# Patient Record
Sex: FEMALE | Race: WHITE | Employment: OTHER | URBAN - METROPOLITAN AREA
[De-identification: names, ages, dates, MRNs, and addresses within clinical notes are randomized per-mention and may not be internally consistent; named-entity substitution may affect disease eponyms.]

---

## 2017-02-27 DIAGNOSIS — F51.01 PRIMARY INSOMNIA: ICD-10-CM

## 2017-02-28 RX ORDER — MIRTAZAPINE 30 MG/1
30 TABLET, FILM COATED ORAL NIGHTLY
Qty: 30 TABLET | Refills: 1 | OUTPATIENT
Start: 2017-02-28

## 2017-02-28 RX ORDER — HYDROXYZINE PAMOATE 25 MG/1
CAPSULE ORAL
Qty: 60 CAPSULE | Refills: 0 | OUTPATIENT
Start: 2017-02-28

## 2017-02-28 RX ORDER — TRAZODONE HYDROCHLORIDE 100 MG/1
TABLET ORAL
Qty: 45 TABLET | Refills: 0 | OUTPATIENT
Start: 2017-02-28

## 2017-02-28 RX ORDER — LORAZEPAM 1 MG/1
TABLET ORAL
Qty: 30 TABLET | Refills: 0 | OUTPATIENT
Start: 2017-02-28

## 2017-02-28 RX ORDER — METHOCARBAMOL 750 MG/1
TABLET, FILM COATED ORAL
Qty: 90 TABLET | Refills: 0 | OUTPATIENT
Start: 2017-02-28

## 2017-02-28 RX ORDER — IBUPROFEN 800 MG/1
TABLET ORAL
Qty: 120 TABLET | Refills: 0 | OUTPATIENT
Start: 2017-02-28

## 2017-03-02 DIAGNOSIS — J45.20 MILD INTERMITTENT ASTHMA WITHOUT COMPLICATION: ICD-10-CM

## 2017-03-02 RX ORDER — ALBUTEROL SULFATE 90 UG/1
2 AEROSOL, METERED RESPIRATORY (INHALATION) EVERY 6 HOURS PRN
Qty: 1 INHALER | Refills: 0 | Status: ON HOLD | OUTPATIENT
Start: 2017-03-02 | End: 2018-06-08

## 2017-03-03 RX ORDER — LORAZEPAM 1 MG/1
TABLET ORAL
Qty: 30 TABLET | Refills: 0 | OUTPATIENT
Start: 2017-03-03

## 2017-03-03 RX ORDER — IPRATROPIUM BROMIDE AND ALBUTEROL SULFATE 2.5; .5 MG/3ML; MG/3ML
1 SOLUTION RESPIRATORY (INHALATION) EVERY 4 HOURS
Qty: 120 ML | Refills: 0 | Status: SHIPPED | OUTPATIENT
Start: 2017-03-03 | End: 2017-11-04 | Stop reason: ALTCHOICE

## 2017-03-03 RX ORDER — METHOCARBAMOL 750 MG/1
TABLET, FILM COATED ORAL
Qty: 90 TABLET | Refills: 0 | Status: ON HOLD | OUTPATIENT
Start: 2017-03-03 | End: 2018-06-08 | Stop reason: ALTCHOICE

## 2017-03-03 RX ORDER — TRAZODONE HYDROCHLORIDE 100 MG/1
TABLET ORAL
Qty: 45 TABLET | Refills: 0 | Status: SHIPPED | OUTPATIENT
Start: 2017-03-03 | End: 2018-03-16

## 2017-05-08 PROBLEM — J96.01 ACUTE RESPIRATORY FAILURE WITH HYPOXIA (HCC): Status: ACTIVE | Noted: 2017-05-08

## 2017-05-09 PROBLEM — J45.901 ACUTE ASTHMA EXACERBATION: Status: ACTIVE | Noted: 2017-05-09

## 2017-05-09 PROBLEM — J45.902 ASTHMA WITH STATUS ASTHMATICUS: Status: ACTIVE | Noted: 2017-05-09

## 2017-05-10 ENCOUNTER — CARE COORDINATION (OUTPATIENT)
Dept: CARE COORDINATION | Age: 57
End: 2017-05-10

## 2017-05-11 ENCOUNTER — TELEPHONE (OUTPATIENT)
Dept: PULMONOLOGY | Age: 57
End: 2017-05-11

## 2018-06-08 PROBLEM — J45.901 ACUTE ASTHMA EXACERBATION: Status: ACTIVE | Noted: 2018-06-08
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
## 2018-06-08 PROBLEM — J96.01 ACUTE RESPIRATORY FAILURE WITH HYPOXIA (HCC): Status: RESOLVED | Noted: 2017-05-08 | Resolved: 2018-06-08
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
## 2018-06-10 PROBLEM — J45.902 ASTHMA WITH STATUS ASTHMATICUS: Status: RESOLVED | Noted: 2017-05-09 | Resolved: 2018-06-10
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
## 2018-06-10 PROBLEM — J45.901 ACUTE ASTHMA EXACERBATION: Status: RESOLVED | Noted: 2018-06-08 | Resolved: 2018-06-10
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
## 2018-06-30 ENCOUNTER — CLINICAL DOCUMENTATION (OUTPATIENT)
Dept: INTERNAL MEDICINE | Age: 58
End: 2018-06-30

## 2018-08-28 ENCOUNTER — HOSPITAL ENCOUNTER (OUTPATIENT)
Age: 58
Setting detail: OBSERVATION
Discharge: HOME | End: 2018-08-29
Attending: EMERGENCY MEDICINE | Admitting: EMERGENCY MEDICINE

## 2018-08-28 ENCOUNTER — APPOINTMENT (OUTPATIENT)
Dept: GENERAL RADIOLOGY | Age: 58
End: 2018-08-28

## 2018-08-28 DIAGNOSIS — J45.901 ACUTE SEVERE EXACERBATION OF ASTHMA: Primary | ICD-10-CM

## 2018-08-28 LAB
ANION GAP SERPL CALCULATED.3IONS-SCNC: 13 MMOL/L (ref 3–16)
BUN BLDV-MCNC: 15 MG/DL (ref 7–20)
CALCIUM SERPL-MCNC: 9.5 MG/DL (ref 8.3–10.6)
CHLORIDE BLD-SCNC: 102 MMOL/L (ref 99–110)
CO2: 23 MMOL/L (ref 21–32)
CREAT SERPL-MCNC: 0.5 MG/DL (ref 0.6–1.1)
EKG ATRIAL RATE: 94 BPM
EKG ATRIAL RATE: 94 BPM
EKG DIAGNOSIS: NORMAL
EKG DIAGNOSIS: NORMAL
EKG P AXIS: 6 DEGREES
EKG P AXIS: 6 DEGREES
EKG P-R INTERVAL: 176 MS
EKG P-R INTERVAL: 176 MS
EKG Q-T INTERVAL: 360 MS
EKG Q-T INTERVAL: 360 MS
EKG QRS DURATION: 88 MS
EKG QRS DURATION: 88 MS
EKG QTC CALCULATION (BAZETT): 450 MS
EKG QTC CALCULATION (BAZETT): 450 MS
EKG R AXIS: 55 DEGREES
EKG R AXIS: 55 DEGREES
EKG T AXIS: 46 DEGREES
EKG T AXIS: 46 DEGREES
EKG VENTRICULAR RATE: 94 BPM
EKG VENTRICULAR RATE: 94 BPM
GFR AFRICAN AMERICAN: >60
GFR NON-AFRICAN AMERICAN: >60
GLUCOSE BLD-MCNC: 198 MG/DL (ref 70–99)
HCT VFR BLD CALC: 32.7 % (ref 36–48)
HEMOGLOBIN: 10.4 G/DL (ref 12–16)
MCH RBC QN AUTO: 25 PG (ref 26–34)
MCHC RBC AUTO-ENTMCNC: 31.8 G/DL (ref 31–36)
MCV RBC AUTO: 78.7 FL (ref 80–100)
PDW BLD-RTO: 18.6 % (ref 12.4–15.4)
PLATELET # BLD: 470 K/UL (ref 135–450)
PMV BLD AUTO: 7.4 FL (ref 5–10.5)
POTASSIUM REFLEX MAGNESIUM: 4.3 MMOL/L (ref 3.5–5.1)
RBC # BLD: 4.16 M/UL (ref 4–5.2)
SODIUM BLD-SCNC: 138 MMOL/L (ref 136–145)
WBC # BLD: 9.8 K/UL (ref 4–11)

## 2018-08-28 PROCEDURE — 94640 AIRWAY INHALATION TREATMENT: CPT

## 2018-08-28 PROCEDURE — 2580000003 HC RX 258: Performed by: INTERNAL MEDICINE

## 2018-08-28 PROCEDURE — 93005 ELECTROCARDIOGRAM TRACING: CPT | Performed by: INTERNAL MEDICINE

## 2018-08-28 PROCEDURE — 71046 X-RAY EXAM CHEST 2 VIEWS: CPT

## 2018-08-28 PROCEDURE — 6360000002 HC RX W HCPCS: Performed by: INTERNAL MEDICINE

## 2018-08-28 PROCEDURE — 85027 COMPLETE CBC AUTOMATED: CPT

## 2018-08-28 PROCEDURE — 6370000000 HC RX 637 (ALT 250 FOR IP): Performed by: EMERGENCY MEDICINE

## 2018-08-28 PROCEDURE — 93005 ELECTROCARDIOGRAM TRACING: CPT | Performed by: EMERGENCY MEDICINE

## 2018-08-28 PROCEDURE — G0378 HOSPITAL OBSERVATION PER HR: HCPCS

## 2018-08-28 PROCEDURE — 99222 1ST HOSP IP/OBS MODERATE 55: CPT | Performed by: INTERNAL MEDICINE

## 2018-08-28 PROCEDURE — 6360000002 HC RX W HCPCS: Performed by: EMERGENCY MEDICINE

## 2018-08-28 PROCEDURE — 96374 THER/PROPH/DIAG INJ IV PUSH: CPT

## 2018-08-28 PROCEDURE — 94150 VITAL CAPACITY TEST: CPT

## 2018-08-28 PROCEDURE — 96372 THER/PROPH/DIAG INJ SC/IM: CPT

## 2018-08-28 PROCEDURE — 6370000000 HC RX 637 (ALT 250 FOR IP): Performed by: INTERNAL MEDICINE

## 2018-08-28 PROCEDURE — 99285 EMERGENCY DEPT VISIT HI MDM: CPT

## 2018-08-28 PROCEDURE — 80048 BASIC METABOLIC PNL TOTAL CA: CPT

## 2018-08-28 PROCEDURE — 36415 COLL VENOUS BLD VENIPUNCTURE: CPT

## 2018-08-28 PROCEDURE — 94799 UNLISTED PULMONARY SVC/PX: CPT

## 2018-08-28 PROCEDURE — 94664 DEMO&/EVAL PT USE INHALER: CPT

## 2018-08-28 PROCEDURE — 94761 N-INVAS EAR/PLS OXIMETRY MLT: CPT

## 2018-08-28 RX ORDER — ALBUTEROL SULFATE 2.5 MG/3ML
2.5 SOLUTION RESPIRATORY (INHALATION) 4 TIMES DAILY
Status: DISCONTINUED | OUTPATIENT
Start: 2018-08-28 | End: 2018-08-28

## 2018-08-28 RX ORDER — BUDESONIDE 0.5 MG/2ML
0.5 INHALANT ORAL 2 TIMES DAILY
Status: DISCONTINUED | OUTPATIENT
Start: 2018-08-28 | End: 2018-08-28

## 2018-08-28 RX ORDER — METHYLPREDNISOLONE SODIUM SUCCINATE 125 MG/2ML
125 INJECTION, POWDER, LYOPHILIZED, FOR SOLUTION INTRAMUSCULAR; INTRAVENOUS ONCE
Status: DISCONTINUED | OUTPATIENT
Start: 2018-08-28 | End: 2018-08-28

## 2018-08-28 RX ORDER — HYDROXYZINE PAMOATE 25 MG/1
25 CAPSULE ORAL 3 TIMES DAILY PRN
Status: DISCONTINUED | OUTPATIENT
Start: 2018-08-28 | End: 2018-08-29 | Stop reason: HOSPADM

## 2018-08-28 RX ORDER — IPRATROPIUM BROMIDE AND ALBUTEROL SULFATE 2.5; .5 MG/3ML; MG/3ML
1 SOLUTION RESPIRATORY (INHALATION) ONCE
Status: COMPLETED | OUTPATIENT
Start: 2018-08-28 | End: 2018-08-28

## 2018-08-28 RX ORDER — BUDESONIDE 0.5 MG/2ML
0.5 INHALANT ORAL 2 TIMES DAILY
Status: DISCONTINUED | OUTPATIENT
Start: 2018-08-28 | End: 2018-08-29 | Stop reason: HOSPADM

## 2018-08-28 RX ORDER — CYCLOBENZAPRINE HCL 10 MG
10 TABLET ORAL 3 TIMES DAILY
Status: DISCONTINUED | OUTPATIENT
Start: 2018-08-28 | End: 2018-08-29 | Stop reason: HOSPADM

## 2018-08-28 RX ORDER — IPRATROPIUM BROMIDE AND ALBUTEROL SULFATE 2.5; .5 MG/3ML; MG/3ML
1 SOLUTION RESPIRATORY (INHALATION) EVERY 4 HOURS
Status: DISCONTINUED | OUTPATIENT
Start: 2018-08-28 | End: 2018-08-29 | Stop reason: HOSPADM

## 2018-08-28 RX ORDER — SODIUM CHLORIDE 0.9 % (FLUSH) 0.9 %
10 SYRINGE (ML) INJECTION PRN
Status: DISCONTINUED | OUTPATIENT
Start: 2018-08-28 | End: 2018-08-29 | Stop reason: HOSPADM

## 2018-08-28 RX ORDER — SODIUM CHLORIDE 0.9 % (FLUSH) 0.9 %
10 SYRINGE (ML) INJECTION EVERY 12 HOURS SCHEDULED
Status: DISCONTINUED | OUTPATIENT
Start: 2018-08-28 | End: 2018-08-29 | Stop reason: HOSPADM

## 2018-08-28 RX ORDER — ALBUTEROL SULFATE 2.5 MG/3ML
2.5 SOLUTION RESPIRATORY (INHALATION) EVERY 6 HOURS PRN
Status: DISCONTINUED | OUTPATIENT
Start: 2018-08-28 | End: 2018-08-28

## 2018-08-28 RX ORDER — ALBUTEROL SULFATE 2.5 MG/3ML
2.5 SOLUTION RESPIRATORY (INHALATION) EVERY 4 HOURS PRN
Status: DISCONTINUED | OUTPATIENT
Start: 2018-08-28 | End: 2018-08-29 | Stop reason: HOSPADM

## 2018-08-28 RX ORDER — DOCUSATE SODIUM 100 MG/1
100 CAPSULE, LIQUID FILLED ORAL 2 TIMES DAILY
Status: DISCONTINUED | OUTPATIENT
Start: 2018-08-28 | End: 2018-08-29 | Stop reason: HOSPADM

## 2018-08-28 RX ORDER — ONDANSETRON 2 MG/ML
4 INJECTION INTRAMUSCULAR; INTRAVENOUS EVERY 6 HOURS PRN
Status: DISCONTINUED | OUTPATIENT
Start: 2018-08-28 | End: 2018-08-29 | Stop reason: HOSPADM

## 2018-08-28 RX ORDER — FLUOXETINE HYDROCHLORIDE 20 MG/1
40 CAPSULE ORAL DAILY
Status: DISCONTINUED | OUTPATIENT
Start: 2018-08-28 | End: 2018-08-29 | Stop reason: HOSPADM

## 2018-08-28 RX ORDER — SODIUM CHLORIDE 9 MG/ML
INJECTION, SOLUTION INTRAVENOUS CONTINUOUS
Status: DISCONTINUED | OUTPATIENT
Start: 2018-08-28 | End: 2018-08-29 | Stop reason: HOSPADM

## 2018-08-28 RX ORDER — METHYLPREDNISOLONE SODIUM SUCCINATE 40 MG/ML
40 INJECTION, POWDER, LYOPHILIZED, FOR SOLUTION INTRAMUSCULAR; INTRAVENOUS EVERY 12 HOURS
Status: DISCONTINUED | OUTPATIENT
Start: 2018-08-28 | End: 2018-08-29 | Stop reason: HOSPADM

## 2018-08-28 RX ORDER — GABAPENTIN 300 MG/1
600 CAPSULE ORAL 3 TIMES DAILY
Status: DISCONTINUED | OUTPATIENT
Start: 2018-08-28 | End: 2018-08-29 | Stop reason: HOSPADM

## 2018-08-28 RX ORDER — IPRATROPIUM BROMIDE AND ALBUTEROL SULFATE 2.5; .5 MG/3ML; MG/3ML
1 SOLUTION RESPIRATORY (INHALATION)
Status: DISCONTINUED | OUTPATIENT
Start: 2018-08-28 | End: 2018-08-28

## 2018-08-28 RX ORDER — ALBUTEROL SULFATE 2.5 MG/3ML
2.5 SOLUTION RESPIRATORY (INHALATION) EVERY 30 MIN PRN
Status: DISCONTINUED | OUTPATIENT
Start: 2018-08-28 | End: 2018-08-28

## 2018-08-28 RX ORDER — ACETAMINOPHEN 500 MG
1000 TABLET ORAL ONCE
Status: COMPLETED | OUTPATIENT
Start: 2018-08-28 | End: 2018-08-28

## 2018-08-28 RX ORDER — IBUPROFEN 400 MG/1
800 TABLET ORAL EVERY 6 HOURS PRN
Status: DISCONTINUED | OUTPATIENT
Start: 2018-08-28 | End: 2018-08-29 | Stop reason: HOSPADM

## 2018-08-28 RX ORDER — KETOROLAC TROMETHAMINE 30 MG/ML
30 INJECTION, SOLUTION INTRAMUSCULAR; INTRAVENOUS ONCE
Status: COMPLETED | OUTPATIENT
Start: 2018-08-28 | End: 2018-08-28

## 2018-08-28 RX ADMIN — HYDROXYZINE PAMOATE 25 MG: 25 CAPSULE ORAL at 20:42

## 2018-08-28 RX ADMIN — IPRATROPIUM BROMIDE AND ALBUTEROL SULFATE 1 AMPULE: .5; 3 SOLUTION RESPIRATORY (INHALATION) at 11:54

## 2018-08-28 RX ADMIN — BUDESONIDE 500 MCG: 0.5 SUSPENSION RESPIRATORY (INHALATION) at 11:54

## 2018-08-28 RX ADMIN — BUDESONIDE 500 MCG: 0.5 SUSPENSION RESPIRATORY (INHALATION) at 20:00

## 2018-08-28 RX ADMIN — ALBUTEROL SULFATE 2.5 MG: 2.5 SOLUTION RESPIRATORY (INHALATION) at 06:14

## 2018-08-28 RX ADMIN — DOCUSATE SODIUM 100 MG: 100 CAPSULE, LIQUID FILLED ORAL at 20:37

## 2018-08-28 RX ADMIN — IBUPROFEN 800 MG: 400 TABLET ORAL at 17:59

## 2018-08-28 RX ADMIN — ENOXAPARIN SODIUM 40 MG: 40 INJECTION SUBCUTANEOUS at 12:07

## 2018-08-28 RX ADMIN — METHYLPREDNISOLONE SODIUM SUCCINATE 40 MG: 40 INJECTION, POWDER, FOR SOLUTION INTRAMUSCULAR; INTRAVENOUS at 20:39

## 2018-08-28 RX ADMIN — METHYLPREDNISOLONE SODIUM SUCCINATE 40 MG: 40 INJECTION, POWDER, FOR SOLUTION INTRAMUSCULAR; INTRAVENOUS at 09:37

## 2018-08-28 RX ADMIN — IBUPROFEN 800 MG: 400 TABLET ORAL at 12:04

## 2018-08-28 RX ADMIN — GABAPENTIN 600 MG: 300 CAPSULE ORAL at 16:23

## 2018-08-28 RX ADMIN — IPRATROPIUM BROMIDE AND ALBUTEROL SULFATE 1 AMPULE: .5; 3 SOLUTION RESPIRATORY (INHALATION) at 15:34

## 2018-08-28 RX ADMIN — GABAPENTIN 600 MG: 300 CAPSULE ORAL at 20:37

## 2018-08-28 RX ADMIN — IPRATROPIUM BROMIDE AND ALBUTEROL SULFATE 1 AMPULE: .5; 3 SOLUTION RESPIRATORY (INHALATION) at 03:59

## 2018-08-28 RX ADMIN — SODIUM CHLORIDE: 900 INJECTION, SOLUTION INTRAVENOUS at 09:25

## 2018-08-28 RX ADMIN — KETOROLAC TROMETHAMINE 30 MG: 30 INJECTION, SOLUTION INTRAMUSCULAR at 06:00

## 2018-08-28 RX ADMIN — FLUOXETINE 40 MG: 20 CAPSULE ORAL at 09:36

## 2018-08-28 RX ADMIN — IPRATROPIUM BROMIDE AND ALBUTEROL SULFATE 1 AMPULE: .5; 3 SOLUTION RESPIRATORY (INHALATION) at 20:00

## 2018-08-28 RX ADMIN — ACETAMINOPHEN 1000 MG: 500 TABLET ORAL at 16:23

## 2018-08-28 RX ADMIN — Medication 10 ML: at 09:38

## 2018-08-28 RX ADMIN — GABAPENTIN 600 MG: 300 CAPSULE ORAL at 09:36

## 2018-08-28 RX ADMIN — CYCLOBENZAPRINE HYDROCHLORIDE 10 MG: 10 TABLET, FILM COATED ORAL at 12:05

## 2018-08-28 RX ADMIN — CYCLOBENZAPRINE HYDROCHLORIDE 10 MG: 10 TABLET, FILM COATED ORAL at 20:37

## 2018-08-28 RX ADMIN — HYDROXYZINE PAMOATE 25 MG: 25 CAPSULE ORAL at 12:05

## 2018-08-28 RX ADMIN — Medication 10 ML: at 20:37

## 2018-08-28 RX ADMIN — ALBUTEROL SULFATE 2.5 MG: 2.5 SOLUTION RESPIRATORY (INHALATION) at 04:12

## 2018-08-28 RX ADMIN — SODIUM CHLORIDE: 900 INJECTION, SOLUTION INTRAVENOUS at 22:16

## 2018-08-28 RX ADMIN — AMITRIPTYLINE HYDROCHLORIDE 75 MG: 25 TABLET, FILM COATED ORAL at 20:37

## 2018-08-28 ASSESSMENT — PAIN DESCRIPTION - LOCATION
LOCATION: RIB CAGE
LOCATION: HEAD
LOCATION: HEAD
LOCATION: CHEST
LOCATION: HEAD

## 2018-08-28 ASSESSMENT — PAIN DESCRIPTION - PAIN TYPE
TYPE: ACUTE PAIN

## 2018-08-28 ASSESSMENT — ENCOUNTER SYMPTOMS
WHEEZING: 1
COUGH: 1
SPUTUM PRODUCTION: 1
EYES NEGATIVE: 1
SHORTNESS OF BREATH: 1
GASTROINTESTINAL NEGATIVE: 1

## 2018-08-28 ASSESSMENT — PAIN SCALES - GENERAL
PAINLEVEL_OUTOF10: 9
PAINLEVEL_OUTOF10: 9
PAINLEVEL_OUTOF10: 0
PAINLEVEL_OUTOF10: 8
PAINLEVEL_OUTOF10: 9

## 2018-08-28 ASSESSMENT — PAIN DESCRIPTION - FREQUENCY
FREQUENCY: CONTINUOUS
FREQUENCY: CONTINUOUS

## 2018-08-28 ASSESSMENT — PAIN DESCRIPTION - ORIENTATION: ORIENTATION: MID

## 2018-08-28 ASSESSMENT — PAIN DESCRIPTION - DESCRIPTORS
DESCRIPTORS: ACHING
DESCRIPTORS: ACHING;TIGHTNESS;CONSTANT
DESCRIPTORS: ACHING

## 2018-08-28 ASSESSMENT — PAIN DESCRIPTION - ONSET
ONSET: ON-GOING

## 2018-08-28 ASSESSMENT — PULMONARY FUNCTION TESTS: PEFR_L/MIN: 180

## 2018-08-28 NOTE — PROGRESS NOTES
Patient complaining of a 9 out of 10 headache. She had ibuprofen at noon.   Sent a perfect serve to Dr. Meena Castaneda

## 2018-08-28 NOTE — FLOWSHEET NOTE
08/28/18 1556   Encounter Summary   Services provided to: Patient   Referral/Consult From: [address] [facility] Family members   Continue Visiting (seen 8/28/18, DIAZ. )   Complexity of Encounter Moderate   Length of Encounter 15 minutes   Routine   Type Initial   Assessment Approachable   Intervention Nurtured hope   Outcome Expressed gratitude

## 2018-08-28 NOTE — CONSULTS
 TOTAL HIP ARTHROPLASTY Bilateral 2014    WISDOM TOOTH EXTRACTION  1995       Family History  Family History   Problem Relation Age of Onset    Adopted: Yes    Diabetes Daughter     Cancer Mother         Breast and leukemia    Heart Disease Sister     Drug Abuse Brother        Social History  Social History     Social History    Marital status: Legally      Spouse name: N/A    Number of children: N/A    Years of education: N/A     Social History Main Topics    Smoking status: Former Smoker     Packs/day: 0.25     Years: 0.20     Types: Cigarettes     Quit date: 6/1/2014    Smokeless tobacco: Never Used      Comment: working to decrease    Alcohol use No      Comment: recovering alcoholic since [age], has had couple of relaspes, last 7/15/2015.     Drug use: No    Sexual activity: Yes     Partners: Male     Other Topics Concern    Not on file     Social History Narrative    No narrative on file       Allergies  Allergies   Allergen Reactions    Compazine [Prochlorperazine] Other (See Comments)     EPS symptoms    Droperidol Other (See Comments)     shaking  ETS       Current Medications  Current Facility-Administered Medications   Medication Dose Route Frequency Provider Last Rate Last Dose    amitriptyline (ELAVIL) tablet 75 mg  75 mg Oral Nightly Ernie Melton MD        FLUoxetine (PROZAC) capsule 40 mg  40 mg Oral Daily Ernie Melton MD   40 mg at 08/28/18 0936    gabapentin (NEURONTIN) capsule 600 mg  600 mg Oral TID Ernie Melton MD   600 mg at 08/28/18 0936    0.9 % sodium chloride infusion   Intravenous Continuous Ernie Melton MD 75 mL/hr at 08/28/18 5419      sodium chloride flush 0.9 % injection 10 mL  10 mL Intravenous 2 times per day Ernie Melton MD   10 mL at 08/28/18 5279    sodium chloride flush 0.9 % injection 10 mL  10 mL Intravenous PRN Ernie Melton MD        magnesium hydroxide (MILK OF MAGNESIA) 400 MG/5ML suspension 30 mL  30 mL Oral Daily PRN Piero VALDEZ

## 2018-08-28 NOTE — ED NOTES
Attempted to call report to [address] [place] [address] [address], RN unavailable at this time. Phone number left with [phone].      Carlyle Merlin, RN  08/28/18 9517

## 2018-08-28 NOTE — PROGRESS NOTES
Clinical Pharmacy Progress Note  Medication History     Admit Date: 8/28/2018    List of of current medications patient is taking is complete. Home Medication list in EPIC updated to reflect changes noted below. Source of information: Patient interview    **Of note - patient is supposed to be taking Symbicort as a maintenance inhaler. Pt states she no longer has insurance and cannot afford the Symbicort. Uses only Albuterol nebs now. · Told patient that I would print out patient assistance forms for her, but patient states that she has tried that before and she makes too much money so doesn't qualify for assistance. Verified that another pharmacist provided the patient assistance information to her during her admission in early June. Coupons can only be used with [employer], so this also is not an option. Patient has already used a 30-day free trial card, so also does qualify for a free trial coupon. · An alternate ICS / LABA could be tried Elda Barboza / Radha Bowles / [facility] however the cash price would be the same as Symbicort. Patient assistance programs via drug companies use similar requirements for eligibility, so if she does not qualify for Symbicort patient assistance, she likely will not qualify for the others either. · Could consider using Budesonide nebs BID + Albuterol nebs (pt already on) QID as an alternate - although Budesonide nebs would likely be cash price ~$180 / month (vs $320 / month for Symbicort). · There is a generic Fluticasone / Salmeterol product that is less expensive - generic Rosebud Misha would be ~$ / month cash price. Would need to be ordered as Rosebud Misha \"ok to substitute with generic\" 232mcg/14mcg 1 puff BID. Unsure if this would be considered affordable to patient.   · Systemic chronic steroid (low dose prednisone) may be the only cost-effective option for this patient, although would need to consider risk / benefit of long-term chronic systemic steroid.     Please call with questions--  Thanks--  Roney Todd, PharmD, [address], [address] or [phone] (wireless)  8/28/2018 9:07 AM

## 2018-08-28 NOTE — H&P
Breast and leukemia    Heart Disease Sister     Drug Abuse Brother        REVIEW OF SYSTEMS:   Pertinent positives as noted in the HPI. All other systems reviewed and negative. PHYSICAL EXAM PERFORMED:    BP (!) 145/80   Pulse 97   Temp 98.5 °F (36.9 °C) (Oral)   Resp 20   LMP 12/22/2014   SpO2 91%     Physical Exam   Constitutional: She is oriented to person, place, and time and well-developed, well-nourished, and in no distress. No distress. HENT:   Head: Normocephalic and atraumatic. Cardiovascular: Normal rate and regular rhythm. Pulmonary/Chest: Effort normal. She has wheezes. Abdominal: Soft. Bowel sounds are normal. She exhibits no distension. There is no tenderness. Musculoskeletal: Normal range of motion. She exhibits no edema. Lymphadenopathy:   No LAD noted. Neurological: She is alert and oriented to person, place, and time. Skin: Skin is warm and dry. She is not diaphoretic. Psychiatric: Affect and judgment normal.       Labs:   No labs done in ER. Radiology:           XR CHEST STANDARD (2 VW)   Final Result      Mild streaky right mid and lower lung opacity may relate to atelectasis or pneumonitis present previously      No lobar consolidation          ASSESSMENT & PLAN  Acute asthma exacerbation  Admit to observation. Start Nebs scheduled Q4 ,IV  steroids, and Dulera. Home o2 eval prior to dc. No need for abx now. Check BMP, CBC, EKG. Ordered. Chronic pain :  She is on Gabapentin and Amitriptyline. Continue with that. Code Status: Full         Eugenio Prieto MD    Thank you Bladimir Syed MD for the opportunity to be involved in this patient's care. If you have any questions or concerns please feel free to contact me at [phone].

## 2018-08-28 NOTE — ED PROVIDER NOTES
INHALER    Inhale 2 puffs into the lungs every 4 hours as needed for Wheezing or Shortness of Breath (Space out to every 6 hours as symptoms improve) Space out to every 6 hours as symptoms improve. AMITRIPTYLINE (ELAVIL) 75 MG TABLET    Take 1 tablet by mouth nightly    BUDESONIDE-FORMOTEROL (SYMBICORT) 160-4.5 MCG/ACT AERO    Inhale 2 puffs into the lungs 2 times daily    CHOLECALCIFEROL (VITAMIN D3) 5000 UNITS TABS    Take by mouth    CYCLOBENZAPRINE (FLEXERIL) 5 MG TABLET    Take 10 mg by mouth 3 times daily as needed for Muscle spasms    FLUOXETINE (PROZAC) 40 MG CAPSULE    Take 1 capsule by mouth daily    FOLIC ACID (FOLVITE) 1 MG TABLET        GABAPENTIN (NEURONTIN) 300 MG CAPSULE    Take 600 mg by mouth 3 times daily. Stacy Quiet HYDROXYZINE (VISTARIL) 25 MG CAPSULE    Take 25 mg by mouth 3 times daily as needed for Anxiety    IBUPROFEN (ADVIL;MOTRIN) 800 MG TABLET    Take 800 mg by mouth every 6 hours as needed for Pain    MULTIPLE VITAMINS-MINERALS (THERAPEUTIC MULTIVITAMIN-MINERALS) TABLET    Take 1 tablet by mouth daily    PREDNISONE (DELTASONE) 10 MG TABLET    Take 5 tablets by mouth once daily for 4 more days, starting tomorrow    VITAMIN B-1 (THIAMINE) 100 MG TABLET        VITAMIN B-12 (CYANOCOBALAMIN) 100 MCG TABLET           Allergies     She is allergic to compazine [prochlorperazine] and droperidol. Physical Exam     INITIAL VITALS: BP: 134/85, Temp: 98.5 °F (36.9 °C), Pulse: 97, Resp: 24, SpO2: 96 %   Physical Exam  Gen. --Actively wheezing as I walk in the room looking tachypneic  HEENT--atraumatic, normocephalic, EOMI, pupils are equal round reactive to light  Neck--trachea midline, no stridor  Chest--diffuse significant wheezing with prolonged expiratory phase, no focal rales or rhonchi  Heart--difficult to hear over the wheezing, regular rhythm, no appreciable murmurs  Abdomen--soft, nondistended, no focal rebound or guarding, bowel sounds are present  Extremities--trace edema both lower

## 2018-08-28 NOTE — PROGRESS NOTES
RESPIRATORY THERAPY ASSESSMENT    Name:  Mayte Doherty Record Number:  [id number]  Age: 61 y.o. Gender: female  : 1960  Today's Date:  2018  Room:  [facility]-    Assessment     Is the patient being admitted for a COPD or Asthma exacerbation? Yes   (If yes the patient will be seen every 4 hours for the first 24 hours and then reassessed)    Patient Admission Diagnosis      Allergies  Allergies   Allergen Reactions    Compazine [Prochlorperazine] Other (See Comments)     EPS symptoms    Droperidol Other (See Comments)     shaking  ETS       Minimum Predicted Vital Capacity:     646          Actual Vital Capacity:      500          Pulmonary History:Asthma  Home Oxygen Therapy:  room air  Home Respiratory Therapy:Albuterol and Budesonide/Formoterol    Current Respiratory Therapy:  Albuterol QID, Pulmicort BID, Duoneb Q4WA  Treatment Type: HHN, IS (HHN x 2, Peak flow done)  Medications: Albuterol/Ipratropium, Budesonide    Respiratory Severity Index(RSI)   Patients with orders for inhalation medications, oxygen, or any therapeutic treatment modality will be placed on Respiratory Protocol. They will be assessed with the first treatment and at least every 72 hours thereafter. The following severity scale will be used to determine frequency of treatment intervention. Smoking History: Mild Exacerbation = 3    Social History  Social History   Substance Use Topics    Smoking status: Former Smoker     Packs/day: 0.25     Years: 0.20     Types: Cigarettes     Quit date: 2014    Smokeless tobacco: Never Used      Comment: working to decrease    Alcohol use No      Comment: recovering alcoholic since , has had couple of relaspes, last 7/15/2015.        Recent Surgical History: None = 0  Past Surgical History  Past Surgical History:   Procedure Laterality Date    BREAST ENHANCEMENT SURGERY Bilateral    [place]    FRACTURE SURGERY experiences worsened bronchospasm, or secretions have lessened to the point that the patient is able to clear them with a cough. Anti-inflammatory and Combination Medications:    1. If the patient lacks prior history of lung disease, is not using inhaled anti-inflammatory medication at home, and lacks wheezing by examination or by history for at least 24 hours, contact physician for possible discontinuation.

## 2018-08-28 NOTE — ED NOTES
RT at bedside for breathing treatment. Pt will be admitted for her 02 sats dropping even while awake.      Romel Shelton RN  08/28/18 1675

## 2018-08-28 NOTE — PROGRESS NOTES
Patient called out and requested a breathing treatment. Respiratory at bedside. Patient complaining of pain. Ibuprofen given and scheduled flexeril.

## 2018-08-28 NOTE — PROGRESS NOTES
Peak flows done per orders. Pre = 120, post = 190 (45% predicted), predicted = 425. MD fernandez served to notify him of peak flows <200 per orders.

## 2018-08-29 VITALS
OXYGEN SATURATION: 92 % | WEIGHT: 175 LBS | HEART RATE: 97 BPM | RESPIRATION RATE: 20 BRPM | HEIGHT: 57 IN | BODY MASS INDEX: 37.76 KG/M2 | TEMPERATURE: 98 F | DIASTOLIC BLOOD PRESSURE: 90 MMHG | SYSTOLIC BLOOD PRESSURE: 154 MMHG

## 2018-08-29 PROCEDURE — 94664 DEMO&/EVAL PT USE INHALER: CPT

## 2018-08-29 PROCEDURE — 6370000000 HC RX 637 (ALT 250 FOR IP): Performed by: INTERNAL MEDICINE

## 2018-08-29 PROCEDURE — 96372 THER/PROPH/DIAG INJ SC/IM: CPT

## 2018-08-29 PROCEDURE — 94761 N-INVAS EAR/PLS OXIMETRY MLT: CPT

## 2018-08-29 PROCEDURE — 6360000002 HC RX W HCPCS: Performed by: INTERNAL MEDICINE

## 2018-08-29 PROCEDURE — 94640 AIRWAY INHALATION TREATMENT: CPT

## 2018-08-29 PROCEDURE — G0378 HOSPITAL OBSERVATION PER HR: HCPCS

## 2018-08-29 PROCEDURE — 99232 SBSQ HOSP IP/OBS MODERATE 35: CPT | Performed by: INTERNAL MEDICINE

## 2018-08-29 PROCEDURE — 94680 O2 UPTK RST&XERS DIR SIMPLE: CPT

## 2018-08-29 PROCEDURE — 2580000003 HC RX 258: Performed by: INTERNAL MEDICINE

## 2018-08-29 RX ORDER — IPRATROPIUM BROMIDE AND ALBUTEROL SULFATE 2.5; .5 MG/3ML; MG/3ML
3 SOLUTION RESPIRATORY (INHALATION) EVERY 4 HOURS PRN
Qty: 360 ML | Refills: 0 | Status: SHIPPED | OUTPATIENT
Start: 2018-08-29 | End: 2019-02-13 | Stop reason: ALTCHOICE

## 2018-08-29 RX ORDER — PREDNISONE 20 MG/1
40 TABLET ORAL DAILY
Qty: 10 TABLET | Refills: 0 | Status: SHIPPED | OUTPATIENT
Start: 2018-08-29 | End: 2018-09-03

## 2018-08-29 RX ORDER — BUDESONIDE AND FORMOTEROL FUMARATE DIHYDRATE 160; 4.5 UG/1; UG/1
2 AEROSOL RESPIRATORY (INHALATION) 2 TIMES DAILY
Qty: 1 INHALER | Refills: 3 | Status: ON HOLD | OUTPATIENT
Start: 2018-08-29 | End: 2018-11-03

## 2018-08-29 RX ORDER — ALBUTEROL SULFATE 2.5 MG/3ML
2.5 SOLUTION RESPIRATORY (INHALATION) EVERY 6 HOURS PRN
Qty: 120 EACH | Refills: 3 | Status: ON HOLD | OUTPATIENT
Start: 2018-08-29 | End: 2021-03-20 | Stop reason: HOSPADM

## 2018-08-29 RX ADMIN — GABAPENTIN 600 MG: 300 CAPSULE ORAL at 08:49

## 2018-08-29 RX ADMIN — IBUPROFEN 800 MG: 400 TABLET ORAL at 08:49

## 2018-08-29 RX ADMIN — BUDESONIDE 500 MCG: 0.5 SUSPENSION RESPIRATORY (INHALATION) at 08:33

## 2018-08-29 RX ADMIN — IPRATROPIUM BROMIDE AND ALBUTEROL SULFATE 1 AMPULE: .5; 3 SOLUTION RESPIRATORY (INHALATION) at 08:34

## 2018-08-29 RX ADMIN — ALBUTEROL SULFATE 2.5 MG: 2.5 SOLUTION RESPIRATORY (INHALATION) at 02:38

## 2018-08-29 RX ADMIN — HYDROXYZINE PAMOATE 25 MG: 25 CAPSULE ORAL at 08:49

## 2018-08-29 RX ADMIN — ENOXAPARIN SODIUM 40 MG: 40 INJECTION SUBCUTANEOUS at 08:50

## 2018-08-29 RX ADMIN — CYCLOBENZAPRINE HYDROCHLORIDE 10 MG: 10 TABLET, FILM COATED ORAL at 08:49

## 2018-08-29 RX ADMIN — IPRATROPIUM BROMIDE AND ALBUTEROL SULFATE 1 AMPULE: .5; 3 SOLUTION RESPIRATORY (INHALATION) at 05:15

## 2018-08-29 RX ADMIN — IPRATROPIUM BROMIDE AND ALBUTEROL SULFATE 1 AMPULE: .5; 3 SOLUTION RESPIRATORY (INHALATION) at 12:09

## 2018-08-29 RX ADMIN — Medication 10 ML: at 08:50

## 2018-08-29 RX ADMIN — METHYLPREDNISOLONE SODIUM SUCCINATE 40 MG: 40 INJECTION, POWDER, FOR SOLUTION INTRAMUSCULAR; INTRAVENOUS at 08:50

## 2018-08-29 RX ADMIN — FLUOXETINE 40 MG: 20 CAPSULE ORAL at 08:49

## 2018-08-29 ASSESSMENT — PAIN SCALES - GENERAL
PAINLEVEL_OUTOF10: 0
PAINLEVEL_OUTOF10: 0
PAINLEVEL_OUTOF10: 6

## 2018-08-29 NOTE — PROGRESS NOTES
D/c order received, IV and tele removed. D/c instructions discussed with patient and daughter, denies further questions. D/c home with daughter.

## 2018-08-29 NOTE — PROGRESS NOTES
Pulmonary Followup Note    CC: Acute asthma exacerbation  Subjective:  Patient seen and examined at bedside this morning. No overnight events. Breathing well on RA. She is responding well to scheduled nebs and steroids. She reports feeling much better since admission and denies any SOB, cough or wheezing. She was able to ambulate around her room without becoming short of breath. ROS:  Denies headache, nausea or chest pain. 24HR INTAKE/OUTPUT:    Intake/Output Summary (Last 24 hours) at 18 1535  Last data filed at 18 1026   Gross per 24 hour   Intake             2200 ml   Output                0 ml   Net             2200 ml        amitriptyline  75 mg Oral Nightly    FLUoxetine  40 mg Oral Daily    gabapentin  600 mg Oral TID    sodium chloride flush  10 mL Intravenous 2 times per day    docusate sodium  100 mg Oral BID    enoxaparin  40 mg Subcutaneous Daily    methylPREDNISolone  40 mg Intravenous Q12H    cyclobenzaprine  10 mg Oral TID    budesonide  0.5 mg Nebulization BID    ipratropium-albuterol  1 ampule Inhalation Q4H           PHYSICAL EXAMINATION:  BP (!) 154/90   Pulse 97   Temp 98 °F (36.7 °C) (Oral)   Resp 20   Ht 4' 9\" (1.448 m)   Wt 175 lb (79.4 kg)   LMP 2014   SpO2 92%   BMI 37.87 kg/m²   CURRENT PULSE OXIMETRY:  SpO2: 92 %  24HR PULSE OXIMETRY RANGE:  SpO2  Av.6 %  Min: 91 %  Max: 97 % on RA      Gen: In no apparent distress. Speaking in full sentences without accessory muscle use  HEENT: PERRL, EOMI, OP nl  Lung: CTAB, no wheezing, rales, or rhonchi. CV: RRR without M/R/R  Abd: +BS, soft, NT/ND  Ext: No edema. DATA  CBC: Recent Labs      18   0903   WBC  9.8   HGB  10.4*   HCT  32.7*   MCV  78.7*   PLT  470*     BMP: Recent Labs      18   0903   NA  138   K  4.3   CL  102   CO2  23   BUN  15   CREATININE  0.5*     No results for input(s): PHART, YSE8SQT, PO2ART in the last 72 hours.   LIVER PROFILE: No results for input(s): AST, ALT, LIPASE, BILIDIR, BILITOT, ALKPHOS in the last 72 hours. Invalid input(s): AMYLASE,  ALB    CXR REVIEWED BY ME AND SHOWED:  XR CHEST STANDARD (2 VW)   Final Result      Mild streaky right mid and lower lung opacity may relate to atelectasis or pneumonitis present previously      No lobar consolidation           ASSESSMENT/PLAN:  Ms. Jenn Pierce is 60 yo F with PMHx significant for asthma, EtOH abuse, and depression who presents with progressive SOB and productive cough x 1 week. Acute Asthma Exacerbation- Resolved. Breathing well on RA s/p scheduled nebs, solumedrol, and inhaled corticosteroids with LABA. - Follow-up in outpatient pulmonology clinic tomorrow   - Discharged on the following meds:   - Albuterol nebulizer   - Symbicort   - Duoneb nebulizer    -PDN 20MG BID x 5 days    This patient was staffed and discussed with Dr. Adeline Haynes MD  Internal Medicine, PGY-1    Patient seen, examined and discussed with the resident and I agree with the assessment and plan. Doing much better this morning. No longer wheezing. I called my office and Dr. Boby Dao has an opening tomorrow morning. She has an appointment at 10 am.  She should continue steroids and hopefully with the appointment he can find a maintenance inhaler which she can afford or at least give her samples to tide her over. Agree with discharge.       Heather Pavon MD

## 2018-08-29 NOTE — PLAN OF CARE
Problem: Falls - Risk of:  Goal: Will remain free from falls  Will remain free from falls   Outcome: Ongoing  Pt has unsteady gait, has a IV attached, history of falls and uses a walker, pt educated on the need to use call light before getting out of bed and how to use the call light, pt verbalizes understanding of the information but needs constant reminders. Will continue to monitor, bed in lowest position, call light in reach and bed alarm activated. Problem: Pain:  Goal: Pain level will decrease  Pain level will decrease   Outcome: Ongoing  Pt complained of pain earlier this shift but has managed with repositioning and comfort measures, pt educated on rating the pain and to let staff know when pain begins so interventions can be put in place, pt verbalizes understanding of the information. Will continue to monitor and call light in reach.

## 2018-08-29 NOTE — PROGRESS NOTES
[facility]    Respiratory Therapy     Home Oxygen Evaluation        Name: Mayte Doherty Record Number: [id number]  Age: 61 y.o. Gender:  female   : 1960  Today's date: 2018  Room: [address] [place] [address]      Assessment        BP (!) 154/90   Pulse 97   Temp 98 °F (36.7 °C) (Oral)   Resp 20   Ht 4' 9\" (1.448 m)   Wt 175 lb (79.4 kg)   LMP 2014   SpO2 92%   BMI 37.87 kg/m²     Patient Active Problem List   Diagnosis    Knee osteoarthritis    Chronic pain    Bladder prolapse    Moderate persistent asthma    Alcohol abuse    Localized edema    Anxiety    Primary insomnia    Arthritis    Alcohol use disorder, severe, dependence ([facility]    Depression    [facility]    Acute asthma exacerbation       Social History:  Social History   Substance Use Topics    Smoking status: Former Smoker     Packs/day: 0.25     Years: 0.20     Types: Cigarettes     Quit date: 2014    Smokeless tobacco: Never Used      Comment: working to decrease    Alcohol use No      Comment: recovering alcoholic since , has had couple of relaspes, last 7/15/2015. Patient Room Air saturation at rest 94  %  Patient Room Air saturation upon ambulation 95 %    Qualifying patient for home oxygen with ambulation and continuous flow  @ 0 lpm.      In your clinical assessment does the Patient Require Portable Oxygen Tanks?     No:            Patient/caregiver was educated on Home Oxygen process:  No:       Level of patient/caregiver understanding able to:   [] Verbalize understanding   [] Demonstrate understanding       [] Teach back        [] Needs reinforcement        []  No available caregiver               []  Other:     Response to education:  Good     Time Spent with Home O2 Set Up:  10  minutes     Karla Dunlap RCP on 2018 at 10:57 AM                 .

## 2018-08-30 ENCOUNTER — OFFICE VISIT (OUTPATIENT)
Dept: PULMONOLOGY | Age: 58
End: 2018-08-30

## 2018-08-30 VITALS
WEIGHT: 175 LBS | HEIGHT: 57 IN | BODY MASS INDEX: 37.76 KG/M2 | DIASTOLIC BLOOD PRESSURE: 76 MMHG | RESPIRATION RATE: 18 BRPM | SYSTOLIC BLOOD PRESSURE: 128 MMHG | OXYGEN SATURATION: 92 % | HEART RATE: 107 BPM

## 2018-08-30 DIAGNOSIS — E66.01 MORBID OBESITY (HCC): ICD-10-CM

## 2018-08-30 DIAGNOSIS — Z87.891 EX-SMOKER: ICD-10-CM

## 2018-08-30 DIAGNOSIS — J45.40 MODERATE PERSISTENT ASTHMA WITHOUT COMPLICATION: Primary | ICD-10-CM

## 2018-08-30 PROCEDURE — 99214 OFFICE O/P EST MOD 30 MIN: CPT | Performed by: INTERNAL MEDICINE

## 2018-08-30 ASSESSMENT — ENCOUNTER SYMPTOMS
SHORTNESS OF BREATH: 1
COUGH: 1

## 2018-10-13 ENCOUNTER — HOSPITAL ENCOUNTER (OUTPATIENT)
Age: 58
Setting detail: OBSERVATION
Discharge: HOME | End: 2018-10-16
Attending: EMERGENCY MEDICINE

## 2018-10-13 ENCOUNTER — APPOINTMENT (OUTPATIENT)
Dept: GENERAL RADIOLOGY | Age: 58
End: 2018-10-13

## 2018-10-13 DIAGNOSIS — R26.2 UNABLE TO AMBULATE: ICD-10-CM

## 2018-10-13 DIAGNOSIS — S80.01XD CONTUSION OF RIGHT KNEE, SUBSEQUENT ENCOUNTER: ICD-10-CM

## 2018-10-13 DIAGNOSIS — M25.561 ACUTE PAIN OF RIGHT KNEE: Primary | ICD-10-CM

## 2018-10-13 LAB
ALBUMIN SERPL-MCNC: 3.4 G/DL (ref 3.4–5)
ANION GAP SERPL CALCULATED.3IONS-SCNC: 14 MMOL/L (ref 3–16)
BUN BLDV-MCNC: 15 MG/DL (ref 7–20)
CALCIUM SERPL-MCNC: 8.8 MG/DL (ref 8.3–10.6)
CHLORIDE BLD-SCNC: 100 MMOL/L (ref 99–110)
CO2: 20 MMOL/L (ref 21–32)
CREAT SERPL-MCNC: <0.5 MG/DL (ref 0.6–1.1)
GFR AFRICAN AMERICAN: >60
GFR NON-AFRICAN AMERICAN: >60
GLUCOSE BLD-MCNC: 126 MG/DL (ref 70–99)
PHOSPHORUS: 3.9 MG/DL (ref 2.5–4.9)
POTASSIUM SERPL-SCNC: 3.6 MMOL/L (ref 3.5–5.1)
SODIUM BLD-SCNC: 134 MMOL/L (ref 136–145)

## 2018-10-13 PROCEDURE — 6360000002 HC RX W HCPCS: Performed by: INTERNAL MEDICINE

## 2018-10-13 PROCEDURE — 36415 COLL VENOUS BLD VENIPUNCTURE: CPT

## 2018-10-13 PROCEDURE — 96375 TX/PRO/DX INJ NEW DRUG ADDON: CPT

## 2018-10-13 PROCEDURE — 96372 THER/PROPH/DIAG INJ SC/IM: CPT

## 2018-10-13 PROCEDURE — G0378 HOSPITAL OBSERVATION PER HR: HCPCS

## 2018-10-13 PROCEDURE — 96374 THER/PROPH/DIAG INJ IV PUSH: CPT

## 2018-10-13 PROCEDURE — 73502 X-RAY EXAM HIP UNI 2-3 VIEWS: CPT

## 2018-10-13 PROCEDURE — 94761 N-INVAS EAR/PLS OXIMETRY MLT: CPT

## 2018-10-13 PROCEDURE — 73552 X-RAY EXAM OF FEMUR 2/>: CPT

## 2018-10-13 PROCEDURE — 94640 AIRWAY INHALATION TREATMENT: CPT

## 2018-10-13 PROCEDURE — 6370000000 HC RX 637 (ALT 250 FOR IP): Performed by: INTERNAL MEDICINE

## 2018-10-13 PROCEDURE — 94664 DEMO&/EVAL PT USE INHALER: CPT

## 2018-10-13 PROCEDURE — 2580000003 HC RX 258: Performed by: INTERNAL MEDICINE

## 2018-10-13 PROCEDURE — 6370000000 HC RX 637 (ALT 250 FOR IP): Performed by: EMERGENCY MEDICINE

## 2018-10-13 PROCEDURE — 6360000002 HC RX W HCPCS

## 2018-10-13 PROCEDURE — 99285 EMERGENCY DEPT VISIT HI MDM: CPT

## 2018-10-13 PROCEDURE — 6360000002 HC RX W HCPCS: Performed by: EMERGENCY MEDICINE

## 2018-10-13 PROCEDURE — 73560 X-RAY EXAM OF KNEE 1 OR 2: CPT

## 2018-10-13 PROCEDURE — 80069 RENAL FUNCTION PANEL: CPT

## 2018-10-13 RX ORDER — BUDESONIDE 0.5 MG/2ML
0.5 INHALANT ORAL 2 TIMES DAILY
Status: DISCONTINUED | OUTPATIENT
Start: 2018-10-13 | End: 2018-10-16 | Stop reason: HOSPADM

## 2018-10-13 RX ORDER — OXYCODONE HYDROCHLORIDE AND ACETAMINOPHEN 5; 325 MG/1; MG/1
2 TABLET ORAL EVERY 4 HOURS PRN
Status: DISCONTINUED | OUTPATIENT
Start: 2018-10-13 | End: 2018-10-16 | Stop reason: HOSPADM

## 2018-10-13 RX ORDER — SODIUM CHLORIDE 0.9 % (FLUSH) 0.9 %
10 SYRINGE (ML) INJECTION PRN
Status: DISCONTINUED | OUTPATIENT
Start: 2018-10-13 | End: 2018-10-16 | Stop reason: HOSPADM

## 2018-10-13 RX ORDER — BUDESONIDE AND FORMOTEROL FUMARATE DIHYDRATE 160; 4.5 UG/1; UG/1
2 AEROSOL RESPIRATORY (INHALATION) 2 TIMES DAILY
Status: DISCONTINUED | OUTPATIENT
Start: 2018-10-13 | End: 2018-10-13

## 2018-10-13 RX ORDER — FLUOXETINE HYDROCHLORIDE 20 MG/1
40 CAPSULE ORAL DAILY
Status: DISCONTINUED | OUTPATIENT
Start: 2018-10-14 | End: 2018-10-16 | Stop reason: HOSPADM

## 2018-10-13 RX ORDER — KETOROLAC TROMETHAMINE 30 MG/ML
15 INJECTION, SOLUTION INTRAMUSCULAR; INTRAVENOUS ONCE
Status: COMPLETED | OUTPATIENT
Start: 2018-10-13 | End: 2018-10-13

## 2018-10-13 RX ORDER — TRAMADOL HYDROCHLORIDE 50 MG/1
50 TABLET ORAL EVERY 6 HOURS PRN
Status: DISCONTINUED | OUTPATIENT
Start: 2018-10-13 | End: 2018-10-13

## 2018-10-13 RX ORDER — FOLIC ACID 1 MG/1
1 TABLET ORAL DAILY
Status: DISCONTINUED | OUTPATIENT
Start: 2018-10-13 | End: 2018-10-16 | Stop reason: HOSPADM

## 2018-10-13 RX ORDER — POTASSIUM CHLORIDE 7.45 MG/ML
10 INJECTION INTRAVENOUS PRN
Status: DISCONTINUED | OUTPATIENT
Start: 2018-10-13 | End: 2018-10-16 | Stop reason: HOSPADM

## 2018-10-13 RX ORDER — POTASSIUM CHLORIDE 20MEQ/15ML
40 LIQUID (ML) ORAL PRN
Status: DISCONTINUED | OUTPATIENT
Start: 2018-10-13 | End: 2018-10-16 | Stop reason: HOSPADM

## 2018-10-13 RX ORDER — ALBUTEROL SULFATE 2.5 MG/3ML
2.5 SOLUTION RESPIRATORY (INHALATION) 4 TIMES DAILY
Status: DISCONTINUED | OUTPATIENT
Start: 2018-10-13 | End: 2018-10-16 | Stop reason: HOSPADM

## 2018-10-13 RX ORDER — DIPHENHYDRAMINE HCL 25 MG
25 TABLET ORAL ONCE
Status: COMPLETED | OUTPATIENT
Start: 2018-10-13 | End: 2018-10-13

## 2018-10-13 RX ORDER — GABAPENTIN 300 MG/1
600 CAPSULE ORAL 3 TIMES DAILY
Status: DISCONTINUED | OUTPATIENT
Start: 2018-10-13 | End: 2018-10-16 | Stop reason: HOSPADM

## 2018-10-13 RX ORDER — OXYCODONE HYDROCHLORIDE AND ACETAMINOPHEN 5; 325 MG/1; MG/1
1 TABLET ORAL EVERY 4 HOURS PRN
Status: DISCONTINUED | OUTPATIENT
Start: 2018-10-13 | End: 2018-10-16 | Stop reason: HOSPADM

## 2018-10-13 RX ORDER — HYDROXYZINE PAMOATE 25 MG/1
25 CAPSULE ORAL 3 TIMES DAILY PRN
Status: DISCONTINUED | OUTPATIENT
Start: 2018-10-13 | End: 2018-10-16 | Stop reason: HOSPADM

## 2018-10-13 RX ORDER — CYCLOBENZAPRINE HCL 10 MG
10 TABLET ORAL 3 TIMES DAILY
Status: DISCONTINUED | OUTPATIENT
Start: 2018-10-13 | End: 2018-10-16 | Stop reason: HOSPADM

## 2018-10-13 RX ORDER — POTASSIUM CHLORIDE 20 MEQ/1
40 TABLET, EXTENDED RELEASE ORAL PRN
Status: DISCONTINUED | OUTPATIENT
Start: 2018-10-13 | End: 2018-10-16 | Stop reason: HOSPADM

## 2018-10-13 RX ORDER — TRAMADOL HYDROCHLORIDE 50 MG/1
100 TABLET ORAL EVERY 6 HOURS PRN
Status: DISCONTINUED | OUTPATIENT
Start: 2018-10-13 | End: 2018-10-13

## 2018-10-13 RX ORDER — MORPHINE SULFATE 10 MG/ML
4 INJECTION, SOLUTION INTRAMUSCULAR; INTRAVENOUS
Status: DISCONTINUED | OUTPATIENT
Start: 2018-10-13 | End: 2018-10-16 | Stop reason: HOSPADM

## 2018-10-13 RX ORDER — ONDANSETRON 2 MG/ML
4 INJECTION INTRAMUSCULAR; INTRAVENOUS EVERY 6 HOURS PRN
Status: DISCONTINUED | OUTPATIENT
Start: 2018-10-13 | End: 2018-10-16 | Stop reason: HOSPADM

## 2018-10-13 RX ORDER — SODIUM CHLORIDE 0.9 % (FLUSH) 0.9 %
10 SYRINGE (ML) INJECTION EVERY 12 HOURS SCHEDULED
Status: DISCONTINUED | OUTPATIENT
Start: 2018-10-13 | End: 2018-10-16 | Stop reason: HOSPADM

## 2018-10-13 RX ORDER — ALBUTEROL SULFATE 2.5 MG/3ML
2.5 SOLUTION RESPIRATORY (INHALATION) EVERY 6 HOURS PRN
Status: DISCONTINUED | OUTPATIENT
Start: 2018-10-13 | End: 2018-10-16 | Stop reason: HOSPADM

## 2018-10-13 RX ORDER — MORPHINE SULFATE 4 MG/ML
INJECTION, SOLUTION INTRAMUSCULAR; INTRAVENOUS
Status: COMPLETED
Start: 2018-10-13 | End: 2018-10-13

## 2018-10-13 RX ADMIN — MORPHINE SULFATE 4 MG: 4 INJECTION INTRAVENOUS at 10:27

## 2018-10-13 RX ADMIN — BUDESONIDE 500 MCG: 0.5 SUSPENSION RESPIRATORY (INHALATION) at 22:04

## 2018-10-13 RX ADMIN — ALBUTEROL SULFATE 2.5 MG: 2.5 SOLUTION RESPIRATORY (INHALATION) at 22:04

## 2018-10-13 RX ADMIN — GABAPENTIN 600 MG: 300 CAPSULE ORAL at 20:35

## 2018-10-13 RX ADMIN — CYCLOBENZAPRINE HYDROCHLORIDE 10 MG: 10 TABLET, FILM COATED ORAL at 20:35

## 2018-10-13 RX ADMIN — KETOROLAC TROMETHAMINE 15 MG: 30 INJECTION, SOLUTION INTRAMUSCULAR at 14:20

## 2018-10-13 RX ADMIN — HYDROXYZINE PAMOATE 25 MG: 25 CAPSULE ORAL at 15:08

## 2018-10-13 RX ADMIN — ONDANSETRON HYDROCHLORIDE 4 MG: 2 SOLUTION INTRAMUSCULAR; INTRAVENOUS at 21:56

## 2018-10-13 RX ADMIN — DIPHENHYDRAMINE HCL 25 MG: 25 TABLET ORAL at 12:14

## 2018-10-13 RX ADMIN — Medication 10 ML: at 20:36

## 2018-10-13 RX ADMIN — FOLIC ACID 1 MG: 1 TABLET ORAL at 20:35

## 2018-10-13 RX ADMIN — DICLOFENAC 4 G: 10 GEL TOPICAL at 20:35

## 2018-10-13 RX ADMIN — MORPHINE SULFATE 4 MG: 10 INJECTION, SOLUTION INTRAMUSCULAR; INTRAVENOUS at 11:46

## 2018-10-13 RX ADMIN — ALBUTEROL SULFATE 2.5 MG: 2.5 SOLUTION RESPIRATORY (INHALATION) at 17:28

## 2018-10-13 RX ADMIN — OXYCODONE AND ACETAMINOPHEN 2 TABLET: 5; 325 TABLET ORAL at 23:47

## 2018-10-13 RX ADMIN — OXYCODONE AND ACETAMINOPHEN 2 TABLET: 5; 325 TABLET ORAL at 19:12

## 2018-10-13 RX ADMIN — ENOXAPARIN SODIUM 40 MG: 40 INJECTION SUBCUTANEOUS at 20:36

## 2018-10-13 RX ADMIN — AMITRIPTYLINE HYDROCHLORIDE 75 MG: 50 TABLET, FILM COATED ORAL at 20:34

## 2018-10-13 ASSESSMENT — PAIN DESCRIPTION - ONSET: ONSET: GRADUAL

## 2018-10-13 ASSESSMENT — PAIN DESCRIPTION - PROGRESSION: CLINICAL_PROGRESSION: GRADUALLY WORSENING

## 2018-10-13 ASSESSMENT — PAIN SCALES - GENERAL
PAINLEVEL_OUTOF10: 6
PAINLEVEL_OUTOF10: 10
PAINLEVEL_OUTOF10: 6
PAINLEVEL_OUTOF10: 7
PAINLEVEL_OUTOF10: 9
PAINLEVEL_OUTOF10: 9
PAINLEVEL_OUTOF10: 7
PAINLEVEL_OUTOF10: 10

## 2018-10-13 ASSESSMENT — PAIN DESCRIPTION - LOCATION
LOCATION: LEG;HIP;KNEE
LOCATION: BACK;HIP;KNEE

## 2018-10-13 ASSESSMENT — PAIN DESCRIPTION - PAIN TYPE
TYPE: ACUTE PAIN
TYPE: ACUTE PAIN

## 2018-10-13 ASSESSMENT — ENCOUNTER SYMPTOMS
COLOR CHANGE: 0
CHEST TIGHTNESS: 0
NAUSEA: 0
ABDOMINAL PAIN: 0
SHORTNESS OF BREATH: 0
DIARRHEA: 0

## 2018-10-13 ASSESSMENT — PAIN DESCRIPTION - DESCRIPTORS: DESCRIPTORS: PATIENT UNABLE TO DESCRIBE;CONSTANT

## 2018-10-13 ASSESSMENT — PAIN DESCRIPTION - ORIENTATION
ORIENTATION: RIGHT
ORIENTATION: RIGHT

## 2018-10-13 ASSESSMENT — PAIN DESCRIPTION - FREQUENCY: FREQUENCY: CONTINUOUS

## 2018-10-13 NOTE — ED PROVIDER NOTES
Date ofevaluation: 10/13/2018    Chief Complaint   Fall (Pt reports falling getting out of tub and states her right knee hurts, right leg, and right hip. ); Knee Pain; and Leg Pain    Nursing Notes, Past Medical Hx, Past Surgical Hx, Social Hx, Allergies, and Family Hx were reviewed. History of Present Illness     Terri Moreira is a 60 y.o. female with PMH notable For numerous prior procedures on her right hip, femur and knee presenting with right knee pain, right thigh pain and right hip pain following a fall as she slipped exiting her bathtub this morning. The pain was so severe that she was unable to ambulate. She stated that she had to crawl to call 911. Pain is 9 out of 10, throbbing, exacerbated by movement and palpation. She has a small amount of swelling around her right knee. She denies hitting her head or any trunk trauma. She denies having any midline spine pain or neck pain. Although range of motion is somewhat limited due to pain, she denies any foot discoloration, focal weakness, numbness or paresthesias. Review of Systems     Review of Systems   Constitutional: Negative for appetite change and fever. HENT: Negative for congestion. Respiratory: Negative for chest tightness and shortness of breath. Gastrointestinal: Negative for abdominal pain, diarrhea and nausea. Musculoskeletal: Positive for gait problem and joint swelling. Negative for neck pain. Skin: Negative for color change, pallor and wound. Neurological: Negative for weakness and numbness. All other systems reviewed and are negative.       Past Medical, Surgical, Family, and Social History         Diagnosis Date    Asthma     Depression     Osteoarthritis     PONV (postoperative nausea and vomiting)     Scoliosis          Procedure Laterality Date    BREAST ENHANCEMENT SURGERY Bilateral 2007   [place]    FRACTURE SURGERY      femur RT    GASTRIC BY[place] SURGERY  [place]    [place]    JOINT REPLACEMENT Bilateral 12/29/2014    knee replacement    JOINT REPLACEMENT      KNEE CARTILAGE SURGERY Left 2006    ROTATOR CUFF REPAIR Left 2008    TOTAL HIP ARTHROPLASTY Bilateral 2014    WISDOM TOOTH EXTRACTION  1995     Her family history includes Cancer in her mother; Diabetes in her daughter; Drug Abuse in her brother; Heart Disease in her sister. She was adopted. She reports that she quit smoking about 4 years ago. Her smoking use included Cigarettes. She has a 0.05 pack-year smoking history. She has never used smokeless tobacco. She reports that she does not drink alcohol or use drugs. Medications     Previous Medications    ALBUTEROL (PROVENTIL) (2.5 MG/3ML) 0.083% NEBULIZER SOLUTION    Take 3 mLs by nebulization every 6 hours as needed for Wheezing or Shortness of Breath    AMITRIPTYLINE (ELAVIL) 75 MG TABLET    Take 1 tablet by mouth nightly    BUDESONIDE-FORMOTEROL (SYMBICORT) 160-4.5 MCG/ACT AERO    Inhale 2 puffs into the lungs 2 times daily    CHOLECALCIFEROL (VITAMIN D3) 5000 UNITS TABS    Take 5,000 Units by mouth daily     CYCLOBENZAPRINE (FLEXERIL) 5 MG TABLET    Take 10 mg by mouth 3 times daily     FLUOXETINE (PROZAC) 40 MG CAPSULE    Take 1 capsule by mouth daily    FOLIC ACID (FOLVITE) 1 MG TABLET    Take 1 mg by mouth daily     GABAPENTIN (NEURONTIN) 300 MG CAPSULE    Take 600 mg by mouth 3 times daily. Patricia Tran     HYDROXYZINE (VISTARIL) 25 MG CAPSULE    Take 25 mg by mouth 3 times daily as needed for Anxiety    IBUPROFEN (ADVIL;MOTRIN) 800 MG TABLET    Take 800 mg by mouth every 6 hours as needed for Pain    IPRATROPIUM-ALBUTEROL (DUONEB) 0.5-2.5 (3) MG/3ML SOLN NEBULIZER SOLUTION    Inhale 3 mLs into the lungs every 4 hours as needed for Shortness of Breath    MULTIPLE VITAMINS-MINERALS (THERAPEUTIC MULTIVITAMIN-MINERALS) TABLET    Take 1 tablet by mouth daily    VITAMIN B-1 (THIAMINE) 100 MG TABLET    Take 100 mg by mouth daily     VITAMIN B-12 (CYANOCOBALAMIN) 100 MCG TABLET    Take 100 mcg by mouth daily        Allergies     She is allergic to cortisone; droperidol; and compazine [prochlorperazine]. Physical Exam     INITIAL VITALS: BP (!) 156/78   Pulse 89   Temp 98.2 °F (36.8 °C)   Resp 15   Ht 4' 9\" (1.448 m)   Wt 175 lb (79.4 kg)   LMP 12/22/2014   SpO2 94%   BMI 37.87 kg/m²    Physical Exam   Constitutional: She is oriented to person, place, and time. She appears well-developed and well-nourished. No distress. HENT:   Head: Normocephalic and atraumatic. Eyes: Pupils are equal, round, and reactive to light. EOM are normal.   Neck: Normal range of motion. No tracheal deviation present. Nontender C-spine   Cardiovascular: Normal rate, regular rhythm and normal heart sounds. Pulmonary/Chest: Effort normal and breath sounds normal. No respiratory distress. Abdominal: Soft. She exhibits no distension. There is no tenderness. Musculoskeletal: She exhibits tenderness. She exhibits no deformity. Surgical scars intact over R thigh, knee, hip  Minimal swelling of right knee, no skin lesion, significant tenderness of right knee with range of motion limited by pain, no hip pain on log roll, tenderness to palpation of anterior thigh and over her greater trochanter on the right, tenderness palpation of her right sacrum; no hematoma or ecchymosis   Neurological: She is alert and oriented to person, place, and time. No cranial nerve deficit. Coordination normal.   Skin: Capillary refill takes less than 2 seconds. Psychiatric: Her behavior is normal.   Nursing note and vitals reviewed. Diagnostic Results     RADIOLOGY:  XR KNEE RIGHT (1-2 VIEWS)   Final Result      No sign of any acute fracture with extensive hardware in place, unchanged compared to prior study.  Total knee replacement intact as well as lateral fixation plate and screws in the distal femur and an intramedullary aracelis and cerclage wires      RIGHT FEMUR AND HIP 4 VIEWS:

## 2018-10-13 NOTE — ED NOTES
Pt is alert and oriented times four. Pt here today for a fall. Pt states she was getting out of the tub and slipped and fell. Pt c/o right knee pain. Pt states she has had a knee replacement in the past. Pt states her pain is to her right knee and hip. Call ligh tin reach will continue to monitor.       Janet Díaz RN  10/13/18 6830

## 2018-10-13 NOTE — H&P
12/29/2014); fracture surgery; joint replacement; and Total hip arthroplasty (Bilateral, 2014). Medications:  No current facility-administered medications on file prior to encounter. Current Outpatient Prescriptions on File Prior to Encounter   Medication Sig Dispense Refill    albuterol (PROVENTIL) (2.5 MG/3ML) 0.083% nebulizer solution Take 3 mLs by nebulization every 6 hours as needed for Wheezing or Shortness of Breath 120 each 3    gabapentin (NEURONTIN) 300 MG capsule Take 600 mg by mouth 3 times daily. Lonn Megan cyclobenzaprine (FLEXERIL) 5 MG tablet Take 10 mg by mouth 3 times daily       hydrOXYzine (VISTARIL) 25 MG capsule Take 25 mg by mouth 3 times daily as needed for Anxiety      vitamin B-12 (CYANOCOBALAMIN) 100 MCG tablet Take 100 mcg by mouth daily       ibuprofen (ADVIL;MOTRIN) 800 MG tablet Take 800 mg by mouth every 6 hours as needed for Pain      FLUoxetine (PROZAC) 40 MG capsule Take 1 capsule by mouth daily 30 capsule 0    amitriptyline (ELAVIL) 75 MG tablet Take 1 tablet by mouth nightly 30 tablet 0    Multiple Vitamins-Minerals (THERAPEUTIC MULTIVITAMIN-MINERALS) tablet Take 1 tablet by mouth daily      budesonide-formoterol (SYMBICORT) 160-4.5 MCG/ACT AERO Inhale 2 puffs into the lungs 2 times daily 1 Inhaler 3    ipratropium-albuterol (DUONEB) 0.5-2.5 (3) MG/3ML SOLN nebulizer solution Inhale 3 mLs into the lungs every 4 hours as needed for Shortness of Breath 360 mL 0    Cholecalciferol (VITAMIN D3) 5000 units TABS Take 5,000 Units by mouth daily       folic acid (FOLVITE) 1 MG tablet Take 1 mg by mouth daily       vitamin B-1 (THIAMINE) 100 MG tablet Take 100 mg by mouth daily          Allergies: Allergies   Allergen Reactions    Cortisone Anaphylaxis     Only if Injected; many years ago. Anaphylactic reaction??numbing agent??cortisone. Injected causes reaction. .. Judythe Arnaldo Blackmanythe Arnaldo the patient states she CAN TAKE IV solumedrol fine (6/8/18)    Droperidol Other (See Comments)     EPS List  Active Problems:    Right knee pain  Resolved Problems:    * No resolved hospital problems.  *        Assessment/Plan:     R knee pain after mechanical fall / trauma    Admit as observation    Ice-pack / voltaren gel / percocet for pain    PT/OT     Chronic pain syndrome - continue home regimen        Diet: DIET GENERAL;  Prophylaxis:   DVT: lovenox   Code:Full Code  Disposition: med-surg     Admit as observation     Bindu Alfonso MD   10/13/2018 5:49 PM

## 2018-10-13 NOTE — ED PROVIDER NOTES
ED Attending Attestation Note     Date of evaluation: 10/13/2018    This patient was seen by the resident. I have seen and examined the patient, agree with the workup, evaluation, management and diagnosis. The care plan has been discussed. My assessment reveals A female who suffered a mechanical fall and reports pain to her right leg. Neurovascularly intact.        Daina Cantor MD  10/13/18 0460

## 2018-10-13 NOTE — ED NOTES
Report called to [facility]. RN on 5S. Pt will be transferred to 5S.       Nancy Mckeon RN  10/13/18 3241

## 2018-10-13 NOTE — PROGRESS NOTES
4 Eyes Admission Assessment     I agree as the admission nurse that 2 RN's have performed a thorough Head to Toe Skin Assessment on the patient. ALL assessment sites listed below have been assessed on admission. Areas assessed by both nurses:   [x]   Head, Face, and Ears   [x]   Shoulders, Back, and Chest  [x]   Arms, Elbows, and Hands   [x]   Coccyx, Sacrum, and Ischum  [x]   Legs, Feet, and Heels        Does the Patient have Skin Breakdown?   No         Evan Prevention initiated:  No   Wound Care Orders initiated:  No      [facility] nurse consulted for Pressure Injury (Stage 3,4, Unstageable, DTI, NWPT, and Complex wounds):  No      Nurse 1 eSignature: Electronically signed by Flip Kwon RN on 10/13/18 at 3:55 PM    **SHARE this note so that the co-signing nurse is able to place an eSignature**    Nurse 2 eSignature: Electronically signed by Phil Hopper RN on 10/13/18 at 6:18 PM

## 2018-10-14 PROBLEM — S80.01XA CONTUSION OF RIGHT KNEE: Status: ACTIVE | Noted: 2018-10-14

## 2018-10-14 PROBLEM — J45.909 ASTHMA: Status: ACTIVE | Noted: 2018-10-14

## 2018-10-14 LAB
A/G RATIO: 1 (ref 1.1–2.2)
ALBUMIN SERPL-MCNC: 3.7 G/DL (ref 3.4–5)
ALP BLD-CCNC: 113 U/L (ref 40–129)
ALT SERPL-CCNC: 15 U/L (ref 10–40)
ANION GAP SERPL CALCULATED.3IONS-SCNC: 12 MMOL/L (ref 3–16)
AST SERPL-CCNC: 17 U/L (ref 15–37)
BASOPHILS ABSOLUTE: 0 K/UL (ref 0–0.2)
BASOPHILS RELATIVE PERCENT: 0.3 %
BILIRUB SERPL-MCNC: <0.2 MG/DL (ref 0–1)
BUN BLDV-MCNC: 15 MG/DL (ref 7–20)
CALCIUM SERPL-MCNC: 9.3 MG/DL (ref 8.3–10.6)
CHLORIDE BLD-SCNC: 102 MMOL/L (ref 99–110)
CO2: 23 MMOL/L (ref 21–32)
CREAT SERPL-MCNC: <0.5 MG/DL (ref 0.6–1.1)
EOSINOPHILS ABSOLUTE: 0.1 K/UL (ref 0–0.6)
EOSINOPHILS RELATIVE PERCENT: 2.3 %
GFR AFRICAN AMERICAN: >60
GFR NON-AFRICAN AMERICAN: >60
GLOBULIN: 3.8 G/DL
GLUCOSE BLD-MCNC: 107 MG/DL (ref 70–99)
HCT VFR BLD CALC: 35.2 % (ref 36–48)
HEMOGLOBIN: 10.9 G/DL (ref 12–16)
LYMPHOCYTES ABSOLUTE: 2 K/UL (ref 1–5.1)
LYMPHOCYTES RELATIVE PERCENT: 38 %
MCH RBC QN AUTO: 25.2 PG (ref 26–34)
MCHC RBC AUTO-ENTMCNC: 31 G/DL (ref 31–36)
MCV RBC AUTO: 81.1 FL (ref 80–100)
MONOCYTES ABSOLUTE: 0.8 K/UL (ref 0–1.3)
MONOCYTES RELATIVE PERCENT: 14.9 %
NEUTROPHILS ABSOLUTE: 2.3 K/UL (ref 1.7–7.7)
NEUTROPHILS RELATIVE PERCENT: 44.5 %
PDW BLD-RTO: 20.8 % (ref 12.4–15.4)
PLATELET # BLD: 476 K/UL (ref 135–450)
PMV BLD AUTO: 7.3 FL (ref 5–10.5)
POTASSIUM REFLEX MAGNESIUM: 4 MMOL/L (ref 3.5–5.1)
RBC # BLD: 4.34 M/UL (ref 4–5.2)
SODIUM BLD-SCNC: 137 MMOL/L (ref 136–145)
TOTAL PROTEIN: 7.5 G/DL (ref 6.4–8.2)
WBC # BLD: 5.2 K/UL (ref 4–11)

## 2018-10-14 PROCEDURE — G0378 HOSPITAL OBSERVATION PER HR: HCPCS

## 2018-10-14 PROCEDURE — 85025 COMPLETE CBC W/AUTO DIFF WBC: CPT

## 2018-10-14 PROCEDURE — 36415 COLL VENOUS BLD VENIPUNCTURE: CPT

## 2018-10-14 PROCEDURE — G0008 ADMIN INFLUENZA VIRUS VAC: HCPCS | Performed by: INTERNAL MEDICINE

## 2018-10-14 PROCEDURE — 90686 IIV4 VACC NO PRSV 0.5 ML IM: CPT | Performed by: INTERNAL MEDICINE

## 2018-10-14 PROCEDURE — 96372 THER/PROPH/DIAG INJ SC/IM: CPT

## 2018-10-14 PROCEDURE — 80053 COMPREHEN METABOLIC PANEL: CPT

## 2018-10-14 PROCEDURE — 2580000003 HC RX 258: Performed by: INTERNAL MEDICINE

## 2018-10-14 PROCEDURE — 94150 VITAL CAPACITY TEST: CPT

## 2018-10-14 PROCEDURE — 6360000002 HC RX W HCPCS: Performed by: INTERNAL MEDICINE

## 2018-10-14 PROCEDURE — 94761 N-INVAS EAR/PLS OXIMETRY MLT: CPT

## 2018-10-14 PROCEDURE — 94640 AIRWAY INHALATION TREATMENT: CPT

## 2018-10-14 PROCEDURE — 94664 DEMO&/EVAL PT USE INHALER: CPT

## 2018-10-14 PROCEDURE — 96376 TX/PRO/DX INJ SAME DRUG ADON: CPT

## 2018-10-14 PROCEDURE — 6370000000 HC RX 637 (ALT 250 FOR IP): Performed by: INTERNAL MEDICINE

## 2018-10-14 RX ORDER — CALCIUM CARBONATE 200(500)MG
500 TABLET,CHEWABLE ORAL 3 TIMES DAILY PRN
Status: DISCONTINUED | OUTPATIENT
Start: 2018-10-14 | End: 2018-10-16 | Stop reason: HOSPADM

## 2018-10-14 RX ADMIN — INFLUENZA A VIRUS A/MICHIGAN/45/2015 X-275 (H1N1) ANTIGEN (FORMALDEHYDE INACTIVATED), INFLUENZA A VIRUS A/SINGAPORE/INFIMH-16-0019/2016 IVR-186 (H3N2) ANTIGEN (FORMALDEHYDE INACTIVATED), INFLUENZA B VIRUS B/PHUKET/3073/2013 ANTIGEN (FORMALDEHYDE INACTIVATED), AND INFLUENZA B VIRUS B/MARYLAND/15/2016 BX-69A ANTIGEN (FORMALDEHYDE INACTIVATED) 0.5 ML: 15; 15; 15; 15 INJECTION, SUSPENSION INTRAMUSCULAR at 09:07

## 2018-10-14 RX ADMIN — Medication 10 ML: at 19:28

## 2018-10-14 RX ADMIN — GABAPENTIN 600 MG: 300 CAPSULE ORAL at 13:22

## 2018-10-14 RX ADMIN — ALBUTEROL SULFATE 2.5 MG: 2.5 SOLUTION RESPIRATORY (INHALATION) at 16:54

## 2018-10-14 RX ADMIN — AMITRIPTYLINE HYDROCHLORIDE 75 MG: 50 TABLET, FILM COATED ORAL at 21:28

## 2018-10-14 RX ADMIN — Medication 10 ML: at 09:09

## 2018-10-14 RX ADMIN — ALBUTEROL SULFATE 2.5 MG: 2.5 SOLUTION RESPIRATORY (INHALATION) at 13:16

## 2018-10-14 RX ADMIN — ANTACID TABLETS 500 MG: 500 TABLET, CHEWABLE ORAL at 14:25

## 2018-10-14 RX ADMIN — FOLIC ACID 1 MG: 1 TABLET ORAL at 09:07

## 2018-10-14 RX ADMIN — BUDESONIDE 500 MCG: 0.5 SUSPENSION RESPIRATORY (INHALATION) at 20:24

## 2018-10-14 RX ADMIN — BUDESONIDE 500 MCG: 0.5 SUSPENSION RESPIRATORY (INHALATION) at 09:49

## 2018-10-14 RX ADMIN — ONDANSETRON HYDROCHLORIDE 4 MG: 2 SOLUTION INTRAMUSCULAR; INTRAVENOUS at 15:58

## 2018-10-14 RX ADMIN — OXYCODONE AND ACETAMINOPHEN 2 TABLET: 5; 325 TABLET ORAL at 09:07

## 2018-10-14 RX ADMIN — ALBUTEROL SULFATE 2.5 MG: 2.5 SOLUTION RESPIRATORY (INHALATION) at 05:09

## 2018-10-14 RX ADMIN — OXYCODONE AND ACETAMINOPHEN 2 TABLET: 5; 325 TABLET ORAL at 17:22

## 2018-10-14 RX ADMIN — OXYCODONE AND ACETAMINOPHEN 2 TABLET: 5; 325 TABLET ORAL at 21:28

## 2018-10-14 RX ADMIN — OXYCODONE AND ACETAMINOPHEN 2 TABLET: 5; 325 TABLET ORAL at 13:19

## 2018-10-14 RX ADMIN — OXYCODONE AND ACETAMINOPHEN 2 TABLET: 5; 325 TABLET ORAL at 05:05

## 2018-10-14 RX ADMIN — GABAPENTIN 600 MG: 300 CAPSULE ORAL at 09:07

## 2018-10-14 RX ADMIN — ALBUTEROL SULFATE 2.5 MG: 2.5 SOLUTION RESPIRATORY (INHALATION) at 09:48

## 2018-10-14 RX ADMIN — GABAPENTIN 600 MG: 300 CAPSULE ORAL at 19:27

## 2018-10-14 RX ADMIN — CYCLOBENZAPRINE HYDROCHLORIDE 10 MG: 10 TABLET, FILM COATED ORAL at 19:27

## 2018-10-14 RX ADMIN — ALBUTEROL SULFATE 2.5 MG: 2.5 SOLUTION RESPIRATORY (INHALATION) at 20:23

## 2018-10-14 RX ADMIN — CYCLOBENZAPRINE HYDROCHLORIDE 10 MG: 10 TABLET, FILM COATED ORAL at 13:22

## 2018-10-14 RX ADMIN — CYCLOBENZAPRINE HYDROCHLORIDE 10 MG: 10 TABLET, FILM COATED ORAL at 09:07

## 2018-10-14 RX ADMIN — ENOXAPARIN SODIUM 40 MG: 40 INJECTION SUBCUTANEOUS at 19:28

## 2018-10-14 RX ADMIN — HYDROXYZINE PAMOATE 25 MG: 25 CAPSULE ORAL at 19:27

## 2018-10-14 RX ADMIN — FLUOXETINE 40 MG: 20 CAPSULE ORAL at 09:10

## 2018-10-14 ASSESSMENT — PAIN DESCRIPTION - LOCATION
LOCATION: BACK;KNEE;HIP
LOCATION: KNEE

## 2018-10-14 ASSESSMENT — PAIN DESCRIPTION - PROGRESSION
CLINICAL_PROGRESSION: GRADUALLY WORSENING
CLINICAL_PROGRESSION: NOT CHANGED
CLINICAL_PROGRESSION: GRADUALLY WORSENING

## 2018-10-14 ASSESSMENT — PAIN DESCRIPTION - DESCRIPTORS
DESCRIPTORS: PATIENT UNABLE TO DESCRIBE

## 2018-10-14 ASSESSMENT — PAIN DESCRIPTION - PAIN TYPE
TYPE: ACUTE PAIN

## 2018-10-14 ASSESSMENT — PAIN DESCRIPTION - ONSET
ONSET: ON-GOING

## 2018-10-14 ASSESSMENT — PAIN DESCRIPTION - ORIENTATION: ORIENTATION: RIGHT

## 2018-10-14 ASSESSMENT — PAIN SCALES - GENERAL
PAINLEVEL_OUTOF10: 7
PAINLEVEL_OUTOF10: 8
PAINLEVEL_OUTOF10: 2
PAINLEVEL_OUTOF10: 3
PAINLEVEL_OUTOF10: 7
PAINLEVEL_OUTOF10: 4
PAINLEVEL_OUTOF10: 8
PAINLEVEL_OUTOF10: 8

## 2018-10-14 ASSESSMENT — PAIN DESCRIPTION - FREQUENCY
FREQUENCY: CONTINUOUS

## 2018-10-14 NOTE — PROGRESS NOTES
Hospitalist Progress Note      PCP: Ciera Fernandez MD    Date of Admission: 10/13/2018    Chief Complaint: Right knee pain    Hospital Course: admitted with knee pain. X rays do not show acute fracture. Patient states her right leg has been weaker than left for many years, and had multiple falls before. No problems with left, arm strength is also OK    Subjective: right knee pain and swelling. No chest pain, no shortness of breath. Others reviewed in complete detail and found negative       Medications:  Reviewed    Infusion Medications   Scheduled Medications    amitriptyline  75 mg Oral Nightly    cyclobenzaprine  10 mg Oral TID    FLUoxetine  40 mg Oral Daily    folic acid  1 mg Oral Daily    gabapentin  600 mg Oral TID    sodium chloride flush  10 mL Intravenous 2 times per day    enoxaparin  40 mg Subcutaneous Daily    budesonide  0.5 mg Nebulization BID    And    albuterol  2.5 mg Nebulization 4x daily     PRN Meds: morphine, albuterol, hydrOXYzine, sodium chloride flush, magnesium hydroxide, ondansetron, potassium chloride **OR** potassium chloride **OR** potassium chloride, diclofenac sodium, oxyCODONE-acetaminophen **OR** oxyCODONE-acetaminophen      Intake/Output Summary (Last 24 hours) at 10/14/18 1053  Last data filed at 10/14/18 0453   Gross per 24 hour   Intake             1200 ml   Output                0 ml   Net             1200 ml       Physical Exam Performed:    /76   Pulse 87   Temp 98.1 °F (36.7 °C) (Oral)   Resp 16   Ht 4' 10\" (1.473 m)   Wt 179 lb 3.7 oz (81.3 kg)   LMP 12/22/2014   SpO2 92%   BMI 37.46 kg/m²     General appearance: No apparent distress, appears stated age and cooperative. HEENT: Pupils equal, round, and reactive to light. Conjunctivae/corneas clear. Neck: Supple, with full range of motion. No jugular venous distention. Trachea midline. Respiratory:  Normal respiratory effort.  Clear to auscultation, bilaterally without No acute fracture or dislocation         VL Extremity Venous Bilateral    (Results Pending)           Assessment/Plan:    Active Hospital Problems    Diagnosis Date Noted    Asthma [J45.909] 10/14/2018    Contusion of right knee [S80.01XA] 10/14/2018    Right knee pain [M25.561] 10/13/2018     PLAN:    Right knee pain:  Continue symptomatic treatment. PT/OT consulted. No fractures on the affected knee. OK to bear weight. Right knee contusion:  Both knees are prosthetic. No issues with left knee. Patient states she has the tendency to have falls on the right side. This is not the first time. I am also consulting PM & R to evaluate patient's eligibility for ARU. May benefit from intensive rehab and especially balance and strength training    Asthma:  No signs of acute exacerbation.  Continue inhalers as before    D/w patient, questions answered    DVT Prophylaxis: Lovenox  Diet: DIET GENERAL;  Code Status: Full Code    PT/OT Eval Status: pending    Dispo - possible ARU vs home w Martín Blake MD

## 2018-10-14 NOTE — PLAN OF CARE
Problem: Falls - Risk of:  Goal: Absence of physical injury  Absence of physical injury   Outcome: Ongoing  Patient has remained free from injury at this time.  Bedside commode given, Standby assist. Will continue to monitor

## 2018-10-14 NOTE — PROGRESS NOTES
Pt AO4, VSS, c/o R knee pain which is controlled w prn percocet. Pt has had an emesis, was given zofran, no extra emesis noted. Pt has bedside commode, got up x1 assistance, calls out appropriately.  Will continue to monitor

## 2018-10-15 PROCEDURE — 97161 PT EVAL LOW COMPLEX 20 MIN: CPT

## 2018-10-15 PROCEDURE — 6360000002 HC RX W HCPCS: Performed by: INTERNAL MEDICINE

## 2018-10-15 PROCEDURE — 97530 THERAPEUTIC ACTIVITIES: CPT

## 2018-10-15 PROCEDURE — 96376 TX/PRO/DX INJ SAME DRUG ADON: CPT

## 2018-10-15 PROCEDURE — G8988 SELF CARE GOAL STATUS: HCPCS

## 2018-10-15 PROCEDURE — G8987 SELF CARE CURRENT STATUS: HCPCS

## 2018-10-15 PROCEDURE — 97165 OT EVAL LOW COMPLEX 30 MIN: CPT

## 2018-10-15 PROCEDURE — G8978 MOBILITY CURRENT STATUS: HCPCS

## 2018-10-15 PROCEDURE — G0378 HOSPITAL OBSERVATION PER HR: HCPCS

## 2018-10-15 PROCEDURE — 2580000003 HC RX 258: Performed by: INTERNAL MEDICINE

## 2018-10-15 PROCEDURE — 94640 AIRWAY INHALATION TREATMENT: CPT

## 2018-10-15 PROCEDURE — 94664 DEMO&/EVAL PT USE INHALER: CPT

## 2018-10-15 PROCEDURE — 96372 THER/PROPH/DIAG INJ SC/IM: CPT

## 2018-10-15 PROCEDURE — 6370000000 HC RX 637 (ALT 250 FOR IP): Performed by: INTERNAL MEDICINE

## 2018-10-15 PROCEDURE — 93970 EXTREMITY STUDY: CPT

## 2018-10-15 PROCEDURE — 97116 GAIT TRAINING THERAPY: CPT

## 2018-10-15 PROCEDURE — G8979 MOBILITY GOAL STATUS: HCPCS

## 2018-10-15 RX ADMIN — Medication 10 ML: at 12:41

## 2018-10-15 RX ADMIN — CYCLOBENZAPRINE HYDROCHLORIDE 10 MG: 10 TABLET, FILM COATED ORAL at 20:43

## 2018-10-15 RX ADMIN — FOLIC ACID 1 MG: 1 TABLET ORAL at 09:32

## 2018-10-15 RX ADMIN — OXYCODONE AND ACETAMINOPHEN 2 TABLET: 5; 325 TABLET ORAL at 07:44

## 2018-10-15 RX ADMIN — ALBUTEROL SULFATE 2.5 MG: 2.5 SOLUTION RESPIRATORY (INHALATION) at 16:34

## 2018-10-15 RX ADMIN — OXYCODONE AND ACETAMINOPHEN 2 TABLET: 5; 325 TABLET ORAL at 20:42

## 2018-10-15 RX ADMIN — HYDROXYZINE PAMOATE 25 MG: 25 CAPSULE ORAL at 15:57

## 2018-10-15 RX ADMIN — GABAPENTIN 600 MG: 300 CAPSULE ORAL at 14:52

## 2018-10-15 RX ADMIN — CYCLOBENZAPRINE HYDROCHLORIDE 10 MG: 10 TABLET, FILM COATED ORAL at 09:33

## 2018-10-15 RX ADMIN — OXYCODONE AND ACETAMINOPHEN 2 TABLET: 5; 325 TABLET ORAL at 15:57

## 2018-10-15 RX ADMIN — ONDANSETRON HYDROCHLORIDE 4 MG: 2 SOLUTION INTRAMUSCULAR; INTRAVENOUS at 12:41

## 2018-10-15 RX ADMIN — OXYCODONE AND ACETAMINOPHEN 2 TABLET: 5; 325 TABLET ORAL at 03:32

## 2018-10-15 RX ADMIN — GABAPENTIN 600 MG: 300 CAPSULE ORAL at 09:32

## 2018-10-15 RX ADMIN — HYDROXYZINE PAMOATE 25 MG: 25 CAPSULE ORAL at 09:57

## 2018-10-15 RX ADMIN — GABAPENTIN 600 MG: 300 CAPSULE ORAL at 20:41

## 2018-10-15 RX ADMIN — FLUOXETINE 40 MG: 20 CAPSULE ORAL at 09:33

## 2018-10-15 RX ADMIN — Medication 10 ML: at 21:00

## 2018-10-15 RX ADMIN — OXYCODONE AND ACETAMINOPHEN 2 TABLET: 5; 325 TABLET ORAL at 11:52

## 2018-10-15 RX ADMIN — BUDESONIDE 500 MCG: 0.5 SUSPENSION RESPIRATORY (INHALATION) at 08:18

## 2018-10-15 RX ADMIN — BUDESONIDE 500 MCG: 0.5 SUSPENSION RESPIRATORY (INHALATION) at 21:59

## 2018-10-15 RX ADMIN — ALBUTEROL SULFATE 2.5 MG: 2.5 SOLUTION RESPIRATORY (INHALATION) at 08:20

## 2018-10-15 RX ADMIN — ALBUTEROL SULFATE 2.5 MG: 2.5 SOLUTION RESPIRATORY (INHALATION) at 21:59

## 2018-10-15 RX ADMIN — CYCLOBENZAPRINE HYDROCHLORIDE 10 MG: 10 TABLET, FILM COATED ORAL at 14:53

## 2018-10-15 RX ADMIN — AMITRIPTYLINE HYDROCHLORIDE 75 MG: 50 TABLET, FILM COATED ORAL at 20:41

## 2018-10-15 RX ADMIN — ENOXAPARIN SODIUM 40 MG: 40 INJECTION SUBCUTANEOUS at 20:42

## 2018-10-15 ASSESSMENT — PAIN SCALES - GENERAL
PAINLEVEL_OUTOF10: 7
PAINLEVEL_OUTOF10: 8
PAINLEVEL_OUTOF10: 7
PAINLEVEL_OUTOF10: 8
PAINLEVEL_OUTOF10: 7
PAINLEVEL_OUTOF10: 7

## 2018-10-15 ASSESSMENT — PAIN DESCRIPTION - PAIN TYPE
TYPE: ACUTE PAIN

## 2018-10-15 ASSESSMENT — PAIN DESCRIPTION - LOCATION
LOCATION: KNEE

## 2018-10-15 ASSESSMENT — PAIN DESCRIPTION - ONSET: ONSET: ON-GOING

## 2018-10-15 ASSESSMENT — PAIN DESCRIPTION - DESCRIPTORS: DESCRIPTORS: STABBING

## 2018-10-15 ASSESSMENT — PAIN DESCRIPTION - FREQUENCY: FREQUENCY: CONTINUOUS

## 2018-10-15 NOTE — PLAN OF CARE
Problem: Falls - Risk of:  Goal: Will remain free from falls  Will remain free from falls   Outcome: Ongoing  Pt is A&Ox4, Pt educated and encouraged to use call light to notify and wait for assistance from staff before getting OOB, pt uses call light appropriately, has made no unsafe movements, no attempts to get OOB without assistance. Pt's bed alarm remained on throughout shift, bed in lowest position, 2/4 side rails up, call light and bedside table within reach. No falls so far this shift, will continue to monitor.

## 2018-10-15 NOTE — PROGRESS NOTES
Occupational Therapy   Occupational Therapy Initial Assessment and Treatment  Date: 10/15/2018   Patient Name: Renetta Bautista  MRN: [id number]     : 1960    Date of Service: 10/15/2018    Discharge Recommendations: Renetta Bautista scored a 21/24 on the AM-PAC ADL Inpatient form. Current research shows that an AM-PAC score of 18 or greater is typically associated with a discharge to the patient's home setting. Based on the patients AM-PAC score and their current ADL deficits, it is recommended that the patient have 2-3 sessions per week of Occupational Therapy at d/c to increase the patients independence. OT Equipment Recommendations  Equipment Needed: No      Patient Diagnosis(es): The primary encounter diagnosis was Acute pain of right knee. A diagnosis of Unable to ambulate was also pertinent to this visit. has a past medical history of Asthma; Depression; Osteoarthritis; PONV (postoperative nausea and vomiting); and Scoliosis. has a past surgical history that includes Gastric bypass surgery (); Rotator cuff repair (Left, ); Knee cartilage surgery (Left, ); Breast enhancement surgery (Bilateral, );  section ([facility] [address]); Cholecystectomy (); [place] tooth extraction (); joint replacement (Bilateral, 2014); fracture surgery; joint replacement; and Total hip arthroplasty (Bilateral, ). Treatment Diagnosis: Decreased ADLs and functional mobility      Restrictions  Position Activity Restriction  Other position/activity restrictions: up with assist    Subjective   General  Chart Reviewed:  Yes  Additional Pertinent Hx: 60 y.o. female to hospital 10/13 s/p fall; B femur XR, pelvic XR, R hip and knee XRs all negative for fx; LE dopplers: (-); PMHx: astham, depression, OA, scoliosis, R femur fx surgery, gastric bypass, B TKR, B JACEK, L rotator cuff repair  Family / Caregiver Present: No  Diagnosis: Right knee pain  Subjective  Subjective: Pt in bed upon entry; Complexity  Patient Education: Role of OT, d/c planning- pt verb understanding  REQUIRES OT FOLLOW UP: Yes  Activity Tolerance  Activity Tolerance: Patient Tolerated treatment well  Safety Devices  Safety Devices in place: Yes  Type of devices: Left in chair;Nurse notified;Call light within reach; Chair alarm in place         Plan   Plan  Times per week: 2-5x  Times per day: Daily  If patient discharges prior to next treatment, this note will serve as discharge summary. Will continue per POC if patient does not discharge. AM-PAC Score        AM-PAC Inpatient Daily Activity Raw Score: 21  AM-PAC Inpatient ADL T-Scale Score : 44.27  ADL Inpatient CMS 0-100% Score: 32.79  ADL Inpatient CMS G-Code Modifier : CJ    Goals  Short term goals  Time Frame for Short term goals: By d/c   Short term goal 1: Toilet transfers w/ spvn   Short term goal 2: Standing balance x4 mins w/ spvn while completing ADL tasks   Short term goal 3: Pt to participate in full LB dressing assessment  Patient Goals   Patient goals :  To return home        Therapy Time   Individual Concurrent Group Co-treatment   Time In 1615         Time Out 1640         Minutes 25         Timed Code Treatment Minutes:   10    Total Treatment Minutes:  [address] [facility]

## 2018-10-15 NOTE — PROGRESS NOTES
Hospitalist Progress Note      PCP: Emilia Webster MD    Date of Admission: 10/13/2018    Chief Complaint: Right knee pain    Hospital Course: admitted with knee pain. X rays do not show acute fracture. Patient states her right leg has been weaker than left for many years, and had multiple falls before. No problems with left, arm strength is also OK. Venous dopplers OK. PT/OT not seen yet    Subjective: right knee pain and swelling. No chest pain, no shortness of breath. Others reviewed in complete detail and found negative. No changes from yesterday      Medications:  Reviewed    Infusion Medications   Scheduled Medications    amitriptyline  75 mg Oral Nightly    cyclobenzaprine  10 mg Oral TID    FLUoxetine  40 mg Oral Daily    folic acid  1 mg Oral Daily    gabapentin  600 mg Oral TID    sodium chloride flush  10 mL Intravenous 2 times per day    enoxaparin  40 mg Subcutaneous Daily    budesonide  0.5 mg Nebulization BID    And    albuterol  2.5 mg Nebulization 4x daily     PRN Meds: calcium carbonate, morphine, albuterol, hydrOXYzine, sodium chloride flush, magnesium hydroxide, ondansetron, potassium chloride **OR** potassium chloride **OR** potassium chloride, diclofenac sodium, oxyCODONE-acetaminophen **OR** oxyCODONE-acetaminophen      Intake/Output Summary (Last 24 hours) at 10/15/18 1623  Last data filed at 10/15/18 1000   Gross per 24 hour   Intake              660 ml   Output             1400 ml   Net             -740 ml       Physical Exam Performed:    /77   Pulse 95   Temp 98.6 °F (37 °C) (Oral)   Resp 16   Ht 4' 10\" (1.473 m)   Wt 179 lb 3.7 oz (81.3 kg)   LMP 12/22/2014   SpO2 92%   BMI 37.46 kg/m²     General appearance: No apparent distress, appears stated age and cooperative. HEENT: Pupils equal, round, and reactive to light. Conjunctivae/corneas clear. Neck: Supple, with full range of motion. No jugular venous distention. Trachea midline.   Respiratory:

## 2018-10-15 NOTE — PLAN OF CARE
Problem: Falls - Risk of:  Goal: Will remain free from falls  Will remain free from falls   Outcome: Ongoing  Pt has been free from fall during shift. Pt has called out appropriately for needs. Call light in reach. Bed in lowest position. Bed alarm on. Will continue to monitor.

## 2018-10-16 VITALS
HEART RATE: 97 BPM | HEIGHT: 58 IN | RESPIRATION RATE: 22 BRPM | DIASTOLIC BLOOD PRESSURE: 68 MMHG | TEMPERATURE: 98.5 F | SYSTOLIC BLOOD PRESSURE: 110 MMHG | WEIGHT: 179.23 LBS | OXYGEN SATURATION: 92 % | BODY MASS INDEX: 37.62 KG/M2

## 2018-10-16 PROCEDURE — G0378 HOSPITAL OBSERVATION PER HR: HCPCS

## 2018-10-16 PROCEDURE — 6360000002 HC RX W HCPCS: Performed by: INTERNAL MEDICINE

## 2018-10-16 PROCEDURE — 6370000000 HC RX 637 (ALT 250 FOR IP): Performed by: INTERNAL MEDICINE

## 2018-10-16 PROCEDURE — 94760 N-INVAS EAR/PLS OXIMETRY 1: CPT

## 2018-10-16 PROCEDURE — 94640 AIRWAY INHALATION TREATMENT: CPT

## 2018-10-16 RX ORDER — OXYCODONE HYDROCHLORIDE AND ACETAMINOPHEN 5; 325 MG/1; MG/1
1 TABLET ORAL EVERY 6 HOURS PRN
Qty: 12 TABLET | Refills: 0 | Status: SHIPPED | OUTPATIENT
Start: 2018-10-16 | End: 2018-10-19

## 2018-10-16 RX ADMIN — BUDESONIDE 500 MCG: 0.5 SUSPENSION RESPIRATORY (INHALATION) at 09:11

## 2018-10-16 RX ADMIN — FOLIC ACID 1 MG: 1 TABLET ORAL at 09:42

## 2018-10-16 RX ADMIN — HYDROXYZINE PAMOATE 25 MG: 25 CAPSULE ORAL at 09:42

## 2018-10-16 RX ADMIN — ALBUTEROL SULFATE 2.5 MG: 2.5 SOLUTION RESPIRATORY (INHALATION) at 12:58

## 2018-10-16 RX ADMIN — GABAPENTIN 600 MG: 300 CAPSULE ORAL at 09:42

## 2018-10-16 RX ADMIN — OXYCODONE AND ACETAMINOPHEN 2 TABLET: 5; 325 TABLET ORAL at 02:20

## 2018-10-16 RX ADMIN — ALBUTEROL SULFATE 2.5 MG: 2.5 SOLUTION RESPIRATORY (INHALATION) at 09:10

## 2018-10-16 RX ADMIN — CYCLOBENZAPRINE HYDROCHLORIDE 10 MG: 10 TABLET, FILM COATED ORAL at 13:56

## 2018-10-16 RX ADMIN — FLUOXETINE 40 MG: 20 CAPSULE ORAL at 09:42

## 2018-10-16 RX ADMIN — GABAPENTIN 600 MG: 300 CAPSULE ORAL at 13:56

## 2018-10-16 RX ADMIN — OXYCODONE AND ACETAMINOPHEN 2 TABLET: 5; 325 TABLET ORAL at 11:03

## 2018-10-16 RX ADMIN — OXYCODONE AND ACETAMINOPHEN 2 TABLET: 5; 325 TABLET ORAL at 06:41

## 2018-10-16 RX ADMIN — CYCLOBENZAPRINE HYDROCHLORIDE 10 MG: 10 TABLET, FILM COATED ORAL at 09:42

## 2018-10-16 ASSESSMENT — PAIN SCALES - GENERAL
PAINLEVEL_OUTOF10: 8
PAINLEVEL_OUTOF10: 8
PAINLEVEL_OUTOF10: 7
PAINLEVEL_OUTOF10: 0
PAINLEVEL_OUTOF10: 7

## 2018-10-16 ASSESSMENT — PAIN DESCRIPTION - PAIN TYPE
TYPE: ACUTE PAIN
TYPE: ACUTE PAIN

## 2018-10-16 ASSESSMENT — PAIN DESCRIPTION - DESCRIPTORS
DESCRIPTORS: ACHING
DESCRIPTORS: SHARP

## 2018-10-16 ASSESSMENT — PAIN DESCRIPTION - LOCATION
LOCATION: KNEE
LOCATION: LEG

## 2018-10-16 NOTE — DISCHARGE SUMMARY
involved in this patient's care. If you have any questions or concerns please feel free to contact me at [phone].

## 2018-11-02 ENCOUNTER — APPOINTMENT (OUTPATIENT)
Dept: GENERAL RADIOLOGY | Age: 58
End: 2018-11-02

## 2018-11-02 ENCOUNTER — HOSPITAL ENCOUNTER (OUTPATIENT)
Age: 58
Setting detail: OBSERVATION
Discharge: HOME | End: 2018-11-03
Attending: EMERGENCY MEDICINE | Admitting: INTERNAL MEDICINE

## 2018-11-02 DIAGNOSIS — J45.41 MODERATE PERSISTENT ASTHMA WITH ACUTE EXACERBATION: Primary | ICD-10-CM

## 2018-11-02 DIAGNOSIS — G89.4 CHRONIC PAIN SYNDROME: Chronic | ICD-10-CM

## 2018-11-02 LAB
ANION GAP SERPL CALCULATED.3IONS-SCNC: 14 MMOL/L (ref 3–16)
BASE EXCESS VENOUS: 4 (ref -3–3)
BASOPHILS ABSOLUTE: 0.1 K/UL (ref 0–0.2)
BASOPHILS RELATIVE PERCENT: 0.6 %
BUN BLDV-MCNC: 15 MG/DL (ref 7–20)
CALCIUM SERPL-MCNC: 9.3 MG/DL (ref 8.3–10.6)
CHLORIDE BLD-SCNC: 103 MMOL/L (ref 99–110)
CO2: 23 MMOL/L (ref 21–32)
CREAT SERPL-MCNC: 0.5 MG/DL (ref 0.6–1.1)
EOSINOPHILS ABSOLUTE: 0.3 K/UL (ref 0–0.6)
EOSINOPHILS RELATIVE PERCENT: 2.8 %
GFR AFRICAN AMERICAN: >60
GFR NON-AFRICAN AMERICAN: >60
GLUCOSE BLD-MCNC: 121 MG/DL (ref 70–99)
HCO3 VENOUS: 28 MMOL/L (ref 23–29)
HCT VFR BLD CALC: 33.2 % (ref 36–48)
HEMOGLOBIN: 10.7 G/DL (ref 12–16)
LACTATE: 0.66 MMOL/L (ref 0.4–2)
LYMPHOCYTES ABSOLUTE: 2.1 K/UL (ref 1–5.1)
LYMPHOCYTES RELATIVE PERCENT: 21.9 %
MCH RBC QN AUTO: 25.3 PG (ref 26–34)
MCHC RBC AUTO-ENTMCNC: 32.2 G/DL (ref 31–36)
MCV RBC AUTO: 78.6 FL (ref 80–100)
MONOCYTES ABSOLUTE: 0.7 K/UL (ref 0–1.3)
MONOCYTES RELATIVE PERCENT: 7.5 %
NEUTROPHILS ABSOLUTE: 6.6 K/UL (ref 1.7–7.7)
NEUTROPHILS RELATIVE PERCENT: 67.2 %
O2 SAT, VEN: 82 %
PCO2, VEN: 43 MM HG (ref 40–50)
PDW BLD-RTO: 20.6 % (ref 12.4–15.4)
PERFORMED ON: ABNORMAL
PH VENOUS: 7.42 (ref 7.35–7.45)
PLATELET # BLD: 396 K/UL (ref 135–450)
PMV BLD AUTO: 7.7 FL (ref 5–10.5)
PO2, VEN: 46 MM HG
POC SAMPLE TYPE: ABNORMAL
POTASSIUM SERPL-SCNC: 4.6 MMOL/L (ref 3.5–5.1)
PRO-BNP: 86 PG/ML (ref 0–124)
RBC # BLD: 4.22 M/UL (ref 4–5.2)
SODIUM BLD-SCNC: 140 MMOL/L (ref 136–145)
TCO2 CALC VENOUS: 29 MMOL/L
TROPONIN: <0.01 NG/ML
WBC # BLD: 9.8 K/UL (ref 4–11)

## 2018-11-02 PROCEDURE — 2700000000 HC OXYGEN THERAPY PER DAY

## 2018-11-02 PROCEDURE — 80048 BASIC METABOLIC PNL TOTAL CA: CPT

## 2018-11-02 PROCEDURE — G0378 HOSPITAL OBSERVATION PER HR: HCPCS

## 2018-11-02 PROCEDURE — 6370000000 HC RX 637 (ALT 250 FOR IP): Performed by: FAMILY MEDICINE

## 2018-11-02 PROCEDURE — 96372 THER/PROPH/DIAG INJ SC/IM: CPT

## 2018-11-02 PROCEDURE — 6370000000 HC RX 637 (ALT 250 FOR IP): Performed by: INTERNAL MEDICINE

## 2018-11-02 PROCEDURE — 99285 EMERGENCY DEPT VISIT HI MDM: CPT

## 2018-11-02 PROCEDURE — 6360000002 HC RX W HCPCS: Performed by: FAMILY MEDICINE

## 2018-11-02 PROCEDURE — 94761 N-INVAS EAR/PLS OXIMETRY MLT: CPT

## 2018-11-02 PROCEDURE — 94150 VITAL CAPACITY TEST: CPT

## 2018-11-02 PROCEDURE — 85025 COMPLETE CBC W/AUTO DIFF WBC: CPT

## 2018-11-02 PROCEDURE — 83605 ASSAY OF LACTIC ACID: CPT

## 2018-11-02 PROCEDURE — 96365 THER/PROPH/DIAG IV INF INIT: CPT

## 2018-11-02 PROCEDURE — 6360000002 HC RX W HCPCS: Performed by: INTERNAL MEDICINE

## 2018-11-02 PROCEDURE — 2580000003 HC RX 258: Performed by: INTERNAL MEDICINE

## 2018-11-02 PROCEDURE — 71046 X-RAY EXAM CHEST 2 VIEWS: CPT

## 2018-11-02 PROCEDURE — 83880 ASSAY OF NATRIURETIC PEPTIDE: CPT

## 2018-11-02 PROCEDURE — 6360000002 HC RX W HCPCS: Performed by: EMERGENCY MEDICINE

## 2018-11-02 PROCEDURE — 94664 DEMO&/EVAL PT USE INHALER: CPT

## 2018-11-02 PROCEDURE — 93005 ELECTROCARDIOGRAM TRACING: CPT | Performed by: EMERGENCY MEDICINE

## 2018-11-02 PROCEDURE — 94640 AIRWAY INHALATION TREATMENT: CPT

## 2018-11-02 PROCEDURE — 84484 ASSAY OF TROPONIN QUANT: CPT

## 2018-11-02 PROCEDURE — 82803 BLOOD GASES ANY COMBINATION: CPT

## 2018-11-02 PROCEDURE — 6370000000 HC RX 637 (ALT 250 FOR IP): Performed by: EMERGENCY MEDICINE

## 2018-11-02 RX ORDER — BUDESONIDE 0.5 MG/2ML
0.5 INHALANT ORAL 2 TIMES DAILY
Status: DISCONTINUED | OUTPATIENT
Start: 2018-11-02 | End: 2018-11-03 | Stop reason: HOSPADM

## 2018-11-02 RX ORDER — GABAPENTIN 300 MG/1
600 CAPSULE ORAL 3 TIMES DAILY
Status: DISCONTINUED | OUTPATIENT
Start: 2018-11-02 | End: 2018-11-03 | Stop reason: HOSPADM

## 2018-11-02 RX ORDER — SODIUM CHLORIDE 0.9 % (FLUSH) 0.9 %
10 SYRINGE (ML) INJECTION PRN
Status: DISCONTINUED | OUTPATIENT
Start: 2018-11-02 | End: 2018-11-03 | Stop reason: HOSPADM

## 2018-11-02 RX ORDER — OXYCODONE HYDROCHLORIDE AND ACETAMINOPHEN 5; 325 MG/1; MG/1
1 TABLET ORAL ONCE
Status: COMPLETED | OUTPATIENT
Start: 2018-11-02 | End: 2018-11-02

## 2018-11-02 RX ORDER — THIAMINE MONONITRATE (VIT B1) 100 MG
100 TABLET ORAL DAILY
Status: DISCONTINUED | OUTPATIENT
Start: 2018-11-02 | End: 2018-11-03 | Stop reason: HOSPADM

## 2018-11-02 RX ORDER — ALBUTEROL SULFATE 2.5 MG/3ML
2.5 SOLUTION RESPIRATORY (INHALATION) ONCE
Status: COMPLETED | OUTPATIENT
Start: 2018-11-02 | End: 2018-11-02

## 2018-11-02 RX ORDER — HYDROXYZINE PAMOATE 25 MG/1
25 CAPSULE ORAL 3 TIMES DAILY PRN
Status: DISCONTINUED | OUTPATIENT
Start: 2018-11-02 | End: 2018-11-03 | Stop reason: HOSPADM

## 2018-11-02 RX ORDER — CYCLOBENZAPRINE HCL 10 MG
10 TABLET ORAL 3 TIMES DAILY PRN
Status: DISCONTINUED | OUTPATIENT
Start: 2018-11-02 | End: 2018-11-03 | Stop reason: HOSPADM

## 2018-11-02 RX ORDER — AMITRIPTYLINE HYDROCHLORIDE 50 MG/1
75 TABLET, FILM COATED ORAL NIGHTLY
Status: DISCONTINUED | OUTPATIENT
Start: 2018-11-02 | End: 2018-11-03 | Stop reason: HOSPADM

## 2018-11-02 RX ORDER — PREDNISONE 20 MG/1
20 TABLET ORAL 2 TIMES DAILY
Status: DISCONTINUED | OUTPATIENT
Start: 2018-11-03 | End: 2018-11-03 | Stop reason: HOSPADM

## 2018-11-02 RX ORDER — IPRATROPIUM BROMIDE AND ALBUTEROL SULFATE 2.5; .5 MG/3ML; MG/3ML
1 SOLUTION RESPIRATORY (INHALATION) EVERY 4 HOURS
Status: DISCONTINUED | OUTPATIENT
Start: 2018-11-02 | End: 2018-11-03 | Stop reason: HOSPADM

## 2018-11-02 RX ORDER — UBIDECARENONE 75 MG
100 CAPSULE ORAL DAILY
Status: DISCONTINUED | OUTPATIENT
Start: 2018-11-02 | End: 2018-11-03 | Stop reason: HOSPADM

## 2018-11-02 RX ORDER — FOLIC ACID 1 MG/1
1 TABLET ORAL DAILY
Status: DISCONTINUED | OUTPATIENT
Start: 2018-11-02 | End: 2018-11-03 | Stop reason: HOSPADM

## 2018-11-02 RX ORDER — FLUOXETINE HYDROCHLORIDE 20 MG/1
40 CAPSULE ORAL DAILY
Status: DISCONTINUED | OUTPATIENT
Start: 2018-11-02 | End: 2018-11-03 | Stop reason: HOSPADM

## 2018-11-02 RX ORDER — ALBUTEROL SULFATE 2.5 MG/3ML
2.5 SOLUTION RESPIRATORY (INHALATION)
Status: DISCONTINUED | OUTPATIENT
Start: 2018-11-02 | End: 2018-11-02

## 2018-11-02 RX ORDER — IPRATROPIUM BROMIDE AND ALBUTEROL SULFATE 2.5; .5 MG/3ML; MG/3ML
1 SOLUTION RESPIRATORY (INHALATION)
Status: DISCONTINUED | OUTPATIENT
Start: 2018-11-02 | End: 2018-11-02

## 2018-11-02 RX ORDER — SODIUM CHLORIDE 0.9 % (FLUSH) 0.9 %
10 SYRINGE (ML) INJECTION EVERY 12 HOURS SCHEDULED
Status: DISCONTINUED | OUTPATIENT
Start: 2018-11-02 | End: 2018-11-03 | Stop reason: HOSPADM

## 2018-11-02 RX ORDER — MAGNESIUM SULFATE IN WATER 40 MG/ML
2 INJECTION, SOLUTION INTRAVENOUS ONCE
Status: COMPLETED | OUTPATIENT
Start: 2018-11-02 | End: 2018-11-02

## 2018-11-02 RX ORDER — LEVALBUTEROL 1.25 MG/.5ML
1.25 SOLUTION, CONCENTRATE RESPIRATORY (INHALATION) EVERY 8 HOURS PRN
Status: DISCONTINUED | OUTPATIENT
Start: 2018-11-02 | End: 2018-11-03 | Stop reason: HOSPADM

## 2018-11-02 RX ORDER — ALBUTEROL SULFATE 2.5 MG/3ML
2.5 SOLUTION RESPIRATORY (INHALATION) 4 TIMES DAILY
Status: DISCONTINUED | OUTPATIENT
Start: 2018-11-02 | End: 2018-11-02

## 2018-11-02 RX ORDER — ONDANSETRON 2 MG/ML
4 INJECTION INTRAMUSCULAR; INTRAVENOUS EVERY 6 HOURS PRN
Status: DISCONTINUED | OUTPATIENT
Start: 2018-11-02 | End: 2018-11-03 | Stop reason: HOSPADM

## 2018-11-02 RX ORDER — OXYCODONE HYDROCHLORIDE AND ACETAMINOPHEN 5; 325 MG/1; MG/1
1 TABLET ORAL EVERY 4 HOURS PRN
Status: DISCONTINUED | OUTPATIENT
Start: 2018-11-02 | End: 2018-11-03 | Stop reason: HOSPADM

## 2018-11-02 RX ORDER — BUDESONIDE 0.5 MG/2ML
0.5 INHALANT ORAL 2 TIMES DAILY
Status: DISCONTINUED | OUTPATIENT
Start: 2018-11-02 | End: 2018-11-02

## 2018-11-02 RX ADMIN — ALBUTEROL SULFATE 2.5 MG: 2.5 SOLUTION RESPIRATORY (INHALATION) at 06:32

## 2018-11-02 RX ADMIN — Medication 10 ML: at 11:07

## 2018-11-02 RX ADMIN — HYDROXYZINE PAMOATE 25 MG: 25 CAPSULE ORAL at 17:02

## 2018-11-02 RX ADMIN — FOLIC ACID 1 MG: 1 TABLET ORAL at 11:05

## 2018-11-02 RX ADMIN — IPRATROPIUM BROMIDE AND ALBUTEROL SULFATE 1 AMPULE: .5; 3 SOLUTION RESPIRATORY (INHALATION) at 22:44

## 2018-11-02 RX ADMIN — Medication 100 MG: at 11:05

## 2018-11-02 RX ADMIN — HYDROXYZINE PAMOATE 25 MG: 25 CAPSULE ORAL at 11:43

## 2018-11-02 RX ADMIN — Medication 10 ML: at 20:54

## 2018-11-02 RX ADMIN — ENOXAPARIN SODIUM 40 MG: 40 INJECTION SUBCUTANEOUS at 11:05

## 2018-11-02 RX ADMIN — OXYCODONE AND ACETAMINOPHEN 1 TABLET: 5; 325 TABLET ORAL at 20:48

## 2018-11-02 RX ADMIN — BUDESONIDE 500 MCG: 0.5 SUSPENSION RESPIRATORY (INHALATION) at 19:54

## 2018-11-02 RX ADMIN — ALBUTEROL SULFATE 2.5 MG: 2.5 SOLUTION RESPIRATORY (INHALATION) at 04:22

## 2018-11-02 RX ADMIN — GABAPENTIN 600 MG: 300 CAPSULE ORAL at 11:05

## 2018-11-02 RX ADMIN — GABAPENTIN 600 MG: 300 CAPSULE ORAL at 20:48

## 2018-11-02 RX ADMIN — OXYCODONE AND ACETAMINOPHEN 1 TABLET: 5; 325 TABLET ORAL at 11:43

## 2018-11-02 RX ADMIN — OXYCODONE HYDROCHLORIDE AND ACETAMINOPHEN 1 TABLET: 5; 325 TABLET ORAL at 04:26

## 2018-11-02 RX ADMIN — GABAPENTIN 600 MG: 300 CAPSULE ORAL at 15:36

## 2018-11-02 RX ADMIN — IPRATROPIUM BROMIDE AND ALBUTEROL SULFATE 1 AMPULE: .5; 3 SOLUTION RESPIRATORY (INHALATION) at 19:53

## 2018-11-02 RX ADMIN — ALBUTEROL SULFATE 2.5 MG: 2.5 SOLUTION RESPIRATORY (INHALATION) at 12:28

## 2018-11-02 RX ADMIN — ALBUTEROL SULFATE 2.5 MG: 2.5 SOLUTION RESPIRATORY (INHALATION) at 04:12

## 2018-11-02 RX ADMIN — MAGNESIUM SULFATE HEPTAHYDRATE 2 G: 40 INJECTION, SOLUTION INTRAVENOUS at 05:40

## 2018-11-02 RX ADMIN — CYCLOBENZAPRINE HYDROCHLORIDE 10 MG: 10 TABLET, FILM COATED ORAL at 23:19

## 2018-11-02 RX ADMIN — AMITRIPTYLINE HYDROCHLORIDE 75 MG: 50 TABLET, FILM COATED ORAL at 20:48

## 2018-11-02 RX ADMIN — ALBUTEROL SULFATE 2.5 MG: 2.5 SOLUTION RESPIRATORY (INHALATION) at 04:03

## 2018-11-02 RX ADMIN — VITAM B12 100 MCG: 100 TAB at 11:05

## 2018-11-02 RX ADMIN — FLUOXETINE 40 MG: 20 CAPSULE ORAL at 11:05

## 2018-11-02 RX ADMIN — OXYCODONE AND ACETAMINOPHEN 1 TABLET: 5; 325 TABLET ORAL at 15:57

## 2018-11-02 RX ADMIN — IPRATROPIUM BROMIDE 0.5 MG: 0.5 SOLUTION RESPIRATORY (INHALATION) at 12:29

## 2018-11-02 RX ADMIN — IPRATROPIUM BROMIDE AND ALBUTEROL SULFATE 1 AMPULE: .5; 3 SOLUTION RESPIRATORY (INHALATION) at 17:21

## 2018-11-02 ASSESSMENT — PAIN DESCRIPTION - LOCATION
LOCATION: LEG
LOCATION: LEG
LOCATION: KNEE
LOCATION: LEG

## 2018-11-02 ASSESSMENT — PAIN DESCRIPTION - PROGRESSION
CLINICAL_PROGRESSION: NOT CHANGED

## 2018-11-02 ASSESSMENT — ENCOUNTER SYMPTOMS
SHORTNESS OF BREATH: 1
COUGH: 0
EYES NEGATIVE: 1
GASTROINTESTINAL NEGATIVE: 1

## 2018-11-02 ASSESSMENT — PAIN DESCRIPTION - DIRECTION
RADIATING_TOWARDS: KNEE
RADIATING_TOWARDS: KNEE TO HIP

## 2018-11-02 ASSESSMENT — PAIN SCALES - GENERAL
PAINLEVEL_OUTOF10: 5
PAINLEVEL_OUTOF10: 8
PAINLEVEL_OUTOF10: 8
PAINLEVEL_OUTOF10: 5
PAINLEVEL_OUTOF10: 8
PAINLEVEL_OUTOF10: 8
PAINLEVEL_OUTOF10: 7

## 2018-11-02 ASSESSMENT — PAIN DESCRIPTION - PAIN TYPE
TYPE: CHRONIC PAIN

## 2018-11-02 ASSESSMENT — PULMONARY FUNCTION TESTS: PEFR_L/MIN: 160

## 2018-11-02 ASSESSMENT — PAIN DESCRIPTION - FREQUENCY
FREQUENCY: CONTINUOUS

## 2018-11-02 ASSESSMENT — PAIN DESCRIPTION - ORIENTATION
ORIENTATION: RIGHT
ORIENTATION: LEFT;RIGHT
ORIENTATION: RIGHT
ORIENTATION: RIGHT

## 2018-11-02 ASSESSMENT — PAIN DESCRIPTION - DESCRIPTORS
DESCRIPTORS: ACHING
DESCRIPTORS: THROBBING;SHOOTING
DESCRIPTORS: THROBBING
DESCRIPTORS: SHOOTING

## 2018-11-02 ASSESSMENT — PAIN DESCRIPTION - ONSET
ONSET: ON-GOING

## 2018-11-02 ASSESSMENT — PAIN SCALES - WONG BAKER: WONGBAKER_NUMERICALRESPONSE: 0

## 2018-11-02 NOTE — PROGRESS NOTES
RESPIRATORY THERAPY ASSESSMENT    Name:  Mayte Doherty Record Number:  [id number]  Age: 60 y.o. Gender: female  : 1960  Today's Date:  2018  Room:  [facility]630-    Assessment     Is the patient being admitted for a COPD or Asthma exacerbation? Yes   (If yes the patient will be seen every 4 hours for the first 24 hours and then reassessed)    Patient Admission Diagnosis   Acute asthma exacerbation      Allergies  Allergies   Allergen Reactions    Cortisone Anaphylaxis     Only if Injected; many years ago. Anaphylactic reaction??numbing agent??cortisone. Injected causes reaction. .. Garlon Odor Garlon Odor the patient states she CAN TAKE IV solumedrol fine (18)    Droperidol Other (See Comments)     EPS   shaking  shaking  ETS    Compazine [Prochlorperazine] Other (See Comments)     EPS symptoms       Minimum Predicted Vital Capacity:     544          Actual Vital Capacity:      1250          Pulmonary History:Asthma , former smoker  Home Oxygen Therapy:  room air  Home Respiratory Therapy:Albuterol, Albuterol/Ipratropium Bromide MDI and Mometasone/Formoterol   Current Respiratory Therapy:  Dulera, Xopenex prn, Atrovent   Treatment Type: Aerosol Generator  Medications: (S) Albuterol    Respiratory Severity Index(RSI)   Patients with orders for inhalation medications, oxygen, or any therapeutic treatment modality will be placed on Respiratory Protocol. They will be assessed with the first treatment and at least every 72 hours thereafter. The following severity scale will be used to determine frequency of treatment intervention.     Smoking History: Mild Exacerbation = 3    Social History  Social History   Substance Use Topics    Smoking status: Former Smoker     Packs/day: 0.25     Years: 0.20     Types: Cigarettes     Quit date: 2014    Smokeless tobacco: Never Used      Comment: working to decrease    Alcohol use No      Comment: recovering alcoholic since , has had couple of relaspes, last

## 2018-11-03 VITALS
HEART RATE: 85 BPM | TEMPERATURE: 97.8 F | OXYGEN SATURATION: 94 % | SYSTOLIC BLOOD PRESSURE: 130 MMHG | BODY MASS INDEX: 38.04 KG/M2 | DIASTOLIC BLOOD PRESSURE: 68 MMHG | HEIGHT: 58 IN | WEIGHT: 181.2 LBS | RESPIRATION RATE: 18 BRPM

## 2018-11-03 LAB
ANION GAP SERPL CALCULATED.3IONS-SCNC: 12 MMOL/L (ref 3–16)
BUN BLDV-MCNC: 12 MG/DL (ref 7–20)
CALCIUM SERPL-MCNC: 9.1 MG/DL (ref 8.3–10.6)
CHLORIDE BLD-SCNC: 100 MMOL/L (ref 99–110)
CO2: 24 MMOL/L (ref 21–32)
CREAT SERPL-MCNC: <0.5 MG/DL (ref 0.6–1.1)
GFR AFRICAN AMERICAN: >60
GFR NON-AFRICAN AMERICAN: >60
GLUCOSE BLD-MCNC: 103 MG/DL (ref 70–99)
POTASSIUM REFLEX MAGNESIUM: 4.1 MMOL/L (ref 3.5–5.1)
SODIUM BLD-SCNC: 136 MMOL/L (ref 136–145)

## 2018-11-03 PROCEDURE — 6370000000 HC RX 637 (ALT 250 FOR IP): Performed by: FAMILY MEDICINE

## 2018-11-03 PROCEDURE — 94664 DEMO&/EVAL PT USE INHALER: CPT

## 2018-11-03 PROCEDURE — 2580000003 HC RX 258: Performed by: INTERNAL MEDICINE

## 2018-11-03 PROCEDURE — 94761 N-INVAS EAR/PLS OXIMETRY MLT: CPT

## 2018-11-03 PROCEDURE — 94640 AIRWAY INHALATION TREATMENT: CPT

## 2018-11-03 PROCEDURE — 94150 VITAL CAPACITY TEST: CPT

## 2018-11-03 PROCEDURE — 6360000002 HC RX W HCPCS: Performed by: FAMILY MEDICINE

## 2018-11-03 PROCEDURE — 96372 THER/PROPH/DIAG INJ SC/IM: CPT

## 2018-11-03 PROCEDURE — G0378 HOSPITAL OBSERVATION PER HR: HCPCS

## 2018-11-03 PROCEDURE — 94799 UNLISTED PULMONARY SVC/PX: CPT

## 2018-11-03 PROCEDURE — 6360000002 HC RX W HCPCS: Performed by: INTERNAL MEDICINE

## 2018-11-03 PROCEDURE — 6370000000 HC RX 637 (ALT 250 FOR IP): Performed by: INTERNAL MEDICINE

## 2018-11-03 PROCEDURE — 80048 BASIC METABOLIC PNL TOTAL CA: CPT

## 2018-11-03 PROCEDURE — 36415 COLL VENOUS BLD VENIPUNCTURE: CPT

## 2018-11-03 RX ORDER — OXYCODONE HYDROCHLORIDE AND ACETAMINOPHEN 5; 325 MG/1; MG/1
1 TABLET ORAL EVERY 4 HOURS PRN
Qty: 3 TABLET | Refills: 0 | Status: SHIPPED | OUTPATIENT
Start: 2018-11-03 | End: 2018-11-05

## 2018-11-03 RX ORDER — BUDESONIDE AND FORMOTEROL FUMARATE DIHYDRATE 160; 4.5 UG/1; UG/1
2 AEROSOL RESPIRATORY (INHALATION) 2 TIMES DAILY
Qty: 1 INHALER | Refills: 3 | Status: SHIPPED | OUTPATIENT
Start: 2018-11-03 | End: 2019-02-13 | Stop reason: ALTCHOICE

## 2018-11-03 RX ORDER — PREDNISONE 20 MG/1
20 TABLET ORAL 2 TIMES DAILY
Qty: 10 TABLET | Refills: 0 | Status: SHIPPED | OUTPATIENT
Start: 2018-11-03 | End: 2018-11-08

## 2018-11-03 RX ADMIN — FOLIC ACID 1 MG: 1 TABLET ORAL at 07:56

## 2018-11-03 RX ADMIN — FLUOXETINE 40 MG: 20 CAPSULE ORAL at 07:55

## 2018-11-03 RX ADMIN — Medication 10 ML: at 07:58

## 2018-11-03 RX ADMIN — IPRATROPIUM BROMIDE AND ALBUTEROL SULFATE 1 AMPULE: .5; 3 SOLUTION RESPIRATORY (INHALATION) at 13:27

## 2018-11-03 RX ADMIN — OXYCODONE AND ACETAMINOPHEN 1 TABLET: 5; 325 TABLET ORAL at 00:26

## 2018-11-03 RX ADMIN — IPRATROPIUM BROMIDE AND ALBUTEROL SULFATE 1 AMPULE: .5; 3 SOLUTION RESPIRATORY (INHALATION) at 03:36

## 2018-11-03 RX ADMIN — OXYCODONE AND ACETAMINOPHEN 1 TABLET: 5; 325 TABLET ORAL at 09:40

## 2018-11-03 RX ADMIN — GABAPENTIN 600 MG: 300 CAPSULE ORAL at 13:44

## 2018-11-03 RX ADMIN — OXYCODONE AND ACETAMINOPHEN 1 TABLET: 5; 325 TABLET ORAL at 05:36

## 2018-11-03 RX ADMIN — OXYCODONE AND ACETAMINOPHEN 1 TABLET: 5; 325 TABLET ORAL at 13:44

## 2018-11-03 RX ADMIN — IPRATROPIUM BROMIDE AND ALBUTEROL SULFATE 1 AMPULE: .5; 3 SOLUTION RESPIRATORY (INHALATION) at 08:52

## 2018-11-03 RX ADMIN — HYDROXYZINE PAMOATE 25 MG: 25 CAPSULE ORAL at 07:56

## 2018-11-03 RX ADMIN — VITAM B12 100 MCG: 100 TAB at 07:56

## 2018-11-03 RX ADMIN — CYCLOBENZAPRINE HYDROCHLORIDE 10 MG: 10 TABLET, FILM COATED ORAL at 07:55

## 2018-11-03 RX ADMIN — ENOXAPARIN SODIUM 40 MG: 40 INJECTION SUBCUTANEOUS at 07:56

## 2018-11-03 RX ADMIN — BUDESONIDE 500 MCG: 0.5 SUSPENSION RESPIRATORY (INHALATION) at 08:53

## 2018-11-03 RX ADMIN — Medication 100 MG: at 07:55

## 2018-11-03 RX ADMIN — PREDNISONE 20 MG: 20 TABLET ORAL at 07:56

## 2018-11-03 RX ADMIN — GABAPENTIN 600 MG: 300 CAPSULE ORAL at 07:56

## 2018-11-03 ASSESSMENT — PAIN DESCRIPTION - FREQUENCY
FREQUENCY: CONTINUOUS

## 2018-11-03 ASSESSMENT — PAIN DESCRIPTION - DESCRIPTORS
DESCRIPTORS: ACHING

## 2018-11-03 ASSESSMENT — PAIN DESCRIPTION - ONSET
ONSET: ON-GOING

## 2018-11-03 ASSESSMENT — PAIN DESCRIPTION - ORIENTATION
ORIENTATION: LEFT;RIGHT
ORIENTATION: RIGHT
ORIENTATION: LEFT;RIGHT
ORIENTATION: RIGHT
ORIENTATION: RIGHT

## 2018-11-03 ASSESSMENT — PAIN SCALES - GENERAL
PAINLEVEL_OUTOF10: 8
PAINLEVEL_OUTOF10: 0
PAINLEVEL_OUTOF10: 7
PAINLEVEL_OUTOF10: 0
PAINLEVEL_OUTOF10: 8
PAINLEVEL_OUTOF10: 4
PAINLEVEL_OUTOF10: 3
PAINLEVEL_OUTOF10: 8
PAINLEVEL_OUTOF10: 7

## 2018-11-03 ASSESSMENT — PAIN DESCRIPTION - PAIN TYPE
TYPE: CHRONIC PAIN

## 2018-11-03 ASSESSMENT — PAIN DESCRIPTION - LOCATION
LOCATION: KNEE
LOCATION: HIP;KNEE
LOCATION: KNEE
LOCATION: HIP;KNEE
LOCATION: HIP;KNEE

## 2018-11-03 ASSESSMENT — PAIN DESCRIPTION - PROGRESSION
CLINICAL_PROGRESSION: NOT CHANGED
CLINICAL_PROGRESSION: GRADUALLY WORSENING
CLINICAL_PROGRESSION: NOT CHANGED

## 2018-11-03 ASSESSMENT — PULMONARY FUNCTION TESTS: PEFR_L/MIN: 100

## 2018-11-03 NOTE — CARE COORDINATION
SW received referral today, Saturday, for Pt to be DC home with visiting RN - Home Care Order placed. Pt is returning home with her mother and does not need Home 02. Pt does NOT currently have Insurance. SW will have to find a Home Care agency that will agree to do free visits. SW will try to locate an agency this weekend but if not able too, CM/SW will have to follow up on Monday morning 11/5/18. Pt was advised of above.      Katheryn Lechuga, [place]  Case Management  [phone]

## 2018-11-15 LAB
EKG ATRIAL RATE: 105 BPM
EKG DIAGNOSIS: NORMAL
EKG P AXIS: 82 DEGREES
EKG P-R INTERVAL: 166 MS
EKG Q-T INTERVAL: 344 MS
EKG QRS DURATION: 86 MS
EKG QTC CALCULATION (BAZETT): 454 MS
EKG R AXIS: 43 DEGREES
EKG T AXIS: 39 DEGREES
EKG VENTRICULAR RATE: 105 BPM

## 2019-02-13 ENCOUNTER — APPOINTMENT (OUTPATIENT)
Dept: GENERAL RADIOLOGY | Age: 59
DRG: 153 | End: 2019-02-13
Payer: MEDICARE

## 2019-02-13 ENCOUNTER — HOSPITAL ENCOUNTER (INPATIENT)
Age: 59
LOS: 1 days | Discharge: HOME | DRG: 153 | End: 2019-02-14
Attending: EMERGENCY MEDICINE | Admitting: INTERNAL MEDICINE
Payer: MEDICARE

## 2019-02-13 DIAGNOSIS — J18.9 PNEUMONIA DUE TO ORGANISM: ICD-10-CM

## 2019-02-13 DIAGNOSIS — J45.21 INTERMITTENT ASTHMA WITH ACUTE EXACERBATION, UNSPECIFIED ASTHMA SEVERITY: Primary | ICD-10-CM

## 2019-02-13 DIAGNOSIS — J45.41 MODERATE PERSISTENT ASTHMA WITH EXACERBATION: ICD-10-CM

## 2019-02-13 LAB
ANION GAP SERPL CALCULATED.3IONS-SCNC: 12 MMOL/L (ref 3–16)
BASE EXCESS VENOUS: 6 (ref -3–3)
BASOPHILS ABSOLUTE: 0.1 K/UL (ref 0–0.2)
BASOPHILS RELATIVE PERCENT: 1.4 %
BUN BLDV-MCNC: 14 MG/DL (ref 7–20)
CALCIUM SERPL-MCNC: 9 MG/DL (ref 8.3–10.6)
CHLORIDE BLD-SCNC: 102 MMOL/L (ref 99–110)
CO2: 27 MMOL/L (ref 21–32)
CREAT SERPL-MCNC: <0.5 MG/DL (ref 0.6–1.1)
EOSINOPHILS ABSOLUTE: 0.2 K/UL (ref 0–0.6)
EOSINOPHILS RELATIVE PERCENT: 3 %
GFR AFRICAN AMERICAN: >60
GFR NON-AFRICAN AMERICAN: >60
GLUCOSE BLD-MCNC: 101 MG/DL (ref 70–99)
HCO3 VENOUS: 29.6 MMOL/L (ref 23–29)
HCT VFR BLD CALC: 34.7 % (ref 36–48)
HEMOGLOBIN: 10.9 G/DL (ref 12–16)
LYMPHOCYTES ABSOLUTE: 1.9 K/UL (ref 1–5.1)
LYMPHOCYTES RELATIVE PERCENT: 24.2 %
MCH RBC QN AUTO: 25.4 PG (ref 26–34)
MCHC RBC AUTO-ENTMCNC: 31.6 G/DL (ref 31–36)
MCV RBC AUTO: 80.5 FL (ref 80–100)
MONOCYTES ABSOLUTE: 0.6 K/UL (ref 0–1.3)
MONOCYTES RELATIVE PERCENT: 6.9 %
NEUTROPHILS ABSOLUTE: 5.2 K/UL (ref 1.7–7.7)
NEUTROPHILS RELATIVE PERCENT: 64.5 %
O2 SAT, VEN: 81 %
PCO2, VEN: 39.7 MM HG (ref 40–50)
PDW BLD-RTO: 19.1 % (ref 12.4–15.4)
PERFORMED ON: ABNORMAL
PH VENOUS: 7.48 (ref 7.35–7.45)
PLATELET # BLD: 499 K/UL (ref 135–450)
PMV BLD AUTO: 7.4 FL (ref 5–10.5)
PO2, VEN: 42 MM HG
POC SAMPLE TYPE: ABNORMAL
POTASSIUM REFLEX MAGNESIUM: 4.1 MMOL/L (ref 3.5–5.1)
PROCALCITONIN: 0.02 NG/ML (ref 0–0.15)
RBC # BLD: 4.31 M/UL (ref 4–5.2)
SODIUM BLD-SCNC: 141 MMOL/L (ref 136–145)
TCO2 CALC VENOUS: 31 MMOL/L
WBC # BLD: 8 K/UL (ref 4–11)

## 2019-02-13 PROCEDURE — 99285 EMERGENCY DEPT VISIT HI MDM: CPT

## 2019-02-13 PROCEDURE — 85025 COMPLETE CBC W/AUTO DIFF WBC: CPT

## 2019-02-13 PROCEDURE — 94761 N-INVAS EAR/PLS OXIMETRY MLT: CPT

## 2019-02-13 PROCEDURE — 1200000000 HC SEMI PRIVATE

## 2019-02-13 PROCEDURE — 2580000003 HC RX 258: Performed by: EMERGENCY MEDICINE

## 2019-02-13 PROCEDURE — 6370000000 HC RX 637 (ALT 250 FOR IP): Performed by: EMERGENCY MEDICINE

## 2019-02-13 PROCEDURE — 2700000000 HC OXYGEN THERAPY PER DAY

## 2019-02-13 PROCEDURE — 82803 BLOOD GASES ANY COMBINATION: CPT

## 2019-02-13 PROCEDURE — 6360000002 HC RX W HCPCS: Performed by: INTERNAL MEDICINE

## 2019-02-13 PROCEDURE — 94664 DEMO&/EVAL PT USE INHALER: CPT

## 2019-02-13 PROCEDURE — 2580000003 HC RX 258: Performed by: INTERNAL MEDICINE

## 2019-02-13 PROCEDURE — 6360000002 HC RX W HCPCS: Performed by: EMERGENCY MEDICINE

## 2019-02-13 PROCEDURE — 80048 BASIC METABOLIC PNL TOTAL CA: CPT

## 2019-02-13 PROCEDURE — 71046 X-RAY EXAM CHEST 2 VIEWS: CPT

## 2019-02-13 PROCEDURE — 94640 AIRWAY INHALATION TREATMENT: CPT

## 2019-02-13 PROCEDURE — 36415 COLL VENOUS BLD VENIPUNCTURE: CPT

## 2019-02-13 PROCEDURE — 6370000000 HC RX 637 (ALT 250 FOR IP): Performed by: INTERNAL MEDICINE

## 2019-02-13 PROCEDURE — 84145 PROCALCITONIN (PCT): CPT

## 2019-02-13 RX ORDER — SODIUM CHLORIDE 0.9 % (FLUSH) 0.9 %
10 SYRINGE (ML) INJECTION EVERY 12 HOURS SCHEDULED
Status: DISCONTINUED | OUTPATIENT
Start: 2019-02-13 | End: 2019-02-14 | Stop reason: HOSPADM

## 2019-02-13 RX ORDER — ALBUTEROL SULFATE 2.5 MG/3ML
2.5 SOLUTION RESPIRATORY (INHALATION) EVERY 4 HOURS PRN
Status: DISCONTINUED | OUTPATIENT
Start: 2019-02-14 | End: 2019-02-14

## 2019-02-13 RX ORDER — UBIDECARENONE 75 MG
100 CAPSULE ORAL DAILY
Status: DISCONTINUED | OUTPATIENT
Start: 2019-02-13 | End: 2019-02-14 | Stop reason: HOSPADM

## 2019-02-13 RX ORDER — ALBUTEROL SULFATE 2.5 MG/3ML
2.5 SOLUTION RESPIRATORY (INHALATION) EVERY 6 HOURS PRN
Status: DISCONTINUED | OUTPATIENT
Start: 2019-02-13 | End: 2019-02-14

## 2019-02-13 RX ORDER — METHYLPREDNISOLONE SODIUM SUCCINATE 40 MG/ML
40 INJECTION, POWDER, LYOPHILIZED, FOR SOLUTION INTRAMUSCULAR; INTRAVENOUS EVERY 12 HOURS
Status: DISCONTINUED | OUTPATIENT
Start: 2019-02-13 | End: 2019-02-14 | Stop reason: HOSPADM

## 2019-02-13 RX ORDER — ALBUTEROL SULFATE 2.5 MG/3ML
2.5 SOLUTION RESPIRATORY (INHALATION) ONCE
Status: COMPLETED | OUTPATIENT
Start: 2019-02-13 | End: 2019-02-13

## 2019-02-13 RX ORDER — CYCLOBENZAPRINE HCL 10 MG
10 TABLET ORAL 3 TIMES DAILY
Status: DISCONTINUED | OUTPATIENT
Start: 2019-02-13 | End: 2019-02-14 | Stop reason: HOSPADM

## 2019-02-13 RX ORDER — ALBUTEROL SULFATE 2.5 MG/3ML
2.5 SOLUTION RESPIRATORY (INHALATION) EVERY 4 HOURS
Status: DISCONTINUED | OUTPATIENT
Start: 2019-02-13 | End: 2019-02-13

## 2019-02-13 RX ORDER — ONDANSETRON 2 MG/ML
4 INJECTION INTRAMUSCULAR; INTRAVENOUS EVERY 6 HOURS PRN
Status: DISCONTINUED | OUTPATIENT
Start: 2019-02-13 | End: 2019-02-14 | Stop reason: HOSPADM

## 2019-02-13 RX ORDER — ALBUTEROL SULFATE 2.5 MG/3ML
2.5 SOLUTION RESPIRATORY (INHALATION)
Status: DISCONTINUED | OUTPATIENT
Start: 2019-02-13 | End: 2019-02-13

## 2019-02-13 RX ORDER — IPRATROPIUM BROMIDE AND ALBUTEROL SULFATE 2.5; .5 MG/3ML; MG/3ML
1 SOLUTION RESPIRATORY (INHALATION) EVERY 4 HOURS
Status: DISCONTINUED | OUTPATIENT
Start: 2019-02-14 | End: 2019-02-14 | Stop reason: HOSPADM

## 2019-02-13 RX ORDER — PREDNISONE 20 MG/1
40 TABLET ORAL DAILY
Status: DISCONTINUED | OUTPATIENT
Start: 2019-02-14 | End: 2019-02-13

## 2019-02-13 RX ORDER — IBUPROFEN 400 MG/1
800 TABLET ORAL ONCE
Status: COMPLETED | OUTPATIENT
Start: 2019-02-13 | End: 2019-02-13

## 2019-02-13 RX ORDER — FLUOXETINE HYDROCHLORIDE 20 MG/1
40 CAPSULE ORAL DAILY
Status: DISCONTINUED | OUTPATIENT
Start: 2019-02-13 | End: 2019-02-14 | Stop reason: HOSPADM

## 2019-02-13 RX ORDER — GABAPENTIN 300 MG/1
600 CAPSULE ORAL 3 TIMES DAILY
Status: DISCONTINUED | OUTPATIENT
Start: 2019-02-13 | End: 2019-02-14 | Stop reason: HOSPADM

## 2019-02-13 RX ORDER — PREDNISONE 20 MG/1
60 TABLET ORAL ONCE
Status: COMPLETED | OUTPATIENT
Start: 2019-02-13 | End: 2019-02-13

## 2019-02-13 RX ORDER — IPRATROPIUM BROMIDE AND ALBUTEROL SULFATE 2.5; .5 MG/3ML; MG/3ML
1 SOLUTION RESPIRATORY (INHALATION) ONCE
Status: COMPLETED | OUTPATIENT
Start: 2019-02-13 | End: 2019-02-13

## 2019-02-13 RX ORDER — SODIUM CHLORIDE 0.9 % (FLUSH) 0.9 %
10 SYRINGE (ML) INJECTION PRN
Status: DISCONTINUED | OUTPATIENT
Start: 2019-02-13 | End: 2019-02-14 | Stop reason: HOSPADM

## 2019-02-13 RX ORDER — THIAMINE MONONITRATE (VIT B1) 100 MG
100 TABLET ORAL DAILY
Status: DISCONTINUED | OUTPATIENT
Start: 2019-02-13 | End: 2019-02-14 | Stop reason: HOSPADM

## 2019-02-13 RX ORDER — GABAPENTIN 300 MG/1
600 CAPSULE ORAL ONCE
Status: COMPLETED | OUTPATIENT
Start: 2019-02-13 | End: 2019-02-13

## 2019-02-13 RX ORDER — IBUPROFEN 400 MG/1
800 TABLET ORAL EVERY 6 HOURS PRN
Status: DISCONTINUED | OUTPATIENT
Start: 2019-02-13 | End: 2019-02-14 | Stop reason: HOSPADM

## 2019-02-13 RX ORDER — FOLIC ACID 1 MG/1
1 TABLET ORAL DAILY
Status: DISCONTINUED | OUTPATIENT
Start: 2019-02-13 | End: 2019-02-14 | Stop reason: HOSPADM

## 2019-02-13 RX ORDER — NICOTINE 21 MG/24HR
1 PATCH, TRANSDERMAL 24 HOURS TRANSDERMAL DAILY
Status: DISCONTINUED | OUTPATIENT
Start: 2019-02-13 | End: 2019-02-13

## 2019-02-13 RX ORDER — HYDROXYZINE PAMOATE 25 MG/1
25 CAPSULE ORAL 3 TIMES DAILY PRN
Status: DISCONTINUED | OUTPATIENT
Start: 2019-02-13 | End: 2019-02-14 | Stop reason: HOSPADM

## 2019-02-13 RX ADMIN — AZITHROMYCIN MONOHYDRATE 500 MG: 500 INJECTION, POWDER, LYOPHILIZED, FOR SOLUTION INTRAVENOUS at 10:16

## 2019-02-13 RX ADMIN — IPRATROPIUM BROMIDE AND ALBUTEROL SULFATE 1 AMPULE: .5; 3 SOLUTION RESPIRATORY (INHALATION) at 06:05

## 2019-02-13 RX ADMIN — ALBUTEROL SULFATE 2.5 MG: 2.5 SOLUTION RESPIRATORY (INHALATION) at 08:06

## 2019-02-13 RX ADMIN — Medication 10 ML: at 20:49

## 2019-02-13 RX ADMIN — PREDNISONE 60 MG: 20 TABLET ORAL at 06:21

## 2019-02-13 RX ADMIN — GABAPENTIN 600 MG: 300 CAPSULE ORAL at 20:48

## 2019-02-13 RX ADMIN — GABAPENTIN 600 MG: 300 CAPSULE ORAL at 14:33

## 2019-02-13 RX ADMIN — GABAPENTIN 600 MG: 300 CAPSULE ORAL at 08:24

## 2019-02-13 RX ADMIN — IBUPROFEN 800 MG: 400 TABLET ORAL at 08:24

## 2019-02-13 RX ADMIN — ALBUTEROL SULFATE 2.5 MG: 2.5 SOLUTION RESPIRATORY (INHALATION) at 20:01

## 2019-02-13 RX ADMIN — ALBUTEROL SULFATE 2.5 MG: 2.5 SOLUTION RESPIRATORY (INHALATION) at 15:04

## 2019-02-13 RX ADMIN — CYCLOBENZAPRINE HYDROCHLORIDE 10 MG: 10 TABLET, FILM COATED ORAL at 20:48

## 2019-02-13 RX ADMIN — METHYLPREDNISOLONE SODIUM SUCCINATE 40 MG: 40 INJECTION, POWDER, FOR SOLUTION INTRAMUSCULAR; INTRAVENOUS at 18:20

## 2019-02-13 RX ADMIN — AMITRIPTYLINE HYDROCHLORIDE 75 MG: 50 TABLET, FILM COATED ORAL at 20:48

## 2019-02-13 RX ADMIN — ALBUTEROL SULFATE 2.5 MG: 2.5 SOLUTION RESPIRATORY (INHALATION) at 11:22

## 2019-02-13 RX ADMIN — IBUPROFEN 800 MG: 400 TABLET, FILM COATED ORAL at 14:33

## 2019-02-13 RX ADMIN — FOLIC ACID 1 MG: 1 TABLET ORAL at 14:34

## 2019-02-13 RX ADMIN — ENOXAPARIN SODIUM 40 MG: 40 INJECTION SUBCUTANEOUS at 14:34

## 2019-02-13 RX ADMIN — CEFTRIAXONE 1 G: 1 INJECTION, POWDER, FOR SOLUTION INTRAMUSCULAR; INTRAVENOUS at 09:50

## 2019-02-13 RX ADMIN — VITAM B12 100 MCG: 100 TAB at 14:34

## 2019-02-13 RX ADMIN — IBUPROFEN 800 MG: 400 TABLET, FILM COATED ORAL at 20:54

## 2019-02-13 RX ADMIN — FLUOXETINE 40 MG: 20 CAPSULE ORAL at 14:33

## 2019-02-13 RX ADMIN — ALBUTEROL SULFATE 2.5 MG: 2.5 SOLUTION RESPIRATORY (INHALATION) at 06:11

## 2019-02-13 RX ADMIN — Medication 10 ML: at 14:35

## 2019-02-13 RX ADMIN — HYDROXYZINE PAMOATE 25 MG: 25 CAPSULE ORAL at 19:14

## 2019-02-13 RX ADMIN — Medication 100 MG: at 14:34

## 2019-02-13 RX ADMIN — CYCLOBENZAPRINE HYDROCHLORIDE 10 MG: 10 TABLET, FILM COATED ORAL at 14:33

## 2019-02-13 ASSESSMENT — PAIN DESCRIPTION - ORIENTATION
ORIENTATION: RIGHT
ORIENTATION: MID;UPPER;LOWER

## 2019-02-13 ASSESSMENT — PAIN SCALES - GENERAL
PAINLEVEL_OUTOF10: 5
PAINLEVEL_OUTOF10: 8

## 2019-02-13 ASSESSMENT — PAIN DESCRIPTION - FREQUENCY
FREQUENCY: CONTINUOUS
FREQUENCY: CONTINUOUS

## 2019-02-13 ASSESSMENT — PAIN DESCRIPTION - LOCATION
LOCATION: GENERALIZED
LOCATION: HIP

## 2019-02-13 ASSESSMENT — ENCOUNTER SYMPTOMS
WHEEZING: 0
NAUSEA: 0
VOMITING: 0
EYE PAIN: 0
DIARRHEA: 0
COUGH: 1
ABDOMINAL PAIN: 0
SHORTNESS OF BREATH: 1

## 2019-02-13 ASSESSMENT — PAIN DESCRIPTION - DESCRIPTORS
DESCRIPTORS: ACHING
DESCRIPTORS: ACHING

## 2019-02-13 ASSESSMENT — PAIN DESCRIPTION - ONSET: ONSET: ON-GOING

## 2019-02-13 ASSESSMENT — PAIN DESCRIPTION - PAIN TYPE
TYPE: CHRONIC PAIN
TYPE: CHRONIC PAIN

## 2019-02-13 ASSESSMENT — PAIN - FUNCTIONAL ASSESSMENT: PAIN_FUNCTIONAL_ASSESSMENT: ACTIVITIES ARE NOT PREVENTED

## 2019-02-14 VITALS
OXYGEN SATURATION: 92 % | HEIGHT: 58 IN | SYSTOLIC BLOOD PRESSURE: 145 MMHG | BODY MASS INDEX: 39.02 KG/M2 | RESPIRATION RATE: 18 BRPM | WEIGHT: 185.9 LBS | DIASTOLIC BLOOD PRESSURE: 90 MMHG | TEMPERATURE: 98.7 F | HEART RATE: 85 BPM

## 2019-02-14 LAB
BASOPHILS ABSOLUTE: 0.1 K/UL (ref 0–0.2)
BASOPHILS RELATIVE PERCENT: 0.5 %
EOSINOPHILS ABSOLUTE: 0 K/UL (ref 0–0.6)
EOSINOPHILS RELATIVE PERCENT: 0.2 %
HCT VFR BLD CALC: 33 % (ref 36–48)
HEMOGLOBIN: 10.7 G/DL (ref 12–16)
LYMPHOCYTES ABSOLUTE: 1.2 K/UL (ref 1–5.1)
LYMPHOCYTES RELATIVE PERCENT: 12.9 %
MCH RBC QN AUTO: 25.7 PG (ref 26–34)
MCHC RBC AUTO-ENTMCNC: 32.5 G/DL (ref 31–36)
MCV RBC AUTO: 78.9 FL (ref 80–100)
MONOCYTES ABSOLUTE: 0.5 K/UL (ref 0–1.3)
MONOCYTES RELATIVE PERCENT: 5.7 %
NEUTROPHILS ABSOLUTE: 7.8 K/UL (ref 1.7–7.7)
NEUTROPHILS RELATIVE PERCENT: 80.7 %
PDW BLD-RTO: 19 % (ref 12.4–15.4)
PLATELET # BLD: 370 K/UL (ref 135–450)
PMV BLD AUTO: 8.3 FL (ref 5–10.5)
RAPID INFLUENZA  B AGN: NEGATIVE
RAPID INFLUENZA A AGN: NEGATIVE
RBC # BLD: 4.18 M/UL (ref 4–5.2)
REPORT: NORMAL
RESPIRATORY PANEL PCR: NORMAL
WBC # BLD: 9.7 K/UL (ref 4–11)

## 2019-02-14 PROCEDURE — 87581 M.PNEUMON DNA AMP PROBE: CPT

## 2019-02-14 PROCEDURE — 2580000003 HC RX 258: Performed by: INTERNAL MEDICINE

## 2019-02-14 PROCEDURE — 94761 N-INVAS EAR/PLS OXIMETRY MLT: CPT

## 2019-02-14 PROCEDURE — 36415 COLL VENOUS BLD VENIPUNCTURE: CPT

## 2019-02-14 PROCEDURE — 87486 CHLMYD PNEUM DNA AMP PROBE: CPT

## 2019-02-14 PROCEDURE — 6370000000 HC RX 637 (ALT 250 FOR IP): Performed by: FAMILY MEDICINE

## 2019-02-14 PROCEDURE — 6370000000 HC RX 637 (ALT 250 FOR IP): Performed by: INTERNAL MEDICINE

## 2019-02-14 PROCEDURE — 2700000000 HC OXYGEN THERAPY PER DAY

## 2019-02-14 PROCEDURE — 6360000002 HC RX W HCPCS: Performed by: INTERNAL MEDICINE

## 2019-02-14 PROCEDURE — 87798 DETECT AGENT NOS DNA AMP: CPT

## 2019-02-14 PROCEDURE — 87804 INFLUENZA ASSAY W/OPTIC: CPT

## 2019-02-14 PROCEDURE — 85025 COMPLETE CBC W/AUTO DIFF WBC: CPT

## 2019-02-14 PROCEDURE — 89220 SPUTUM SPECIMEN COLLECTION: CPT

## 2019-02-14 PROCEDURE — 87633 RESP VIRUS 12-25 TARGETS: CPT

## 2019-02-14 PROCEDURE — 94640 AIRWAY INHALATION TREATMENT: CPT

## 2019-02-14 RX ORDER — ALBUTEROL SULFATE 90 UG/1
2 AEROSOL, METERED RESPIRATORY (INHALATION) 4 TIMES DAILY PRN
Qty: 1 INHALER | Refills: 0 | Status: SHIPPED | OUTPATIENT
Start: 2019-02-14 | End: 2022-09-17

## 2019-02-14 RX ORDER — PREDNISONE 20 MG/1
40 TABLET ORAL DAILY
Qty: 10 TABLET | Refills: 0 | Status: SHIPPED | OUTPATIENT
Start: 2019-02-14 | End: 2019-02-14

## 2019-02-14 RX ORDER — PREDNISONE 20 MG/1
40 TABLET ORAL DAILY
Qty: 10 TABLET | Refills: 0 | Status: SHIPPED | OUTPATIENT
Start: 2019-02-14 | End: 2019-02-19

## 2019-02-14 RX ORDER — BENZONATATE 100 MG/1
100-200 CAPSULE ORAL 3 TIMES DAILY PRN
Qty: 20 CAPSULE | Refills: 0 | Status: SHIPPED | OUTPATIENT
Start: 2019-02-14 | End: 2019-02-19

## 2019-02-14 RX ORDER — BUDESONIDE AND FORMOTEROL FUMARATE DIHYDRATE 160; 4.5 UG/1; UG/1
2 AEROSOL RESPIRATORY (INHALATION) 2 TIMES DAILY
Qty: 1 INHALER | Refills: 0 | Status: SHIPPED | OUTPATIENT
Start: 2019-02-14 | End: 2019-02-14

## 2019-02-14 RX ORDER — TRAMADOL HYDROCHLORIDE 50 MG/1
50 TABLET ORAL EVERY 6 HOURS PRN
Status: DISCONTINUED | OUTPATIENT
Start: 2019-02-14 | End: 2019-02-14 | Stop reason: HOSPADM

## 2019-02-14 RX ORDER — TRAMADOL HYDROCHLORIDE 50 MG/1
100 TABLET ORAL EVERY 6 HOURS PRN
Status: DISCONTINUED | OUTPATIENT
Start: 2019-02-14 | End: 2019-02-14 | Stop reason: HOSPADM

## 2019-02-14 RX ORDER — ALBUTEROL SULFATE 90 UG/1
2 AEROSOL, METERED RESPIRATORY (INHALATION) 4 TIMES DAILY PRN
Qty: 1 INHALER | Refills: 0 | Status: SHIPPED | OUTPATIENT
Start: 2019-02-14 | End: 2019-02-14

## 2019-02-14 RX ORDER — BUDESONIDE AND FORMOTEROL FUMARATE DIHYDRATE 160; 4.5 UG/1; UG/1
2 AEROSOL RESPIRATORY (INHALATION) 2 TIMES DAILY
Qty: 1 INHALER | Refills: 0 | Status: SHIPPED | OUTPATIENT
Start: 2019-02-14 | End: 2020-10-23 | Stop reason: DRUGHIGH

## 2019-02-14 RX ORDER — ALBUTEROL SULFATE 2.5 MG/3ML
2.5 SOLUTION RESPIRATORY (INHALATION) EVERY 4 HOURS PRN
Status: DISCONTINUED | OUTPATIENT
Start: 2019-02-14 | End: 2019-02-14 | Stop reason: HOSPADM

## 2019-02-14 RX ADMIN — CYCLOBENZAPRINE HYDROCHLORIDE 10 MG: 10 TABLET, FILM COATED ORAL at 15:50

## 2019-02-14 RX ADMIN — GABAPENTIN 600 MG: 300 CAPSULE ORAL at 08:45

## 2019-02-14 RX ADMIN — IPRATROPIUM BROMIDE AND ALBUTEROL SULFATE 1 AMPULE: .5; 3 SOLUTION RESPIRATORY (INHALATION) at 09:32

## 2019-02-14 RX ADMIN — Medication 100 MG: at 08:45

## 2019-02-14 RX ADMIN — TRAMADOL HYDROCHLORIDE 100 MG: 50 TABLET, FILM COATED ORAL at 03:50

## 2019-02-14 RX ADMIN — TRAMADOL HYDROCHLORIDE 100 MG: 50 TABLET, FILM COATED ORAL at 16:23

## 2019-02-14 RX ADMIN — HYDROXYZINE PAMOATE 25 MG: 25 CAPSULE ORAL at 05:59

## 2019-02-14 RX ADMIN — FOLIC ACID 1 MG: 1 TABLET ORAL at 08:45

## 2019-02-14 RX ADMIN — METHYLPREDNISOLONE SODIUM SUCCINATE 40 MG: 40 INJECTION, POWDER, FOR SOLUTION INTRAMUSCULAR; INTRAVENOUS at 05:59

## 2019-02-14 RX ADMIN — TRAMADOL HYDROCHLORIDE 100 MG: 50 TABLET, FILM COATED ORAL at 10:05

## 2019-02-14 RX ADMIN — GABAPENTIN 600 MG: 300 CAPSULE ORAL at 15:50

## 2019-02-14 RX ADMIN — IPRATROPIUM BROMIDE AND ALBUTEROL SULFATE 1 AMPULE: .5; 3 SOLUTION RESPIRATORY (INHALATION) at 12:20

## 2019-02-14 RX ADMIN — ENOXAPARIN SODIUM 40 MG: 40 INJECTION SUBCUTANEOUS at 08:45

## 2019-02-14 RX ADMIN — IPRATROPIUM BROMIDE AND ALBUTEROL SULFATE 1 AMPULE: .5; 3 SOLUTION RESPIRATORY (INHALATION) at 04:36

## 2019-02-14 RX ADMIN — FLUOXETINE 40 MG: 20 CAPSULE ORAL at 08:45

## 2019-02-14 RX ADMIN — CYCLOBENZAPRINE HYDROCHLORIDE 10 MG: 10 TABLET, FILM COATED ORAL at 08:45

## 2019-02-14 RX ADMIN — IBUPROFEN 800 MG: 400 TABLET, FILM COATED ORAL at 02:39

## 2019-02-14 RX ADMIN — Medication 10 ML: at 08:45

## 2019-02-14 RX ADMIN — IPRATROPIUM BROMIDE AND ALBUTEROL SULFATE 1 AMPULE: .5; 3 SOLUTION RESPIRATORY (INHALATION) at 00:57

## 2019-02-14 RX ADMIN — VITAM B12 100 MCG: 100 TAB at 08:45

## 2019-02-14 ASSESSMENT — PAIN DESCRIPTION - LOCATION
LOCATION: HIP

## 2019-02-14 ASSESSMENT — PAIN SCALES - GENERAL
PAINLEVEL_OUTOF10: 8
PAINLEVEL_OUTOF10: 8
PAINLEVEL_OUTOF10: 6
PAINLEVEL_OUTOF10: 7
PAINLEVEL_OUTOF10: 8

## 2019-02-14 ASSESSMENT — PAIN DESCRIPTION - ORIENTATION
ORIENTATION: RIGHT

## 2019-02-14 ASSESSMENT — PAIN DESCRIPTION - PAIN TYPE
TYPE: CHRONIC PAIN

## 2019-02-14 ASSESSMENT — PAIN DESCRIPTION - PROGRESSION
CLINICAL_PROGRESSION: NOT CHANGED
CLINICAL_PROGRESSION: NOT CHANGED

## 2019-02-14 ASSESSMENT — PAIN DESCRIPTION - FREQUENCY
FREQUENCY: CONTINUOUS

## 2019-02-14 ASSESSMENT — PAIN DESCRIPTION - DESCRIPTORS
DESCRIPTORS: ACHING

## 2019-02-14 ASSESSMENT — PAIN - FUNCTIONAL ASSESSMENT
PAIN_FUNCTIONAL_ASSESSMENT: ACTIVITIES ARE NOT PREVENTED

## 2019-02-14 ASSESSMENT — PAIN DESCRIPTION - ONSET
ONSET: ON-GOING

## 2019-05-27 ENCOUNTER — HOSPITAL ENCOUNTER (EMERGENCY)
Age: 59
Discharge: HOME | End: 2019-05-27
Attending: EMERGENCY MEDICINE

## 2019-05-27 ENCOUNTER — APPOINTMENT (OUTPATIENT)
Dept: GENERAL RADIOLOGY | Age: 59
End: 2019-05-27

## 2019-05-27 VITALS
SYSTOLIC BLOOD PRESSURE: 155 MMHG | TEMPERATURE: 98.9 F | BODY MASS INDEX: 37.62 KG/M2 | WEIGHT: 180 LBS | OXYGEN SATURATION: 93 % | RESPIRATION RATE: 18 BRPM | HEART RATE: 93 BPM | DIASTOLIC BLOOD PRESSURE: 102 MMHG

## 2019-05-27 DIAGNOSIS — L02.415 CUTANEOUS ABSCESS OF RIGHT LOWER EXTREMITY: Primary | ICD-10-CM

## 2019-05-27 LAB
ANION GAP SERPL CALCULATED.3IONS-SCNC: 11 MMOL/L (ref 3–16)
BUN BLDV-MCNC: 10 MG/DL (ref 7–20)
C-REACTIVE PROTEIN: 21.1 MG/L (ref 0–5.1)
CALCIUM SERPL-MCNC: 8.9 MG/DL (ref 8.3–10.6)
CHLORIDE BLD-SCNC: 102 MMOL/L (ref 99–110)
CO2: 26 MMOL/L (ref 21–32)
CREAT SERPL-MCNC: 0.6 MG/DL (ref 0.6–1.1)
GFR AFRICAN AMERICAN: >60
GFR NON-AFRICAN AMERICAN: >60
GLUCOSE BLD-MCNC: 133 MG/DL (ref 70–99)
HCT VFR BLD CALC: 32.7 % (ref 36–48)
HEMOGLOBIN: 10.4 G/DL (ref 12–16)
LACTIC ACID: 0.8 MMOL/L (ref 0.4–2)
MCH RBC QN AUTO: 24.3 PG (ref 26–34)
MCHC RBC AUTO-ENTMCNC: 31.9 G/DL (ref 31–36)
MCV RBC AUTO: 76.4 FL (ref 80–100)
PDW BLD-RTO: 16.2 % (ref 12.4–15.4)
PLATELET # BLD: 462 K/UL (ref 135–450)
PMV BLD AUTO: 7.3 FL (ref 5–10.5)
POTASSIUM REFLEX MAGNESIUM: 4.7 MMOL/L (ref 3.5–5.1)
RBC # BLD: 4.28 M/UL (ref 4–5.2)
SEDIMENTATION RATE, ERYTHROCYTE: 57 MM/HR (ref 0–30)
SODIUM BLD-SCNC: 139 MMOL/L (ref 136–145)
WBC # BLD: 9.5 K/UL (ref 4–11)

## 2019-05-27 PROCEDURE — 99284 EMERGENCY DEPT VISIT MOD MDM: CPT

## 2019-05-27 PROCEDURE — 86140 C-REACTIVE PROTEIN: CPT

## 2019-05-27 PROCEDURE — 73502 X-RAY EXAM HIP UNI 2-3 VIEWS: CPT

## 2019-05-27 PROCEDURE — 6360000002 HC RX W HCPCS: Performed by: EMERGENCY MEDICINE

## 2019-05-27 PROCEDURE — 80048 BASIC METABOLIC PNL TOTAL CA: CPT

## 2019-05-27 PROCEDURE — 94761 N-INVAS EAR/PLS OXIMETRY MLT: CPT

## 2019-05-27 PROCEDURE — 94640 AIRWAY INHALATION TREATMENT: CPT

## 2019-05-27 PROCEDURE — 96366 THER/PROPH/DIAG IV INF ADDON: CPT

## 2019-05-27 PROCEDURE — 83605 ASSAY OF LACTIC ACID: CPT

## 2019-05-27 PROCEDURE — 85027 COMPLETE CBC AUTOMATED: CPT

## 2019-05-27 PROCEDURE — 2580000003 HC RX 258: Performed by: EMERGENCY MEDICINE

## 2019-05-27 PROCEDURE — 96365 THER/PROPH/DIAG IV INF INIT: CPT

## 2019-05-27 PROCEDURE — 96375 TX/PRO/DX INJ NEW DRUG ADDON: CPT

## 2019-05-27 PROCEDURE — 85652 RBC SED RATE AUTOMATED: CPT

## 2019-05-27 RX ORDER — LIDOCAINE HYDROCHLORIDE 10 MG/ML
5 INJECTION, SOLUTION EPIDURAL; INFILTRATION; INTRACAUDAL; PERINEURAL ONCE
Status: DISCONTINUED | OUTPATIENT
Start: 2019-05-27 | End: 2019-05-27 | Stop reason: HOSPADM

## 2019-05-27 RX ORDER — ALBUTEROL SULFATE 2.5 MG/3ML
2.5 SOLUTION RESPIRATORY (INHALATION) EVERY 6 HOURS PRN
Status: DISCONTINUED | OUTPATIENT
Start: 2019-05-27 | End: 2019-05-27 | Stop reason: HOSPADM

## 2019-05-27 RX ORDER — SULFAMETHOXAZOLE AND TRIMETHOPRIM 800; 160 MG/1; MG/1
1 TABLET ORAL 2 TIMES DAILY
Qty: 14 TABLET | Refills: 0 | Status: SHIPPED | OUTPATIENT
Start: 2019-05-27 | End: 2019-06-03

## 2019-05-27 RX ORDER — SODIUM CHLORIDE, SODIUM LACTATE, POTASSIUM CHLORIDE, CALCIUM CHLORIDE 600; 310; 30; 20 MG/100ML; MG/100ML; MG/100ML; MG/100ML
1000 INJECTION, SOLUTION INTRAVENOUS ONCE
Status: COMPLETED | OUTPATIENT
Start: 2019-05-27 | End: 2019-05-27

## 2019-05-27 RX ORDER — MORPHINE SULFATE 4 MG/ML
4 INJECTION, SOLUTION INTRAMUSCULAR; INTRAVENOUS ONCE
Status: COMPLETED | OUTPATIENT
Start: 2019-05-27 | End: 2019-05-27

## 2019-05-27 RX ADMIN — SODIUM CHLORIDE, POTASSIUM CHLORIDE, SODIUM LACTATE AND CALCIUM CHLORIDE 1000 ML: 600; 310; 30; 20 INJECTION, SOLUTION INTRAVENOUS at 05:50

## 2019-05-27 RX ADMIN — MORPHINE SULFATE 4 MG: 4 INJECTION INTRAVENOUS at 05:50

## 2019-05-27 RX ADMIN — ALBUTEROL SULFATE 2.5 MG: 2.5 SOLUTION RESPIRATORY (INHALATION) at 07:34

## 2019-05-27 ASSESSMENT — PAIN SCALES - GENERAL
PAINLEVEL_OUTOF10: 9
PAINLEVEL_OUTOF10: 8
PAINLEVEL_OUTOF10: 4

## 2019-05-27 ASSESSMENT — ENCOUNTER SYMPTOMS
ALLERGIC/IMMUNOLOGIC NEGATIVE: 1
RESPIRATORY NEGATIVE: 1
EYES NEGATIVE: 1
GASTROINTESTINAL NEGATIVE: 1

## 2019-05-27 ASSESSMENT — PAIN SCALES - WONG BAKER: WONGBAKER_NUMERICALRESPONSE: 8

## 2019-05-27 ASSESSMENT — PAIN DESCRIPTION - ORIENTATION: ORIENTATION: RIGHT

## 2019-05-27 ASSESSMENT — PAIN DESCRIPTION - LOCATION: LOCATION: HIP

## 2019-05-27 ASSESSMENT — PAIN DESCRIPTION - FREQUENCY: FREQUENCY: CONTINUOUS

## 2019-05-27 ASSESSMENT — PAIN DESCRIPTION - ONSET: ONSET: SUDDEN

## 2019-05-27 ASSESSMENT — PAIN DESCRIPTION - PAIN TYPE: TYPE: CHRONIC PAIN;ACUTE PAIN

## 2019-05-27 NOTE — ED NOTES
Patient states her pain is mostly at baseline for her. Is resting as comfortably as she can.       Paula Owen RN  05/27/19 1546

## 2019-05-27 NOTE — ED PROVIDER NOTES
[address] ENCOUNTER          ATTENDING PHYSICIAN NOTE       Date of evaluation: 2019    Chief Complaint     Hip Pain (right hip) and Fall      History of Present Illness     Terri Barajas is a 60 y.o. female who presents with complaining of pain in the area of a knot/lesion in her right hip which she just noticed this evening. Patient reports that about 2 days ago she had a fall onto the right hip, was able to stand afterwards and has been able to ambulate since then. She reports that she has also been, and this wound did not appear to be particularly different. She noticed this evening when she turned over this lesion on her right lateral thigh. It's painful to the touch, warm and that area. She denies drainage from the area. No limitation range of motion of her hip or knee. No fevers or chills or systemic symptoms otherwise. Review of Systems     Review of Systems   Constitutional: Negative. HENT: Negative. Eyes: Negative. Respiratory: Negative. Cardiovascular: Negative. Gastrointestinal: Negative. Endocrine: Negative. Genitourinary: Negative. Musculoskeletal:        Pain in area of lesion right thigh   Skin: Negative. Allergic/Immunologic: Negative. Neurological: Negative. Hematological: Negative. Psychiatric/Behavioral: Negative. Past Medical, Surgical, Family, and Social History     She has a past medical history of Asthma, Depression, Osteoarthritis, PONV (postoperative nausea and vomiting), and Scoliosis. She has a past surgical history that includes Gastric bypass surgery (); Rotator cuff repair (Left, ); Knee cartilage surgery (Left, ); Breast enhancement surgery (Bilateral, );  section ([facility] [address]); Cholecystectomy (); [place] tooth extraction (); joint replacement (Bilateral, 2014); fracture surgery; joint replacement; and Total hip arthroplasty (Bilateral, ).   Her family history includes Cancer in her mother; Diabetes in her daughter; Drug Abuse in her brother; Heart Disease in her sister. She was adopted. She reports that she quit smoking about 4 years ago. Her smoking use included cigarettes. She has a 0.05 pack-year smoking history. She has never used smokeless tobacco. She reports that she does not drink alcohol or use drugs. Medications     Previous Medications    ALBUTEROL (PROVENTIL) (2.5 MG/3ML) 0.083% NEBULIZER SOLUTION    Take 3 mLs by nebulization every 6 hours as needed for Wheezing or Shortness of Breath    ALBUTEROL SULFATE  (90 BASE) MCG/ACT INHALER    Inhale 2 puffs into the lungs 4 times daily as needed for Wheezing    AMITRIPTYLINE (ELAVIL) 75 MG TABLET    Take 1 tablet by mouth nightly    BUDESONIDE-FORMOTEROL (SYMBICORT) 160-4.5 MCG/ACT AERO    Inhale 2 puffs into the lungs 2 times daily    CHOLECALCIFEROL (VITAMIN D3) 5000 UNITS TABS    Take 5,000 Units by mouth daily     CYCLOBENZAPRINE (FLEXERIL) 5 MG TABLET    Take 10 mg by mouth 3 times daily     FLUOXETINE (PROZAC) 40 MG CAPSULE    Take 1 capsule by mouth daily    FOLIC ACID (FOLVITE) 1 MG TABLET    Take 1 mg by mouth daily     GABAPENTIN (NEURONTIN) 300 MG CAPSULE    Take 600 mg by mouth 3 times daily. Shey Reyes HYDROXYZINE (VISTARIL) 25 MG CAPSULE    Take 25 mg by mouth 3 times daily as needed for Anxiety    IBUPROFEN (ADVIL;MOTRIN) 800 MG TABLET    Take 800 mg by mouth every 6 hours as needed for Pain    MULTIPLE VITAMINS-MINERALS (THERAPEUTIC MULTIVITAMIN-MINERALS) TABLET    Take 1 tablet by mouth daily    VITAMIN B-1 (THIAMINE) 100 MG TABLET    Take 100 mg by mouth daily     VITAMIN B-12 (CYANOCOBALAMIN) 100 MCG TABLET    Take 100 mcg by mouth daily        Allergies     She is allergic to cortisone; droperidol; and compazine [prochlorperazine].     Physical Exam     INITIAL VITALS: BP: 125/86, Temp: 98.9 °F (37.2 °C), Pulse: 103, Resp: 16, SpO2: 93 %    Physical Exam   Constitutional: She Gustavo Sweeney MD     Consent:     Consent obtained:  Verbal    Consent given by:  Patient    Risks discussed:  Bleeding and incomplete drainage    Alternatives discussed:  No treatment  Location:     Type:  Abscess    Location:  Lower extremity    Lower extremity location:  Hip    Hip location:  R hip  Pre-procedure details:     Skin preparation:  Betadine  Anesthesia (see MAR for exact dosages): Anesthesia method:  Local infiltration    Local anesthetic:  Lidocaine 1% w/o epi  Procedure type:     Complexity:  Simple  Procedure details:     Needle aspiration: no      Incision types:  Stab incision    Incision depth:  Subcutaneous    Wound management:  Probed and deloculated    Drainage:  Purulent and bloody    Drainage amount: Moderate    Wound treatment:  Wound left open    Packing materials:  1/2 in gauze    Amount 1/2\":  4 inches  Post-procedure details:     Patient tolerance of procedure: Tolerated well, no immediate complications  Comments: Instructed to remove about 1 inch of packing tomorrow and the remainder the next day        ED Course     Nursing Notes, Past Medical Hx, Past Surgical Hx, Social Hx,Allergies, and Family Hx were reviewed. The patient was given the following medications:  Orders Placed This Encounter   Medications    morphine injection 4 mg    lactated ringers infusion 1,000 mL    albuterol (PROVENTIL) nebulizer solution 2.5 mg    lidocaine PF 1 % injection 5 mL    sulfamethoxazole-trimethoprim (BACTRIM DS) 800-160 MG per tablet     Sig: Take 1 tablet by mouth 2 times daily for 7 days     Dispense:  14 tablet     Refill:  0       CONSULTS:  None    MEDICAL DECISIONMAKING / ASSESSMENT / Sima Myron is a 60 y.o. female who presents with what appears to be abscess on the right lateral thigh. 90 was performed in the ED with expression of some purulent drainage. The area around it appears relatively minimally inflamed, we will prescribe Bactrim as well.   Patient

## 2019-05-27 NOTE — ED TRIAGE NOTES
Patient states she fell due to tripping over a cord around midnight, pain increased so she came in. Patient also reports a growth on her right hip that is giving her pain. A raised, reddened growth noted. States she did NOT hit her head. Denies blood thinners. No LOC, Denies chest pain, SOB, N,V,D, burning with urination.

## 2019-07-11 ENCOUNTER — HOSPITAL ENCOUNTER (EMERGENCY)
Age: 59
Discharge: HOME | End: 2019-07-11
Attending: EMERGENCY MEDICINE
Payer: MEDICARE

## 2019-07-11 VITALS
OXYGEN SATURATION: 100 % | TEMPERATURE: 97.5 F | RESPIRATION RATE: 17 BRPM | DIASTOLIC BLOOD PRESSURE: 85 MMHG | BODY MASS INDEX: 40.93 KG/M2 | HEIGHT: 58 IN | HEART RATE: 104 BPM | WEIGHT: 195 LBS | SYSTOLIC BLOOD PRESSURE: 160 MMHG

## 2019-07-11 DIAGNOSIS — L02.415 ABSCESS OF RIGHT LEG: Primary | ICD-10-CM

## 2019-07-11 PROCEDURE — 2500000003 HC RX 250 WO HCPCS: Performed by: EMERGENCY MEDICINE

## 2019-07-11 PROCEDURE — 4500000022 HC ED LEVEL 2 PROCEDURE

## 2019-07-11 PROCEDURE — 99282 EMERGENCY DEPT VISIT SF MDM: CPT

## 2019-07-11 RX ORDER — SULFAMETHOXAZOLE AND TRIMETHOPRIM 800; 160 MG/1; MG/1
1 TABLET ORAL 2 TIMES DAILY
Qty: 14 TABLET | Refills: 0 | Status: SHIPPED | OUTPATIENT
Start: 2019-07-11 | End: 2019-07-18

## 2019-07-11 RX ORDER — LIDOCAINE HYDROCHLORIDE AND EPINEPHRINE 10; 10 MG/ML; UG/ML
20 INJECTION, SOLUTION INFILTRATION; PERINEURAL ONCE
Status: COMPLETED | OUTPATIENT
Start: 2019-07-11 | End: 2019-07-11

## 2019-07-11 RX ORDER — CEPHALEXIN 500 MG/1
500 CAPSULE ORAL 4 TIMES DAILY
Qty: 28 CAPSULE | Refills: 0 | Status: SHIPPED | OUTPATIENT
Start: 2019-07-11 | End: 2019-07-18

## 2019-07-11 RX ADMIN — LIDOCAINE HYDROCHLORIDE,EPINEPHRINE BITARTRATE 20 ML: 10; .01 INJECTION, SOLUTION INFILTRATION; PERINEURAL at 13:08

## 2019-07-11 ASSESSMENT — PAIN DESCRIPTION - PROGRESSION: CLINICAL_PROGRESSION: GRADUALLY WORSENING

## 2019-07-11 ASSESSMENT — PAIN DESCRIPTION - DESCRIPTORS: DESCRIPTORS: PRESSURE;BURNING

## 2019-07-11 ASSESSMENT — PAIN DESCRIPTION - LOCATION: LOCATION: HIP

## 2019-07-11 ASSESSMENT — PAIN SCALES - GENERAL: PAINLEVEL_OUTOF10: 8

## 2019-07-11 ASSESSMENT — PAIN DESCRIPTION - PAIN TYPE: TYPE: ACUTE PAIN

## 2019-07-11 ASSESSMENT — PAIN DESCRIPTION - FREQUENCY: FREQUENCY: INTERMITTENT

## 2019-07-11 ASSESSMENT — PAIN DESCRIPTION - ORIENTATION: ORIENTATION: RIGHT

## 2019-08-01 ENCOUNTER — HOSPITAL ENCOUNTER (OUTPATIENT)
Dept: PHYSICAL THERAPY | Age: 59
Setting detail: THERAPIES SERIES
Discharge: HOME | End: 2019-08-01
Payer: MEDICARE

## 2019-08-01 PROCEDURE — 97110 THERAPEUTIC EXERCISES: CPT

## 2019-08-01 PROCEDURE — 97161 PT EVAL LOW COMPLEX 20 MIN: CPT

## 2019-08-01 NOTE — FLOWSHEET NOTE
Physical Therapy Daily Treatment Note  Date:  2019    Patient Name:  Sophia Mraio    :  1960  MRN: [id number]  Restrictions/Precautions:  Hx B TKA, B JACEK (R 2x + revision), HWR R femur, fall risk, asthma, depression  Medical/Treatment Diagnosis Information:  · Diagnosis: Primary OA of B knees M17.0, history of R hip replacement V43.64  · Treatment Diagnosis: R knee pain, weakness, impaired gait  Insurance/Certification information:  PT Insurance Information: Medicare  Physician Information:  Referring Practitioner: Dr. Ronie Apley MD Follow-up: ?  Plan of care signed (Y/N): Faxed to  19  Visit# / total visits:  1/10  Pain level: 7/10     Progress Note: []  Yes  [x]  No  Next due by: Visit #10      Subjective:  19: Pt reports history of B TKA 14, R JACEK Aug 2016 and was noted to have spiral fx in R femur so revision 3 weeks later, Oct 2016 fell and shattered R femur and HWR placed and R JACEK again , L JACEK 2017. Pt reports that since her knee surgeries her R knee has never fully recovered and has gotten worse over time. Pt reports was able to use cane after L JACEK but she kept falling so then switched to 4WW and has used since. Over this time pt reports pain has gotten worse and increased frequency of falls. Pt reports dr found a \"spot\" on her brain that is likely early dementia that is located in \"balance\" section of brain and is likely contributing to falls. Pt reports this \"spot\" is likely due to the fact that she is a recovering alcoholic. Pt reports pain primarily to R knee and thigh 7/10 currently, ranges from 3-9/10 that is achy, sharp, burning. Also with R groin pain today due to fall getting out of tub - she says it's just a muscle pull. Pt with history of falls 3x in just this last week, due to either losing balance or tripping over something and is usually able to get back up herself.   Pt is disabled and PLOF: walking with cane , less pain (more typical

## 2019-08-01 NOTE — PROGRESS NOTES
Physical Therapy  Initial Assessment  Date: 2019  Patient Name: Himanshu Harris  MRN: [id number]  : 1960     Treatment Diagnosis: R knee pain, weakness, impaired gait    Restrictions  Position Activity Restriction  Other position/activity restrictions: Hx B TKA, B JACEK (R 2x + revision), HWR R femur, fall risk, asthma, depression    Subjective   General  Chart Reviewed: Yes  Patient assessed for rehabilitation services?: Yes  Additional Pertinent Hx: Hx B TKA, B JACEK (R 2x + revision), HWR R femur, fall risk, asthma, depression  Referring Practitioner: Dr. Mcneal Session  Referral Date : 19  Diagnosis: Primary OA of B knees M17.0, history of R hip replacement V43.64  PT Visit Information  Onset Date: ( but worse )  PT Insurance Information: Medicare  Total # of Visits Approved: 10  Total # of Visits to Date: 1  Subjective  Subjective: Pt reports history of B TKA 14, R JACEK Aug 2016 and was noted to have spiral fx in R femur so revision 3 weeks later, Oct 2016 fell and shattered R femur and HWR placed and R JACEK again , L JACEK 2017. Pt reports that since her knee surgeries her R knee has never fully recovered and has gotten worse over time. Pt reports was able to use cane after L JACEK but she kept falling so then switched to 4WW and has used since. Over this time pt reports pain has gotten worse and increased frequency of falls. Pt reports dr found a \"spot\" on her brain that is likely early dementia that is located in \"balance\" section of brain and is likely contributing to falls. Pt reports this \"spot\" is likely due to the fact that she is a recovering alcoholic. Pt reports pain primarily to R knee and thigh 7/10 currently, ranges from 3-9/10 that is achy, sharp, burning. Also with R groin pain today due to fall getting out of tub - she says it's just a muscle pull.   Pt with history of falls 3x in just this last week, due to either losing balance or tripping over something and is usually able to get back up herself. Pt is disabled and PLOF: walking with cane 2017, less pain (more typical 5/10) and less frequent falls. Social/Functional History  Social/Functional History  Lives With: Alone  Type of Home: Apartment  Home Layout: One level  Home Access: Level entry  Active : Yes  Additional Comments: pt typically orders groceries and has them delievered, does own cooking/cleaning    Objective     Observation/Palpation  Observation: pt ambulates with 4WW with moderate antalgia with gait speed: 2.17 with decreased R knee flexion during swing and heavy reliance on B UE on walker  Edema: mild swelling R knee vs L    PROM RLE (degrees)  RLE General PROM: hip flexion 90 deg limited by body stature  AROM RLE (degrees)  RLE General AROM: knee 10-92 deg  PROM LLE (degrees)  LLE General PROM: hip flexion ~ 80 deg with pain  AROM LLE (degrees)  LLE General AROM: knee 5-112 deg    Strength RLE  Comment: HS 3/5 limited by pain, quad 3/5 limited by pain, hip flexion 3/5 limited by pain, poor quad set  Strength LLE  Comment: HS 4/5, quad 4/5, hip flexion 4/5. Fair+ quad set  Strength Other  Other: B DF 5/5. Additional Measures  Special Tests: TUG = 14.13 seconds and 13.25 seconds with 4WW  Other: LEFS = 86% impaired    Assessment   Conditions Requiring Skilled Therapeutic Intervention  Body structures, Functions, Activity limitations: Decreased functional mobility ; Decreased ROM; Decreased strength; Increased Pain;Decreased balance  Assessment: Pt presents with pain, decreased ROM/strength/balance limiting N gait, standing  Treatment Diagnosis: R knee pain, weakness, impaired gait  Prognosis: Fair  Decision Making: Medium Complexity  REQUIRES PT FOLLOW UP: Yes  Activity Tolerance  Activity Tolerance: Patient limited by pain         Plan   Plan  Times per week: 2  Plan weeks: 5  Plan Comment: Plan of care initiated    Goals  Short term goals  Time Frame for Short term goals: 2 weeks  Short

## 2019-08-05 ENCOUNTER — HOSPITAL ENCOUNTER (EMERGENCY)
Age: 59
Discharge: HOME | End: 2019-08-05
Attending: EMERGENCY MEDICINE
Payer: MEDICARE

## 2019-08-05 VITALS
SYSTOLIC BLOOD PRESSURE: 121 MMHG | TEMPERATURE: 98.7 F | WEIGHT: 190 LBS | BODY MASS INDEX: 40.99 KG/M2 | HEART RATE: 100 BPM | RESPIRATION RATE: 24 BRPM | DIASTOLIC BLOOD PRESSURE: 93 MMHG | HEIGHT: 57 IN | OXYGEN SATURATION: 93 %

## 2019-08-05 DIAGNOSIS — J45.21 MILD INTERMITTENT ASTHMA WITH EXACERBATION: Primary | ICD-10-CM

## 2019-08-05 DIAGNOSIS — M25.511 ACUTE PAIN OF RIGHT SHOULDER: ICD-10-CM

## 2019-08-05 PROCEDURE — 94640 AIRWAY INHALATION TREATMENT: CPT

## 2019-08-05 PROCEDURE — 99284 EMERGENCY DEPT VISIT MOD MDM: CPT

## 2019-08-05 PROCEDURE — 6370000000 HC RX 637 (ALT 250 FOR IP): Performed by: EMERGENCY MEDICINE

## 2019-08-05 PROCEDURE — 94761 N-INVAS EAR/PLS OXIMETRY MLT: CPT

## 2019-08-05 RX ORDER — OXYCODONE HYDROCHLORIDE AND ACETAMINOPHEN 5; 325 MG/1; MG/1
1 TABLET ORAL ONCE
Status: COMPLETED | OUTPATIENT
Start: 2019-08-05 | End: 2019-08-05

## 2019-08-05 RX ORDER — HYDROCODONE BITARTRATE AND ACETAMINOPHEN 5; 325 MG/1; MG/1
1 TABLET ORAL EVERY 6 HOURS PRN
Qty: 10 TABLET | Refills: 0 | Status: SHIPPED | OUTPATIENT
Start: 2019-08-05 | End: 2019-08-06

## 2019-08-05 RX ORDER — PREDNISONE 10 MG/1
TABLET ORAL
Qty: 30 TABLET | Refills: 0 | Status: ON HOLD | OUTPATIENT
Start: 2019-08-05 | End: 2020-10-28 | Stop reason: SDUPTHER

## 2019-08-05 RX ORDER — CYCLOBENZAPRINE HCL 10 MG
10 TABLET ORAL 3 TIMES DAILY PRN
Qty: 30 TABLET | Refills: 0 | Status: ON HOLD | OUTPATIENT
Start: 2019-08-05 | End: 2021-03-20 | Stop reason: HOSPADM

## 2019-08-05 RX ORDER — PREDNISONE 20 MG/1
60 TABLET ORAL ONCE
Status: COMPLETED | OUTPATIENT
Start: 2019-08-05 | End: 2019-08-05

## 2019-08-05 RX ADMIN — OXYCODONE HYDROCHLORIDE AND ACETAMINOPHEN 1 TABLET: 5; 325 TABLET ORAL at 15:39

## 2019-08-05 RX ADMIN — PREDNISONE 60 MG: 20 TABLET ORAL at 15:39

## 2019-08-05 ASSESSMENT — ENCOUNTER SYMPTOMS
DIARRHEA: 0
NAUSEA: 0
VOMITING: 0
COUGH: 0
SHORTNESS OF BREATH: 1

## 2019-08-05 ASSESSMENT — PAIN SCALES - GENERAL
PAINLEVEL_OUTOF10: 5
PAINLEVEL_OUTOF10: 9

## 2019-08-06 ENCOUNTER — HOSPITAL ENCOUNTER (OUTPATIENT)
Dept: PHYSICAL THERAPY | Age: 59
Setting detail: THERAPIES SERIES
Discharge: HOME | End: 2019-08-06
Payer: MEDICARE

## 2019-08-06 NOTE — CARE COORDINATION
Physical Therapy  Cancellation/No-show Note  Patient Name:  Griffin Figueroa  :  1960   Date:  2019  MRN: [id number]  Cancelled visits to date: 1-19  No-shows to date: 0    For today's appointment patient:  [x]  Cancelled  []  Rescheduled appointment  []  No-show     Reason given by patient:  [x]  Patient ill  []  Conflicting appointment  []  No transportation    []  Conflict with work  []  No reason given  []  Other:     Comments:   Pt called morning of appt stating very sick. Confirmed next appt.      Electronically signed by:  Kalli Lara, PT, DPT

## 2019-08-08 ENCOUNTER — HOSPITAL ENCOUNTER (OUTPATIENT)
Dept: PHYSICAL THERAPY | Age: 59
Setting detail: THERAPIES SERIES
Discharge: HOME | End: 2019-08-08
Payer: MEDICARE

## 2019-08-08 PROCEDURE — 97110 THERAPEUTIC EXERCISES: CPT

## 2019-08-08 NOTE — FLOWSHEET NOTE
5/10) and less frequent falls. 8/8/19: Pt reports she went to ED on 8/5/19 for R UE sharp hot pain after lifting arm up,  Reportedly was diagnosed w/ radiculopathy and asthma flare up due to anxiety. Steriods, pain medicine, muscle relaxer prescribed. Reviewed chart, no new imaging. Feeling better now. Pt reports she attempted to go down 3 flights of stairs when power went out at apt and wanted to charge her cell phone in her car. She reportedly fell 2 days ago while dragging rollator down stairs with it landing on her head. Has been using rowing machine, leg press, hip abd/add, knee ext machine sometimes at gym when goes, hasn't been there in over a week. Had difficulty getting on/off machines. Objective: 08/08/19  Observation:    Observation: pt ambulates with 6KN with moderate antalgia excessive UE lean on walker w/ shoulders elevated. Exercises:  Exercise/Equipment Resistance/Repetitions Other comments   R sitting knee flexion 5\" x 5 Required review due to not performing correctly on HEP   Supine B quad sets over towel roll 5\" x 10 Reviewed w/ correct technique   Nustep 5 min., L 2   Seat 5, L arm 6 B LE, L UE only due to R UE pain. Good tolerance   R HS stretch 30 sec x 3 hooklying w/ strap   R Calf stretch 60 sec x 3  Supported long sit w/ belt   R/L SAQ Lower for 3 sec, 10 x 2 6\" bolster                                             Other Therapeutic Activities:  NA    Home Exercise Program:   Pt. demonstrated good understanding and knowledge of HEP. Written instructions provided. 08/01/19: quad sets, sitting knee flexion  8/8/19: reprinted HEP at pt's request due to she threw it away and daughter asked to see it. When reviewed exercises, pt not aware of knee flex exercise and not performing correctly. Re-educated and pt then able to return demo correctly. Encouraged her to not throw HEP away and to actually keep it with her while doing exercises at home.    Added supported long sit gastroc

## 2019-08-13 ENCOUNTER — HOSPITAL ENCOUNTER (OUTPATIENT)
Dept: PHYSICAL THERAPY | Age: 59
Setting detail: THERAPIES SERIES
Discharge: HOME | End: 2019-08-13
Payer: MEDICARE

## 2019-08-13 PROCEDURE — 97110 THERAPEUTIC EXERCISES: CPT

## 2019-08-13 NOTE — FLOWSHEET NOTE
cane 2017, less pain (more typical 5/10) and less frequent falls. 8/8/19: Pt reports she went to ED on 8/5/19 for R UE sharp hot pain after lifting arm up,  Reportedly was diagnosed w/ radiculopathy and asthma flare up due to anxiety. Steriods, pain medicine, muscle relaxer prescribed. Reviewed chart, no new imaging. Feeling better now. Pt reports she attempted to go down 3 flights of stairs when power went out at apt and wanted to charge her cell phone in her car. She reportedly fell 2 days ago while dragging rollator down stairs with it landing on her head. Has been using rowing machine, leg press, hip abd/add, knee ext machine sometimes at gym when goes, hasn't been there in over a week. Had difficulty getting on/off machines. 8/13/19: Pt reports her R hip is more painful and she is afraid something is wrong because this is how it felt a week or 2 before she had to have it revised last time. Hurts worse when walking, wishes she could be non- WB because it hurts with every step. She doesn't want to see doctor because her daughter is getting  in 2 months and doesn't want to have surgery. Exercises are not effecting it, except doesn't feel like she can do all HEP well. Also cannot recall exercises. Hasn't been to gym since last Wed. Pt c/o noticing having more trouble w/ memory in past 2 months, \"like I am a sundowner\", and especially at night. Objective: 08/08/19  Observation:    Observation: pt ambulates with 2IP with moderate antalgia excessive UE lean on walker w/ shoulders elevated. Exercises:  Exercise/Equipment Resistance/Repetitions Other comments   Supine B quad sets over towel roll 5\" x 10 Reviewed w/ correct technique   Nustep 7 min., L 2   Seat 5, L arm 6 B LE/UE. Good tolerance, but not willing to increase resistance. R HS stretch 30 sec x 3 hooklying w/ strap. VC for hold time. R Calf stretch 60 sec x 3  Supported long sit w/ belt   R/L SAQ Lower for 3 sec, 10 x 2 6\" bolster. Communicated concerns w/ pt's c/o pain today and inconsistent recall/compliance w/ HEP w/ primary PT to address next session. Pt w/ partial carry over of HEP, reviewed again and only added 2 exercises for ROM to improve compliance.      Prognosis: [] Good [x] Fair  [] Poor    Patient Requires Follow-up: [x] Yes  [] No    Goals:  Short term goals  Time Frame for Short term goals: 2 weeks  Short term goal 1: Pt will demo good understanding and knowledge of initial HEP  Short term goal 2: Good B quad set for improved WB tolerance  Short term goal 3: Pt with decreased frequency of falls 50%  Long term goals  Time Frame for Long term goals : 5 weeks  Long term goal 1: Pt will demo good understanding and knowledge of HEP progressions  Long term goal 2: AROM R knee 5-110 deg, PROM R hip 90 deg flexion to allow for improved gait pattern with 4WW with gait speed: 2.83 ft/sec   Long term goal 3: Decreased pain to 5/10 at worst to allow for improved tolerance to standing  Long term goal 4: Strength L knee/hip 5/5, R knee and hip 4+/5 to allow for good stability with gait  Long term goal 5: TUG less than or equal to 10 seconds with 4WW for decreased fall risk     Plan:   [x] Continue per plan of care [] Alter current plan (see comments)  [] Plan of care initiated [] Hold pending MD visit [] Discharge    Plan for Next Session:  Review HEP, add exercises as able    Electronically signed by:  Maribel Quiles DPT

## 2019-08-27 ENCOUNTER — APPOINTMENT (OUTPATIENT)
Dept: PHYSICAL THERAPY | Age: 59
End: 2019-08-27
Payer: MEDICARE

## 2019-08-29 ENCOUNTER — APPOINTMENT (OUTPATIENT)
Dept: PHYSICAL THERAPY | Age: 59
End: 2019-08-29
Payer: MEDICARE

## 2019-08-30 ENCOUNTER — HOSPITAL ENCOUNTER (OUTPATIENT)
Dept: PHYSICAL THERAPY | Age: 59
Setting detail: THERAPIES SERIES
Discharge: HOME | End: 2019-08-30
Payer: MEDICARE

## 2019-08-30 PROCEDURE — 97110 THERAPEUTIC EXERCISES: CPT

## 2019-08-30 NOTE — FLOWSHEET NOTE
Physical Therapy Daily Treatment Note  Date:  2019    Patient Name:  Hammad Zarate    :  1960  MRN: [id number]  Restrictions/Precautions:  Hx B TKA, B JACEK (R 2x + revision), HWR R femur, fall risk, asthma, depression  Medical/Treatment Diagnosis Information:  · Diagnosis: Primary OA of B knees M17.0, history of R hip replacement V43.64  · Treatment Diagnosis: R knee pain, weakness, impaired gait  Insurance/Certification information:  PT Insurance Information: Medicare  Physician Information:  Referring Practitioner: Dr. Milena Gramajo MD Follow-up: ?  Plan of care signed (Y/N): Faxed to  19  Visit# / total visits:  4/10  Pain level: 5/10 R lateral hip, 0/10 R knee    Progress Note: []  Yes  [x]  No  Next due by: Visit #10      Subjective:  19: Pt reports history of B TKA 14, R JACEK Aug 2016 and was noted to have spiral fx in R femur so revision 3 weeks later, Oct 2016 fell and shattered R femur and HWR placed and R JACEK again , L JACEK 2017. Pt reports that since her knee surgeries her R knee has never fully recovered and has gotten worse over time. Pt reports was able to use cane after L JACEK but she kept falling so then switched to 4WW and has used since. Over this time pt reports pain has gotten worse and increased frequency of falls. Pt reports dr found a \"spot\" on her brain that is likely early dementia that is located in \"balance\" section of brain and is likely contributing to falls. Pt reports this \"spot\" is likely due to the fact that she is a recovering alcoholic. Pt reports pain primarily to R knee and thigh 7/10 currently, ranges from 3-9/10 that is achy, sharp, burning. Also with R groin pain today due to fall getting out of tub - she says it's just a muscle pull. Pt with history of falls 3x in just this last week, due to either losing balance or tripping over something and is usually able to get back up herself.   Pt is disabled and PLOF: walking with cane ,

## 2019-09-03 ENCOUNTER — HOSPITAL ENCOUNTER (OUTPATIENT)
Dept: PHYSICAL THERAPY | Age: 59
Setting detail: THERAPIES SERIES
Discharge: HOME | End: 2019-09-03
Payer: MEDICARE

## 2019-09-03 NOTE — CARE COORDINATION
Physical Therapy  Cancellation/No-show Note  Patient Name:  Luis Lewis  :  1960   Date:  9/3/2019  MRN: [id number]  Cancelled visits to date: 2  19  No-shows to date: 3  08/22/19  08/20/19  08/15/19    For today's appointment patient:  [x]  Cancelled  []  Rescheduled appointment  []  No-show     Reason given by patient:  []  Patient ill  []  Conflicting appointment  []  No transportation    []  Conflict with work  []  No reason given  [x]  Other:     Comments: Mother-in-law is dying and has to travel to [facility]     Electronically signed by:   Kristan Harrison, [address] [facility], DPT [id number]

## 2019-10-17 ENCOUNTER — HOSPITAL ENCOUNTER (OUTPATIENT)
Dept: PHYSICAL THERAPY | Age: 59
Setting detail: THERAPIES SERIES
Discharge: HOME | End: 2019-10-17
Payer: MEDICARE

## 2020-09-10 ENCOUNTER — TELEPHONE (OUTPATIENT)
Dept: PULMONOLOGY | Age: 60
End: 2020-09-10

## 2020-09-14 ENCOUNTER — OFFICE VISIT (OUTPATIENT)
Dept: PRIMARY CARE CLINIC | Age: 60
End: 2020-09-14
Payer: MEDICARE

## 2020-09-14 PROCEDURE — 99211 OFF/OP EST MAY X REQ PHY/QHP: CPT | Performed by: NURSE PRACTITIONER

## 2020-09-14 PROCEDURE — G8428 CUR MEDS NOT DOCUMENT: HCPCS | Performed by: NURSE PRACTITIONER

## 2020-09-14 PROCEDURE — G8421 BMI NOT CALCULATED: HCPCS | Performed by: NURSE PRACTITIONER

## 2020-09-16 LAB — SARS-COV-2, NAA: NOT DETECTED

## 2020-10-23 ENCOUNTER — APPOINTMENT (OUTPATIENT)
Dept: CT IMAGING | Age: 60
DRG: 202 | End: 2020-10-23
Payer: MEDICARE

## 2020-10-23 ENCOUNTER — APPOINTMENT (OUTPATIENT)
Dept: GENERAL RADIOLOGY | Age: 60
DRG: 202 | End: 2020-10-23
Payer: MEDICARE

## 2020-10-23 ENCOUNTER — HOSPITAL ENCOUNTER (INPATIENT)
Age: 60
LOS: 2 days | Discharge: HOME | DRG: 202 | End: 2020-10-28
Attending: EMERGENCY MEDICINE | Admitting: HOSPITALIST
Payer: MEDICARE

## 2020-10-23 PROBLEM — R06.09 DOE (DYSPNEA ON EXERTION): Status: ACTIVE | Noted: 2020-10-23

## 2020-10-23 LAB
ANION GAP SERPL CALCULATED.3IONS-SCNC: 10 MMOL/L (ref 3–16)
BASE EXCESS VENOUS: 1 MMOL/L (ref -2–3)
BASOPHILS ABSOLUTE: 0.1 K/UL (ref 0–0.2)
BASOPHILS RELATIVE PERCENT: 0.5 %
BUN BLDV-MCNC: 12 MG/DL (ref 7–20)
CALCIUM SERPL-MCNC: 8.2 MG/DL (ref 8.3–10.6)
CARBOXYHEMOGLOBIN: 0.4 % (ref 0–1.5)
CHLORIDE BLD-SCNC: 105 MMOL/L (ref 99–110)
CO2: 20 MMOL/L (ref 21–32)
CREAT SERPL-MCNC: 0.6 MG/DL (ref 0.6–1.2)
EKG ATRIAL RATE: 102 BPM
EKG DIAGNOSIS: NORMAL
EKG P AXIS: 63 DEGREES
EKG P-R INTERVAL: 174 MS
EKG Q-T INTERVAL: 338 MS
EKG QRS DURATION: 94 MS
EKG QTC CALCULATION (BAZETT): 440 MS
EKG R AXIS: 46 DEGREES
EKG T AXIS: 58 DEGREES
EKG VENTRICULAR RATE: 102 BPM
EOSINOPHILS ABSOLUTE: 0 K/UL (ref 0–0.6)
EOSINOPHILS RELATIVE PERCENT: 0.1 %
GFR AFRICAN AMERICAN: >60
GFR NON-AFRICAN AMERICAN: >60
GLUCOSE BLD-MCNC: 122 MG/DL (ref 70–99)
HCO3 VENOUS: 25.2 MMOL/L (ref 24–28)
HCT VFR BLD CALC: 34.9 % (ref 36–48)
HEMOGLOBIN, VEN, REDUCED: 6.9 %
HEMOGLOBIN: 10.9 G/DL (ref 12–16)
LYMPHOCYTES ABSOLUTE: 1.4 K/UL (ref 1–5.1)
LYMPHOCYTES RELATIVE PERCENT: 13.4 %
MAGNESIUM: 2.1 MG/DL (ref 1.8–2.4)
MCH RBC QN AUTO: 25.9 PG (ref 26–34)
MCHC RBC AUTO-ENTMCNC: 31.3 G/DL (ref 31–36)
MCV RBC AUTO: 82.5 FL (ref 80–100)
METHEMOGLOBIN VENOUS: 0.5 % (ref 0–1.5)
MONOCYTES ABSOLUTE: 0.4 K/UL (ref 0–1.3)
MONOCYTES RELATIVE PERCENT: 3.8 %
NEUTROPHILS ABSOLUTE: 8.3 K/UL (ref 1.7–7.7)
NEUTROPHILS RELATIVE PERCENT: 82.2 %
O2 SAT, VEN: 93 %
PCO2, VEN: 40.9 MMHG (ref 41–51)
PDW BLD-RTO: 16.8 % (ref 12.4–15.4)
PH VENOUS: 7.41 (ref 7.35–7.45)
PHOSPHORUS: 2.7 MG/DL (ref 2.5–4.9)
PLATELET # BLD: 376 K/UL (ref 135–450)
PMV BLD AUTO: 7.6 FL (ref 5–10.5)
PO2, VEN: 73.4 MMHG (ref 25–40)
POTASSIUM SERPL-SCNC: 4.9 MMOL/L (ref 3.5–5.1)
PRO-BNP: 190 PG/ML (ref 0–124)
RBC # BLD: 4.23 M/UL (ref 4–5.2)
SODIUM BLD-SCNC: 135 MMOL/L (ref 136–145)
TCO2 CALC VENOUS: 27 MMOL/L
TROPONIN: <0.01 NG/ML
WBC # BLD: 10.1 K/UL (ref 4–11)

## 2020-10-23 PROCEDURE — 36415 COLL VENOUS BLD VENIPUNCTURE: CPT

## 2020-10-23 PROCEDURE — 85025 COMPLETE CBC W/AUTO DIFF WBC: CPT

## 2020-10-23 PROCEDURE — G0378 HOSPITAL OBSERVATION PER HR: HCPCS

## 2020-10-23 PROCEDURE — 83880 ASSAY OF NATRIURETIC PEPTIDE: CPT

## 2020-10-23 PROCEDURE — 6360000004 HC RX CONTRAST MEDICATION: Performed by: INTERNAL MEDICINE

## 2020-10-23 PROCEDURE — 99285 EMERGENCY DEPT VISIT HI MDM: CPT

## 2020-10-23 PROCEDURE — 82803 BLOOD GASES ANY COMBINATION: CPT

## 2020-10-23 PROCEDURE — 83735 ASSAY OF MAGNESIUM: CPT

## 2020-10-23 PROCEDURE — 6370000000 HC RX 637 (ALT 250 FOR IP): Performed by: EMERGENCY MEDICINE

## 2020-10-23 PROCEDURE — 93005 ELECTROCARDIOGRAM TRACING: CPT | Performed by: EMERGENCY MEDICINE

## 2020-10-23 PROCEDURE — 84484 ASSAY OF TROPONIN QUANT: CPT

## 2020-10-23 PROCEDURE — 84100 ASSAY OF PHOSPHORUS: CPT

## 2020-10-23 PROCEDURE — 71045 X-RAY EXAM CHEST 1 VIEW: CPT

## 2020-10-23 PROCEDURE — 80048 BASIC METABOLIC PNL TOTAL CA: CPT

## 2020-10-23 PROCEDURE — 71275 CT ANGIOGRAPHY CHEST: CPT

## 2020-10-23 PROCEDURE — 94640 AIRWAY INHALATION TREATMENT: CPT

## 2020-10-23 PROCEDURE — U0003 INFECTIOUS AGENT DETECTION BY NUCLEIC ACID (DNA OR RNA); SEVERE ACUTE RESPIRATORY SYNDROME CORONAVIRUS 2 (SARS-COV-2) (CORONAVIRUS DISEASE [COVID-19]), AMPLIFIED PROBE TECHNIQUE, MAKING USE OF HIGH THROUGHPUT TECHNOLOGIES AS DESCRIBED BY CMS-2020-01-R: HCPCS

## 2020-10-23 PROCEDURE — U0002 COVID-19 LAB TEST NON-CDC: HCPCS

## 2020-10-23 RX ORDER — IPRATROPIUM BROMIDE AND ALBUTEROL SULFATE 2.5; .5 MG/3ML; MG/3ML
1 SOLUTION RESPIRATORY (INHALATION) ONCE
Status: COMPLETED | OUTPATIENT
Start: 2020-10-23 | End: 2020-10-23

## 2020-10-23 RX ORDER — MELOXICAM 15 MG/1
15 TABLET ORAL DAILY
Status: ON HOLD | COMMUNITY
End: 2021-03-20 | Stop reason: HOSPADM

## 2020-10-23 RX ORDER — BUDESONIDE AND FORMOTEROL FUMARATE DIHYDRATE 80; 4.5 UG/1; UG/1
2 AEROSOL RESPIRATORY (INHALATION) 2 TIMES DAILY
COMMUNITY

## 2020-10-23 RX ADMIN — IOPAMIDOL 80 ML: 755 INJECTION, SOLUTION INTRAVENOUS at 19:19

## 2020-10-23 RX ADMIN — IPRATROPIUM BROMIDE AND ALBUTEROL SULFATE 1 AMPULE: .5; 3 SOLUTION RESPIRATORY (INHALATION) at 17:32

## 2020-10-23 NOTE — ED NOTES
Larger IV access requested for CTA. Line placed with one attempt. Patient tolerated well.       Georgette Serrano, RN  10/23/20 [id number]

## 2020-10-23 NOTE — PROGRESS NOTES
List of Home Medications the patient is currently taking is complete. Home Medication list in EPIC updated to reflect changes noted below. Source of medications in list is [address] and patient interview. Medications added:   Meloxicam    Medications removed:   Vitamin D   Folic acid   MVI   Thiamine   B12    Medications adjusted:   Symbicort 160-4.5 adjusted to 80-4.5    Please note:  Patient reports taking both ibuprofen and meloxicam.    Please call with questions.   Manuel Cartwright, PharmD, BCPS  [address] [place] [address] Pharmacist  Wireless: [phone]  Main pharmacy: [id number]  10/23/2020 4:44 PM

## 2020-10-23 NOTE — ED PROVIDER NOTES
[address] [facility] ENCOUNTER          ATTENDING PHYSICIAN NOTE       Date of evaluation: 10/23/2020    Chief Complaint     Shortness of Breath and Asthma      History of Present Illness     Terri Bashir is a 63 y.o. female who presents to the emergency department with a complaint of dyspnea on exertion. The patient has a prior history of COPD and intermittently is on home bronchodilator therapy. Does not require any home oxygen. She reports that over the course the past week she has had increasing difficulty breathing describes it is associated with wheezing as well as with a dry cough. Reports primarily dyspnea on exertion has her main complaint. Denies any chest pain or chest discomfort. Denies any abdominal pain nausea vomiting or diarrhea. Does not know of any fevers. Does not know of any sick contacts. Review of Systems     As documented in the HPI, otherwise all other systems were reviewed and were negative. Past Medical, Surgical, Family, and Social History     She has a past medical history of Asthma, Depression, Osteoarthritis, PONV (postoperative nausea and vomiting), and Scoliosis. She has a past surgical history that includes Gastric bypass surgery (); Rotator cuff repair (Left, ); Knee cartilage surgery (Left, ); Breast enhancement surgery (Bilateral, );  section ([facility]); Cholecystectomy (); [place] tooth extraction (); joint replacement (Bilateral, 2014); fracture surgery; joint replacement; and Total hip arthroplasty (Bilateral, ). Her family history includes Cancer in her mother; Diabetes in her daughter; Drug Abuse in her brother; Heart Disease in her sister. She was adopted. She reports that she quit smoking about 6 years ago. Her smoking use included cigarettes. She has a 0.05 pack-year smoking history.  She has never used smokeless tobacco. She reports that she does not drink alcohol or use radial pulses bilaterally, capillary refill 2 seconds  Abdominal: Soft, nontender, nondistended, positive bowel sounds throughout, no rebound or guarding  Skin: Warm, dry well perfused, no rashes  Musculoskeletal: no obvious deformities, no tenderness to palpation diffusely  Neurologic:  alert and oriented x4, speech is clear and intact without dysarthria    Diagnostic Results     EKG   Sinus tachycardia with a ventricular rate of 102 patient has no evidence of any ST or T wave changes that would be indicative of active ischemia comparison to a previous EKG that was done and November 2018 I see no evidence of any significant changes    RADIOLOGY:  CTA CHEST W CONTRAST   Final Result      No evidence of acute pulmonary emboli. Atelectatic collapse of the right middle lobe. Coronary artery calcification. Status post gastric bypass. XR CHEST PORTABLE   Final Result      Stable right basilar atelectasis, scar or less likely pneumonia.                 LABS:   Results for orders placed or performed during the hospital encounter of 10/23/20   CBC Auto Differential   Result Value Ref Range    WBC 10.1 4.0 - 11.0 K/uL    RBC 4.23 4.00 - 5.20 M/uL    Hemoglobin 10.9 (L) 12.0 - 16.0 g/dL    Hematocrit 34.9 (L) 36.0 - 48.0 %    MCV 82.5 80.0 - 100.0 fL    MCH 25.9 (L) 26.0 - 34.0 pg    MCHC 31.3 31.0 - 36.0 g/dL    RDW 16.8 (H) 12.4 - 15.4 %    Platelets 723 318 - 421 K/uL    MPV 7.6 5.0 - 10.5 fL    Neutrophils % 82.2 %    Lymphocytes % 13.4 %    Monocytes % 3.8 %    Eosinophils % 0.1 %    Basophils % 0.5 %    Neutrophils Absolute 8.3 (H) 1.7 - 7.7 K/uL    Lymphocytes Absolute 1.4 1.0 - 5.1 K/uL    Monocytes Absolute 0.4 0.0 - 1.3 K/uL    Eosinophils Absolute 0.0 0.0 - 0.6 K/uL    Basophils Absolute 0.1 0.0 - 0.2 K/uL   Basic Metabolic Panel   Result Value Ref Range    Sodium 135 (L) 136 - 145 mmol/L    Potassium 4.9 3.5 - 5.1 mmol/L    Chloride 105 99 - 110 mmol/L    CO2 20 (L) 21 - 32 mmol/L    Anion Gap 10 3 - 16    Glucose 122 (H) 70 - 99 mg/dL    BUN 12 7 - 20 mg/dL    CREATININE 0.6 0.6 - 1.2 mg/dL    GFR Non-African American >60 >60    GFR African American >60 >60    Calcium 8.2 (L) 8.3 - 10.6 mg/dL   Phosphorus   Result Value Ref Range    Phosphorus 2.7 2.5 - 4.9 mg/dL   Magnesium   Result Value Ref Range    Magnesium 2.10 1.80 - 2.40 mg/dL   Troponin   Result Value Ref Range    Troponin <0.01 <0.01 ng/mL   Brain Natriuretic Peptide   Result Value Ref Range    Pro- (H) 0 - 124 pg/mL   Blood Gas, Venous   Result Value Ref Range    pH, Osbaldo 7.408 7.350 - 7.450    pCO2, Osbaldo 40.9 (L) 41.0 - 51.0 mmHg    pO2, Osbaldo 73.4 (H) 25.0 - 40.0 mmHg    HCO3, Venous 25.2 24.0 - 28.0 mmol/L    Base Excess, Osbaldo 1.0 -2.0 - 3.0 mmol/L    O2 Sat, Osbaldo 93 Not established %    Carboxyhemoglobin 0.4 0.0 - 1.5 %    MetHgb, Osbaldo 0.5 0.0 - 1.5 %    TC02 (Calc), Osbaldo 27 mmol/L    Hemoglobin, Osbaldo, Reduced 6.90 %   EKG 12 Lead   Result Value Ref Range    Ventricular Rate 102 BPM    Atrial Rate 102 BPM    P-R Interval 174 ms    QRS Duration 94 ms    Q-T Interval 338 ms    QTc Calculation (Bazett) 440 ms    P Axis 63 degrees    R Axis 46 degrees    T Axis 58 degrees    Diagnosis       EKG performed in ER and to be interpreted by ER physician. Confirmed by MD, ER ([id number]),  Vincent Owens ([phone]) on 10/23/2020 5:08:07 PM         RECENT VITALS:  BP: (!) 165/90, Temp: 98.1 °F (36.7 °C), Pulse: 97, Resp: 16, SpO2: 97 %     ED Course     Nursing Notes, Past Medical Hx, Past Surgical Hx, Social Hx, Allergies, and Family Hx were reviewed.     The patient was given the following medications:  Orders Placed This Encounter   Medications    ipratropium-albuterol (DUONEB) nebulizer solution 1 ampule    iopamidol (ISOVUE-370) 76 % injection 80 mL       CONSULTS:  Faizan Downs / ASSESSMENT / Nica Thu is a 63 y.o. female who presents to the emergency department with a complaint of shortness of breath primarily dyspnea on exertion over the course of the past week. He has been treated as an outpatient for a presumed COPD exacerbation with a course of steroids but has not had improvement of her symptoms. On initial physical exam was noted to have an oxygen saturation of 90% on room air was placed on 2 L of nasal cannula with a significant improvement of her work of breathing and oxygen saturations that improved into the upper 90s. The patient had an EKG which showed no evidence of any ischemic changes. She had laboratory investigations sent which showed CBC with a hemoglobin of 10.9 which was stable for the patient to basic metabolic profile with a sodium of 135 BUN of 12 creatinine of 0.6. Troponin less than 0.01 BNP of 190. Venous blood gas which did not reveal any evidence of significant respiratory acidosis. Chest x-ray showed stable right basilar atelectasis or scar. Because the patient's continued hypoxia the decision was made to perform a CT pulmonary angiogram which is resulted back showing no evidence of any pulmonary embolism, she does have some atelectasis that was seen which appears to be somewhat chronic in nature there is no evidence of any pneumonia. Following this the patient's oxygen saturations had spontaneously improved into the mid 90s. With ambulation her oxygen's desaturated down from 90 to 92% and the patient became quite dyspneic. Overall the exact cause and nature of the patient's dyspnea on exertion is unclear its possible that it is cardiac in nature the patient does have a number of cardiovascular risk factors and has never had cardiovascular risk ratification with a stress test.  It is possible is related to asthma however she does not have significant wheezing on exam nor does she have significant evidence of CO2 retention on her venous blood gas. Does not appear to be pneumonia. Covid test has been sent.   At this point do not feel she does require admission to the hospital for further care and evaluation of her dyspnea on exertion    Clinical Impression     1. Dyspnea on exertion        Disposition     PATIENT REFERRED TO:  No follow-up provider specified.     DISCHARGE MEDICATIONS:  New Prescriptions    No medications on file       DISPOSITION Decision To Admit 10/23/2020 08:37:30 PM         Nel Romero MD  10/23/20 2039       Nle Romero MD  10/23/20 2103

## 2020-10-24 LAB
ANION GAP SERPL CALCULATED.3IONS-SCNC: 8 MMOL/L (ref 3–16)
ANION GAP SERPL CALCULATED.3IONS-SCNC: 9 MMOL/L (ref 3–16)
BUN BLDV-MCNC: 16 MG/DL (ref 7–20)
BUN BLDV-MCNC: 20 MG/DL (ref 7–20)
CALCIUM SERPL-MCNC: 8.5 MG/DL (ref 8.3–10.6)
CALCIUM SERPL-MCNC: 8.9 MG/DL (ref 8.3–10.6)
CHLORIDE BLD-SCNC: 104 MMOL/L (ref 99–110)
CHLORIDE BLD-SCNC: 104 MMOL/L (ref 99–110)
CO2: 26 MMOL/L (ref 21–32)
CO2: 27 MMOL/L (ref 21–32)
CREAT SERPL-MCNC: 0.7 MG/DL (ref 0.6–1.2)
CREAT SERPL-MCNC: 0.7 MG/DL (ref 0.6–1.2)
GFR AFRICAN AMERICAN: >60
GFR AFRICAN AMERICAN: >60
GFR NON-AFRICAN AMERICAN: >60
GFR NON-AFRICAN AMERICAN: >60
GLUCOSE BLD-MCNC: 111 MG/DL (ref 70–99)
GLUCOSE BLD-MCNC: 170 MG/DL (ref 70–99)
MAGNESIUM: 2.3 MG/DL (ref 1.8–2.4)
POTASSIUM REFLEX MAGNESIUM: 3.5 MMOL/L (ref 3.5–5.1)
POTASSIUM REFLEX MAGNESIUM: 3.9 MMOL/L (ref 3.5–5.1)
SARS-COV-2, PCR: NOT DETECTED
SODIUM BLD-SCNC: 138 MMOL/L (ref 136–145)
SODIUM BLD-SCNC: 140 MMOL/L (ref 136–145)
TROPONIN: <0.01 NG/ML
TROPONIN: <0.01 NG/ML

## 2020-10-24 PROCEDURE — G0378 HOSPITAL OBSERVATION PER HR: HCPCS

## 2020-10-24 PROCEDURE — 94761 N-INVAS EAR/PLS OXIMETRY MLT: CPT

## 2020-10-24 PROCEDURE — 6370000000 HC RX 637 (ALT 250 FOR IP): Performed by: INTERNAL MEDICINE

## 2020-10-24 PROCEDURE — 80048 BASIC METABOLIC PNL TOTAL CA: CPT

## 2020-10-24 PROCEDURE — 6370000000 HC RX 637 (ALT 250 FOR IP): Performed by: HOSPITALIST

## 2020-10-24 PROCEDURE — 6360000002 HC RX W HCPCS: Performed by: INTERNAL MEDICINE

## 2020-10-24 PROCEDURE — 84484 ASSAY OF TROPONIN QUANT: CPT

## 2020-10-24 PROCEDURE — 94640 AIRWAY INHALATION TREATMENT: CPT

## 2020-10-24 PROCEDURE — 2700000000 HC OXYGEN THERAPY PER DAY

## 2020-10-24 PROCEDURE — 6370000000 HC RX 637 (ALT 250 FOR IP): Performed by: EMERGENCY MEDICINE

## 2020-10-24 PROCEDURE — 83735 ASSAY OF MAGNESIUM: CPT

## 2020-10-24 PROCEDURE — 99220 PR INITIAL OBSERVATION CARE/DAY 70 MINUTES: CPT | Performed by: INTERNAL MEDICINE

## 2020-10-24 PROCEDURE — 2580000003 HC RX 258: Performed by: INTERNAL MEDICINE

## 2020-10-24 PROCEDURE — 96372 THER/PROPH/DIAG INJ SC/IM: CPT

## 2020-10-24 RX ORDER — SODIUM CHLORIDE 0.9 % (FLUSH) 0.9 %
10 SYRINGE (ML) INJECTION EVERY 12 HOURS SCHEDULED
Status: DISCONTINUED | OUTPATIENT
Start: 2020-10-24 | End: 2020-10-28 | Stop reason: HOSPADM

## 2020-10-24 RX ORDER — ALBUTEROL SULFATE 2.5 MG/3ML
2.5 SOLUTION RESPIRATORY (INHALATION) EVERY 6 HOURS PRN
Status: DISCONTINUED | OUTPATIENT
Start: 2020-10-24 | End: 2020-10-24

## 2020-10-24 RX ORDER — FLUOXETINE HYDROCHLORIDE 20 MG/1
40 CAPSULE ORAL DAILY
Status: DISCONTINUED | OUTPATIENT
Start: 2020-10-24 | End: 2020-10-28 | Stop reason: HOSPADM

## 2020-10-24 RX ORDER — CYCLOBENZAPRINE HCL 10 MG
10 TABLET ORAL 3 TIMES DAILY PRN
Status: DISCONTINUED | OUTPATIENT
Start: 2020-10-24 | End: 2020-10-28 | Stop reason: HOSPADM

## 2020-10-24 RX ORDER — OXYCODONE HYDROCHLORIDE AND ACETAMINOPHEN 5; 325 MG/1; MG/1
1 TABLET ORAL ONCE
Status: COMPLETED | OUTPATIENT
Start: 2020-10-24 | End: 2020-10-24

## 2020-10-24 RX ORDER — PROMETHAZINE HYDROCHLORIDE 25 MG/1
12.5 TABLET ORAL EVERY 6 HOURS PRN
Status: DISCONTINUED | OUTPATIENT
Start: 2020-10-24 | End: 2020-10-28 | Stop reason: HOSPADM

## 2020-10-24 RX ORDER — ALBUTEROL SULFATE 2.5 MG/3ML
2.5 SOLUTION RESPIRATORY (INHALATION) EVERY 4 HOURS PRN
Status: DISCONTINUED | OUTPATIENT
Start: 2020-10-24 | End: 2020-10-24

## 2020-10-24 RX ORDER — GABAPENTIN 300 MG/1
600 CAPSULE ORAL 3 TIMES DAILY
Status: DISCONTINUED | OUTPATIENT
Start: 2020-10-24 | End: 2020-10-28 | Stop reason: HOSPADM

## 2020-10-24 RX ORDER — PANTOPRAZOLE SODIUM 40 MG/1
40 TABLET, DELAYED RELEASE ORAL
Status: DISCONTINUED | OUTPATIENT
Start: 2020-10-25 | End: 2020-10-28 | Stop reason: HOSPADM

## 2020-10-24 RX ORDER — ALBUTEROL SULFATE 2.5 MG/3ML
2.5 SOLUTION RESPIRATORY (INHALATION) EVERY 4 HOURS
Status: DISCONTINUED | OUTPATIENT
Start: 2020-10-24 | End: 2020-10-28

## 2020-10-24 RX ORDER — ACETAMINOPHEN 650 MG/1
650 SUPPOSITORY RECTAL EVERY 6 HOURS PRN
Status: DISCONTINUED | OUTPATIENT
Start: 2020-10-24 | End: 2020-10-28 | Stop reason: HOSPADM

## 2020-10-24 RX ORDER — AZITHROMYCIN 250 MG/1
500 TABLET, FILM COATED ORAL DAILY
Status: DISCONTINUED | OUTPATIENT
Start: 2020-10-24 | End: 2020-10-24

## 2020-10-24 RX ORDER — OXYCODONE HYDROCHLORIDE AND ACETAMINOPHEN 5; 325 MG/1; MG/1
1 TABLET ORAL EVERY 6 HOURS PRN
Status: DISCONTINUED | OUTPATIENT
Start: 2020-10-24 | End: 2020-10-28 | Stop reason: HOSPADM

## 2020-10-24 RX ORDER — ALBUTEROL SULFATE 90 UG/1
2 AEROSOL, METERED RESPIRATORY (INHALATION) EVERY 6 HOURS PRN
Status: DISCONTINUED | OUTPATIENT
Start: 2020-10-24 | End: 2020-10-28 | Stop reason: HOSPADM

## 2020-10-24 RX ORDER — HYDROXYZINE PAMOATE 25 MG/1
25 CAPSULE ORAL 3 TIMES DAILY PRN
Status: DISCONTINUED | OUTPATIENT
Start: 2020-10-24 | End: 2020-10-28 | Stop reason: HOSPADM

## 2020-10-24 RX ORDER — BUDESONIDE AND FORMOTEROL FUMARATE DIHYDRATE 80; 4.5 UG/1; UG/1
2 AEROSOL RESPIRATORY (INHALATION) 2 TIMES DAILY
Status: DISCONTINUED | OUTPATIENT
Start: 2020-10-24 | End: 2020-10-28 | Stop reason: HOSPADM

## 2020-10-24 RX ORDER — ONDANSETRON 2 MG/ML
4 INJECTION INTRAMUSCULAR; INTRAVENOUS EVERY 6 HOURS PRN
Status: DISCONTINUED | OUTPATIENT
Start: 2020-10-24 | End: 2020-10-28 | Stop reason: HOSPADM

## 2020-10-24 RX ORDER — POLYETHYLENE GLYCOL 3350 17 G/17G
17 POWDER, FOR SOLUTION ORAL DAILY PRN
Status: DISCONTINUED | OUTPATIENT
Start: 2020-10-24 | End: 2020-10-28 | Stop reason: HOSPADM

## 2020-10-24 RX ORDER — ACETAMINOPHEN 325 MG/1
650 TABLET ORAL EVERY 6 HOURS PRN
Status: DISCONTINUED | OUTPATIENT
Start: 2020-10-24 | End: 2020-10-28 | Stop reason: HOSPADM

## 2020-10-24 RX ORDER — SODIUM CHLORIDE 0.9 % (FLUSH) 0.9 %
10 SYRINGE (ML) INJECTION PRN
Status: DISCONTINUED | OUTPATIENT
Start: 2020-10-24 | End: 2020-10-28 | Stop reason: HOSPADM

## 2020-10-24 RX ORDER — ALBUTEROL SULFATE 2.5 MG/3ML
SOLUTION RESPIRATORY (INHALATION)
Status: DISPENSED
Start: 2020-10-24 | End: 2020-10-25

## 2020-10-24 RX ADMIN — FLUOXETINE 40 MG: 20 CAPSULE ORAL at 08:58

## 2020-10-24 RX ADMIN — AZITHROMYCIN MONOHYDRATE 500 MG: 250 TABLET ORAL at 13:55

## 2020-10-24 RX ADMIN — ALBUTEROL SULFATE 2 PUFF: 90 AEROSOL, METERED RESPIRATORY (INHALATION) at 06:44

## 2020-10-24 RX ADMIN — ALBUTEROL SULFATE 2 PUFF: 90 AEROSOL, METERED RESPIRATORY (INHALATION) at 10:09

## 2020-10-24 RX ADMIN — CYCLOBENZAPRINE 10 MG: 10 TABLET, FILM COATED ORAL at 05:40

## 2020-10-24 RX ADMIN — ALBUTEROL SULFATE 2.5 MG: 2.5 SOLUTION RESPIRATORY (INHALATION) at 17:29

## 2020-10-24 RX ADMIN — OXYCODONE HYDROCHLORIDE AND ACETAMINOPHEN 1 TABLET: 5; 325 TABLET ORAL at 19:51

## 2020-10-24 RX ADMIN — BUDESONIDE AND FORMOTEROL FUMARATE DIHYDRATE 2 PUFF: 80; 4.5 AEROSOL RESPIRATORY (INHALATION) at 20:00

## 2020-10-24 RX ADMIN — GABAPENTIN 600 MG: 300 CAPSULE ORAL at 13:01

## 2020-10-24 RX ADMIN — Medication 10 ML: at 08:58

## 2020-10-24 RX ADMIN — OXYCODONE HYDROCHLORIDE AND ACETAMINOPHEN 1 TABLET: 5; 325 TABLET ORAL at 13:01

## 2020-10-24 RX ADMIN — ALBUTEROL SULFATE 2 PUFF: 90 AEROSOL, METERED RESPIRATORY (INHALATION) at 02:42

## 2020-10-24 RX ADMIN — ENOXAPARIN SODIUM 40 MG: 40 INJECTION SUBCUTANEOUS at 08:58

## 2020-10-24 RX ADMIN — Medication 10 ML: at 19:51

## 2020-10-24 RX ADMIN — CYCLOBENZAPRINE 10 MG: 10 TABLET, FILM COATED ORAL at 23:52

## 2020-10-24 RX ADMIN — GABAPENTIN 600 MG: 300 CAPSULE ORAL at 19:51

## 2020-10-24 RX ADMIN — BUDESONIDE AND FORMOTEROL FUMARATE DIHYDRATE 2 PUFF: 80; 4.5 AEROSOL RESPIRATORY (INHALATION) at 10:06

## 2020-10-24 RX ADMIN — ALBUTEROL SULFATE 2.5 MG: 2.5 SOLUTION RESPIRATORY (INHALATION) at 20:00

## 2020-10-24 RX ADMIN — OXYCODONE HYDROCHLORIDE AND ACETAMINOPHEN 1 TABLET: 5; 325 TABLET ORAL at 00:00

## 2020-10-24 RX ADMIN — GABAPENTIN 600 MG: 300 CAPSULE ORAL at 08:58

## 2020-10-24 RX ADMIN — ALBUTEROL SULFATE 2.5 MG: 2.5 SOLUTION RESPIRATORY (INHALATION) at 14:21

## 2020-10-24 RX ADMIN — AMITRIPTYLINE HYDROCHLORIDE 75 MG: 50 TABLET, FILM COATED ORAL at 19:50

## 2020-10-24 ASSESSMENT — ENCOUNTER SYMPTOMS
COUGH: 0
SHORTNESS OF BREATH: 1
WHEEZING: 0
CHEST TIGHTNESS: 0

## 2020-10-24 ASSESSMENT — PAIN DESCRIPTION - ORIENTATION
ORIENTATION: RIGHT

## 2020-10-24 ASSESSMENT — PAIN DESCRIPTION - FREQUENCY
FREQUENCY: CONTINUOUS

## 2020-10-24 ASSESSMENT — PAIN DESCRIPTION - ONSET
ONSET: ON-GOING

## 2020-10-24 ASSESSMENT — PAIN DESCRIPTION - PAIN TYPE
TYPE: CHRONIC PAIN

## 2020-10-24 ASSESSMENT — PAIN DESCRIPTION - LOCATION
LOCATION: HIP;LEG

## 2020-10-24 ASSESSMENT — PAIN DESCRIPTION - DESCRIPTORS
DESCRIPTORS: ACHING;STABBING
DESCRIPTORS: ACHING
DESCRIPTORS: ACHING

## 2020-10-24 ASSESSMENT — PAIN SCALES - GENERAL
PAINLEVEL_OUTOF10: 8
PAINLEVEL_OUTOF10: 5
PAINLEVEL_OUTOF10: 8
PAINLEVEL_OUTOF10: 7
PAINLEVEL_OUTOF10: 8
PAINLEVEL_OUTOF10: 8
PAINLEVEL_OUTOF10: 7

## 2020-10-24 ASSESSMENT — PAIN DESCRIPTION - PROGRESSION
CLINICAL_PROGRESSION: GRADUALLY WORSENING
CLINICAL_PROGRESSION: NOT CHANGED

## 2020-10-24 NOTE — ED NOTES
Patient states she feels like she is getting tight in the chest from SOB. Called RT for PRN breathing treatment of Albuterol.       Ledy Malagon RN  10/24/20 [id number]

## 2020-10-24 NOTE — CONSULTS
Pulmonary Consult    Patient's PCP: Leonarda Cervantes MD  Referred by: Yessica Vences MD for acute hypoxic respiratory failure, hx of asthma     HISTORY OF PRESENT ILLNESS:    This is a  63 y.o. female with PMH of asthma and others as listed below presented to the hospital complaining of SOB. She was placed on 2 liters on admission as she was found hypoxic, however O2 supplement was weaned off with sats in lower 90s  On detailed history, his main complain in fact is dyspnea on exertion. She has asthma for over 30 years however this time feels different. There is no much wheezing and there is no SOB at rest.  She gets SOB going to the bathroom associated with chest tightness. There is no chest pain or palpitations, however she has left side neck pain. There is no prior hx of heart disease. She is an ex smoker. Past Medical / Surgical History:    Past Medical History:   Diagnosis Date    Asthma     Depression     Osteoarthritis     PONV (postoperative nausea and vomiting)     Scoliosis      Past Surgical History:   Procedure Laterality Date    BREAST ENHANCEMENT SURGERY Bilateral 2007   [facility]    FRACTURE SURGERY      femur RT    [place]   Connye Sole JOINT REPLACEMENT Bilateral 12/29/2014    knee replacement    JOINT REPLACEMENT      KNEE CARTILAGE SURGERY Left 2006    ROTATOR CUFF REPAIR Left 2008    TOTAL HIP ARTHROPLASTY Bilateral 2014    WISDOM TOOTH EXTRACTION  1995       Medications Prior to Admission:    No current facility-administered medications on file prior to encounter.       Current Outpatient Medications on File Prior to Encounter   Medication Sig Dispense Refill    budesonide-formoterol (SYMBICORT) 80-4.5 MCG/ACT AERO Inhale 2 puffs into the lungs 2 times daily      meloxicam (MOBIC) 15 MG tablet Take 15 mg by mouth daily      predniSONE (DELTASONE) 10 MG tablet 4 tablets once daily for 3 days then 3 tablets once daily for 3 days then 2 tablets once daily for 3 days then 1 tablet once daily for 3 days. 30 tablet 0    cyclobenzaprine (FLEXERIL) 10 MG tablet Take 1 tablet by mouth 3 times daily as needed for Muscle spasms 30 tablet 0    albuterol sulfate  (90 Base) MCG/ACT inhaler Inhale 2 puffs into the lungs 4 times daily as needed for Wheezing 1 Inhaler 0    albuterol (PROVENTIL) (2.5 MG/3ML) 0.083% nebulizer solution Take 3 mLs by nebulization every 6 hours as needed for Wheezing or Shortness of Breath 120 each 3    gabapentin (NEURONTIN) 300 MG capsule Take 600 mg by mouth 3 times daily. Raynold Glad hydrOXYzine (VISTARIL) 25 MG capsule Take 25 mg by mouth 3 times daily as needed for Anxiety      ibuprofen (ADVIL;MOTRIN) 800 MG tablet Take 800 mg by mouth every 6 hours as needed for Pain      FLUoxetine (PROZAC) 40 MG capsule Take 1 capsule by mouth daily 30 capsule 0    amitriptyline (ELAVIL) 75 MG tablet Take 1 tablet by mouth nightly 30 tablet 0       Allergies:  Cortisone; Droperidol; and Compazine [prochlorperazine]    Social History:   TOBACCO:   reports that she quit smoking about 6 years ago. Her smoking use included cigarettes. She has a 0.05 pack-year smoking history. She has never used smokeless tobacco.     ETOH:   reports no history of alcohol use. Family History:       Adopted: Yes   Problem Relation Age of Onset    Diabetes Daughter     Cancer Mother         Breast and leukemia    Heart Disease Sister     Drug Abuse Brother          Review of Systems   Constitutional: Negative for chills, fatigue, fever and unexpected weight change. HENT: Negative for congestion. Respiratory: Positive for shortness of breath (on exertion). Negative for cough, chest tightness and wheezing. Cardiovascular: Negative for chest pain, palpitations and leg swelling. Musculoskeletal: Positive for neck pain. Neurological: Negative for weakness.    Psychiatric/Behavioral: The patient is not nervous/anxious. All other systems reviewed and are negative. PHYSICAL EXAM:  BP (!) 143/82   Pulse 88   Temp 98.2 °F (36.8 °C) (Oral)   Resp 20   Ht 4' 10\" (1.473 m)   Wt 198 lb 9.6 oz (90.1 kg)   LMP 12/22/2014   SpO2 90%   BMI 41.51 kg/m²     Physical Exam  Vitals signs reviewed. Constitutional:       Appearance: Normal appearance. She is well-developed. HENT:      Head: Normocephalic and atraumatic. Eyes:      Extraocular Movements: Extraocular movements intact. Pupils: Pupils are equal, round, and reactive to light. Neck:      Musculoskeletal: Neck supple. Vascular: No JVD. Cardiovascular:      Rate and Rhythm: Normal rate and regular rhythm. Heart sounds: No murmur. Pulmonary:      Effort: Pulmonary effort is normal. No respiratory distress. Breath sounds: Normal breath sounds. No stridor. No wheezing or rales. Abdominal:      General: Bowel sounds are normal.      Palpations: Abdomen is soft. Musculoskeletal:         General: No deformity. Right lower leg: No edema. Left lower leg: No edema. Skin:     General: Skin is warm and dry. Neurological:      Mental Status: She is alert and oriented to person, place, and time. Psychiatric:         Behavior: Behavior normal.         LABS:  Recent Labs     10/23/20  1638   WBC 10.1   HGB 10.9*   HCT 34.9*                                                                     Recent Labs     10/23/20  1638 10/24/20  0312 10/24/20  0600   * 138 140   K 4.9 3.9 3.5    104 104   CO2 20* 26 27   BUN 12 16 20   CREATININE 0.6 0.7 0.7   GLUCOSE 122* 111* 170*     No results for input(s): AST, ALT, ALB, BILITOT, ALKPHOS in the last 72 hours. Recent Labs     10/23/20  1638 10/24/20  0312 10/24/20  0600   TROPONINI <0.01 <0.01 <0.01     No results for input(s): BNP in the last 72 hours.   No results found for: PHART, CEF5CLZ, PO2ART  No results for input(s): INR in the last 72 me  Full Code

## 2020-10-24 NOTE — ED NOTES
Report called to [facility] for ED12 going to 4314. All questions answered. Patient will be transported on ED stretcher with ED staff escort.         Jose R Jones RN  10/24/20 8034

## 2020-10-24 NOTE — PLAN OF CARE
Problem: Falls - Risk of:  Goal: Will remain free from falls  Description: Will remain free from falls  Outcome: Ongoing  Note: Pt scores medium fall risk on Lanier fall risk scale and is able to independently in room with steady gait. Pt alert and oriented x 4 and encouraged to call RN if she needs assistance. Non-skid footwear is in place. Call light and personal belongings are within reach. Will continue to monitor. Electronically signed by Saira Villatoro RN on 10/24/2020 at 3:02 PM       Problem: Pain:  Goal: Control of chronic pain  Description: Control of chronic pain  Outcome: Ongoing  Note: Percocet ordered by MD as needed every 6 hours for chronic pain in right hip and knee. Gabapentin is being administered as ordered (see MAR). Pt states that she is satisfied with pain relief regimen. Will monitor and administer pain medication as needed. Electronically signed by Saira Villatoro RN on 10/24/2020 at 3:03 PM       Problem: Cardiac:  Goal: Hemodynamic stability will improve  Description: Hemodynamic stability will improve  Outcome: Ongoing  Note: Pt remains hemodynamically stable with stable vital signs. Pt is in normal sinus rhythm on tele. No complaints of shortness of breath/chest pain/palpitations. Will monitor. Electronically signed by Saira Villatoro RN on 10/24/2020 at 3:06 PM       Problem: Respiratory:  Goal: Ability to maintain adequate ventilation will improve  Description: Ability to maintain adequate ventilation will improve  Outcome: Ongoing  Note: Pt is currently on room air maintaining oxygen saturation greater than 90%. No complaints of shortness of breath. No respiratory noted. Pulmonary has been consulted. Will monitor and update as needed with change in patient condition.  Electronically signed by Saira Villatoro RN on 10/24/2020 at 3:05 PM       Problem: Skin Integrity:  Goal: Risk for impaired skin integrity will decrease  Description: Risk for impaired skin integrity will decrease  Outcome: Ongoing

## 2020-10-24 NOTE — PROGRESS NOTES
RESPIRATORY THERAPY ASSESSMENT    Name:  Mayte Doherty Record Number:  [id number]  Age: 63 y.o. Gender: female  : 1960  Today's Date:  10/24/2020  Room:  9772/9924-10    Assessment     Is the patient being admitted for a COPD or Asthma exacerbation? No   (If yes the patient will be seen every 4 hours for the first 24 hours and then reassessed)    Patient Admission Diagnosis Dyspnea on exertion      Allergies  Allergies   Allergen Reactions    Cortisone Anaphylaxis     Only if Injected; many years ago. Anaphylactic reaction??numbing agent??cortisone. Injected causes reaction. .. Karlee Graven Karlee Graven the patient states she CAN TAKE IV solumedrol fine (18)    Droperidol Other (See Comments)     EPS   shaking  shaking  ETS    Compazine [Prochlorperazine] Other (See Comments)     EPS symptoms             Pulmonary History:Asthma  Home Oxygen Therapy:  room air  Home Respiratory Therapy:Albuterol and Symbicort   Current Respiratory Therapy:  Albuterol and Symbicort   Treatment Type: HHN  Medications: Albuterol    Respiratory Severity Index(RSI)   Patients with orders for inhalation medications, oxygen, or any therapeutic treatment modality will be placed on Respiratory Protocol. They will be assessed with the first treatment and at least every 72 hours thereafter. The following severity scale will be used to determine frequency of treatment intervention. Smoking History: Smoking History Less than 1ppd or less than 15 pack year = 1    Social History  Social History     Tobacco Use    Smoking status: Former Smoker     Packs/day: 0.25     Years: 0.20     Pack years: 0.05     Types: Cigarettes     Last attempt to quit: 2014     Years since quittin.4    Smokeless tobacco: Never Used    Tobacco comment: working to decrease   Substance Use Topics    Alcohol use: No     Comment: recovering alcoholic since 1499, has had couple of relaspes, last 7/15/2015.     Drug use: No       Recent Surgical History: None = 1. Assessment and treatment by Respiratory Therapy will be initiated for medication and therapeutic interventions upon initiation of aerosolized medication. 2. Physician will be contacted for respiratory rate (RR) greater than 35 breaths per minute. Therapy will be held for heart rate (HR) greater than 140 beats per minute, pending direction from physician. 3. Bronchodilators will be administered via Metered Dose Inhaler (MDI) with spacer when the following criteria are met:  a. Alert and cooperative     b. HR < 140 bpm  c. RR < 30 bpm                d. Can demonstrate a 2-3 second inspiratory hold  4. Bronchodilators will be administered via Hand Held Nebulizer CB [facility]) to patients when ANY of the following criteria are met  a. Incognizant or uncooperative          b. Patients treated with HHN at Home        c. Unable to demonstrate proper use of MDI with spacer     d. RR > 30 bpm   5. Bronchodilators will be delivered via Metered Dose Inhaler (MDI), HHN, Aerogen to intubated patients on mechanical ventilation. 6. Inhalation medication orders will be delivered and/or substituted as outlined below. Aerosolized Medications Ordering and Administration Guidelines:    1. All Medications will be ordered by a physician, and their frequency and/or modality will be adjusted as defined by the patients Respiratory Severity Index (RSI) score. 2. If the patient does not have documented COPD, consider discontinuing anticholinergics when RSI is less than 9.  3. If the bronchospasm worsens (increased RSI), then the bronchodilator frequency can be increased to a maximum of every 4 hours. If greater than every 4 hours is required, the physician will be contacted. 4. If the bronchospasm improves, the frequency of the bronchodilator can be decreased, based on the patient's RSI, but not less than home treatment regimen frequency.   5. Bronchodilator(s) will be discontinued if patient has a RSI less than 9 and has received no

## 2020-10-24 NOTE — H&P
Hospital Medicine History & Physical      PCP: Asif George MD    Date of Admission: 10/23/2020    Date of Service: Pt seen/examined on 10/24/2020 and Admitted to Inpatient with expected LOS greater than two midnights due to medical therapy. Chief Complaint: Shortness of breath      History Of Present Illness:      63 y.o. female who presented to [facility] emergency room with a chief complaint of shortness of breath in emergency patient was found to be hypoxic patient with a history of asthma denies any fever chills no chest pain complains of shortness of breath no cough no sputum no nausea vomiting no diarrhea no melena hematochezia no recent weight loss weight gain no change in appetite. Past Medical History:          Diagnosis Date    Asthma     Depression     Osteoarthritis     PONV (postoperative nausea and vomiting)     Scoliosis        Past Surgical History:          Procedure Laterality Date    BREAST ENH[place]EMENT [place]GERY Bilateral [place]   [place]    FRACTURE SURGERY      femur RT    GA[place]IC [place]  [place]    [place]   [facility] JOINT REPLACEMENT Bilateral 12/29/2014    knee replacement    JOINT REPLACEMENT      KNEE CARTILAGE SURGERY Left 2006    ROTATOR CUFF REPAIR Left 2008    TOTAL HIP ARTHROPLASTY Bilateral 2014    WISDOM TOOTH EXTRACTION  1995       Medications Prior to Admission:      Prior to Admission medications    Medication Sig Start Date End Date Taking? Authorizing Provider   budesonide-formoterol (SYMBICORT) 80-4.5 MCG/ACT AERO Inhale 2 puffs into the lungs 2 times daily   Yes Historical Provider, MD   meloxicam (MOBIC) 15 MG tablet Take 15 mg by mouth daily   Yes Historical Provider, MD   predniSONE (DELTASONE) 10 MG tablet 4 tablets once daily for 3 days then 3 tablets once daily for 3 days then 2 tablets once daily for 3 days then 1 tablet once daily for 3 days.  8/5/19  Yes Ofe Pulido MD reviewed and negative. PHYSICAL EXAM PERFORMED:    BP (!) 140/91   Pulse 89   Temp 98.1 °F (36.7 °C) (Oral)   Resp 19   Ht 4' 10\" (1.473 m)   Wt 198 lb 9.6 oz (90.1 kg)   LMP 12/22/2014   SpO2 94%   BMI 41.51 kg/m²     General appearance:  No apparent distress, appears stated age and cooperative. HEENT:  Normal cephalic, atraumatic without obvious deformity. Pupils equal, round, and reactive to light. Extra ocular muscles intact. Conjunctivae/corneas clear. Neck: Supple, with full range of motion. No jugular venous distention. Trachea midline. Respiratory:  Normal respiratory effort. positive wheezing bilateral  Cardiovascular:  Regular rate and rhythm with normal S1/S2 without murmurs, rubs or gallops. Abdomen: Soft, non-tender, non-distended with normal bowel sounds. Musculoskeletal:  No clubbing, cyanosis or edema bilaterally. Full range of motion without deformity. Skin: Skin color, texture, turgor normal.  No rashes or lesions. Neurologic:  Neurovascularly intact without any focal sensory/motor deficits. Cranial nerves: II-XII intact, grossly non-focal.  Psychiatric:  Alert and oriented, thought content appropriate, normal insight  Capillary Refill: Brisk,< 3 seconds   Peripheral Pulses: +2 palpable, equal bilaterally       Labs:     Recent Labs     10/23/20  1638   WBC 10.1   HGB 10.9*   HCT 34.9*        Recent Labs     10/23/20  1638 10/24/20  0312 10/24/20  0600   * 138 140   K 4.9 3.9 3.5    104 104   CO2 20* 26 27   BUN 12 16 20   CREATININE 0.6 0.7 0.7   CALCIUM 8.2* 8.5 8.9   PHOS 2.7  --   --      No results for input(s): AST, ALT, BILIDIR, BILITOT, ALKPHOS in the last 72 hours. No results for input(s): INR in the last 72 hours.   Recent Labs     10/23/20  1638 10/24/20  0312 10/24/20  0600   TROPONINI <0.01 <0.01 <0.01       Urinalysis:      Lab Results   Component Value Date    NITRU Negative 03/16/2018    WBCUA 3-5 03/16/2018    BACTERIA 1+ 03/16/2018    RBCUA

## 2020-10-24 NOTE — PROGRESS NOTES
RESPIRATORY THERAPY ASSESSMENT    Name:  Mayte Doherty Record Number:  [id number]  Age: 63 y.o. Gender: female  : 1960  Today's Date:  10/24/2020  Room:  [place] [address]    Assessment     Is the patient being admitted for a COPD or Asthma exacerbation? No   (If yes the patient will be seen every 4 hours for the first 24 hours and then reassessed)    Patient Admission Diagnosis      Allergies  Allergies   Allergen Reactions    Cortisone Anaphylaxis     Only if Injected; many years ago. Anaphylactic reaction??numbing agent??cortisone. Injected causes reaction. .. Lai Rosas the patient states she CAN TAKE IV solumedrol fine (18)    Droperidol Other (See Comments)     EPS   shaking  shaking  ETS    Compazine [Prochlorperazine] Other (See Comments)     EPS symptoms         Pulmonary History:Asthma  Home Oxygen Therapy:  room air  Home Respiratory Therapy:Albuterol and Symbicort   Current Respiratory Therapy:  Albuterol and Symbicort   Treatment Type: MDI  Medications: Albuterol    Respiratory Severity Index(RSI)   Patients with orders for inhalation medications, oxygen, or any therapeutic treatment modality will be placed on Respiratory Protocol. They will be assessed with the first treatment and at least every 72 hours thereafter. The following severity scale will be used to determine frequency of treatment intervention. Smoking History: Smoking History Less than 1ppd or less than 15 pack year = 1    Social History  Social History     Tobacco Use    Smoking status: Former Smoker     Packs/day: 0.25     Years: 0.20     Pack years: 0.05     Types: Cigarettes     Last attempt to quit: 2014     Years since quittin.4    Smokeless tobacco: Never Used    Tobacco comment: working to decrease   Substance Use Topics    Alcohol use: No     Comment: recovering alcoholic since , has had couple of relaspes, last 7/15/2015.     Drug use: No       Recent Surgical History: None = 0  Past Surgical History  Past Surgical History:   Procedure Laterality Date    BREAST ENHANCEMENT SURGERY Bilateral 2007   [place]    FRACTURE SURGERY      femur RT    [place]    JOINT REPLACEMENT Bilateral 12/29/2014    knee replacement    JOINT REPLACEMENT      KNEE CARTILAGE SURGERY Left 2006    ROTATOR CUFF REPAIR Left 2008    TOTAL HIP ARTHROPLASTY Bilateral 2014    WISDOM TOOTH EXTRACTION  1995       Level of Consciousness: Alert, Oriented, and Cooperative = 0    Level of Activity: Walking unassisted = 0    Respiratory Pattern: Increased; RR 21-30 = 1    Breath Sounds: Scattered wheezes to clear = 2    Sputum   ,  , Sputum How Obtained: Spontaneous cough  Cough: Strong, spontaneous, non-productive = 0    Vital Signs   BP (!) 150/76   Pulse 97   Temp 98.1 °F (36.7 °C) (Oral)   Resp 16   Ht 4' 9\" (1.448 m)   Wt 195 lb (88.5 kg)   LMP 12/22/2014   SpO2 94%   BMI 42.20 kg/m²   SPO2 (COPD values may differ): Greater than or equal to 92% on room air = 0    Peak Flow (asthma only): not applicable = 0    RSI: 0-4 = See once and convert to home regimen or discontinue        Plan       Goals: Medication delivery     Patient/caregiver was educated on the proper method of use for Respiratory Care Devices:  Yes      Level of patient/caregiver understanding able to:   ? Verbalize understanding   ? Demonstrate understanding       ? Teach back        ? Needs reinforcement       ? No available caregiver               ? Other:     Response to education:  Good     Is patient being placed on Home Treatment Regimen? Yes     Does the patient have everything they need prior to discharge? NA     Plan of Care: Continue home regimen as ordered     Electronically signed by Chucho Hammond RCP on 10/24/2020 at 5:02 AM    Respiratory Protocol Guidelines     1.  Assessment and treatment by Respiratory Therapy will be initiated for medication and therapeutic interventions upon initiation of aerosolized medication. 2. Physician will be contacted for respiratory rate (RR) greater than 35 breaths per minute. Therapy will be held for heart rate (HR) greater than 140 beats per minute, pending direction from physician. 3. Bronchodilators will be administered via Metered Dose Inhaler (MDI) with spacer when the following criteria are met:  a. Alert and cooperative     b. HR < 140 bpm  c. RR < 30 bpm                d. Can demonstrate a 2-3 second inspiratory hold  4. Bronchodilators will be administered via Hand Held Nebulizer CB [facility]) to patients when ANY of the following criteria are met  a. Incognizant or uncooperative          b. Patients treated with HHN at Home        c. Unable to demonstrate proper use of MDI with spacer     d. RR > 30 bpm   5. Bronchodilators will be delivered via Metered Dose Inhaler (MDI), HHN, Aerogen to intubated patients on mechanical ventilation. 6. Inhalation medication orders will be delivered and/or substituted as outlined below. Aerosolized Medications Ordering and Administration Guidelines:    1. All Medications will be ordered by a physician, and their frequency and/or modality will be adjusted as defined by the patients Respiratory Severity Index (RSI) score. 2. If the patient does not have documented COPD, consider discontinuing anticholinergics when RSI is less than 9.  3. If the bronchospasm worsens (increased RSI), then the bronchodilator frequency can be increased to a maximum of every 4 hours. If greater than every 4 hours is required, the physician will be contacted. 4. If the bronchospasm improves, the frequency of the bronchodilator can be decreased, based on the patient's RSI, but not less than home treatment regimen frequency. 5. Bronchodilator(s) will be discontinued if patient has a RSI less than 9 and has received no scheduled or as needed treatment for 72  Hrs.     Patients Ordered on a Mucolytic Agent: 1. Must always be administered with a bronchodilator. 2. Discontinue if patient experiences worsened bronchospasm, or secretions have lessened to the point that the patient is able to clear them with a cough. Anti-inflammatory and Combination Medications:    1. If the patient lacks prior history of lung disease, is not using inhaled anti-inflammatory medication at home, and lacks wheezing by examination or by history for at least 24 hours, contact physician for possible discontinuation.

## 2020-10-25 PROCEDURE — 94640 AIRWAY INHALATION TREATMENT: CPT

## 2020-10-25 PROCEDURE — 96374 THER/PROPH/DIAG INJ IV PUSH: CPT

## 2020-10-25 PROCEDURE — 6360000002 HC RX W HCPCS: Performed by: INTERNAL MEDICINE

## 2020-10-25 PROCEDURE — 96376 TX/PRO/DX INJ SAME DRUG ADON: CPT

## 2020-10-25 PROCEDURE — 2580000003 HC RX 258: Performed by: INTERNAL MEDICINE

## 2020-10-25 PROCEDURE — G0378 HOSPITAL OBSERVATION PER HR: HCPCS

## 2020-10-25 PROCEDURE — 6360000002 HC RX W HCPCS: Performed by: HOSPITALIST

## 2020-10-25 PROCEDURE — 6370000000 HC RX 637 (ALT 250 FOR IP): Performed by: INTERNAL MEDICINE

## 2020-10-25 PROCEDURE — 6370000000 HC RX 637 (ALT 250 FOR IP): Performed by: HOSPITALIST

## 2020-10-25 RX ORDER — METHYLPREDNISOLONE SODIUM SUCCINATE 40 MG/ML
40 INJECTION, POWDER, LYOPHILIZED, FOR SOLUTION INTRAMUSCULAR; INTRAVENOUS EVERY 8 HOURS
Status: DISCONTINUED | OUTPATIENT
Start: 2020-10-25 | End: 2020-10-26

## 2020-10-25 RX ADMIN — PANTOPRAZOLE SODIUM 40 MG: 40 TABLET, DELAYED RELEASE ORAL at 06:24

## 2020-10-25 RX ADMIN — ALBUTEROL SULFATE 2.5 MG: 2.5 SOLUTION RESPIRATORY (INHALATION) at 15:17

## 2020-10-25 RX ADMIN — METHYLPREDNISOLONE SODIUM SUCCINATE 40 MG: 40 INJECTION, POWDER, FOR SOLUTION INTRAMUSCULAR; INTRAVENOUS at 13:52

## 2020-10-25 RX ADMIN — AMITRIPTYLINE HYDROCHLORIDE 75 MG: 50 TABLET, FILM COATED ORAL at 20:54

## 2020-10-25 RX ADMIN — FLUOXETINE 40 MG: 20 CAPSULE ORAL at 08:42

## 2020-10-25 RX ADMIN — BUDESONIDE AND FORMOTEROL FUMARATE DIHYDRATE 2 PUFF: 80; 4.5 AEROSOL RESPIRATORY (INHALATION) at 09:29

## 2020-10-25 RX ADMIN — PROMETHAZINE HYDROCHLORIDE 12.5 MG: 25 TABLET ORAL at 18:00

## 2020-10-25 RX ADMIN — OXYCODONE HYDROCHLORIDE AND ACETAMINOPHEN 1 TABLET: 5; 325 TABLET ORAL at 03:58

## 2020-10-25 RX ADMIN — METHYLPREDNISOLONE SODIUM SUCCINATE 40 MG: 40 INJECTION, POWDER, FOR SOLUTION INTRAMUSCULAR; INTRAVENOUS at 20:54

## 2020-10-25 RX ADMIN — ALBUTEROL SULFATE 2.5 MG: 2.5 SOLUTION RESPIRATORY (INHALATION) at 09:28

## 2020-10-25 RX ADMIN — ALBUTEROL SULFATE 2.5 MG: 2.5 SOLUTION RESPIRATORY (INHALATION) at 04:05

## 2020-10-25 RX ADMIN — OXYCODONE HYDROCHLORIDE AND ACETAMINOPHEN 1 TABLET: 5; 325 TABLET ORAL at 16:12

## 2020-10-25 RX ADMIN — Medication 10 ML: at 20:55

## 2020-10-25 RX ADMIN — Medication 10 ML: at 08:43

## 2020-10-25 RX ADMIN — GABAPENTIN 600 MG: 300 CAPSULE ORAL at 13:52

## 2020-10-25 RX ADMIN — ALBUTEROL SULFATE 2.5 MG: 2.5 SOLUTION RESPIRATORY (INHALATION) at 21:09

## 2020-10-25 RX ADMIN — OXYCODONE HYDROCHLORIDE AND ACETAMINOPHEN 1 TABLET: 5; 325 TABLET ORAL at 09:59

## 2020-10-25 RX ADMIN — ALBUTEROL SULFATE 2.5 MG: 2.5 SOLUTION RESPIRATORY (INHALATION) at 00:10

## 2020-10-25 RX ADMIN — BUDESONIDE AND FORMOTEROL FUMARATE DIHYDRATE 2 PUFF: 80; 4.5 AEROSOL RESPIRATORY (INHALATION) at 21:09

## 2020-10-25 RX ADMIN — ALBUTEROL SULFATE 2.5 MG: 2.5 SOLUTION RESPIRATORY (INHALATION) at 11:45

## 2020-10-25 RX ADMIN — OXYCODONE HYDROCHLORIDE AND ACETAMINOPHEN 1 TABLET: 5; 325 TABLET ORAL at 22:22

## 2020-10-25 RX ADMIN — GABAPENTIN 600 MG: 300 CAPSULE ORAL at 08:42

## 2020-10-25 RX ADMIN — ENOXAPARIN SODIUM 40 MG: 40 INJECTION SUBCUTANEOUS at 08:42

## 2020-10-25 RX ADMIN — GABAPENTIN 600 MG: 300 CAPSULE ORAL at 20:55

## 2020-10-25 ASSESSMENT — PAIN DESCRIPTION - LOCATION
LOCATION: HIP;LEG

## 2020-10-25 ASSESSMENT — PAIN DESCRIPTION - ORIENTATION
ORIENTATION: RIGHT
ORIENTATION: RIGHT

## 2020-10-25 ASSESSMENT — PAIN DESCRIPTION - PAIN TYPE
TYPE: CHRONIC PAIN

## 2020-10-25 ASSESSMENT — PAIN SCALES - GENERAL
PAINLEVEL_OUTOF10: 6
PAINLEVEL_OUTOF10: 5
PAINLEVEL_OUTOF10: 0
PAINLEVEL_OUTOF10: 6
PAINLEVEL_OUTOF10: 3

## 2020-10-25 ASSESSMENT — PAIN DESCRIPTION - DESCRIPTORS: DESCRIPTORS: ACHING

## 2020-10-25 ASSESSMENT — PAIN DESCRIPTION - PROGRESSION: CLINICAL_PROGRESSION: GRADUALLY IMPROVING

## 2020-10-25 ASSESSMENT — PAIN DESCRIPTION - ONSET: ONSET: ON-GOING

## 2020-10-25 ASSESSMENT — PAIN DESCRIPTION - FREQUENCY: FREQUENCY: CONTINUOUS

## 2020-10-25 NOTE — PLAN OF CARE
Problem: Falls - Risk of:  Goal: Will remain free from falls  Description: Will remain free from falls  10/25/2020 0454 by Vishal Jc RN  Outcome: Ongoing     Problem: Pain:  Goal: Pain level will decrease  Description: Pain level will decrease  Outcome: Ongoing     Problem: Cardiac:  Goal: Hemodynamic stability will improve  Description: Hemodynamic stability will improve  10/25/2020 0454 by Vishal Jc RN  Outcome: Ongoing     Problem: Respiratory:  Goal: Ability to maintain adequate ventilation will improve  Description: Ability to maintain adequate ventilation will improve  10/25/2020 0454 by Vishal Jc RN  Outcome: Ongoing

## 2020-10-25 NOTE — PROGRESS NOTES
Pt noted to be dyspneic with exertion while ambulating to the bathroom. This RN checked oxygen saturation which was 86% on room air with exertion. Pt placed on 2 liters oxygen nasal cannula and saturation recovered to >90%. Pt previously up ad phil, but this RN explained to patient that she should have assistance getting up to the bathroom due to her dyspnea and need for supplemental oxygen. Pt expressed understanding and stated that she would call for assistance prior to getting up to the bathroom. Dr. Nakia Bernard made aware face-to-face of pt's new oxygen requirement with exertion. Dr. Nakia Bernard stated that we will continue with plan for stress test tomorrow, and no new orders were placed at this time. Will continue to monitor closely and update MD as needed with change in patient condition.  Electronically signed by Vianney Perez RN on 10/25/2020 at 6:25 PM

## 2020-10-25 NOTE — PROGRESS NOTES
appreciated patient stated she is not allergic to Solu-Medrol we will start her on Solu-Medrol continue with inhaler Zithromax has been discontinued per pulmonary. Stress test ordered for tomorrow for dyspnea on exertion. Acute respiratory failure with hypoxia on rest today not requiring any oxygen, needs to be evaluated prior to discharge for O2 requirement. 2.  Depression continue with a home medication. 3.  Morbid obesity BMI 41.51 weight loss and exercise. 4.  Chronic pain syndrome currently NSAIDs on hold Percocet as needed and continue with the gabapentin.     DVT Prophylaxis: Lovenox subcu  Diet: DIET GENERAL;  Code Status: Full Code    PT/OT Eval Status:     Jhon Patel MD

## 2020-10-26 PROBLEM — R06.09 DYSPNEA ON EXERTION: Status: ACTIVE | Noted: 2020-10-26

## 2020-10-26 LAB
LV EF: 73 %
LVEF MODALITY: NORMAL

## 2020-10-26 PROCEDURE — 6360000002 HC RX W HCPCS: Performed by: INTERNAL MEDICINE

## 2020-10-26 PROCEDURE — 93017 CV STRESS TEST TRACING ONLY: CPT

## 2020-10-26 PROCEDURE — 2580000003 HC RX 258: Performed by: INTERNAL MEDICINE

## 2020-10-26 PROCEDURE — 1200000000 HC SEMI PRIVATE

## 2020-10-26 PROCEDURE — A9502 TC99M TETROFOSMIN: HCPCS | Performed by: INTERNAL MEDICINE

## 2020-10-26 PROCEDURE — 94640 AIRWAY INHALATION TREATMENT: CPT

## 2020-10-26 PROCEDURE — G0378 HOSPITAL OBSERVATION PER HR: HCPCS

## 2020-10-26 PROCEDURE — 96376 TX/PRO/DX INJ SAME DRUG ADON: CPT

## 2020-10-26 PROCEDURE — 6370000000 HC RX 637 (ALT 250 FOR IP): Performed by: HOSPITALIST

## 2020-10-26 PROCEDURE — 78452 HT MUSCLE IMAGE SPECT MULT: CPT

## 2020-10-26 PROCEDURE — 99222 1ST HOSP IP/OBS MODERATE 55: CPT | Performed by: INTERNAL MEDICINE

## 2020-10-26 PROCEDURE — 99231 SBSQ HOSP IP/OBS SF/LOW 25: CPT | Performed by: INTERNAL MEDICINE

## 2020-10-26 PROCEDURE — 6360000002 HC RX W HCPCS: Performed by: HOSPITALIST

## 2020-10-26 PROCEDURE — 6370000000 HC RX 637 (ALT 250 FOR IP): Performed by: INTERNAL MEDICINE

## 2020-10-26 PROCEDURE — 94761 N-INVAS EAR/PLS OXIMETRY MLT: CPT

## 2020-10-26 PROCEDURE — 3430000000 HC RX DIAGNOSTIC RADIOPHARMACEUTICAL: Performed by: INTERNAL MEDICINE

## 2020-10-26 PROCEDURE — 96372 THER/PROPH/DIAG INJ SC/IM: CPT

## 2020-10-26 RX ORDER — AMLODIPINE BESYLATE 2.5 MG/1
2.5 TABLET ORAL DAILY
Status: DISCONTINUED | OUTPATIENT
Start: 2020-10-26 | End: 2020-10-28 | Stop reason: HOSPADM

## 2020-10-26 RX ORDER — PREDNISONE 20 MG/1
40 TABLET ORAL DAILY
Status: DISCONTINUED | OUTPATIENT
Start: 2020-10-26 | End: 2020-10-28 | Stop reason: HOSPADM

## 2020-10-26 RX ORDER — FUROSEMIDE 10 MG/ML
40 INJECTION INTRAMUSCULAR; INTRAVENOUS ONCE
Status: COMPLETED | OUTPATIENT
Start: 2020-10-26 | End: 2020-10-26

## 2020-10-26 RX ORDER — METHYLPREDNISOLONE SODIUM SUCCINATE 40 MG/ML
40 INJECTION, POWDER, LYOPHILIZED, FOR SOLUTION INTRAMUSCULAR; INTRAVENOUS DAILY
Status: DISCONTINUED | OUTPATIENT
Start: 2020-10-27 | End: 2020-10-26

## 2020-10-26 RX ORDER — SODIUM CHLORIDE 0.9 % (FLUSH) 0.9 %
10 SYRINGE (ML) INJECTION 2 TIMES DAILY
Status: DISCONTINUED | OUTPATIENT
Start: 2020-10-26 | End: 2020-10-28 | Stop reason: HOSPADM

## 2020-10-26 RX ADMIN — ALBUTEROL SULFATE 2.5 MG: 2.5 SOLUTION RESPIRATORY (INHALATION) at 00:24

## 2020-10-26 RX ADMIN — TETROFOSMIN 9.7 MILLICURIE: 1.38 INJECTION, POWDER, LYOPHILIZED, FOR SOLUTION INTRAVENOUS at 10:08

## 2020-10-26 RX ADMIN — GABAPENTIN 600 MG: 300 CAPSULE ORAL at 09:58

## 2020-10-26 RX ADMIN — ALBUTEROL SULFATE 2.5 MG: 2.5 SOLUTION RESPIRATORY (INHALATION) at 04:39

## 2020-10-26 RX ADMIN — Medication 10 ML: at 10:45

## 2020-10-26 RX ADMIN — ENOXAPARIN SODIUM 40 MG: 40 INJECTION SUBCUTANEOUS at 09:59

## 2020-10-26 RX ADMIN — METHYLPREDNISOLONE SODIUM SUCCINATE 40 MG: 40 INJECTION, POWDER, FOR SOLUTION INTRAMUSCULAR; INTRAVENOUS at 05:30

## 2020-10-26 RX ADMIN — AMLODIPINE BESYLATE 2.5 MG: 2.5 TABLET ORAL at 20:11

## 2020-10-26 RX ADMIN — TETROFOSMIN 32.8 MILLICURIE: 1.38 INJECTION, POWDER, LYOPHILIZED, FOR SOLUTION INTRAVENOUS at 11:00

## 2020-10-26 RX ADMIN — OXYCODONE HYDROCHLORIDE AND ACETAMINOPHEN 1 TABLET: 5; 325 TABLET ORAL at 10:06

## 2020-10-26 RX ADMIN — OXYCODONE HYDROCHLORIDE AND ACETAMINOPHEN 1 TABLET: 5; 325 TABLET ORAL at 16:53

## 2020-10-26 RX ADMIN — AMITRIPTYLINE HYDROCHLORIDE 75 MG: 50 TABLET, FILM COATED ORAL at 20:11

## 2020-10-26 RX ADMIN — ALBUTEROL SULFATE 2.5 MG: 2.5 SOLUTION RESPIRATORY (INHALATION) at 12:11

## 2020-10-26 RX ADMIN — OXYCODONE HYDROCHLORIDE AND ACETAMINOPHEN 1 TABLET: 5; 325 TABLET ORAL at 23:09

## 2020-10-26 RX ADMIN — ALBUTEROL SULFATE 2.5 MG: 2.5 SOLUTION RESPIRATORY (INHALATION) at 20:17

## 2020-10-26 RX ADMIN — Medication 10 ML: at 20:12

## 2020-10-26 RX ADMIN — PANTOPRAZOLE SODIUM 40 MG: 40 TABLET, DELAYED RELEASE ORAL at 05:30

## 2020-10-26 RX ADMIN — GABAPENTIN 600 MG: 300 CAPSULE ORAL at 20:11

## 2020-10-26 RX ADMIN — CYCLOBENZAPRINE 10 MG: 10 TABLET, FILM COATED ORAL at 16:53

## 2020-10-26 RX ADMIN — BUDESONIDE AND FORMOTEROL FUMARATE DIHYDRATE 2 PUFF: 80; 4.5 AEROSOL RESPIRATORY (INHALATION) at 20:17

## 2020-10-26 RX ADMIN — Medication 10 ML: at 10:08

## 2020-10-26 RX ADMIN — ALBUTEROL SULFATE 2.5 MG: 2.5 SOLUTION RESPIRATORY (INHALATION) at 16:25

## 2020-10-26 RX ADMIN — Medication 10 ML: at 09:59

## 2020-10-26 RX ADMIN — OXYCODONE HYDROCHLORIDE AND ACETAMINOPHEN 1 TABLET: 5; 325 TABLET ORAL at 04:37

## 2020-10-26 RX ADMIN — GABAPENTIN 600 MG: 300 CAPSULE ORAL at 16:53

## 2020-10-26 RX ADMIN — BUDESONIDE AND FORMOTEROL FUMARATE DIHYDRATE 2 PUFF: 80; 4.5 AEROSOL RESPIRATORY (INHALATION) at 08:58

## 2020-10-26 RX ADMIN — Medication 10 ML: at 11:00

## 2020-10-26 RX ADMIN — ALBUTEROL SULFATE 2.5 MG: 2.5 SOLUTION RESPIRATORY (INHALATION) at 08:56

## 2020-10-26 RX ADMIN — FUROSEMIDE 40 MG: 10 INJECTION, SOLUTION INTRAMUSCULAR; INTRAVENOUS at 16:54

## 2020-10-26 RX ADMIN — REGADENOSON 0.4 MG: 0.08 INJECTION, SOLUTION INTRAVENOUS at 11:00

## 2020-10-26 RX ADMIN — FLUOXETINE 40 MG: 20 CAPSULE ORAL at 09:58

## 2020-10-26 ASSESSMENT — PAIN DESCRIPTION - LOCATION
LOCATION: HIP;LEG
LOCATION: LEG;HIP
LOCATION: HIP;LEG
LOCATION: HIP;LEG

## 2020-10-26 ASSESSMENT — PAIN DESCRIPTION - PROGRESSION
CLINICAL_PROGRESSION: NOT CHANGED
CLINICAL_PROGRESSION: GRADUALLY IMPROVING
CLINICAL_PROGRESSION: GRADUALLY WORSENING
CLINICAL_PROGRESSION: GRADUALLY IMPROVING
CLINICAL_PROGRESSION: GRADUALLY WORSENING
CLINICAL_PROGRESSION: GRADUALLY WORSENING
CLINICAL_PROGRESSION: GRADUALLY IMPROVING

## 2020-10-26 ASSESSMENT — PAIN DESCRIPTION - ONSET
ONSET: ON-GOING

## 2020-10-26 ASSESSMENT — PAIN DESCRIPTION - PAIN TYPE
TYPE: CHRONIC PAIN

## 2020-10-26 ASSESSMENT — PAIN SCALES - GENERAL
PAINLEVEL_OUTOF10: 8
PAINLEVEL_OUTOF10: 6
PAINLEVEL_OUTOF10: 4
PAINLEVEL_OUTOF10: 8
PAINLEVEL_OUTOF10: 6
PAINLEVEL_OUTOF10: 0
PAINLEVEL_OUTOF10: 0
PAINLEVEL_OUTOF10: 6
PAINLEVEL_OUTOF10: 0
PAINLEVEL_OUTOF10: 4
PAINLEVEL_OUTOF10: 8
PAINLEVEL_OUTOF10: 6
PAINLEVEL_OUTOF10: 4
PAINLEVEL_OUTOF10: 7

## 2020-10-26 ASSESSMENT — PAIN DESCRIPTION - ORIENTATION
ORIENTATION: RIGHT

## 2020-10-26 ASSESSMENT — PAIN DESCRIPTION - FREQUENCY
FREQUENCY: CONTINUOUS

## 2020-10-26 ASSESSMENT — PAIN DESCRIPTION - DESCRIPTORS
DESCRIPTORS: ACHING

## 2020-10-26 NOTE — CARE COORDINATION
Case Management Note    Patient unavailable at this time for assessment      CM attempted to meet with patient for completion of initial face to face assessment at this time. Consult noted for assistance with discharge planning. Patient currently working with staff members at the bedside x2 different rounding times today. CM has reviewed chart and noted patient from home and is pending stress test, is negative for COVID, cardiology was consulted on patient. CM will follow up when patient available and will complete initial assessment and assist with needs for discharge.     Evan Gomez RN  [facility]  Case Management Department  Ph: [phone]  Fax: [phone]

## 2020-10-26 NOTE — CONSULTS
Cardiology Consultation                                                                    Pt Name: Jennifer Smith  Age: 63 y.o. Sex: female  : 1960  Location: 8564/[id number]    Referring Physician: Queta Paige MD  Primary cardiologist: triny So      Reason for Consult:     Reason for Consultation/Chief Complaint: exertional dyspnea    HPI:      Terri Phillips is a 63 y.o. female with a past medical history of morbid obesity, asthma, past smoking. She has no prior cardiac history. Patient presented on 10/23 with progressively worse exertional dyspnea since 2020, occasional L neck pains. Her symptoms feel different c/w her prior episodes of asthma exacerbation. She did not have any navin chest pains, palpitations, dizziness. She denies any orthopnea or lower extremity edema, denies any fevers or productive cough. At the ED she was afebrile, mildly hypertensive, oxygen saturation 90% on room air, was placed on 2 L of nasal cannula for adequate sats. Christal 10/26/20: normal.     ECG 10/23/20: normal    CTA chest neg for PE or consolidations, + moderate CAD. Trop x 3 negative, COVID negative. Histories     Past Medical History:   has a past medical history of Asthma, Depression, Osteoarthritis, PONV (postoperative nausea and vomiting), and Scoliosis. Surgical History:   has a past surgical history that includes Gastric bypass surgery (); Rotator cuff repair (Left, ); Knee cartilage surgery (Left, ); Breast enhancement surgery (Bilateral, );  section ([place] [address]); Cholecystectomy (); [place] tooth extraction (); joint replacement (Bilateral, 2014); fracture surgery; joint replacement; and Total hip arthroplasty (Bilateral, ). Social History:   reports that she quit smoking about 6 years ago. Her smoking use included cigarettes. She has a 0.05 pack-year smoking history.  She has never used smokeless tobacco. She reports that she does not drink alcohol or use drugs. Family History:  No evidence for sudden cardiac death or premature CAD      Medications:       Home Medications  Were reviewed and are listed in nursing record. and/or listed below  Prior to Admission medications    Medication Sig Start Date End Date Taking? Authorizing Provider   budesonide-formoterol (SYMBICORT) 80-4.5 MCG/ACT AERO Inhale 2 puffs into the lungs 2 times daily   Yes Historical Provider, MD   meloxicam (MOBIC) 15 MG tablet Take 15 mg by mouth daily   Yes Historical Provider, MD   predniSONE (DELTASONE) 10 MG tablet 4 tablets once daily for 3 days then 3 tablets once daily for 3 days then 2 tablets once daily for 3 days then 1 tablet once daily for 3 days. 8/5/19  Yes Talon Grijalva MD   cyclobenzaprine (FLEXERIL) 10 MG tablet Take 1 tablet by mouth 3 times daily as needed for Muscle spasms 8/5/19  Yes Talon Grijalva MD   albuterol sulfate  (90 Base) MCG/ACT inhaler Inhale 2 puffs into the lungs 4 times daily as needed for Wheezing 2/14/19  Yes Paula Gannon MD   albuterol (PROVENTIL) (2.5 MG/3ML) 0.083% nebulizer solution Take 3 mLs by nebulization every 6 hours as needed for Wheezing or Shortness of Breath 8/29/18  Yes Ofelia Bradford MD   gabapentin (NEURONTIN) 300 MG capsule Take 600 mg by mouth 3 times daily. .   Yes Historical Provider, MD   hydrOXYzine (VISTARIL) 25 MG capsule Take 25 mg by mouth 3 times daily as needed for Anxiety   Yes Historical Provider, MD   ibuprofen (ADVIL;MOTRIN) 800 MG tablet Take 800 mg by mouth every 6 hours as needed for Pain   Yes Historical Provider, MD   FLUoxetine (PROZAC) 40 MG capsule Take 1 capsule by mouth daily 11/9/17  Yes Agustin Hernandez MD   amitriptyline (ELAVIL) 75 MG tablet Take 1 tablet by mouth nightly 11/8/17  Yes Agustin Hernandez MD          Inpatient Medications:   sodium chloride flush  10 mL Intravenous BID    predniSONE  40 mg Oral Daily    amLODIPine  2.5 mg Oral Daily    Weight:       Height:           Wt Readings from Last 3 Encounters:   10/24/20 198 lb 9.6 oz (90.1 kg)   08/05/19 190 lb (86.2 kg)   07/11/19 195 lb (88.5 kg)         General Appearance:  Alert, cooperative, no distress, appears stated age Appropriate weight   Head:  Normocephalic, without obvious abnormality, atraumatic   Eyes:  PERRL, conjunctiva/corneas clear EOM intact  Ears normal   Throat no lesions       Nose: Nares normal, no drainage or sinus tenderness   Throat: Lips, mucosa, and tongue normal   Neck: Supple, symmetrical, trachea midline, no adenopathy, thyroid: not enlarged, symmetric, no tenderness/mass/nodules, no carotid bruit. Lungs:   Respirations unlabored, bilateral mild wheezing with expiration. Chest Wall:  No tenderness or deformity   Heart:  Regular rhythm, rate is controlled, S1, S2 normal, there is no murmur, there is no rub or gallop, + jvd, no bilateral lower extremity edema   Abdomen:   Soft, non-tender, bowel sounds active all four quadrants,  no masses, no organomegaly       Extremities: Extremities normal, atraumatic, no cyanosis. Pulses: 2+ and symmetric   Skin: Skin color, texture, turgor normal, no rashes or lesions   Pysch: Normal mood and affect   Neurologic: Normal gross motor and sensory exam.  Cranial nerves intact        Labs:     Recent Labs     10/23/20  1638 10/24/20  0312 10/24/20  0600   * 138 140   K 4.9 3.9 3.5   BUN 12 16 20   CREATININE 0.6 0.7 0.7    104 104   CO2 20* 26 27   GLUCOSE 122* 111* 170*   CALCIUM 8.2* 8.5 8.9   MG 2.10  --  2.30     Recent Labs     10/23/20  1638   WBC 10.1   HGB 10.9*   HCT 34.9*      MCV 82.5     No results for input(s): CHOLTOT, TRIG, HDL in the last 72 hours. Invalid input(s): LIPIDCOMM, CHOLHDL, VLDCHOL, LDL  No results for input(s): PTT, INR in the last 72 hours.     Invalid input(s): PT  Recent Labs     10/23/20  1638 10/24/20  0312 10/24/20  0600   TROPONINI <0.01 <0.01 <0.01     No results for input(s): BNP in the last 72 hours. No results for input(s): TSH in the last 72 hours. No results for input(s): CHOL, HDL, LDLCALC, TRIG in the last 72 hours.]    Lab Results   Component Value Date    TROPONINI <0.01 10/24/2020         Imaging:     Telemetry:  NSR      Assessment / Plan:     1. Exertional dyspnea:  Patient's could be related to angina versus asthma exacerbation. [facility] nuclear stress test was unremarkable/normal today, however, she had caffeine through 1 am today (3 cokes and coffee), hence stress test is nondiagnostic. Troponin x3 - and ECG normal.  I cannot exclude mild acute heart failure exacerbation. COVID, viral panel and flu negative, procalcitonin normal, CT chest unremarkable except for CAD. -We will obtain an echocardiogram tomorrow  -We will give 1 dose of Lasix 40 mg IV x1 tonight  -Will plan for [address] tomorrow. Keep patient NPO x meds overnight. 2.  Essential hypertension:  Patient's blood pressure is mildly elevated. -Continue with amlodipine 2.5 daily  -We will give IV Lasix tonight  -If BP remains elevated, will adjust her medication. I have personally reviewed the reports and images of labs, radiological studies, cardiac studies including ECG's and telemetry, current and old medical records. The note was completed using EMR and Dragon dictation system. Every effort was made to ensure accuracy; however, inadvertent computerized transcription errors may be present. All questions and concerns were addressed to the patient/family. Alternatives to my treatment were discussed. I would like to thank you for providing me the opportunity to participate in the care of your patient. If you have any questions, please do not hesitate to contact me.      Gianluca Navas MD, [facility], [address]  The [address] [address]  Ph: [phone]  Fax: [phone]

## 2020-10-26 NOTE — PROGRESS NOTES
sodium chloride flush, acetaminophen **OR** acetaminophen, polyethylene glycol, promethazine **OR** ondansetron, albuterol sulfate HFA, oxyCODONE-acetaminophen    No intake or output data in the 24 hours ending 10/26/20 1631    Physical Exam Performed:    BP (!) 153/93   Pulse 86   Temp 97.2 °F (36.2 °C) (Oral)   Resp 18   Ht 4' 10\" (1.473 m)   Wt 198 lb 9.6 oz (90.1 kg)   LMP 12/22/2014   SpO2 94%   BMI 41.51 kg/m²     General appearance: No apparent distress, appears stated age and cooperative. HEENT: Pupils equal, round, and reactive to light. Conjunctivae/corneas clear. Neck: Supple, with full range of motion. No jugular venous distention. Trachea midline. Respiratory:  Normal respiratory effort. Positive wheezing bilateral.  Cardiovascular: Regular rate and rhythm with normal S1/S2 without murmurs, rubs or gallops. Abdomen: Soft, non-tender, non-distended with normal bowel sounds. Musculoskeletal: No clubbing, cyanosis or edema bilaterally. Full range of motion without deformity. Skin: Skin color, texture, turgor normal.  No rashes or lesions. Neurologic:  Neurovascularly intact without any focal sensory/motor deficits. Cranial nerves: II-XII intact, grossly non-focal.  Psychiatric: Alert and oriented, thought content appropriate, normal insight  Capillary Refill: Brisk,< 3 seconds   Peripheral Pulses: +2 palpable, equal bilaterally       Labs:   Recent Labs     10/23/20  1638   WBC 10.1   HGB 10.9*   HCT 34.9*        Recent Labs     10/23/20  1638 10/24/20  0312 10/24/20  0600   * 138 140   K 4.9 3.9 3.5    104 104   CO2 20* 26 27   BUN 12 16 20   CREATININE 0.6 0.7 0.7   CALCIUM 8.2* 8.5 8.9   PHOS 2.7  --   --      No results for input(s): AST, ALT, BILIDIR, BILITOT, ALKPHOS in the last 72 hours. No results for input(s): INR in the last 72 hours.   Recent Labs     10/23/20  1638 10/24/20  0312 10/24/20  0600   TROPONINI <0.01 <0.01 <0.01       Urinalysis:      Lab Results Prophylaxis: Lovenox subcu  Diet: Diet NPO, After Midnight Exceptions are: Sips with Meds  DIET CARDIAC; Low Sodium (2 GM)  Code Status: Full Code    PT/OT Eval Status:     Dispo - Continue inpatient care    Brenda Cleaning MD   Hospitalist

## 2020-10-26 NOTE — PLAN OF CARE
Problem: Falls - Risk of:  Goal: Will remain free from falls  Description: Will remain free from falls  10/26/2020 0235 by Claudeen Juniper, RN  Outcome: Ongoing     Problem: Pain:  Goal: Pain level will decrease  Description: Pain level will decrease  Outcome: Ongoing     Problem:  Activity:  Goal: Fatigue will decrease  Description: Fatigue will decrease  10/26/2020 0235 by Claudeen Juniper, RN  Outcome: Ongoing     Problem: Coping:  Goal: Level of anxiety will decrease  Description: Level of anxiety will decrease  10/26/2020 0235 by Claudeen Juniper, RN  Outcome: Ongoing

## 2020-10-26 NOTE — PROGRESS NOTES
Pulmonary & Critical Care Medicine    Admit Date: 10/23/2020  PCP: Shameka Macias MD    CC:  Dyspnea on exertion  Events of Last 24 hours:   No major events over the past 24 hours. Continue to have SOB on exertion, however no much wheezing. Vitals:  Tmax:  VITALS:  BP (!) 144/88   Pulse 94   Temp 97.4 °F (36.3 °C) (Oral)   Resp 18   Ht 4' 10\" (1.473 m)   Wt 198 lb 9.6 oz (90.1 kg)   LMP 2014   SpO2 94%   BMI 41.51 kg/m²   24HR INTAKE/OUTPUT:  No intake or output data in the 24 hours ending 10/26/20 1727  CURRENT PULSE OXIMETRY:  SpO2: 94 %  24HR PULSE OXIMETRY RANGE:  SpO2  Av.9 %  Min: 91 %  Max: 96 %    EXAM:  General: No distress. Alert. Eyes: PERRL. No sclera icterus. No conjunctival injection. ENT: No discharge. Moist oral mucosa   Neck: Trachea midline. Neck is supple   Resp: No accessory muscle use. No crackles. No wheezing. No rhonchi. CV: Regular rate. Regular rhythm. No mumur or rub. No edema. GI: Non-tender. Non-distended. Normal bowel sounds. Skin: Warm and dry. No nodule on exposed extremities. No rash on exposed extremities. M/S: No cyanosis. No joint deformity. No clubbing. Neuro: Awake. Speech is clear. Psych: Oriented to person, place, time. No anxiety or agitation. Medications:    IV:        Scheduled Meds:   sodium chloride flush  10 mL Intravenous BID    predniSONE  40 mg Oral Daily    amLODIPine  2.5 mg Oral Daily    amitriptyline  75 mg Oral Nightly    budesonide-formoterol  2 puff Inhalation BID    FLUoxetine  40 mg Oral Daily    gabapentin  600 mg Oral TID    sodium chloride flush  10 mL Intravenous 2 times per day    enoxaparin  40 mg Subcutaneous Daily    pantoprazole  40 mg Oral QAM AC    albuterol  2.5 mg Nebulization Q4H         Diet: Diet NPO, After Midnight Exceptions are: Sips with Meds  DIET CARDIAC; Low Sodium (2 GM)     Results:  CBC: No results for input(s): WBC, HGB, HCT, MCV, PLT in the last 72 hours.   BMP:   Recent

## 2020-10-26 NOTE — PLAN OF CARE
Problem: Falls - Risk of:  Goal: Will remain free from falls  Description: Will remain free from falls  Outcome: Ongoing     Problem: Pain:  Goal: Control of acute pain  Description: Control of acute pain  Outcome: Ongoing     Problem:  Activity:  Goal: Fatigue will decrease  Description: Fatigue will decrease  Outcome: Ongoing     Problem: Cardiac:  Goal: Hemodynamic stability will improve  Description: Hemodynamic stability will improve  Outcome: Ongoing     Problem: Respiratory:  Goal: Ability to maintain adequate ventilation will improve  Description: Ability to maintain adequate ventilation will improve  Outcome: Ongoing

## 2020-10-27 ENCOUNTER — HOSPITAL ENCOUNTER (OUTPATIENT)
Dept: CARDIAC CATH/INVASIVE PROCEDURES | Age: 60
Discharge: HOME | DRG: 202 | End: 2020-10-29
Payer: MEDICARE

## 2020-10-27 VITALS — WEIGHT: 195 LBS | HEIGHT: 58 IN | BODY MASS INDEX: 40.93 KG/M2

## 2020-10-27 LAB
ANION GAP SERPL CALCULATED.3IONS-SCNC: 14 MMOL/L (ref 3–16)
BUN BLDV-MCNC: 28 MG/DL (ref 7–20)
CALCIUM SERPL-MCNC: 8.8 MG/DL (ref 8.3–10.6)
CHLORIDE BLD-SCNC: 99 MMOL/L (ref 99–110)
CO2: 26 MMOL/L (ref 21–32)
CREAT SERPL-MCNC: 0.7 MG/DL (ref 0.6–1.2)
GFR AFRICAN AMERICAN: >60
GFR NON-AFRICAN AMERICAN: >60
GLUCOSE BLD-MCNC: 108 MG/DL (ref 70–99)
LV EF: 58 %
LV EF: 60 %
LVEF MODALITY: NORMAL
POTASSIUM REFLEX MAGNESIUM: 4.2 MMOL/L (ref 3.5–5.1)
SODIUM BLD-SCNC: 139 MMOL/L (ref 136–145)

## 2020-10-27 PROCEDURE — 2580000003 HC RX 258: Performed by: INTERNAL MEDICINE

## 2020-10-27 PROCEDURE — 6360000004 HC RX CONTRAST MEDICATION: Performed by: INTERNAL MEDICINE

## 2020-10-27 PROCEDURE — 99152 MOD SED SAME PHYS/QHP 5/>YRS: CPT | Performed by: INTERNAL MEDICINE

## 2020-10-27 PROCEDURE — 6360000002 HC RX W HCPCS

## 2020-10-27 PROCEDURE — C1769 GUIDE WIRE: HCPCS

## 2020-10-27 PROCEDURE — 6360000002 HC RX W HCPCS: Performed by: INTERNAL MEDICINE

## 2020-10-27 PROCEDURE — 94761 N-INVAS EAR/PLS OXIMETRY MLT: CPT

## 2020-10-27 PROCEDURE — 2709999900 HC NON-CHARGEABLE SUPPLY

## 2020-10-27 PROCEDURE — 6370000000 HC RX 637 (ALT 250 FOR IP): Performed by: INTERNAL MEDICINE

## 2020-10-27 PROCEDURE — 80048 BASIC METABOLIC PNL TOTAL CA: CPT

## 2020-10-27 PROCEDURE — 4A023N8 MEASUREMENT OF CARDIAC SAMPLING AND PRESSURE, BILATERAL, PERCUTANEOUS APPROACH: ICD-10-PCS | Performed by: INTERNAL MEDICINE

## 2020-10-27 PROCEDURE — 2700000000 HC OXYGEN THERAPY PER DAY

## 2020-10-27 PROCEDURE — 99152 MOD SED SAME PHYS/QHP 5/>YRS: CPT

## 2020-10-27 PROCEDURE — 6370000000 HC RX 637 (ALT 250 FOR IP): Performed by: HOSPITALIST

## 2020-10-27 PROCEDURE — 99232 SBSQ HOSP IP/OBS MODERATE 35: CPT | Performed by: INTERNAL MEDICINE

## 2020-10-27 PROCEDURE — 93306 TTE W/DOPPLER COMPLETE: CPT

## 2020-10-27 PROCEDURE — 93356 MYOCRD STRAIN IMG SPCKL TRCK: CPT

## 2020-10-27 PROCEDURE — 99223 1ST HOSP IP/OBS HIGH 75: CPT | Performed by: INTERNAL MEDICINE

## 2020-10-27 PROCEDURE — C1894 INTRO/SHEATH, NON-LASER: HCPCS

## 2020-10-27 PROCEDURE — 93458 L HRT ARTERY/VENTRICLE ANGIO: CPT | Performed by: INTERNAL MEDICINE

## 2020-10-27 PROCEDURE — 2060000000 HC ICU INTERMEDIATE R&B

## 2020-10-27 PROCEDURE — 93458 L HRT ARTERY/VENTRICLE ANGIO: CPT

## 2020-10-27 PROCEDURE — 94640 AIRWAY INHALATION TREATMENT: CPT

## 2020-10-27 PROCEDURE — 2500000003 HC RX 250 WO HCPCS

## 2020-10-27 RX ORDER — ATORVASTATIN CALCIUM 20 MG/1
20 TABLET, FILM COATED ORAL NIGHTLY
Status: DISCONTINUED | OUTPATIENT
Start: 2020-10-27 | End: 2020-10-30 | Stop reason: HOSPADM

## 2020-10-27 RX ORDER — SODIUM CHLORIDE 0.9 % (FLUSH) 0.9 %
10 SYRINGE (ML) INJECTION PRN
Status: DISCONTINUED | OUTPATIENT
Start: 2020-10-27 | End: 2020-10-30 | Stop reason: HOSPADM

## 2020-10-27 RX ORDER — MORPHINE SULFATE 2 MG/ML
2 INJECTION, SOLUTION INTRAMUSCULAR; INTRAVENOUS ONCE
Status: COMPLETED | OUTPATIENT
Start: 2020-10-27 | End: 2020-10-27

## 2020-10-27 RX ORDER — SODIUM CHLORIDE 0.9 % (FLUSH) 0.9 %
10 SYRINGE (ML) INJECTION EVERY 12 HOURS SCHEDULED
Status: DISCONTINUED | OUTPATIENT
Start: 2020-10-27 | End: 2020-10-30 | Stop reason: HOSPADM

## 2020-10-27 RX ORDER — FUROSEMIDE 20 MG/1
20 TABLET ORAL DAILY
Status: DISCONTINUED | OUTPATIENT
Start: 2020-10-27 | End: 2020-10-28 | Stop reason: HOSPADM

## 2020-10-27 RX ORDER — ACETAMINOPHEN 325 MG/1
650 TABLET ORAL EVERY 4 HOURS PRN
Status: DISCONTINUED | OUTPATIENT
Start: 2020-10-27 | End: 2020-10-30 | Stop reason: HOSPADM

## 2020-10-27 RX ORDER — ALBUTEROL SULFATE 2.5 MG/3ML
SOLUTION RESPIRATORY (INHALATION)
Status: COMPLETED
Start: 2020-10-27 | End: 2020-10-27

## 2020-10-27 RX ADMIN — ALBUTEROL SULFATE 2.5 MG: 2.5 SOLUTION RESPIRATORY (INHALATION) at 12:35

## 2020-10-27 RX ADMIN — ALBUTEROL SULFATE 2.5 MG: 2.5 SOLUTION RESPIRATORY (INHALATION) at 00:25

## 2020-10-27 RX ADMIN — OXYCODONE HYDROCHLORIDE AND ACETAMINOPHEN 1 TABLET: 5; 325 TABLET ORAL at 05:00

## 2020-10-27 RX ADMIN — AMITRIPTYLINE HYDROCHLORIDE 75 MG: 50 TABLET, FILM COATED ORAL at 20:47

## 2020-10-27 RX ADMIN — OXYCODONE HYDROCHLORIDE AND ACETAMINOPHEN 1 TABLET: 5; 325 TABLET ORAL at 18:50

## 2020-10-27 RX ADMIN — ALBUTEROL SULFATE 2.5 MG: 2.5 SOLUTION RESPIRATORY (INHALATION) at 07:55

## 2020-10-27 RX ADMIN — Medication 10 ML: at 09:14

## 2020-10-27 RX ADMIN — ENOXAPARIN SODIUM 40 MG: 40 INJECTION SUBCUTANEOUS at 09:12

## 2020-10-27 RX ADMIN — GABAPENTIN 600 MG: 300 CAPSULE ORAL at 20:47

## 2020-10-27 RX ADMIN — BUDESONIDE AND FORMOTEROL FUMARATE DIHYDRATE 2 PUFF: 80; 4.5 AEROSOL RESPIRATORY (INHALATION) at 20:02

## 2020-10-27 RX ADMIN — PREDNISONE 40 MG: 20 TABLET ORAL at 09:12

## 2020-10-27 RX ADMIN — MORPHINE SULFATE 2 MG: 2 INJECTION, SOLUTION INTRAMUSCULAR; INTRAVENOUS at 16:14

## 2020-10-27 RX ADMIN — ALBUTEROL SULFATE 2.5 MG: 2.5 SOLUTION RESPIRATORY (INHALATION) at 04:35

## 2020-10-27 RX ADMIN — Medication 10 ML: at 20:47

## 2020-10-27 RX ADMIN — OXYCODONE HYDROCHLORIDE AND ACETAMINOPHEN 1 TABLET: 5; 325 TABLET ORAL at 11:01

## 2020-10-27 RX ADMIN — Medication 10 ML: at 09:13

## 2020-10-27 RX ADMIN — MORPHINE SULFATE 2 MG: 2 INJECTION, SOLUTION INTRAMUSCULAR; INTRAVENOUS at 01:27

## 2020-10-27 RX ADMIN — ALBUTEROL SULFATE 2.5 MG: 2.5 SOLUTION RESPIRATORY (INHALATION) at 12:33

## 2020-10-27 RX ADMIN — GABAPENTIN 600 MG: 300 CAPSULE ORAL at 09:12

## 2020-10-27 RX ADMIN — PANTOPRAZOLE SODIUM 40 MG: 40 TABLET, DELAYED RELEASE ORAL at 05:00

## 2020-10-27 RX ADMIN — BUDESONIDE AND FORMOTEROL FUMARATE DIHYDRATE 2 PUFF: 80; 4.5 AEROSOL RESPIRATORY (INHALATION) at 07:55

## 2020-10-27 RX ADMIN — ALBUTEROL SULFATE 2.5 MG: 2.5 SOLUTION RESPIRATORY (INHALATION) at 20:02

## 2020-10-27 RX ADMIN — FUROSEMIDE 20 MG: 20 TABLET ORAL at 18:50

## 2020-10-27 RX ADMIN — ALBUTEROL SULFATE 2.5 MG: 2.5 SOLUTION RESPIRATORY (INHALATION) at 17:17

## 2020-10-27 RX ADMIN — IOHEXOL 150 ML: 350 INJECTION, SOLUTION INTRAVENOUS at 14:39

## 2020-10-27 RX ADMIN — AMLODIPINE BESYLATE 2.5 MG: 2.5 TABLET ORAL at 09:12

## 2020-10-27 RX ADMIN — FLUOXETINE 40 MG: 20 CAPSULE ORAL at 09:12

## 2020-10-27 ASSESSMENT — PAIN DESCRIPTION - ORIENTATION
ORIENTATION: RIGHT

## 2020-10-27 ASSESSMENT — PAIN SCALES - GENERAL
PAINLEVEL_OUTOF10: 6
PAINLEVEL_OUTOF10: 6
PAINLEVEL_OUTOF10: 4
PAINLEVEL_OUTOF10: 8
PAINLEVEL_OUTOF10: 6
PAINLEVEL_OUTOF10: 3
PAINLEVEL_OUTOF10: 5
PAINLEVEL_OUTOF10: 4
PAINLEVEL_OUTOF10: 7
PAINLEVEL_OUTOF10: 7

## 2020-10-27 ASSESSMENT — PAIN DESCRIPTION - PAIN TYPE
TYPE: CHRONIC PAIN

## 2020-10-27 ASSESSMENT — PAIN - FUNCTIONAL ASSESSMENT
PAIN_FUNCTIONAL_ASSESSMENT: ACTIVITIES ARE NOT PREVENTED

## 2020-10-27 ASSESSMENT — PAIN DESCRIPTION - ONSET
ONSET: ON-GOING

## 2020-10-27 ASSESSMENT — PAIN DESCRIPTION - PROGRESSION
CLINICAL_PROGRESSION: GRADUALLY WORSENING
CLINICAL_PROGRESSION: NOT CHANGED
CLINICAL_PROGRESSION: GRADUALLY WORSENING
CLINICAL_PROGRESSION: GRADUALLY WORSENING
CLINICAL_PROGRESSION: NOT CHANGED
CLINICAL_PROGRESSION: GRADUALLY WORSENING
CLINICAL_PROGRESSION: NOT CHANGED

## 2020-10-27 ASSESSMENT — PAIN DESCRIPTION - FREQUENCY
FREQUENCY: CONTINUOUS

## 2020-10-27 ASSESSMENT — PAIN DESCRIPTION - DESCRIPTORS
DESCRIPTORS: ACHING

## 2020-10-27 ASSESSMENT — PAIN DESCRIPTION - LOCATION
LOCATION: KNEE
LOCATION: KNEE
LOCATION: HIP;LEG
LOCATION: KNEE;HIP
LOCATION: HIP;LEG

## 2020-10-27 NOTE — CONSULTS
History   Adopted: Yes   Problem Relation Age of Onset    Diabetes Daughter     Cancer Mother         Breast and leukemia    Heart Disease Sister     Drug Abuse Brother      Review of Systems   General: No fevers, chills, fatigue, or night sweats. HEENT: No blurry or decreased vision. No changes in hearing, nasal discharge or sore throat. Cardiovascular: See HPI. Respiratory: + cough, asthma. Gastrointestinal: No abdominal pain, hematochezia, melana, or history of GI ulcers. Genito-Urinary: No dysuria or hematuria. No urgency or polyuria. Musculoskeletal: No complaints of joint pain, joint swelling or muscular weakness/soreness. Neurological: No dizziness or headaches. No numbness/tingling, speech problems or weakness. No history of a stroke or TIA. Psychological: No anxiety or depression  Hematological and Lymphatic: No abnormal bleeding or bruising, blood clots, jaundice. Endocrine: No malaise/lethargy, palpitations, polydipsia/polyuria, temperature intolerance or unexpected weight changes. Skin: No rashes or non-healing ulcers. PE:  Blood pressure 121/83, pulse 94, temperature 97.8 °F (36.6 °C), temperature source Oral, resp. rate 18, height 4' 10\" (1.473 m), weight 195 lb 11.2 oz (88.8 kg), last menstrual period 12/22/2014, SpO2 98 %, not currently breastfeeding. General (appearance): Well devel. No acute distress  Eyes: Anicteric. EOMI. Neck: Supple. No JVD  Ears/Nose/Mouth/Thorat: No cyanosis  CV: Reg RR, borderline tachy on my exam.   Respiratory:  No wheezing on my exam. Minimal rhonchi. O2 sat 95-96% on room air. Normal respiratory effort on my exam  GI: Obese. Skin: Warm, dry. No rashes  Neuro/Psych: Alert and oriented x 3. Appropriate behavior  Ext:  No c/c.  No pitting edema  Pulses:  2+ radial    Lab Results   Component Value Date    WBC 10.1 10/23/2020    HGB 10.9 (L) 10/23/2020    HCT 34.9 (L) 10/23/2020    MCV 82.5 10/23/2020     10/23/2020     Lab Results

## 2020-10-27 NOTE — PROGRESS NOTES
Hospitalist Progress Note      PCP: Minda Carl MD    Date of Admission: 10/23/2020    Chief Complaint: Shortness of breath    Hospital Course:     Jose L Andersen 63 y.o. female presented to ED with complains of shortness of breath, dyspnea on exertion over the course of the past week. Treated as an outpatient presumed COPD exacerbation course of steroid but did not improve symptoms. On arrival to the ED oxygen sats 90% on room air, but reported significant increased work of breathing, on placement 2 L oxygen work of breathing and oxygen sats improved to upper 90s. To wean off oxygen in the ED but unable to, continued hypoxemia CT pulmonary angiogram brain protocol was done which did not show PE but showed some atelectasis but appears somewhat chronic in nature, no evidence of consolidation or pneumonia.,  Patient was ambulated in oxygenation continues to desaturate down to 90 to 92%, significant dyspnea. He was started on dyspnea on exertion with likely secondary to asthma exacerbation and she was admitted for further evaluation and management.       10/27 -- episode of significant shortness of breath with wheezing, needed back to back breathing rx    Subjective:   Seen post cardiac cath, she is frustrated about not knowing what is going on with her      Medications:  Reviewed    Infusion Medications   Scheduled Medications    sodium chloride flush  10 mL Intravenous BID    predniSONE  40 mg Oral Daily    amLODIPine  2.5 mg Oral Daily    amitriptyline  75 mg Oral Nightly    budesonide-formoterol  2 puff Inhalation BID    FLUoxetine  40 mg Oral Daily    gabapentin  600 mg Oral TID    sodium chloride flush  10 mL Intravenous 2 times per day    enoxaparin  40 mg Subcutaneous Daily    pantoprazole  40 mg Oral QAM AC    albuterol  2.5 mg Nebulization Q4H     PRN Meds: perflutren lipid microspheres, perflutren lipid microspheres, cyclobenzaprine, hydrOXYzine, sodium chloride flush, acetaminophen **OR** acetaminophen, polyethylene glycol, promethazine **OR** ondansetron, albuterol sulfate HFA, oxyCODONE-acetaminophen      Intake/Output Summary (Last 24 hours) at 10/27/2020 0917  Last data filed at 10/27/2020 0600  Gross per 24 hour   Intake 640 ml   Output 0 ml   Net 640 ml       Physical Exam Performed:    /88   Pulse 87   Temp 97.2 °F (36.2 °C) (Oral)   Resp 18   Ht 4' 10\" (1.473 m)   Wt 195 lb 11.2 oz (88.8 kg)   LMP 12/22/2014   SpO2 93%   BMI 40.90 kg/m²     General appearance: Anxious, worried, appears stated age and cooperative. HEENT: Pupils equal, round, and reactive to light. Conjunctivae/corneas clear. Neck: Supple, with full range of motion. No jugular venous distention. Trachea midline. Respiratory:  Normal respiratory effort. Positive wheezing bilateral.  Cardiovascular: Regular rate and rhythm with normal S1/S2 without murmurs, rubs or gallops. Abdomen: Soft, non-tender, non-distended with normal bowel sounds. Musculoskeletal: No clubbing, cyanosis or edema bilaterally. Full range of motion without deformity. Skin: Skin color, texture, turgor normal.  No rashes or lesions. Neurologic:  Neurovascularly intact without any focal sensory/motor deficits. Cranial nerves: II-XII intact, grossly non-focal.  Psychiatric: Alert and oriented, thought content appropriate, normal insight  Capillary Refill: Brisk,< 3 seconds   Peripheral Pulses: +2 palpable, equal bilaterally       Labs:   No results for input(s): WBC, HGB, HCT, PLT in the last 72 hours. Recent Labs     10/27/20  0515      K 4.2   CL 99   CO2 26   BUN 28*   CREATININE 0.7   CALCIUM 8.8     No results for input(s): AST, ALT, BILIDIR, BILITOT, ALKPHOS in the last 72 hours. No results for input(s): INR in the last 72 hours. No results for input(s): Jadene Moh in the last 72 hours.     Urinalysis:      Lab Results   Component Value Date    NITRU Negative 03/16/2018    WBCUA 3-5 03/16/2018    BACTERIA 1+ 03/16/2018    RBCUA 0-2 03/16/2018    BLOODU Negative 03/16/2018    SPECGRAV 1.010 03/16/2018    GLUCOSEU Negative 03/16/2018       Radiology:  NM Cardiac Stress Test Nuclear Imaging   Final Result      CTA CHEST W CONTRAST   Final Result      No evidence of acute pulmonary emboli. Atelectatic collapse of the right middle lobe. Coronary artery calcification. Status post gastric bypass. XR CHEST PORTABLE   Final Result      Stable right basilar atelectasis, scar or less likely pneumonia. Assessment/Plan:    Active Hospital Problems    Diagnosis    Dyspnea on exertion [R06.00]    WIGGINS (dyspnea on exertion) [R06.00]     Dyspnea on exertion  LHC ruled out obstructive CAD  Mild elevation of LVEDP  Start low dose Lasix 20 mg OD  Continue prednisone 40 mg OD (another 5 days on dc)  Monitor closely on tele    Acute respiratory failure with hypoxemia POA  Not requiring oxygen at rest now    Hypertension  - Continue low dose amlodipine 2.5 mg OD  - Monitor for now    Morbid obesity due to excess calories Body mass index is 40.9 kg/m². - Complicating assessment and treatment. Placing patient at risk for multiple co-morbidities as well as early death and contributing to the patient's presentation.  on weight loss when appropriate.     Depression -continue home medication    Chronic pain syndrome: currently NSAIDs on hold Percocet as needed and continue with the gabapentin.     DVT Prophylaxis: Lovenox subcu  Diet: Diet NPO, After Midnight Exceptions are: Sips with Meds  Code Status: Full Code    PT/OT Eval Status:     Dispo - Continue inpatient care, likely dc on Wednesday, Needs monitoring with episode of significant sob and needing back to back breathing rx  Discussed with Dr. Clyde Lanier and Dr. Shani Diaz MD   Hospitalist

## 2020-10-27 NOTE — PLAN OF CARE
Problem: Falls - Risk of:  Goal: Will remain free from falls  Description: Will remain free from falls  10/27/2020 1459 by Denis Reina RN  Outcome: Ongoing   Patient up ad phil. Steady gait. Use call light appropriately    Problem: Pain:  Goal: Pain level will decrease  Description: Pain level will decrease  Outcome: Ongoing   Patient complains of right knee pain. Patient requested morphine upon arrival to unit. Patient agreed to take percocet for knee pain since morphine is not ordered. Problem: Cardiac:  Goal: Hemodynamic stability will improve  Description: Hemodynamic stability will improve  Outcome: Ongoing   VSS stable. Afebrile. NSR. Brisk cap refill     Problem: Coping:  Goal: Level of anxiety will decrease  Description: Level of anxiety will decrease  Outcome: Ongoing   Patient became a bit anxious when short of breath and respiratory called. Problem: Skin Integrity:  Goal: Risk for impaired skin integrity will decrease  Description: Risk for impaired skin integrity will decrease  Outcome: Ongoing   Skin intact. Three spotty dry area on right thigh. Patient states these areas are improving. Problem: Discharge Planning:  Goal: Discharged to appropriate level of care  Description: Discharged to appropriate level of care  Outcome: Ongoing   Will continue to discuss plan of care with patient. Patient verbalizes understanding of plan of care and participates in plan of care. Problem: Respiratory:  Goal: Ability to maintain a clear airway will improve  Description: Ability to maintain a clear airway will improve  Outcome: Ongoing   See additional note. Patient had shortness of breath and wheezing upon return from echo. Respiratory called and patient received treatment with improvement.

## 2020-10-27 NOTE — PROCEDURES
Luis Tafoya  63 y.o.  [id number]    Referring MD:  Dr. Jose Cervantes    Indication:  SOB    Mallampati Class: 3    ASA Class: 3    After informed consent was obtained and history and exam reviewed, the patient was taken to the cardiac cath lab. The patient had both groins and the right wrist prepped and draped in sterile fashion. 1% Xylocaine was used to anesthetize the right wrist.  A needle was inserted into the radial artery and a 5/6 Fr sheath was inserted. Continuous pulse-oximetry and ECG monitoring was performed, and frequent blood pressure assessment was performed. An independent trained observer pushed medications at my direction. We monitored the patient's level of consciousness and vital signs/physiologic status throughout the procedure duration (see start and stop times below). A JR4 catheter was advanced through the sheath over a guide wire and advanced under fluoroscopic guidance into the ascending aorta. The wire was removed and the right coronary artery was engaged. Digital angiograms were recorded. The catheter was then removed and a JL 3.5 catheter was then advanced over the wire into the ascending aorta. The wire was removed, and the left main artery was engaged. Digital angiograms were recorded. The catheter was then removed, and a pigtail catheter was advanced over the wire to the ascending aorta. The pigtail catheter was then placed into the left ventricle. Pressures were recorded. Ventriculography was performed. Post-ventriculography pressures and a pullback were performed. The pigtail catheter was then removed. All catheter exchanges done over a wire. A femoral angiogram was not performed.     Hemostasis was obtained by TR Band    Complications: None    Estimated blood loss: < 50 mL    Sedation: 2 mg Versed, 50 mcg Fentanyl  Sedation start: 1416  Sedation stop: 1438    Angiographic Findings:  Right dominant system  Left Main:  Normal    Left Anterior Descending: Tortuous proximal-mid vessel. Proximal-mid diffuse 30% stenosis. Mid mild irregularities    Circumflex:  Mild irregularities    Right Coronary:  Diffuse mild irregularities. Mid 20-30% stenosis. Left Ventriculogram:  LVEF 60%    Hemodynamics (mm Hg):  Left Ventricular Pressure:  113/8, 17  Central Aortic Pressure:  106/51    Conclusions:  -Mild, non-obstructive CAD  -Mildly elevated LVEDP (17 mm Hg)    Recommendations:  -Preventive care. -Investigate non-CAD causes of SOB.

## 2020-10-27 NOTE — PROGRESS NOTES
femur RT    GASTRIC BYPASS SURGERY  1998    Absil N y    JOINT REPLACEMENT Bilateral 12/29/2014    knee replacement    JOINT REPLACEMENT      KNEE CARTILAGE SURGERY Left 2006    ROTATOR CUFF REPAIR Left 2008    TOTAL HIP ARTHROPLASTY Bilateral 2014    WISDOM TOOTH EXTRACTION  1995       Level of Consciousness: Alert, Oriented, and Cooperative = 0    Level of Activity: Walking with assistance = 1    Respiratory Pattern: Regular Pattern; RR 8-20 = 0    Breath Sounds: Diminished unilaterally = 1    Sputum  Sputum Color: None, Tenacity: None, Sputum How Obtained: Spontaneous cough  Cough: Strong, spontaneous, non-productive = 0    Vital Signs   /88   Pulse 87   Temp 97.2 °F (36.2 °C) (Oral)   Resp 18   Ht 4' 10\" (1.473 m)   Wt 195 lb 11.2 oz (88.8 kg)   LMP 12/22/2014   SpO2 93%   BMI 40.90 kg/m²   SPO2 (COPD values may differ): Greater than or equal to 92% on room air = 0    Peak Flow (asthma only): not applicable = 0    RSI: 25-04 = Q4WA (every four hours while awake) and Q4hrs PRN        Plan       Goals: medication delivery, mobilize retained secretions, volume expansion and improve oxygenation    Patient/caregiver was educated on the proper method of use for Respiratory Care Devices:  Yes      Level of patient/caregiver understanding able to:   ? Verbalize understanding   ? Demonstrate understanding       ? Teach back        ? Needs reinforcement       ? No available caregiver               ? Other:     Response to education:  Good     Is patient being placed on Home Treatment Regimen? No     Does the patient have everything they need prior to discharge? NA     Comments: pt take about 4 tx's at home daily    Plan of Care: continue current therapy    Electronically signed by Lucien Bartholomew RCP on 10/27/2020 at 9:03 AM    Respiratory Protocol Guidelines     1.  Assessment and treatment by Respiratory Therapy will be initiated for medication and therapeutic interventions upon initiation of aerosolized medication. 2. Physician will be contacted for respiratory rate (RR) greater than 35 breaths per minute. Therapy will be held for heart rate (HR) greater than 140 beats per minute, pending direction from physician. 3. Bronchodilators will be administered via Metered Dose Inhaler (MDI) with spacer when the following criteria are met:  a. Alert and cooperative     b. HR < 140 bpm  c. RR < 30 bpm                d. Can demonstrate a 2-3 second inspiratory hold  4. Bronchodilators will be administered via Hand Held Nebulizer [facility]) to patients when ANY of the following criteria are met  a. Incognizant or uncooperative          b. Patients treated with HHN at Home        c. Unable to demonstrate proper use of MDI with spacer     d. RR > 30 bpm   5. Bronchodilators will be delivered via Metered Dose Inhaler (MDI), HHN, Aerogen to intubated patients on mechanical ventilation. 6. Inhalation medication orders will be delivered and/or substituted as outlined below. Aerosolized Medications Ordering and Administration Guidelines:    1. All Medications will be ordered by a physician, and their frequency and/or modality will be adjusted as defined by the patients Respiratory Severity Index (RSI) score. 2. If the patient does not have documented COPD, consider discontinuing anticholinergics when RSI is less than 9.  3. If the bronchospasm worsens (increased RSI), then the bronchodilator frequency can be increased to a maximum of every 4 hours. If greater than every 4 hours is required, the physician will be contacted. 4. If the bronchospasm improves, the frequency of the bronchodilator can be decreased, based on the patient's RSI, but not less than home treatment regimen frequency. 5. Bronchodilator(s) will be discontinued if patient has a RSI less than 9 and has received no scheduled or as needed treatment for 72  Hrs. Patients Ordered on a Mucolytic Agent:    1.  Must always be administered with a bronchodilator. 2. Discontinue if patient experiences worsened bronchospasm, or secretions have lessened to the point that the patient is able to clear them with a cough. Anti-inflammatory and Combination Medications:    1. If the patient lacks prior history of lung disease, is not using inhaled anti-inflammatory medication at home, and lacks wheezing by examination or by history for at least 24 hours, contact physician for possible discontinuation.

## 2020-10-27 NOTE — PLAN OF CARE
Brief Pre-Op Note/Sedation Assessment      Tammie Nicolas  1960  [id number]      [id number]  2:16 PM    Planned Procedure: Cardiac Catheterization Procedure    Post Procedure Plan: Return to same level of care    Consent: I have discussed with the patient and/or the patient representative the indication, alternatives, and the possible risks and/or complications of the planned procedure and the anesthesia methods. The patient and/or patient representative appear to understand and agree to proceed. Chief Complaint: Dyspnea on Exertion  Dyspnea      Indications for Cath Procedure:  Suspected CAD  Anginal Classification within 2 weeks:  CCS III - Symptoms with everyday living activities, i.e., moderate limitation (WIGGINS, neck pain as ? Anginal equivalent)  NYHA Heart Failure Class within 2 weeks: No symptoms  Is Cath Lab Visit Valve-related?: No  Surgical Risk: N/A  Functional Type: < 4 METS    Anti- Anginal Meds within 2 weeks:   Yes: Ca Channel Blockers    Stress or Imaging Studies Performed (within 6 months):  Stress Test with SPECT Result: Negative Risk/Extent of Ischemia:  Low      Vital Signs:  /83   Pulse 94   Temp 97.8 °F (36.6 °C) (Oral)   Resp 18   Ht 4' 10\" (1.473 m)   Wt 195 lb 11.2 oz (88.8 kg)   LMP 12/22/2014   SpO2 98%   BMI 40.90 kg/m²     Allergies:   Allergies   Allergen Reactions    Droperidol Other (See Comments)     EPS   shaking  shaking  ETS    Compazine [Prochlorperazine] Other (See Comments)     EPS symptoms       Past Medical History:  Past Medical History:   Diagnosis Date    Asthma     Depression     Osteoarthritis     PONV (postoperative nausea and vomiting)     Scoliosis          Surgical History:  Past Surgical History:   Procedure Laterality Date    BREAST ENHANCEMENT SURGERY Bilateral 2007   [place]    FRACTURE SURGERY      femur RT    GASTRIC BYPASS SURGERY  [place]    [place]   C/ Mario Ma Bilateral 12/29/2014    knee replacement    JOINT REPLACEMENT      KNEE CARTILAGE SURGERY Left 2006    ROTATOR CUFF REPAIR Left 2008    TOTAL HIP ARTHROPLASTY Bilateral 2014    WISDOM TOOTH EXTRACTION  1995         Medications:  Current Facility-Administered Medications   Medication Dose Route Frequency Provider Last Rate Last Dose    sodium chloride flush 0.9 % injection 10 mL  10 mL Intravenous BID Natasha Green MD   10 mL at 10/27/20 0914    perflutren lipid microspheres (DEFINITY) injection 1.65 mg  1.5 mL Intravenous ONCE PRN Felicita Lanes, MD        predniSONE (DELTASONE) tablet 40 mg  40 mg Oral Daily Felicita Lanes, MD   40 mg at 10/27/20 0912    amLODIPine (NORVASC) tablet 2.5 mg  2.5 mg Oral Daily Felicita Lanes, MD   2.5 mg at 10/27/20 5889    perflutren lipid microspheres (DEFINITY) injection 1.65 mg  1.5 mL Intravenous ONCE PRN Argenis Falcon MD        amitriptyline (ELAVIL) tablet 75 mg  75 mg Oral Nightly Talon Aviles MD   75 mg at 10/26/20 2011    budesonide-formoterol (SYMBICORT) 80-4.5 MCG/ACT inhaler 2 puff  2 puff Inhalation BID Talon Aviles MD   2 puff at 10/27/20 0755    cyclobenzaprine (FLEXERIL) tablet 10 mg  10 mg Oral TID PRN Talon Aviles MD   10 mg at 10/26/20 1653    FLUoxetine (PROZAC) capsule 40 mg  40 mg Oral Daily Talon Aviles MD   40 mg at 10/27/20 0912    gabapentin (NEURONTIN) capsule 600 mg  600 mg Oral TID Talon Aviles MD   600 mg at 10/27/20 0912    hydrOXYzine (VISTARIL) capsule 25 mg  25 mg Oral TID PRN Talon Aviles MD        sodium chloride flush 0.9 % injection 10 mL  10 mL Intravenous 2 times per day Talon Aviles MD   10 mL at 10/27/20 0913    sodium chloride flush 0.9 % injection 10 mL  10 mL Intravenous PRN Talon Aviles MD   10 mL at 10/26/20 1100    acetaminophen (TYLENOL) tablet 650 mg  650 mg Oral Q6H PRN Talon Aviles MD        Or    acetaminophen (TYLENOL) suppository 650 mg  650 mg Rectal Q6H PRN Talon Jacinto MD        polyethylene glycol (GLYCOLAX) packet 17 g  17 g Oral Daily PRN Talno Jacinto MD        promethazine (PHENERGAN) tablet 12.5 mg  12.5 mg Oral Q6H PRN Talon Jacinto MD   12.5 mg at 10/25/20 1800    Or    ondansetron (ZOFRAN) injection 4 mg  4 mg Intravenous Q6H PRN Talon Jacinto MD        enoxaparin (LOVENOX) injection 40 mg  40 mg Subcutaneous Daily Talon Jacinto MD   40 mg at 10/27/20 0912    albuterol sulfate  (90 Base) MCG/ACT inhaler 2 puff  2 puff Inhalation Q6H PRN Talon Jacinto MD   2 puff at 10/24/20 1009    oxyCODONE-acetaminophen (PERCOCET) 5-325 MG per tablet 1 tablet  1 tablet Oral Q6H PRN Fanny Low MD   1 tablet at 10/27/20 1101    pantoprazole (PROTONIX) tablet 40 mg  40 mg Oral QAM AC Fanny Low MD   40 mg at 10/27/20 0500    albuterol (PROVENTIL) nebulizer solution 2.5 mg  2.5 mg Nebulization Q4H Natasha Green MD   2.5 mg at 10/27/20 1235           Pre-Sedation:    Pre-Sedation Documentation and Exam:  I have personally completed a history, physical exam & review of systems for this patient (see notes). Prior History of Anesthesia Complications:   none    Modified Mallampati:  III (soft palate, base of uvula visible)    ASA Classification:  Class 3 - A patient with severe systemic disease that limits activity but is not incapacitating      Jaimee Scale: Activity:  2 - Able to move 4 extremities voluntarily on command  Respiration:  2 - Able to breathe deeply and cough freely  Circulation:  2 - BP+/- 20mmHg of normal  Consciousness:  2 - Fully awake  Oxygen Saturation (color):  2 - Able to maintain oxygen saturation >92% on room air    Sedation/Anesthesia Plan:  Guard the patient's safety and welfare. Minimize physical discomfort and pain.   Minimize negative psychological responses to treatment by providing sedation and analgesia and maximize the potential amnesia. Patient to meet pre-procedure discharge plan.     Medication Planned:  midazolam intravenously and fentanyl intravenously    Patient is an appropriate candidate for plan of sedation: yes      Electronically signed by Melinda Wolfe MD on 10/27/2020 at 2:16 PM

## 2020-10-27 NOTE — PLAN OF CARE
Problem: Falls - Risk of:  Goal: Will remain free from falls  Description: Will remain free from falls  Outcome: Ongoing  Note: Medium risk for falls. Up ad phil, steady on feet. Will update as needed. Problem: Pain:  Goal: Control of chronic pain  Description: Control of chronic pain  Outcome: Ongoing  Note: Pt states pain controlled with ordered percocet. States was better controlled on home dose of ibuprofen, but the percocet is working for her at this time. Will update as needed. Problem: Respiratory:  Goal: Ability to maintain adequate ventilation will improve  Description: Ability to maintain adequate ventilation will improve  Outcome: Ongoing  Note: SpO2 >95% at rest on room air. Will update as needed.

## 2020-10-27 NOTE — ADT AUTH CERT
Pulmonary Disease [facility] - Care Day 4 (10/26/2020) by Guillermo Wadsworth RN         Review Status  Review Entered    Completed  10/27/2020 15:03        Criteria Review       Care Day: 4 Care Date: 10/26/2020 Level of Care: Intermediate Care    Guideline Day 2    Level Of Care    (X) Floor    Clinical Status    (X) * No ICU or intermediate care needs    (X) * No mechanical ventilation required [J]    Interventions    (X) Inpatient interventions continue    10/27/2020 3:03 PM EDT by Jesus morris iv    * Milestone    Additional Notes    10/26     97.4 (36.3)   18   85   164/99Abnormal   -   Semi fowlers   -   1    94   None (Room air)         Scheduled Meds:sodium chloride flush, 10 mL, Intravenous, BID    predniSONE, 40 mg, Oral, Daily    amLODIPine, 2.5 mg, Oral, Daily    amitriptyline, 75 mg, Oral, Nightly    budesonide-formoterol, 2 puff, Inhalation, BID    FLUoxetine, 40 mg, Oral, Daily    gabapentin, 600 mg, Oral, TID    sodium chloride flush, 10 mL, Intravenous, 2 times per day    enoxaparin, 40 mg, Subcutaneous, Daily    pantoprazole, 40 mg, Oral, QAM AC    albuterol, 2.5 mg, Nebulization, Q4H    Lasix 40mg iv x1    Solumedrol 40mg iv x1       PRN Meds given: flexeril 10mg po x1, percocet 1 tab po x4       IM note    Terri Hensley 63 y.o. female presented to ED with complains of shortness of breath, dyspnea on exertion over the course of the past week.  Treated as an outpatient presumed COPD exacerbation course of steroid but did not improve symptoms. On arrival to the ED oxygen sats 90% on room air, but reported significant increased work of breathing, on placement 2 L oxygen work of breathing and oxygen sats improved to upper 90s.  To wean off oxygen in the ED but unable to, continued hypoxemia CT pulmonary angiogram brain protocol was done which did not show PE but showed some atelectasis but appears somewhat chronic in nature, no evidence of consolidation or pneumonia. ,  Patient was ambulated in monitor strict I/Os         Acute respiratory failure with hypoxemia POA    Not requiring oxygen at rest now         Hypertension    - Add low dose amlodipine 2.5 mg OD    - Monitor for now         Morbid obesity due to excess calories Body mass index is 41.51 kg/m². - Complicating assessment and treatment. Placing patient at risk for multiple co-morbidities as well as early death and contributing to the patient's presentation.  on weight loss when appropriate.         Depression -continue home medication         Chronic pain syndrome: currently NSAIDs on hold Percocet as needed and continue with the gabapentin.         DVT Prophylaxis: Lovenox subcu    Diet: Diet NPO, After Midnight Exceptions are: Sips with Meds    DIET CARDIAC; Low Sodium (2 GM)    Code Status: Full Code       Cardio consult    Assessment / Plan:         1.  Exertional dyspnea:    Patient's could be related to angina versus asthma exacerbation.  Lexiscan nuclear stress test was unremarkable/normal today, however, she had caffeine through 1 am today (3 cokes and coffee), hence stress test is nondiagnostic.  Troponin x3 - and ECG normal.  I cannot exclude mild acute heart failure exacerbation. COVID, viral panel and flu negative, procalcitonin normal, CT chest unremarkable except for CAD.         -We will obtain an echocardiogram tomorrow    -We will give 1 dose of Lasix 40 mg IV x1 tonight    -Will plan for [address] tomorrow. Keep patient NPO x meds overnight.         2.  Essential hypertension:    Patient's blood pressure is mildly elevated.         -Continue with amlodipine 2.5 daily    -We will give IV Lasix tonight    -If BP remains elevated, will adjust her medication.              Pulmonology note    Assessment/Plan:    63 y.o. female with         Dyspnea on exertion    Hx of asthma - not in exacerbation at this time.           R/o angina equivalent symptoms.

## 2020-10-27 NOTE — PROGRESS NOTES
Patient returned from echo and ambulated to bed. Patient began wheezing that was audible at bedside. Respiratory called. O2 sats remained in low 90's patient received back to back respiratory treatment. Gabriela Aldana notified. Patient reported feeling much better after respiratory treatments. 1L of oxygen remained on patient for comfort.

## 2020-10-27 NOTE — PROGRESS NOTES
Attempted to see patient, in echo and then cath lab  Discussed with nursing  Please call with any questions or concerns    Nory Huston [facility]  Attending Physician

## 2020-10-27 NOTE — PROGRESS NOTES
VSS. Right radial dressing clean dry and intact. Pt resting comfortably in bed. Call light within reach. Denies further needs at this time. Will continue to monitor.  Electronically signed by Mart Triplett RN on 10/27/2020 at 7:38 PM

## 2020-10-27 NOTE — PROGRESS NOTES
for input(s): WBC, HGB, HCT, MCV, PLT in the last 72 hours. BMP:   Recent Labs     10/27/20  0515      K 4.2   CL 99   CO2 26   BUN 28*   CREATININE 0.7     LIVER PROFILE: No results for input(s): AST, ALT, LIPASE, BILIDIR, BILITOT, ALKPHOS in the last 72 hours. Invalid input(s): AMYLASE,  ALB  PT/INR: No results for input(s): PROTIME, INR in the last 72 hours. APTT: No results for input(s): APTT in the last 72 hours. UA:No results for input(s): NITRITE, COLORU, PHUR, LABCAST, WBCUA, RBCUA, MUCUS, TRICHOMONAS, YEAST, BACTERIA, CLARITYU, SPECGRAV, LEUKOCYTESUR, UROBILINOGEN, BILIRUBINUR, BLOODU, GLUCOSEU, AMORPHOUS in the last 72 hours. Invalid input(s): Joel Mcmillan      Assessment/Plan:  63 y.o. female with     Asthma  Dyspnea on exertion: epicardial CAD is ruled out with LHC. LVEDP is 17    She had an episode of wheezing and SOB during echo around noon time. This got better after albuterol nebulizer. Discussed with Dr. Ressie Duane. Will start lasix PO  Continue symbicort   Breathing treatment  Short course prednisone for 7 days.       Nabeel Shaver MD

## 2020-10-27 NOTE — PROGRESS NOTES
4 Eyes Admission Assessment     I agree as the admitting nurse that 2 RN's have performed a thorough Head to Toe Skin Assessment on the patient. ALL assessment sites listed below have been assessed on admission. Areas assessed by both nurses: Fred Douglass and Dyan  [x]   Head, Face, and Ears   [x]   Shoulders, Back, and Chest  [x]   Arms, Elbows, and Hands   [x]   Coccyx, Sacrum, and Ischium  [x]   Legs, Feet, and Heels        Does the Patient have Skin Breakdown?   No         Evan Prevention initiated:  No   Wound Care Orders initiated:  No      [facility] nurse consulted for Pressure Injury (Stage 3,4, Unstageable, DTI, NWPT, and Complex wounds) or Evan score 18 or lower:  No    Nurse 1 eSignature: Sinan Nunez    **SHARE this note so that the co-signing nurse is able to place an eSignature**    Nurse 2 eSignature: Electronically signed by Alberto Lima on 10/27/20 at 4:43 PM EDT

## 2020-10-28 VITALS
OXYGEN SATURATION: 94 % | SYSTOLIC BLOOD PRESSURE: 137 MMHG | DIASTOLIC BLOOD PRESSURE: 91 MMHG | RESPIRATION RATE: 20 BRPM | HEART RATE: 96 BPM | TEMPERATURE: 98.6 F | HEIGHT: 58 IN | WEIGHT: 200.9 LBS | BODY MASS INDEX: 42.17 KG/M2

## 2020-10-28 LAB
ANION GAP SERPL CALCULATED.3IONS-SCNC: 8 MMOL/L (ref 3–16)
BASOPHILS ABSOLUTE: 0.1 K/UL (ref 0–0.2)
BASOPHILS RELATIVE PERCENT: 0.7 %
BUN BLDV-MCNC: 25 MG/DL (ref 7–20)
CALCIUM SERPL-MCNC: 8.8 MG/DL (ref 8.3–10.6)
CHLORIDE BLD-SCNC: 104 MMOL/L (ref 99–110)
CO2: 27 MMOL/L (ref 21–32)
CREAT SERPL-MCNC: 0.6 MG/DL (ref 0.6–1.2)
EOSINOPHILS ABSOLUTE: 0.1 K/UL (ref 0–0.6)
EOSINOPHILS RELATIVE PERCENT: 0.7 %
GFR AFRICAN AMERICAN: >60
GFR NON-AFRICAN AMERICAN: >60
GLUCOSE BLD-MCNC: 122 MG/DL (ref 70–99)
HCT VFR BLD CALC: 36.3 % (ref 36–48)
HEMOGLOBIN: 11.6 G/DL (ref 12–16)
LYMPHOCYTES ABSOLUTE: 3.7 K/UL (ref 1–5.1)
LYMPHOCYTES RELATIVE PERCENT: 28.4 %
MCH RBC QN AUTO: 25.1 PG (ref 26–34)
MCHC RBC AUTO-ENTMCNC: 31.8 G/DL (ref 31–36)
MCV RBC AUTO: 78.8 FL (ref 80–100)
MONOCYTES ABSOLUTE: 1 K/UL (ref 0–1.3)
MONOCYTES RELATIVE PERCENT: 7.6 %
NEUTROPHILS ABSOLUTE: 8.1 K/UL (ref 1.7–7.7)
NEUTROPHILS RELATIVE PERCENT: 62.6 %
PDW BLD-RTO: 16.3 % (ref 12.4–15.4)
PLATELET # BLD: 334 K/UL (ref 135–450)
PMV BLD AUTO: 8.1 FL (ref 5–10.5)
POTASSIUM REFLEX MAGNESIUM: 4.1 MMOL/L (ref 3.5–5.1)
RBC # BLD: 4.61 M/UL (ref 4–5.2)
SODIUM BLD-SCNC: 139 MMOL/L (ref 136–145)
WBC # BLD: 13 K/UL (ref 4–11)

## 2020-10-28 PROCEDURE — 2580000003 HC RX 258: Performed by: INTERNAL MEDICINE

## 2020-10-28 PROCEDURE — 6370000000 HC RX 637 (ALT 250 FOR IP): Performed by: INTERNAL MEDICINE

## 2020-10-28 PROCEDURE — 80048 BASIC METABOLIC PNL TOTAL CA: CPT

## 2020-10-28 PROCEDURE — 6360000002 HC RX W HCPCS: Performed by: INTERNAL MEDICINE

## 2020-10-28 PROCEDURE — 85025 COMPLETE CBC W/AUTO DIFF WBC: CPT

## 2020-10-28 PROCEDURE — 6370000000 HC RX 637 (ALT 250 FOR IP): Performed by: HOSPITALIST

## 2020-10-28 PROCEDURE — 99232 SBSQ HOSP IP/OBS MODERATE 35: CPT | Performed by: INTERNAL MEDICINE

## 2020-10-28 PROCEDURE — 82785 ASSAY OF IGE: CPT

## 2020-10-28 PROCEDURE — 94640 AIRWAY INHALATION TREATMENT: CPT

## 2020-10-28 RX ORDER — ATORVASTATIN CALCIUM 40 MG/1
40 TABLET, FILM COATED ORAL DAILY
Qty: 30 TABLET | Refills: 0 | Status: SHIPPED | OUTPATIENT
Start: 2020-10-28

## 2020-10-28 RX ORDER — FUROSEMIDE 20 MG/1
20 TABLET ORAL DAILY
Qty: 30 TABLET | Refills: 0 | Status: ON HOLD | OUTPATIENT
Start: 2020-10-29 | End: 2021-03-20 | Stop reason: HOSPADM

## 2020-10-28 RX ORDER — AMLODIPINE BESYLATE 2.5 MG/1
2.5 TABLET ORAL DAILY
Qty: 30 TABLET | Refills: 0 | Status: SHIPPED | OUTPATIENT
Start: 2020-10-29

## 2020-10-28 RX ORDER — CALCIUM CARBONATE 200(500)MG
500 TABLET,CHEWABLE ORAL 3 TIMES DAILY PRN
Status: DISCONTINUED | OUTPATIENT
Start: 2020-10-28 | End: 2020-10-28 | Stop reason: HOSPADM

## 2020-10-28 RX ORDER — PREDNISONE 20 MG/1
40 TABLET ORAL DAILY
Qty: 10 TABLET | Refills: 0 | Status: SHIPPED | OUTPATIENT
Start: 2020-10-28 | End: 2020-11-02

## 2020-10-28 RX ORDER — PREDNISONE 20 MG/1
40 TABLET ORAL DAILY
Qty: 10 TABLET | Refills: 0 | Status: SHIPPED | OUTPATIENT
Start: 2020-10-28 | End: 2020-10-28 | Stop reason: SDUPTHER

## 2020-10-28 RX ORDER — PANTOPRAZOLE SODIUM 40 MG/1
40 TABLET, DELAYED RELEASE ORAL
Qty: 30 TABLET | Refills: 0 | Status: SHIPPED | OUTPATIENT
Start: 2020-10-29

## 2020-10-28 RX ORDER — ASPIRIN 81 MG/1
81 TABLET ORAL DAILY
Qty: 30 TABLET | Refills: 0 | Status: ON HOLD | OUTPATIENT
Start: 2020-10-28 | End: 2021-03-20 | Stop reason: HOSPADM

## 2020-10-28 RX ORDER — ALBUTEROL SULFATE 2.5 MG/3ML
2.5 SOLUTION RESPIRATORY (INHALATION) EVERY 4 HOURS PRN
Status: DISCONTINUED | OUTPATIENT
Start: 2020-10-28 | End: 2020-10-28 | Stop reason: HOSPADM

## 2020-10-28 RX ADMIN — PREDNISONE 40 MG: 20 TABLET ORAL at 08:17

## 2020-10-28 RX ADMIN — BUDESONIDE AND FORMOTEROL FUMARATE DIHYDRATE 2 PUFF: 80; 4.5 AEROSOL RESPIRATORY (INHALATION) at 09:25

## 2020-10-28 RX ADMIN — ANTACID TABLETS 500 MG: 500 TABLET, CHEWABLE ORAL at 10:51

## 2020-10-28 RX ADMIN — Medication 10 ML: at 08:17

## 2020-10-28 RX ADMIN — FLUOXETINE 40 MG: 20 CAPSULE ORAL at 08:27

## 2020-10-28 RX ADMIN — Medication 10 ML: at 08:27

## 2020-10-28 RX ADMIN — GABAPENTIN 600 MG: 300 CAPSULE ORAL at 08:17

## 2020-10-28 RX ADMIN — FUROSEMIDE 20 MG: 20 TABLET ORAL at 08:17

## 2020-10-28 RX ADMIN — PANTOPRAZOLE SODIUM 40 MG: 40 TABLET, DELAYED RELEASE ORAL at 06:47

## 2020-10-28 RX ADMIN — ENOXAPARIN SODIUM 40 MG: 40 INJECTION SUBCUTANEOUS at 08:17

## 2020-10-28 RX ADMIN — GABAPENTIN 600 MG: 300 CAPSULE ORAL at 14:22

## 2020-10-28 RX ADMIN — OXYCODONE HYDROCHLORIDE AND ACETAMINOPHEN 1 TABLET: 5; 325 TABLET ORAL at 10:51

## 2020-10-28 RX ADMIN — ALBUTEROL SULFATE 2.5 MG: 2.5 SOLUTION RESPIRATORY (INHALATION) at 04:07

## 2020-10-28 RX ADMIN — OXYCODONE HYDROCHLORIDE AND ACETAMINOPHEN 1 TABLET: 5; 325 TABLET ORAL at 03:35

## 2020-10-28 RX ADMIN — AMLODIPINE BESYLATE 2.5 MG: 2.5 TABLET ORAL at 08:17

## 2020-10-28 RX ADMIN — ALBUTEROL SULFATE 2.5 MG: 2.5 SOLUTION RESPIRATORY (INHALATION) at 00:01

## 2020-10-28 RX ADMIN — ALBUTEROL SULFATE 2.5 MG: 2.5 SOLUTION RESPIRATORY (INHALATION) at 09:31

## 2020-10-28 ASSESSMENT — PAIN SCALES - GENERAL
PAINLEVEL_OUTOF10: 4
PAINLEVEL_OUTOF10: 7
PAINLEVEL_OUTOF10: 4
PAINLEVEL_OUTOF10: 7
PAINLEVEL_OUTOF10: 4

## 2020-10-28 ASSESSMENT — PAIN DESCRIPTION - ORIENTATION
ORIENTATION: RIGHT

## 2020-10-28 ASSESSMENT — PAIN DESCRIPTION - LOCATION
LOCATION: HIP;KNEE

## 2020-10-28 ASSESSMENT — PAIN DESCRIPTION - PAIN TYPE
TYPE: CHRONIC PAIN

## 2020-10-28 ASSESSMENT — PAIN - FUNCTIONAL ASSESSMENT: PAIN_FUNCTIONAL_ASSESSMENT: ACTIVITIES ARE NOT PREVENTED

## 2020-10-28 ASSESSMENT — PAIN DESCRIPTION - PROGRESSION: CLINICAL_PROGRESSION: NOT CHANGED

## 2020-10-28 ASSESSMENT — PAIN DESCRIPTION - ONSET: ONSET: ON-GOING

## 2020-10-28 ASSESSMENT — PAIN DESCRIPTION - FREQUENCY: FREQUENCY: CONTINUOUS

## 2020-10-28 ASSESSMENT — PAIN DESCRIPTION - DESCRIPTORS: DESCRIPTORS: ACHING

## 2020-10-28 NOTE — FLOWSHEET NOTE
10/28/20 1055   Encounter Summary   Services provided to: Patient   Referral/Consult From: Gerardo   Continue Visiting   (es 10/28)   Complexity of Encounter Moderate   Length of Encounter 15 minutes   Routine   Type Initial   Assessment Approachable   Intervention Active listening;Explored feelings, thoughts, concerns;Nurtured hope;Prayer   Outcome Engaged in conversation;Receptive

## 2020-10-28 NOTE — PROGRESS NOTES
Pulmonary & Critical Care Medicine    Admit Date: 10/23/2020  PCP: Corrine Vargas    CC:  Dyspnea on exertion  Events of Last 24 hours:   Breathing is more or less the same. She is not wheezing at this time. Vitals:  Tmax:  VITALS:  /81   Pulse 88   Temp 97.9 °F (36.6 °C) (Oral)   Resp 18   Ht 4' 10\" (1.473 m)   Wt 200 lb 14.4 oz (91.1 kg)   LMP 2014   SpO2 94%   BMI 41.99 kg/m²   24HR INTAKE/OUTPUT:      Intake/Output Summary (Last 24 hours) at 10/28/2020 1049  Last data filed at 10/28/2020 0344  Gross per 24 hour   Intake 240 ml   Output 800 ml   Net -560 ml     CURRENT PULSE OXIMETRY:  SpO2: 94 %  24HR PULSE OXIMETRY RANGE:  SpO2  Av %  Min: 92 %  Max: 99 %    EXAM:  General: No distress. Alert. Eyes: PERRL. No sclera icterus. No conjunctival injection. ENT: No discharge. Moist oral mucosa   Neck: Trachea midline. Neck is supple   Resp: No accessory muscle use. No crackles. No wheezing. No rhonchi. CV: Regular rate. Regular rhythm. No mumur or rub. No edema. GI: Non-tender. Non-distended. Normal bowel sounds. Skin: Warm and dry. No nodule on exposed extremities. No rash on exposed extremities. M/S: No cyanosis. No joint deformity. No clubbing. Neuro: Awake. Speech is clear. Psych: Oriented to person, place, time. No anxiety or agitation.      Medications:    IV:        Scheduled Meds:   furosemide  20 mg Oral Daily    sodium chloride flush  10 mL Intravenous BID    predniSONE  40 mg Oral Daily    amLODIPine  2.5 mg Oral Daily    amitriptyline  75 mg Oral Nightly    budesonide-formoterol  2 puff Inhalation BID    FLUoxetine  40 mg Oral Daily    gabapentin  600 mg Oral TID    sodium chloride flush  10 mL Intravenous 2 times per day    enoxaparin  40 mg Subcutaneous Daily    pantoprazole  40 mg Oral QAM AC         Diet: DIET CARDIAC; Low Sodium (2 GM)     Results:  CBC:   Recent Labs     10/28/20  0443   WBC 13.0*   HGB 11.6*   HCT 36.3   MCV 78.8*    BMP:   Recent Labs     10/27/20  0515 10/28/20  0443    139   K 4.2 4.1   CL 99 104   CO2 26 27   BUN 28* 25*   CREATININE 0.7 0.6     LIVER PROFILE: No results for input(s): AST, ALT, LIPASE, BILIDIR, BILITOT, ALKPHOS in the last 72 hours. Invalid input(s): AMYLASE,  ALB  PT/INR: No results for input(s): PROTIME, INR in the last 72 hours. APTT: No results for input(s): APTT in the last 72 hours. UA:No results for input(s): NITRITE, COLORU, PHUR, LABCAST, WBCUA, RBCUA, MUCUS, TRICHOMONAS, YEAST, BACTERIA, CLARITYU, SPECGRAV, LEUKOCYTESUR, UROBILINOGEN, BILIRUBINUR, BLOODU, GLUCOSEU, AMORPHOUS in the last 72 hours. Invalid input(s): Lazarus Diaz      Assessment/Plan:  63 y.o. female with     Asthma  Dyspnea on exertion: epicardial CAD is ruled out with LHC. LVEDP is 17    Continue symbicort and albuterol  Continue lasix 20 mg daily   Counseled to lose wt and to stay active   Finish a course of prednisone for total of 7 days. Will arrange for f/u in 3-4 weeks  Discussed with primary team.  OK to d/c from my perspective.       Darylene Cooter, MD

## 2020-10-28 NOTE — DISCHARGE SUMMARY
Hospital Medicine Discharge Summary    Patient ID: Franci Rutledge      Patient's PCP: Corrine Vargas    Admit Date: 10/23/2020     Discharge Date:   10/28/2020    Admitting Physician: Teresa Kim MD     Discharge Physician: Katy Bartlett MD     Discharge Diagnoses: Active Hospital Problems    Diagnosis Date Noted    Dyspnea on exertion [R06.00] 10/26/2020    WIGGINS (dyspnea on exertion) [R06.00] 10/23/2020       The patient was seen and examined on day of discharge and this discharge summary is in conjunction with any daily progress note from day of discharge. Hospital Course: Patient is 79-year-old female with a history of asthma, COPD was admitted with a complaint of shortness of breath with exertion initially treated as an outpatient with the steroids without any improvement. In ER patient had a CT PE which did not showed any pulmonary embolism. Due to significant dyspnea cardio and pulmonary was consulted. Patient had nuclear stress test done did not showed any ischemia. Echo EF 55 to 25%, normal diastolic dysfunction. Due to unexplained dyspnea underwent left heart cath that showed mild CAD. Patient was seen and examined on day of discharge denies nausea, vomiting, fever, chills. Dyspnea has resolved. Patient would need PFTs as an outpatient. Patient is medically stable to be discharged home. She will follow-up with cardiology and pulmonary. Final diagnosis  #Acute respiratory with hypoxemia present on admission resolved. #Asthma exacerbation. #Hypertension  #Morbid obesity. #Chronic pain syndrome. Physical Exam Performed:     BP (!) 137/91   Pulse 96   Temp 98.6 °F (37 °C) (Oral)   Resp 20   Ht 4' 10\" (1.473 m)   Wt 200 lb 14.4 oz (91.1 kg)   LMP 12/22/2014   SpO2 94%   BMI 41.99 kg/m²       General appearance:  No apparent distress, appears stated age and cooperative. HEENT:  Normal cephalic, atraumatic without obvious deformity.  Pupils equal, round, and reactive to light.  Extra ocular muscles intact. Conjunctivae/corneas clear. Neck: Supple, with full range of motion. No jugular venous distention. Trachea midline. Respiratory:  Normal respiratory effort. Clear to auscultation, bilaterally without Rales/Wheezes/Rhonchi. Cardiovascular:  Regular rate and rhythm with normal S1/S2 without murmurs, rubs or gallops. Abdomen: Soft, non-tender, non-distended with normal bowel sounds. Musculoskeletal:  No clubbing, cyanosis or edema bilaterally. Full range of motion without deformity. Skin: Skin color, texture, turgor normal.  No rashes or lesions. Neurologic:  Neurovascularly intact without any focal sensory/motor deficits. Cranial nerves: II-XII intact, grossly non-focal.  Psychiatric:  Alert and oriented, thought content appropriate, normal insight  Capillary Refill: Brisk,< 3 seconds   Peripheral Pulses: +2 palpable, equal bilaterally       Labs: For convenience and continuity at follow-up the following most recent labs are provided:      CBC:    Lab Results   Component Value Date    WBC 13.0 10/28/2020    HGB 11.6 10/28/2020    HCT 36.3 10/28/2020     10/28/2020       Renal:    Lab Results   Component Value Date     10/28/2020    K 4.1 10/28/2020     10/28/2020    CO2 27 10/28/2020    BUN 25 10/28/2020    CREATININE 0.6 10/28/2020    CALCIUM 8.8 10/28/2020    PHOS 2.7 10/23/2020         Significant Diagnostic Studies    Radiology:   NM Cardiac Stress Test Nuclear Imaging   Final Result      CTA CHEST W CONTRAST   Final Result      No evidence of acute pulmonary emboli. Atelectatic collapse of the right middle lobe. Coronary artery calcification. Status post gastric bypass. XR CHEST PORTABLE   Final Result      Stable right basilar atelectasis, scar or less likely pneumonia.                    Consults:     IP CONSULT TO HOSPITALIST  IP CONSULT TO PULMONOLOGY  IP CONSULT TO CARDIOLOGY    Disposition:  home     Condition at Discharge: Stable    Discharge Instructions/Follow-up:  PCP, pulmonary and cardiology    Code Status:  Full Code     Activity: activity as tolerated    Diet: regular diet      Discharge Medications:     Current Discharge Medication List           Details   furosemide (LASIX) 20 MG tablet Take 1 tablet by mouth daily  Qty: 30 tablet, Refills: 0      pantoprazole (PROTONIX) 40 MG tablet Take 1 tablet by mouth every morning (before breakfast)  Qty: 30 tablet, Refills: 0      amLODIPine (NORVASC) 2.5 MG tablet Take 1 tablet by mouth daily  Qty: 30 tablet, Refills: 0      aspirin EC 81 MG EC tablet Take 1 tablet by mouth daily  Qty: 30 tablet, Refills: 0      atorvastatin (LIPITOR) 40 MG tablet Take 1 tablet by mouth daily  Qty: 30 tablet, Refills: 0              Details   predniSONE (DELTASONE) 20 MG tablet Take 2 tablets by mouth daily for 5 doses  Qty: 10 tablet, Refills: 0              Details   budesonide-formoterol (SYMBICORT) 80-4.5 MCG/ACT AERO Inhale 2 puffs into the lungs 2 times daily      meloxicam (MOBIC) 15 MG tablet Take 15 mg by mouth daily      cyclobenzaprine (FLEXERIL) 10 MG tablet Take 1 tablet by mouth 3 times daily as needed for Muscle spasms  Qty: 30 tablet, Refills: 0      albuterol sulfate  (90 Base) MCG/ACT inhaler Inhale 2 puffs into the lungs 4 times daily as needed for Wheezing  Qty: 1 Inhaler, Refills: 0      albuterol (PROVENTIL) (2.5 MG/3ML) 0.083% nebulizer solution Take 3 mLs by nebulization every 6 hours as needed for Wheezing or Shortness of Breath  Qty: 120 each, Refills: 3      gabapentin (NEURONTIN) 300 MG capsule Take 600 mg by mouth 3 times daily. .      hydrOXYzine (VISTARIL) 25 MG capsule Take 25 mg by mouth 3 times daily as needed for Anxiety      ibuprofen (ADVIL;MOTRIN) 800 MG tablet Take 800 mg by mouth every 6 hours as needed for Pain      FLUoxetine (PROZAC) 40 MG capsule Take 1 capsule by mouth daily  Qty: 30 capsule, Refills: 0      amitriptyline (ELAVIL) 75 MG tablet Take 1 tablet by mouth nightly  Qty: 30 tablet, Refills: 0             Time Spent on discharge is more than 30 minutes in the examination, evaluation, counseling and review of medications and discharge plan. Signed:    Shabbir Wallace MD   10/28/2020      Thank you Corrine Vargas for the opportunity to be involved in this patient's care. If you have any questions or concerns please feel free to contact me at [phone].

## 2020-10-28 NOTE — PROGRESS NOTES
RESPIRATORY THERAPY ASSESSMENT    Name:  Myate Doherty Record Number:  [id number]  Age: 63 y.o. Gender: female  : 1960  Today's Date:  10/28/2020  Room:  3519/3519-01    Assessment     Is the patient being admitted for a COPD or Asthma exacerbation? No   (If yes the patient will be seen every 4 hours for the first 24 hours and then reassessed)    Patient Admission Diagnosis      Allergies  Allergies   Allergen Reactions    Droperidol Other (See Comments)     EPS   shaking  shaking  ETS    Compazine [Prochlorperazine] Other (See Comments)     EPS symptoms       Pulmonary History: Asthma   Home Oxygen Therapy:  room air  Home Respiratory Therapy:Albuterol and Budesonide/Formoterol    Current Respiratory Therapy:  Albuterol prn and Symbicort   Treatment Type: HHN  Medications: Albuterol    Respiratory Severity Index(RSI)   Patients with orders for inhalation medications, oxygen, or any therapeutic treatment modality will be placed on Respiratory Protocol. They will be assessed with the first treatment and at least every 72 hours thereafter. The following severity scale will be used to determine frequency of treatment intervention. Smoking History: Smoking History Less than 1ppd or less than 15 pack year = 1    Social History  Social History     Tobacco Use    Smoking status: Former Smoker     Packs/day: 0.25     Years: 0.20     Pack years: 0.05     Types: Cigarettes     Last attempt to quit: 2014     Years since quittin.4    Smokeless tobacco: Never Used    Tobacco comment: working to decrease   Substance Use Topics    Alcohol use: No     Comment: recovering alcoholic since , has had couple of relaspes, last 7/15/2015.     Drug use: No       Recent Surgical History: None = 0  Past Surgical History  Past Surgical History:   Procedure Laterality Date    BREAST ENHANCEMENT SURGERY Bilateral    Audra Kelley [address] femur RT    GASTRIC BYPASS SURGERY  1998    [place]    JOINT REPLACEMENT Bilateral 12/29/2014    knee replacement    JOINT REPLACEMENT      KNEE CARTILAGE SURGERY Left 2006    ROTATOR CUFF REPAIR Left 2008    TOTAL HIP ARTHROPLASTY Bilateral 2014    WISDOM TOOTH EXTRACTION  1995       Level of Consciousness: Alert, Oriented, and Cooperative = 0    Level of Activity: Walking unassisted = 0    Respiratory Pattern: Regular Pattern; RR 8-20 = 0    Breath Sounds: Diminshed bilaterally with scattered wheezes = 2    Sputum  Sputum Color: None, Tenacity: None, Sputum How Obtained: None  Cough: Strong, spontaneous, non-productive = 0    Vital Signs   /73   Pulse 83   Temp 97.8 °F (36.6 °C) (Oral)   Resp 18   Ht 4' 10\" (1.473 m)   Wt 195 lb 11.2 oz (88.8 kg)   LMP 12/22/2014   SpO2 96%   BMI 40.90 kg/m²   SPO2 (COPD values may differ): Greater than or equal to 92% on room air = 0    Peak Flow (asthma only): not applicable = 0    RSI: 0-4 = See once and convert to home regimen or discontinue        Plan       Goals: Medication delivery     Patient/caregiver was educated on the proper method of use for Respiratory Care Devices:  Yes      Level of patient/caregiver understanding able to:   ? Verbalize understanding   ? Demonstrate understanding       ? Teach back        ? Needs reinforcement       ? No available caregiver               ? Other:     Response to education:  Good     Is patient being placed on Home Treatment Regimen? Yes     Does the patient have everything they need prior to discharge? NA     Comments: Patient breath sounds are clear; SpO2 95% on room air; patient does not appear to be in any distress     Plan of Care: Continue with home regimen as ordered     Electronically signed by Ryan David RCP on 10/28/2020 at 7:45 AM    Respiratory Protocol Guidelines     1.  Assessment and treatment by Respiratory Therapy will be initiated for medication and therapeutic interventions upon initiation of aerosolized medication. 2. Physician will be contacted for respiratory rate (RR) greater than 35 breaths per minute. Therapy will be held for heart rate (HR) greater than 140 beats per minute, pending direction from physician. 3. Bronchodilators will be administered via Metered Dose Inhaler (MDI) with spacer when the following criteria are met:  a. Alert and cooperative     b. HR < 140 bpm  c. RR < 30 bpm                d. Can demonstrate a 2-3 second inspiratory hold  4. Bronchodilators will be administered via Hand Held Nebulizer CB [facility]) to patients when ANY of the following criteria are met  a. Incognizant or uncooperative          b. Patients treated with HHN at Home        c. Unable to demonstrate proper use of MDI with spacer     d. RR > 30 bpm   5. Bronchodilators will be delivered via Metered Dose Inhaler (MDI), HHN, Aerogen to intubated patients on mechanical ventilation. 6. Inhalation medication orders will be delivered and/or substituted as outlined below. Aerosolized Medications Ordering and Administration Guidelines:    1. All Medications will be ordered by a physician, and their frequency and/or modality will be adjusted as defined by the patients Respiratory Severity Index (RSI) score. 2. If the patient does not have documented COPD, consider discontinuing anticholinergics when RSI is less than 9.  3. If the bronchospasm worsens (increased RSI), then the bronchodilator frequency can be increased to a maximum of every 4 hours. If greater than every 4 hours is required, the physician will be contacted. 4. If the bronchospasm improves, the frequency of the bronchodilator can be decreased, based on the patient's RSI, but not less than home treatment regimen frequency. 5. Bronchodilator(s) will be discontinued if patient has a RSI less than 9 and has received no scheduled or as needed treatment for 72  Hrs. Patients Ordered on a Mucolytic Agent:    1.  Must always be administered

## 2020-10-28 NOTE — PROGRESS NOTES
Discharge teaching and instructions for diagnosis of CAD completed with patient using teachback method. AVS reviewed. Medications, dosages, schedule, and potential side effects reviewed with patient. Patient voiced understanding regarding prescriptions, follow up appointments, and care of self at home. Discharged in a wheelchair to  home with support per family. Removed both PIVs at this time. No complications. Verified appropriate follow up appointments scheduled & pt instructed on how to schedule appts. Diet & activity discussed. Patient verbalized understanding and questions were answered to her satisfaction. Signed discharge instructions were given to the patient and a copy placed in the paper-lite chart.       Bouchra Matos

## 2020-10-28 NOTE — PROGRESS NOTES
VSS. Ortho negative. Assessment completed. Scheduled meds given whole with water. Pt resting comfortably in bed. Call light within reach. Denies further needs at this time. Will continue to monitor.  Electronically signed by Kev Arizmendi RN on 10/28/2020 at 8:35 AM

## 2020-10-28 NOTE — CARE COORDINATION
Case Management Assessment            Discharge Note                    Date / Time of Note: 10/28/2020 2:54 PM                  Discharge Note Completed by: Tho Bush    Patient Name: Negar Duvall   YOB: 1960  Diagnosis: WIGGINS (dyspnea on exertion) [R06.00]  WIGGINS (dyspnea on exertion) [R06.00]  Dyspnea on exertion [R06.00]   Date / Time: 10/23/2020  3:38 PM    Current PCP: Corrine Vargas  Clinic patient: No    Hospitalization in the last 30 days: Yes    Advance Directives:  Code Status: Full Code  [place] DNR form completed and on chart: No    Financial:  Payor: [place] / Plan: [employer] HMO / Product Type: *No Product type* /      Pharmacy:    [facility], [address] [facility] [phone] - F [phone]  [address]  Phone: [phone] Fax: [phone]    [id number] [place] [phone] - F [phone]  [address] [place] [address] [address]  Phone: [phone] Fax: [phone]      Assistance purchasing medications?: Potential Assistance Purchasing Medications: No  Assistance provided by Case Management: None at this time    Does patient want to participate in local refill/ meds to beds program?: No    Meds To Beds General Rules:  1. Can ONLY be done Monday- Friday between 8:30am-5pm  2. Prescription(s) must be in pharmacy by 3pm to be filled same day  3. Copy of patient's insurance/ prescription drug card and patient face sheet must be sent along with the prescription(s)  4. Cost of Rx cannot be added to hospital bill. If financial assistance is needed, please contact unit  or ;  or  CANNOT provide pharmacy voucher for patients co-pays  5.  Patients can then  the prescription on their way out of the hospital at discharge, or pharmacy can deliver to the bedside if staff is available. (payment due at time of pick-up or delivery - cash, check, or card accepted)     Able to afford home medications/ co-pay costs: Yes    ADLS:  Current PT AM-PAC Score:   /24  Current OT AM-PAC Score:   /24      DISCHARGE Disposition: Home- No Services Needed    LOC at discharge: Not Applicable  LATISHA Completed: No    Notification completed in HENS/PAS?:  Not Applicable    IMM Completed:   Yes, Case management has presented and reviewed IMM letter #2 to the patient and/or family/ POA. Patient and/or family/POA verbalized understanding of their medicare rights and appeal process if needed. Patient and/or family/POA has signed, initialed and placed today's date (10/28/2020) and time () on IMM letter #2 on the the appropriate lines. Patient and/or family/POA, copy of letter offered and they are aware that this original copy of IMM letter #2 is available prior to discharge from the paper chart on the unit. Electronic documentation has been entered into epic for IMM letter #2 and original paper copy has been added to the paper chart at the nurses station.      Transportation:  Transportation PLAN for discharge: family   Mode of Transport: Private Car  Reason for medical transport: Not Applicable  Name of [address] [place] [address], [address]: Not Applicable  Time of Transport: afternoon    Transport form completed: Not Indicated    Home Care:  [address] ordered at discharge: No  [address]: Not Applicable  Orders faxed: No    Durable Medical Equipment:  DME Provider: none  Equipment obtained during hospitalization: none    Home Oxygen and Respiratory Equipment:  Oxygen needed at discharge?: No  [address]: Not Applicable  Portable tank available for discharge?: No    Dialysis:  Dialysis patient: No    Dialysis Center:  Not Applicable    Hospice Services:  Location: Not Applicable  Agency: Not Applicable    Consents signed: No    Referrals made at [facility] for outpatient continued care:  Not Applicable    Additional CM Notes: Met with patient at bedside. She is in agreement to discharge home with no needs. Her daughter will transport at discharge. The Plan for Transition of Care is related to the following treatment goals of WIGGINS (dyspnea on exertion) [R06.00]  WIGGINS (dyspnea on exertion) [R06.00]  Dyspnea on exertion [R06.00]    The Patient and/or patient representative Terri and her family were provided with a choice of provider and agrees with the discharge plan Yes    Freedom of choice list was provided with basic dialogue that supports the patient's individualized plan of care/goals and shares the quality data associated with the providers.  Not Indicated    Care Transitions patient: Lay    Doyle Kiesha, [address]  Case Management Department  Ph: [phone]  Fax: [phone]

## 2020-10-29 ENCOUNTER — TELEPHONE (OUTPATIENT)
Dept: PULMONOLOGY | Age: 60
End: 2020-10-29

## 2020-11-01 LAB — IGE: 36 KU/L

## 2021-03-03 ENCOUNTER — HOSPITAL ENCOUNTER (INPATIENT)
Age: 61
LOS: 17 days | Discharge: HOME HEALTH | DRG: 560 | End: 2021-03-20
Attending: PHYSICAL MEDICINE & REHABILITATION | Admitting: PHYSICAL MEDICINE & REHABILITATION
Payer: MEDICARE

## 2021-03-03 ENCOUNTER — APPOINTMENT (OUTPATIENT)
Dept: GENERAL RADIOLOGY | Age: 61
DRG: 560 | End: 2021-03-03
Attending: PHYSICAL MEDICINE & REHABILITATION
Payer: MEDICARE

## 2021-03-03 DIAGNOSIS — G89.4 CHRONIC PAIN SYNDROME: Primary | Chronic | ICD-10-CM

## 2021-03-03 PROBLEM — R53.81 DEBILITY: Status: ACTIVE | Noted: 2021-03-03

## 2021-03-03 LAB
ANION GAP SERPL CALCULATED.3IONS-SCNC: 9 MMOL/L (ref 3–16)
BASOPHILS ABSOLUTE: 0.1 K/UL (ref 0–0.2)
BASOPHILS RELATIVE PERCENT: 0.7 %
BUN BLDV-MCNC: 9 MG/DL (ref 7–20)
CALCIUM SERPL-MCNC: 8.5 MG/DL (ref 8.3–10.6)
CHLORIDE BLD-SCNC: 103 MMOL/L (ref 99–110)
CO2: 25 MMOL/L (ref 21–32)
CREAT SERPL-MCNC: 0.6 MG/DL (ref 0.6–1.2)
EOSINOPHILS ABSOLUTE: 0.3 K/UL (ref 0–0.6)
EOSINOPHILS RELATIVE PERCENT: 4 %
GFR AFRICAN AMERICAN: >60
GFR NON-AFRICAN AMERICAN: >60
GLUCOSE BLD-MCNC: 114 MG/DL (ref 70–99)
HCT VFR BLD CALC: 29.3 % (ref 36–48)
HEMOGLOBIN: 9.2 G/DL (ref 12–16)
LYMPHOCYTES ABSOLUTE: 1.6 K/UL (ref 1–5.1)
LYMPHOCYTES RELATIVE PERCENT: 20.5 %
MCH RBC QN AUTO: 26.4 PG (ref 26–34)
MCHC RBC AUTO-ENTMCNC: 31.4 G/DL (ref 31–36)
MCV RBC AUTO: 84.1 FL (ref 80–100)
MONOCYTES ABSOLUTE: 0.6 K/UL (ref 0–1.3)
MONOCYTES RELATIVE PERCENT: 7.3 %
NEUTROPHILS ABSOLUTE: 5.4 K/UL (ref 1.7–7.7)
NEUTROPHILS RELATIVE PERCENT: 67.5 %
PDW BLD-RTO: 19.4 % (ref 12.4–15.4)
PLATELET # BLD: 295 K/UL (ref 135–450)
PMV BLD AUTO: 7.8 FL (ref 5–10.5)
POTASSIUM SERPL-SCNC: 4.2 MMOL/L (ref 3.5–5.1)
RBC # BLD: 3.49 M/UL (ref 4–5.2)
SODIUM BLD-SCNC: 137 MMOL/L (ref 136–145)
WBC # BLD: 8 K/UL (ref 4–11)

## 2021-03-03 PROCEDURE — 80048 BASIC METABOLIC PNL TOTAL CA: CPT

## 2021-03-03 PROCEDURE — 94664 DEMO&/EVAL PT USE INHALER: CPT

## 2021-03-03 PROCEDURE — 6360000002 HC RX W HCPCS: Performed by: PHYSICAL MEDICINE & REHABILITATION

## 2021-03-03 PROCEDURE — 36415 COLL VENOUS BLD VENIPUNCTURE: CPT

## 2021-03-03 PROCEDURE — 94640 AIRWAY INHALATION TREATMENT: CPT

## 2021-03-03 PROCEDURE — 85025 COMPLETE CBC W/AUTO DIFF WBC: CPT

## 2021-03-03 PROCEDURE — 6370000000 HC RX 637 (ALT 250 FOR IP): Performed by: PHYSICAL MEDICINE & REHABILITATION

## 2021-03-03 PROCEDURE — 94799 UNLISTED PULMONARY SVC/PX: CPT

## 2021-03-03 PROCEDURE — 1280000000 HC REHAB R&B

## 2021-03-03 PROCEDURE — 71045 X-RAY EXAM CHEST 1 VIEW: CPT

## 2021-03-03 PROCEDURE — 2580000003 HC RX 258: Performed by: PHYSICAL MEDICINE & REHABILITATION

## 2021-03-03 RX ORDER — POLYETHYLENE GLYCOL 3350 17 G/17G
17 POWDER, FOR SOLUTION ORAL DAILY PRN
Status: DISCONTINUED | OUTPATIENT
Start: 2021-03-03 | End: 2021-03-20 | Stop reason: HOSPADM

## 2021-03-03 RX ORDER — ACETAMINOPHEN 325 MG/1
650 TABLET ORAL EVERY 4 HOURS PRN
Status: DISCONTINUED | OUTPATIENT
Start: 2021-03-03 | End: 2021-03-20 | Stop reason: HOSPADM

## 2021-03-03 RX ORDER — ATORVASTATIN CALCIUM 40 MG/1
40 TABLET, FILM COATED ORAL NIGHTLY
Status: DISCONTINUED | OUTPATIENT
Start: 2021-03-03 | End: 2021-03-20 | Stop reason: HOSPADM

## 2021-03-03 RX ORDER — AMITRIPTYLINE HYDROCHLORIDE 25 MG/1
75 TABLET, FILM COATED ORAL NIGHTLY
Status: DISCONTINUED | OUTPATIENT
Start: 2021-03-03 | End: 2021-03-20 | Stop reason: HOSPADM

## 2021-03-03 RX ORDER — ALBUTEROL SULFATE 2.5 MG/3ML
2.5 SOLUTION RESPIRATORY (INHALATION) EVERY 6 HOURS PRN
Status: DISCONTINUED | OUTPATIENT
Start: 2021-03-03 | End: 2021-03-04

## 2021-03-03 RX ORDER — AMLODIPINE BESYLATE 5 MG/1
2.5 TABLET ORAL DAILY
Status: DISCONTINUED | OUTPATIENT
Start: 2021-03-04 | End: 2021-03-20 | Stop reason: HOSPADM

## 2021-03-03 RX ORDER — FLUOXETINE HYDROCHLORIDE 20 MG/1
40 CAPSULE ORAL DAILY
Status: DISCONTINUED | OUTPATIENT
Start: 2021-03-04 | End: 2021-03-20 | Stop reason: HOSPADM

## 2021-03-03 RX ORDER — OXYCODONE HYDROCHLORIDE 5 MG/1
10 TABLET ORAL EVERY 4 HOURS PRN
Status: DISCONTINUED | OUTPATIENT
Start: 2021-03-03 | End: 2021-03-20 | Stop reason: HOSPADM

## 2021-03-03 RX ORDER — OXYCODONE HYDROCHLORIDE 5 MG/1
5 TABLET ORAL EVERY 4 HOURS PRN
Status: DISCONTINUED | OUTPATIENT
Start: 2021-03-03 | End: 2021-03-03

## 2021-03-03 RX ORDER — IPRATROPIUM BROMIDE AND ALBUTEROL SULFATE 2.5; .5 MG/3ML; MG/3ML
1 SOLUTION RESPIRATORY (INHALATION)
Status: DISCONTINUED | OUTPATIENT
Start: 2021-03-03 | End: 2021-03-12

## 2021-03-03 RX ORDER — FUROSEMIDE 20 MG/1
20 TABLET ORAL DAILY
Status: DISCONTINUED | OUTPATIENT
Start: 2021-03-04 | End: 2021-03-09

## 2021-03-03 RX ORDER — TIZANIDINE 4 MG/1
2 TABLET ORAL EVERY 6 HOURS PRN
Status: DISCONTINUED | OUTPATIENT
Start: 2021-03-03 | End: 2021-03-04

## 2021-03-03 RX ORDER — OXYCODONE HYDROCHLORIDE 5 MG/1
5 TABLET ORAL EVERY 4 HOURS PRN
Status: DISCONTINUED | OUTPATIENT
Start: 2021-03-03 | End: 2021-03-20 | Stop reason: HOSPADM

## 2021-03-03 RX ORDER — IBUPROFEN 400 MG/1
200 TABLET ORAL EVERY 6 HOURS PRN
Status: DISCONTINUED | OUTPATIENT
Start: 2021-03-03 | End: 2021-03-09

## 2021-03-03 RX ADMIN — OXYCODONE 10 MG: 5 TABLET ORAL at 19:48

## 2021-03-03 RX ADMIN — PREGABALIN 100 MG: 75 CAPSULE ORAL at 16:46

## 2021-03-03 RX ADMIN — AMITRIPTYLINE HYDROCHLORIDE 75 MG: 25 TABLET, FILM COATED ORAL at 20:33

## 2021-03-03 RX ADMIN — PREGABALIN 100 MG: 75 CAPSULE ORAL at 20:33

## 2021-03-03 RX ADMIN — CEFTRIAXONE SODIUM 2000 MG: 2 INJECTION, POWDER, FOR SOLUTION INTRAMUSCULAR; INTRAVENOUS at 20:29

## 2021-03-03 RX ADMIN — IPRATROPIUM BROMIDE AND ALBUTEROL SULFATE 1 AMPULE: .5; 2.5 SOLUTION RESPIRATORY (INHALATION) at 19:51

## 2021-03-03 RX ADMIN — ATORVASTATIN CALCIUM 40 MG: 40 TABLET, FILM COATED ORAL at 20:32

## 2021-03-03 RX ADMIN — IPRATROPIUM BROMIDE AND ALBUTEROL SULFATE 1 AMPULE: .5; 2.5 SOLUTION RESPIRATORY (INHALATION) at 15:55

## 2021-03-03 RX ADMIN — AMPICILLIN AND SULBACTAM 3000 MG: 1; 2 INJECTION, POWDER, FOR SOLUTION INTRAMUSCULAR; INTRAVENOUS at 19:38

## 2021-03-03 ASSESSMENT — PAIN SCALES - GENERAL: PAINLEVEL_OUTOF10: 5

## 2021-03-03 ASSESSMENT — PAIN DESCRIPTION - PAIN TYPE: TYPE: CHRONIC PAIN;ACUTE PAIN

## 2021-03-03 ASSESSMENT — PAIN DESCRIPTION - DESCRIPTORS: DESCRIPTORS: ACHING;DISCOMFORT

## 2021-03-03 NOTE — PROGRESS NOTES
RESPIRATORY THERAPY ASSESSMENT    Name:  Mayte Doherty Record Number:  [id number]  Age: 63 y.o. Gender: female  : 1960  Today's Date:  3/3/2021  Room:  [address] [place] [address]-    Assessment     Is the patient being admitted for a COPD or Asthma exacerbation? No   (If yes the patient will be seen every 4 hours for the first 24 hours and then reassessed)    Patient Admission Diagnosis      Allergies  Allergies   Allergen Reactions    Droperidol Other (See Comments)     EPS   shaking  shaking  ETS    Compazine [Prochlorperazine] Other (See Comments)     EPS symptoms       Minimum Predicted Vital Capacity:     680          Actual Vital Capacity:      750              Pulmonary History: asthma, remote smoking   hx  Home Oxygen Therapy:  none  Home Respiratory Therapy: Albuterol QID And Q6H PRN, Symbicort 160 MDI BID   Current Respiratory Therapy:  Albuterol/atrovent QID  Treatment Type: HHN  Medications: Albuterol/Ipratropium    Respiratory Severity Index(RSI)   Patients with orders for inhalation medications, oxygen, or any therapeutic treatment modality will be placed on Respiratory Protocol. They will be assessed with the first treatment and at least every 72 hours thereafter. The following severity scale will be used to determine frequency of treatment intervention. Smoking History: Smoking History Less than 1ppd or less than 15 pack year = 1    Social History  Social History     Tobacco Use    Smoking status: Former Smoker     Packs/day: 0.25     Years: 0.20     Pack years: 0.05     Types: Cigarettes     Quit date: 2014     Years since quittin.7    Smokeless tobacco: Never Used    Tobacco comment: working to decrease   Substance Use Topics    Alcohol use: No     Comment: recovering alcoholic since 72, has had couple of relaspes, last 7/15/2015.     Drug use: No       Recent Surgical History: Surgery of Extremities = 1  Past Surgical History  Past Surgical History:   Procedure Laterality Date    BREAST ENHANCEMENT SURGERY Bilateral 2007     SECTION   &     CHOLECYSTECTOMY  1980    FRACTURE SURGERY      femur RT    GASTRIC BYPASS SURGERY  1998    [place]    JOINT REPLACEMENT Bilateral 2014    knee replacement    JOINT REPLACEMENT      KNEE CARTILAGE SURGERY Left 2006    ROTATOR CUFF REPAIR Left 2008    TOTAL HIP ARTHROPLASTY Bilateral     WISDOM TOOTH EXTRACTION         Level of Consciousness: Alert, Oriented, and Cooperative = 0    Level of Activity: Walking with assistance = 1    Respiratory Pattern: Dyspnea with exertion;Irregular pattern;or RR less than 6 = 2    Breath Sounds: Absent bilaterally and/or with wheezes = 3    Sputum   ,  ,    Cough: Strong, spontaneous, non-productive = 0    Vital Signs   BP (!) 164/69   Pulse 87   Temp 98.5 °F (36.9 °C) (Oral)   Resp 18   LMP 2014   SpO2 92%   SPO2 (COPD values may differ): Greater than or equal to 92% on room air = 0    Peak Flow (asthma only): not applicable = 0    RSI: 80-91 = Q6H or QID and Q4HPRN for dyspnea        Plan       Goals: medication delivery, mobilize retained secretions, volume expansion and improve oxygenation    Patient/caregiver was educated on the proper method of use for Respiratory Care Devices:  Yes      Level of patient/caregiver understanding able to:   ? Verbalize understanding   ? Demonstrate understanding       ? Teach back        ? Needs reinforcement       ? No available caregiver               ? Other:     Response to education:  Very Good     Is patient being placed on Home Treatment Regimen? Yes     Does the patient have everything they need prior to discharge? NA     Comments: Chart reviewed    Plan of Care: Continue Albuterol/atrovent QID and Q6H PRN    Electronically signed by Yvon Patel RCP on 3/3/2021 at 4:09 PM    Respiratory Protocol Guidelines     1.  Assessment and treatment by Respiratory Therapy will be initiated for medication and therapeutic interventions upon initiation of aerosolized medication. 2. Physician will be contacted for respiratory rate (RR) greater than 35 breaths per minute. Therapy will be held for heart rate (HR) greater than 140 beats per minute, pending direction from physician. 3. Bronchodilators will be administered via Metered Dose Inhaler (MDI) with spacer when the following criteria are met:  a. Alert and cooperative     b. HR < 140 bpm  c. RR < 30 bpm                d. Can demonstrate a 2-3 second inspiratory hold  4. Bronchodilators will be administered via Hand Held Nebulizer CB [facility]) to patients when ANY of the following criteria are met  a. Incognizant or uncooperative          b. Patients treated with HHN at Home        c. Unable to demonstrate proper use of MDI with spacer     d. RR > 30 bpm   5. Bronchodilators will be delivered via Metered Dose Inhaler (MDI), HHN, Aerogen to intubated patients on mechanical ventilation. 6. Inhalation medication orders will be delivered and/or substituted as outlined below. Aerosolized Medications Ordering and Administration Guidelines:    1. All Medications will be ordered by a physician, and their frequency and/or modality will be adjusted as defined by the patients Respiratory Severity Index (RSI) score. 2. If the patient does not have documented COPD, consider discontinuing anticholinergics when RSI is less than 9.  3. If the bronchospasm worsens (increased RSI), then the bronchodilator frequency can be increased to a maximum of every 4 hours. If greater than every 4 hours is required, the physician will be contacted. 4. If the bronchospasm improves, the frequency of the bronchodilator can be decreased, based on the patient's RSI, but not less than home treatment regimen frequency. 5. Bronchodilator(s) will be discontinued if patient has a RSI less than 9 and has received no scheduled or as needed treatment for 72  Hrs. Patients Ordered on a Mucolytic Agent:    1.  Must always

## 2021-03-03 NOTE — PROGRESS NOTES
Terri Lewis  3/3/2021  [id number]    Rehab Brief Consult:    Patient is able to tolerate 3 hours of therapy 5 days per week and is medically appropriate for rehab at the Acute Rehabilitation Unit. Approved for admission today. Orders placed for transfer. Thank you for the consultation.     Obinna Nur D.O. M.P.H  PM&R  3/3/2021  1:42 PM

## 2021-03-03 NOTE — PLAN OF CARE
Problem: Pain:  Goal: Pain level will decrease  Description: Pain level will decrease  Outcome: Ongoing     Problem: Falls - Risk of:  Goal: Will remain free from falls  Description: Will remain free from falls  Outcome: Ongoing  Note: Hx of falls. Remains free from falls this shift. Fall precautions in place. Bed/chair alarm utilized. Video surveillance in place.

## 2021-03-03 NOTE — PLAN OF CARE
ARU PATIENT TREATMENT PLAN  The [address], [address]  [phone]      Sidon Dose    : 1960  Acct #: [de-identified]  MRN: [id number]  PHYSICIAN:  Melly Ashley DO  Primary Problem    Patient Active Problem List   Diagnosis    Knee osteoarthritis    Chronic pain    Bladder prolapse    Moderate persistent asthma    Alcohol abuse    Localized edema    Anxiety    Primary insomnia    Arthritis    Alcohol use disorder, severe, dependence (HCC)    Depression    Suicide attempt ([facility] [address]    Acute asthma exacerbation    Right knee pain    Asthma    Contusion of right knee    WIGGINS (dyspnea on exertion)    Dyspnea on exertion    Debility       Rehabilitation Diagnosis:  Orthopedic, 8.9, Other Orthopedic   ADMIT DATE:3/3/2021    Patient Goals: To safely return home with spouse  Admitting Impairments: Decreased ability to ambulate, transfer and perform ADLs. Decreased safety, endurance, balance and coordination.   Activity Restrictions: None  Participation Limitations: None   CARE PLAN     NURSING:  Monica Dose while on this unit will:  [] Be continent of bowel and bladder     [x] Have an adequate number of bowel movements  [x] Urinate with no urinary retention >300ml in bladder  [] Complete bladder protocol with manning removal  [] Maintain O2 SATs at ___%  [x] Have pain managed while on ARU       [] Be pain free by discharge   [x] Have no skin breakdown while on ARU  [x] Have improved skin integrity via wound measurements  [x] Have no signs/symptoms of infection at the wound site  [x] Be free from injury during hospitalization   [] Complete education with patient/family with understanding demonstrated for:  [] Adjustment   [] Other:     Nursing Interventions will include:  [] bowel/bladder training   [] education for medical assistive devices   [x] medication education   [] O2 saturation management   [x] energy conservation   [x] stress management techniques   [x] fall prevention   [x] alarms protocol   [] seating and positioning   [x] skin/wound care   [x] pressure relief instruction   [] dressing changes     [x] infection protection   [x] DVT prophylaxis  [] assistance with in room safety with transfers to bed, toilet, wheelchair, shower   [] bathroom activities and hygiene  [] Other:    Patient/Caregiver Education for:  [] Disease/sustained injury/management     [x] Medication Use  [x] Surgical intervention  [x] Safety  [x] Body mechanics and or joint protection  [x] Health maintenance     [] Other:     PHYSICAL THERAPY:  Goals:                  Short term goals  Time Frame for Short term goals: 1 week  Short term goal 1: The pt will perform supine<>sit with SBA  Short term goal 2: The pt will transfer with SBA and RW  Short term goal 3: The pt will ambulate with walker x 76' with CGA. Short term goal 4: The pt will be able to t            Long term goals  Time Frame for Long term goals : The pt will perform bed mobility with mod I  Long term goal 1: The pt will transfer with mod I and LRAD  Long term goal 2: The pt will ambulate 150 ft with LRAD and mod I  These goals were reviewed with this patient at the time of assessment and Terri Tucker is in agreement.      Plan of Care: Pt to be seen 5 out of 7 days per week per ARU protocol (90 minutes with PT)                  Current Treatment Recommendations: Strengthening, Transfer Training, Endurance Training, Neuromuscular Re-education, Patient/Caregiver Education & Training, Balance Training, Gait Training, Functional Mobility Training, Safety Education & Training    OCCUPATIONAL THERAPY:  Goals:             Short term goals  Time Frame for Short term goals: by 1 week  Short term goal 1: Pt will complete LB dressing with AE PRN at mod A  Short term goal 2: Pt will complete toileting with CGA  Short term goal 3: Pt will complete bathing at min A  Short term goal 4: Pt will complete shower transfer at spv  Short term goal 5: Pt will complete B UE exercises at mod I :  Long term goals  Time Frame for Long term goals : By 2 weeks  Long term goal 1: Pt will complete LB dressing with AE PRN at mod I  Long term goal 2: Pt will complete toileting with mod I  Long term goal 3: Pt will complete bathing at mod I  Long term goal 4: Pt will complete shower transfer at mod I  Long term goal 5: Pt will complete simple meal prep task at mod I with LRAD :  These goals were reviewed with this patient at the time of assessment and Terri Tucker is in agreement    Plan of Care:  Pt to be seen 5 out of 7 days per week per ARU protocol (90 minutes with OT)  Patient Education: verb understanding    SPEECH THERAPY: Goals will be left blank if speech is not following this patient. Goals:                                                                               Plan of Care:  Pt to be seen 5 out of 7 days per week per ARU protocol (0 minutes with SLP)    Therapy Treatments will include:  [x]  therapeutic exercises    [x]  gait training     [x]  neuromuscular re-ed                            [x]  transfer training             [] community reintegration    [x] bed mobility                          [x]  w/c mobility and training  [x]  self care    []home mgmt    []  cognitive training            [x]  energy conservation        []  dysphagia tx    []  speech/language/communication therapy   []  group therapy    [x]  patient/family education    [] Other:    CASE MANAGEMENT:  Goals:   Assist patient/family with discharge planning, patient/family counseling,   and coordination with insurance during ARU stay.     Admission Period/Goal QM SCORES  QM Admit/Goal Score   Eating CARE Score: 5 / Discharge Goal: Independent   Oral Hygiene CARE Score: 5 / Discharge Goal: Independent   Shower/Bathing CARE Score: 3 / Discharge Goal: Independent   UB Dressing CARE Score: 5 / Discharge Goal: Independent   LB Dressing CARE Score: 4 / Discharge Goal: dietician/nutritionist may monitor calorie count as well as intake and collaboratively work with SLP on dietary upgrades. Neuropsychology/Psychology may evaluate and provide necessary support. Medical issues being managed closely and that require 24hour availability of a physician:  [] Swallowing Precautions  [x] Bowel/Bladder Fx  [] Weight bearing precautions  [x] Wound Care    [x] Pain Mgmt   [x] Infection Protection  [x] DVT Prophylaxis   [x] Fall Precautions  [x] Fluid/Electrolyte/Nutrition Balance  [] Voice Protection   [] Respiratory  [] Other:    Medical Prognosis: [] Good  [x] Fair    [] Guarded   Total expected IRF days:  12  Anticipated discharge destination:   Home with spouse  [] Home Independently   [x] Home Modified Independent  [] Home with supervision    []SNF     [] Other                                           Physician anticipated functional outcomes:  Home with LRAD    IPOC brief synthesis:    Terri Castro is a 63 y. o.female with pmhx OA, DM, Alcohol Abuse (Prior AA, 2017), Mood Disorder, asthma admitted on 2/25/2021  for planned orthopedic procedure for infection of femur hardware. This initial ARU patient treatment plan of care, together with the IPOC & the Education plan, form the foundation for the patient's plan of care. Weekly patient care conferences are held to evaluate progress towards the initial treatment plan & goals. I have reviewed this initial plan of care and agree with its contents:    Title   Name    Date    Time    Physician: CARLYN LewisPSantyH  PM&R  3/5/2021  9:29 AM        Case Mgmt: Manjit Joanie, LSW  3/3/21 1548    OT: Shu Morales.  Zoila Gr #[id number]     3/3/21    @8639    PT: Berto Perkins DPT 3/4/21 8376    RN: Flex Perez RN 3/3/21, 6208    ST:    :  Chaya Baker MA, PD, 3/5/2021 8:57    Other:

## 2021-03-03 NOTE — CARE COORDINATION
Adithya Wall  1960 (60 yrs)    Address  [address] [place] [address],[address]   Phone  [phone]    [facility] Daughter [phone]     [facility] KRALY FIELDS Daughter [phone]     Timothy Dorado Spouse   [phone]    Insurance: [facility]  Member ID#: [id number]  Auth# [id number]  Approved 7 days  Next reivew date 3/9/21    Referring Facility: [facility]  Referring Physician: Camilla Jorge    Allergies: Cortisone, compazine, Droperidol     WB precautions: WBAT RLE    Dx: Infection associated with internal fixation device of right femur, subsequent encounter [H19.769C]        Summary:  Dai Mahoney is a 63 y. o.female with pmhx OA, DM, Alcohol Abuse (Prior AA, 2017), Mood Disorder, asthma admitted on 2/25/2021  for planned orthopedic procedure for infection of femur hardware. Pt has b/l knee replaced in 2014 and right hip replaced in July 2016/ Revision in August 2016. Intraop Femur fracture found the, on right side. Then she fell in Oct 2016 and broke her femur again. Has A plate [facility] in femur. [facility]  In 2016 she has some abscesses pop up in 2016 on her right thigh and she was on ABX for a year then. The abscesses stopped appearing for a few years. But last year In march she has some more abscesses  along her thigh and on her right knee. They were red with whitish dot in center. Some of them were lanced but I don't see any cultures. She saw ortho and an attempt was made to drain the right hip but nothing could be aspirated. A hip swab from OSH showed MSSA but I am not sure where it was taken from. She saw her PCP in Jan 2021 who lanced some of the abscesses and started dicloxacillin  followed by a repeat deeper lancing just 6 days ago that he switched her to Keflex. Referred to Ortho at [facility]. 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  Up until 2/5/2021 she was on Keflex Patient admitted too [facility] and  had infected plate recs: d/t only partial JENIFER, prefer double beta-lactam coverage for enterococcus  Continue ceftriaxone 2g q 12 hr + unasyn 3g every 6 hours for 12 weeks, unless plan to remove hardware   PICC line in place   Weekly safety labs: CBC w/ differential, BMP, ESR, CRP, + LFTs      Essential HTN - PTA: amlodipine 2.5mg daily, lasix 20mg daily  BP stable w/o meds   At discharge, can resume home lasix, amlodipine     Constipation  Continue bowel regimen      Chronic Pain  Mood Disorder - PTA: elavil, flexeril, fluoxetine, gabapentin, meloxicam  Resume home meds     Moderate Persistent Asthma  RT assess and treat, IS  Continue home Albuterol prn   O2 sats stable on room air  Needs outpatient PFTs and pulmonology for further w/u of SOB with exertion (? ILD, pulm HTN. ..) (already new referral PTA at an OSH)     Medication List     TAKE these medications, which are NEW      Quantity/Refills  acetaminophen 325 MG tablet  Commonly known as: TYLENOL  Take 3 tablets (975 mg total) by mouth every 8 hours for 10 days. Quantity: 90 tablet  Refills: 0     AMPicillin-sulbactam  IVPB (Outpatient / Ambulatory)  Commonly known as: UNASYN  Intravenous 3 g every 6 hours for 84 days. Give as IV piggyback in appropriate diluent and volume as specified by receiving facility. Refills: 0     ceftriaxone  IVPB (Outpatient / Ambulatory)  Commonly known as: ROCEPHIN  Intravenous 2 g every 12 hours for 84 days. Give as IV piggyback in appropriate diluent and volume as specified by receiving facility. Refills: 0     enoxaparin 30 mg/0.3 mL Syrg  Commonly known as: LOVENOX  Inject the contents of 1 syringe (30 mg total) subcutaneously in the morning and at bedtime for 28 days. Quantity: 16.8 mL  For: Deep Vein Thrombosis Prevention  Refills: 0     ibuprofen 600 MG tablet  Commonly known as: MOTRIN  Take 1 tablet (600 mg total) by mouth 4 times a day with meals and at bedtime for 10 days.      Quantity: 40 tablet  Refills: 0     naloxone 4 mg/actuation Spry  Commonly known as: NARCAN  Apply 1 spray in one nostril once for 1 dose. Call 911. May repeat dose in other nostril if no response in 3 minutes. Quantity: 2 each  Refills: 0     oxyCODONE 5 MG immediate release tablet  Commonly known as: ROXICODONE  Take 1-2 tablets (5-10 mg total) by mouth every 4 hours as needed for up to 10 days. Quantity: 42 tablet  Refills: 0     senna-docusate 8.6-50 mg per tablet  Commonly known as: SENNA-S  Take 1 tablet by mouth 2 times a day for 10 days. Quantity: 20 tablet  Refills: 0        TAKE these medications, which you were ALREADY TAKING      Quantity/Refills  * albuterol 90 mcg/actuation inhaler  Commonly known as: PROVENTIL  Inhale 2 puffs into the lungs every 6 hours as needed for Wheezing. Quantity: 54 g  Refills: 0     * albuterol 2.5 mg /3 mL (0.083 %) nebulizer solution  Commonly known as: PROVENTIL  Inhale 3 mLs (2.5 mg total) by nebulization every 6 hours as needed for Wheezing. Quantity: 75 vial  Refills: 0     amitriptyline 75 MG tablet  Commonly known as: ELAVIL  Take 1 tablet by mouth at bedtime. Quantity: 90 tablet  Refills: 4     amLODIPine 2.5 MG tablet  Commonly known as: NORVASC  Take 1 tablet by mouth daily. Quantity: 90 tablet  Refills: 0     atorvastatin 40 MG tablet  Commonly known as: LIPITOR  Take 1 tablet by mouth daily. Quantity: 90 tablet  Refills: 0     cyclobenzaprine 10 MG tablet  Commonly known as: FLEXERIL  Take 1 tablet by mouth at bedtime as needed for muscle spasms. Quantity: 90 tablet  Refills: 0     FLUoxetine 40 MG capsule  Commonly known as: PROZAC  Take 1 capsule by mouth daily at 8pm.     Quantity: 90 capsule  Refills: 2     furosemide 20 MG tablet  Commonly known as: LASIX  Take 1 tablet by mouth daily. Quantity: 90 tablet  Refills: 0     pregabalin 100 MG capsule  Commonly known as: LYRICA  Take 100 mg by mouth 3 times a day.      Refills: 0      DISCHARGE INSTRUCTIONS: RESPIRATORY:  Incentive spirometer ten times per hour while awake. BLADDER:  Monitor for urinary retention, urinary incontinence and outputs daily  Monitor for regular bowel movements     LINES, TUBES AND DRAINS:  PICC line care per nursing protocol. May use Alteplase as needed for occluded central venous catheter. Remove central line after OK with ID     WOUND CARE: dry sterile dressing changes using sterile gauze/tegaderm PRN for saturation or integrity issues. Franchesca Pulido leave sutures/staples/steri-strips in place  no lotions, creams or ointments  keep clean and dry     LABS:  Safety labs while on IV antibiotics   Please obtain safety labs and fax results to [facility] fax # [phone] Attn: HAN Coffman Speaker. Mondays: CBC w/ differential, BMP, ESR, CRP, + Hepatic Panel  PICC Care with weekly and PRN sterile dressing changes. If any problems with PICC (ie: unable to draw blood or concern for contamination/ DVT please notify infectious disease center @ [phone])     ACTIVITY: Activity as tolerated  Out of bed to chair three times a day as tolerated  Ambulate three times a day  May sponge bathe only  Neurovascular checks to affected extremities daily  Ice and elevate injured extremities     ORTHO/TRAUMA INSTRUCTIONS: ANTICOAGULATION: lovenox BID x 28 days  No additional Acetaminophen (Tylenol) products when taking combination medications Oxycodone/APAP (Percocets) or Hydrocodone/APAP (Lortab, Vicodin, Norco). OK to take Acetaminophen (Tylenol) when taking plain Oxycodone (Roxicodone). Do not exceed 3000 mg Acetaminophen (Tylenol) in 24 hours     ANTIBIOTICS: Vancomycin and Ceftriaxone 2gr BID x 12 weeks for e.facalis growing in L femur surgical cultures.

## 2021-03-03 NOTE — PROGRESS NOTES
Patient arrived on unit via stretcher with transport, from , without complications. A&Ox4, VSS. Patient educated on program and oriented to room. Educated on safety precautions, call light within reach, bed alarm utilized, bedside table within reach.      Vitals:    03/03/21 1508   BP: (!) 164/69   Pulse: 87   Resp: 18   Temp: 98.5 °F (36.9 °C)   SpO2: 92%

## 2021-03-03 NOTE — PROGRESS NOTES
4 Eyes Admission Assessment     I agree as the admission nurse that 2 RN's have performed a thorough Head to Toe Skin Assessment on the patient. ALL assessment sites listed below have been assessed on admission. Areas assessed by both nurses: Trey and Kayla   [x]   Head, Face, and Ears   [x]   Shoulders, Back, and Chest  [x]   Arms, Elbows, and Hands   [x]   Coccyx, Sacrum, and Ischium  [x]   Legs, Feet, and Heels        Does the Patient have Skin Breakdown?    Scattered brusing and surgical incision on R leg     Evan Prevention initiated:  No   Wound Care Orders initiated:  No      [facility] nurse consulted for Pressure Injury (Stage 3,4, Unstageable, DTI, NWPT, and Complex wounds) or Evan score 18 or lower:  No      Nurse 1 eSignature: Electronically signed by Hiral Cheatham RN on 3/3/21 at 3:40 PM EST    **SHARE this note so that the co-signing nurse is able to place an eSignature**    Nurse 2 eSignature: Electronically signed by lFex Perez RN on 3/3/21 at 3:37 PM EST

## 2021-03-04 PROCEDURE — 6370000000 HC RX 637 (ALT 250 FOR IP): Performed by: PHYSICAL MEDICINE & REHABILITATION

## 2021-03-04 PROCEDURE — 94640 AIRWAY INHALATION TREATMENT: CPT

## 2021-03-04 PROCEDURE — 97530 THERAPEUTIC ACTIVITIES: CPT

## 2021-03-04 PROCEDURE — 94761 N-INVAS EAR/PLS OXIMETRY MLT: CPT

## 2021-03-04 PROCEDURE — 97116 GAIT TRAINING THERAPY: CPT

## 2021-03-04 PROCEDURE — 2580000003 HC RX 258: Performed by: PHYSICAL MEDICINE & REHABILITATION

## 2021-03-04 PROCEDURE — 6360000002 HC RX W HCPCS: Performed by: PHYSICAL MEDICINE & REHABILITATION

## 2021-03-04 PROCEDURE — 99223 1ST HOSP IP/OBS HIGH 75: CPT | Performed by: PHYSICAL MEDICINE & REHABILITATION

## 2021-03-04 PROCEDURE — 1280000000 HC REHAB R&B

## 2021-03-04 PROCEDURE — 94664 DEMO&/EVAL PT USE INHALER: CPT

## 2021-03-04 PROCEDURE — 97110 THERAPEUTIC EXERCISES: CPT

## 2021-03-04 PROCEDURE — 97535 SELF CARE MNGMENT TRAINING: CPT

## 2021-03-04 PROCEDURE — 94150 VITAL CAPACITY TEST: CPT

## 2021-03-04 PROCEDURE — 6370000000 HC RX 637 (ALT 250 FOR IP): Performed by: STUDENT IN AN ORGANIZED HEALTH CARE EDUCATION/TRAINING PROGRAM

## 2021-03-04 RX ORDER — ALBUTEROL SULFATE 2.5 MG/3ML
2.5 SOLUTION RESPIRATORY (INHALATION) EVERY 4 HOURS PRN
Status: DISCONTINUED | OUTPATIENT
Start: 2021-03-04 | End: 2021-03-20 | Stop reason: HOSPADM

## 2021-03-04 RX ORDER — BENZONATATE 100 MG/1
100 CAPSULE ORAL 3 TIMES DAILY PRN
Status: DISCONTINUED | OUTPATIENT
Start: 2021-03-04 | End: 2021-03-20 | Stop reason: HOSPADM

## 2021-03-04 RX ORDER — TIZANIDINE 4 MG/1
4 TABLET ORAL EVERY 6 HOURS PRN
Status: DISCONTINUED | OUTPATIENT
Start: 2021-03-04 | End: 2021-03-20 | Stop reason: HOSPADM

## 2021-03-04 RX ORDER — ALPRAZOLAM 0.25 MG/1
0.25 TABLET ORAL 3 TIMES DAILY PRN
Status: DISCONTINUED | OUTPATIENT
Start: 2021-03-04 | End: 2021-03-20 | Stop reason: HOSPADM

## 2021-03-04 RX ADMIN — OXYCODONE 10 MG: 5 TABLET ORAL at 04:02

## 2021-03-04 RX ADMIN — ALBUTEROL SULFATE 2.5 MG: 2.5 SOLUTION RESPIRATORY (INHALATION) at 05:00

## 2021-03-04 RX ADMIN — IPRATROPIUM BROMIDE AND ALBUTEROL SULFATE 1 AMPULE: .5; 2.5 SOLUTION RESPIRATORY (INHALATION) at 15:08

## 2021-03-04 RX ADMIN — OXYCODONE 10 MG: 5 TABLET ORAL at 00:46

## 2021-03-04 RX ADMIN — TIZANIDINE 4 MG: 4 TABLET ORAL at 18:15

## 2021-03-04 RX ADMIN — PREGABALIN 100 MG: 75 CAPSULE ORAL at 13:44

## 2021-03-04 RX ADMIN — BISACODYL 5 MG: 5 TABLET, COATED ORAL at 08:40

## 2021-03-04 RX ADMIN — OXYCODONE 10 MG: 5 TABLET ORAL at 08:42

## 2021-03-04 RX ADMIN — TIZANIDINE 2 MG: 4 TABLET ORAL at 08:48

## 2021-03-04 RX ADMIN — IPRATROPIUM BROMIDE AND ALBUTEROL SULFATE 1 AMPULE: .5; 2.5 SOLUTION RESPIRATORY (INHALATION) at 11:16

## 2021-03-04 RX ADMIN — FLUOXETINE 40 MG: 20 CAPSULE ORAL at 08:41

## 2021-03-04 RX ADMIN — TIZANIDINE 2 MG: 4 TABLET ORAL at 01:35

## 2021-03-04 RX ADMIN — PREGABALIN 100 MG: 75 CAPSULE ORAL at 08:41

## 2021-03-04 RX ADMIN — IPRATROPIUM BROMIDE AND ALBUTEROL SULFATE 1 AMPULE: .5; 2.5 SOLUTION RESPIRATORY (INHALATION) at 19:54

## 2021-03-04 RX ADMIN — ENOXAPARIN SODIUM 30 MG: 30 INJECTION SUBCUTANEOUS at 08:41

## 2021-03-04 RX ADMIN — AMPICILLIN AND SULBACTAM 3000 MG: 1; 2 INJECTION, POWDER, FOR SOLUTION INTRAMUSCULAR; INTRAVENOUS at 01:26

## 2021-03-04 RX ADMIN — ALBUTEROL SULFATE 2.5 MG: 2.5 SOLUTION RESPIRATORY (INHALATION) at 01:00

## 2021-03-04 RX ADMIN — ATORVASTATIN CALCIUM 40 MG: 40 TABLET, FILM COATED ORAL at 21:04

## 2021-03-04 RX ADMIN — OXYCODONE 10 MG: 5 TABLET ORAL at 13:45

## 2021-03-04 RX ADMIN — ALPRAZOLAM 0.25 MG: 0.25 TABLET ORAL at 16:53

## 2021-03-04 RX ADMIN — AMPICILLIN AND SULBACTAM 3000 MG: 1; 2 INJECTION, POWDER, FOR SOLUTION INTRAMUSCULAR; INTRAVENOUS at 19:31

## 2021-03-04 RX ADMIN — PREGABALIN 100 MG: 75 CAPSULE ORAL at 21:04

## 2021-03-04 RX ADMIN — CEFTRIAXONE SODIUM 2000 MG: 2 INJECTION, POWDER, FOR SOLUTION INTRAMUSCULAR; INTRAVENOUS at 08:58

## 2021-03-04 RX ADMIN — AMPICILLIN AND SULBACTAM 3000 MG: 1; 2 INJECTION, POWDER, FOR SOLUTION INTRAMUSCULAR; INTRAVENOUS at 07:43

## 2021-03-04 RX ADMIN — AMLODIPINE BESYLATE 2.5 MG: 5 TABLET ORAL at 08:47

## 2021-03-04 RX ADMIN — OXYCODONE 10 MG: 5 TABLET ORAL at 23:51

## 2021-03-04 RX ADMIN — CEFTRIAXONE SODIUM 2000 MG: 2 INJECTION, POWDER, FOR SOLUTION INTRAMUSCULAR; INTRAVENOUS at 20:23

## 2021-03-04 RX ADMIN — AMITRIPTYLINE HYDROCHLORIDE 75 MG: 25 TABLET, FILM COATED ORAL at 21:04

## 2021-03-04 RX ADMIN — OXYCODONE 10 MG: 5 TABLET ORAL at 18:16

## 2021-03-04 RX ADMIN — BENZONATATE 100 MG: 100 CAPSULE ORAL at 13:48

## 2021-03-04 RX ADMIN — AMPICILLIN AND SULBACTAM 3000 MG: 1; 2 INJECTION, POWDER, FOR SOLUTION INTRAMUSCULAR; INTRAVENOUS at 13:50

## 2021-03-04 RX ADMIN — FUROSEMIDE 20 MG: 20 TABLET ORAL at 08:40

## 2021-03-04 ASSESSMENT — PAIN SCALES - GENERAL
PAINLEVEL_OUTOF10: 6
PAINLEVEL_OUTOF10: 9
PAINLEVEL_OUTOF10: 5
PAINLEVEL_OUTOF10: 7
PAINLEVEL_OUTOF10: 8
PAINLEVEL_OUTOF10: 9
PAINLEVEL_OUTOF10: 6
PAINLEVEL_OUTOF10: 9

## 2021-03-04 ASSESSMENT — PAIN DESCRIPTION - PAIN TYPE: TYPE: ACUTE PAIN

## 2021-03-04 ASSESSMENT — PAIN DESCRIPTION - LOCATION
LOCATION: LEG;HIP
LOCATION: HIP;LEG

## 2021-03-04 ASSESSMENT — PAIN - FUNCTIONAL ASSESSMENT: PAIN_FUNCTIONAL_ASSESSMENT: PREVENTS OR INTERFERES SOME ACTIVE ACTIVITIES AND ADLS

## 2021-03-04 ASSESSMENT — PAIN DESCRIPTION - FREQUENCY: FREQUENCY: CONTINUOUS

## 2021-03-04 ASSESSMENT — PAIN DESCRIPTION - ONSET
ONSET: ON-GOING
ONSET: ON-GOING

## 2021-03-04 ASSESSMENT — PAIN DESCRIPTION - DESCRIPTORS
DESCRIPTORS: SHOOTING;BURNING
DESCRIPTORS: STABBING;OTHER (COMMENT)

## 2021-03-04 ASSESSMENT — PAIN DESCRIPTION - ORIENTATION: ORIENTATION: RIGHT

## 2021-03-04 NOTE — PROGRESS NOTES
Incentive Spirometry education and demonstration completed by Respiratory Therapy. Turning over to Nursing for routine follow-up. Minimum Predicted Vital Capacity - 578 mL. Patient's Actual Vital Capacity - 1000 mL.

## 2021-03-04 NOTE — PROGRESS NOTES
Occupational Therapy   Occupational Therapy Initial Assessment/Treatment  Date: 3/4/2021   Patient Name: Buddy Guevara  MRN: [id number]     : 1960    Date of Service: 3/4/2021    Discharge Recommendations:  24 hour supervision or assist, Home with Home health OT  OT Equipment Recommendations  Other: continue to assess, possibly shower chair and reacher/ sock aide    Assessment   Performance deficits / Impairments: Decreased functional mobility ; Decreased endurance;Decreased coordination;Decreased ADL status; Decreased balance;Decreased strength;Decreased high-level IADLs  Assessment: Prior to admission pt was living at home with her , was independent with ADLs and IADLs. Pt now presents s/p hip hardware infection, now significantly below baseline. Pt demonstrating min A for transfers, min A for LB dressing 2/2 pain and fatigue, min A for toileting. PT would benefit from continued OT while in ARU, continue OT POc. Treatment Diagnosis: decreased ADLs and transfers  Prognosis: Fair  Decision Making: Medium Complexity  OT Education: OT Role;Plan of Care  Patient Education: verb understanding  REQUIRES OT FOLLOW UP: Yes  Activity Tolerance  Activity Tolerance: Patient Tolerated treatment well;Patient limited by fatigue;Patient limited by pain  Activity Tolerance: Pt demonstrated max fatigue after ADL routine, reported increased hip pain and requested Rx. RN in room at end of session. Safety Devices  Safety Devices in place: Yes  Type of devices: Nurse notified;Call light within reach; Left in bed;Bed alarm in place           Patient Diagnosis(es): There were no encounter diagnoses. has a past medical history of Asthma, Depression, Osteoarthritis, PONV (postoperative nausea and vomiting), and Scoliosis. has a past surgical history that includes Gastric bypass surgery (); Rotator cuff repair (Left, ); Knee cartilage surgery (Left, ); Breast enhancement surgery (Bilateral, );   section ([address] 71); Cholecystectomy (1980); [place] tooth extraction (1995); joint replacement (Bilateral, 12/29/2014); fracture surgery; joint replacement; and Total hip arthroplasty (Bilateral, 2014). Treatment Diagnosis: decreased ADLs and transfers      Restrictions  Position Activity Restriction  Other position/activity restrictions: WBAT R LE; up with assist    Subjective   General  Chart Reviewed: Yes  Patient assessed for rehabilitation services?: Yes  Additional Pertinent Hx: Pt admitted to OSH with h/o hip hardware infection. Infected hardware removed on 2/25/21. Started on IV ABX. Admitted to ARU on 3/3/21. PMHx inclues:  Asthma, Depression, Osteoarthritis, PONV (postoperative nausea and vomiting), and Scoliosis. Family / Caregiver Present: No  Referring Practitioner: Gabi  Diagnosis: debility  Subjective  Subjective: Pt semi supine in bed upon arrival, on IV ABX. Pt agreeable to OT eval and treat, reporting fatigue however.   Pain Assessment  Pain Level: 8  Social/Functional History  Social/Functional History  Lives With: Spouse( spends 1 week per month with his mother out of town)  Type of Home: Apartment  Home Layout: One level(laundry down the marshall on same floor-  typically does)  Home Access: Elevator, Level entry  Bathroom Shower/Tub: Tub/Shower unit  Bathroom Toilet: Standard(sink nearby for leverage)  Bathroom Equipment: Grab bars in shower  Bathroom Accessibility: Walker accessible  Home Equipment: 4 wheeled walker, Reacher, Sock aid, Long-handled shoehorn, Cane, Electric scooter(pt has another walker but unsure if it has wheels; does ambulate some without 4WW but usually with 4OT)  ADL Assistance: Independent( stands by shower but doesn't really assist)  Homemaking Responsibilities: No(cleaning person comes 2x/month, gets MOW and does microwave meals,  and daughter do laundry)  Ambulation Assistance: Independent(uses 4WW more often than not and definitely outside)  Transfer Assistance: Independent  Active : No(pt reporting she has not driven in a while, uses transportation and  for getting to apts)  Occupation: On disability  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  Leisure & Hobbies: pt reporting she had been doing tough mudders before her knee replacements, likes spending time with her grandkids, likes to afshin  Additional Comments: pt reporting having multiple falls a few months, reported having one fall after trying to get up on her own at the last hospital       Objective        Orientation  Overall Orientation Status: Within Functional Limits     Balance  Sitting Balance: Supervision(seated on TTB, wc)  Standing Balance: Moderate assistance  Standing Balance  Time: 3+3 mins  Activity: mobilityin/ out of bathroom  Comment: with RW  Functional Mobility  Functional - Mobility Device: Rolling Walker  Activity: To/from bathroom  Assist Level: Minimal assistance  Functional Mobility Comments: CGA initially, pt with one LOB standing from wc and unable to recover, wc behind pt for safe landing  Toilet Transfers  Toilet - Technique: Ambulating  Equipment Used: Standard toilet  Toilet Transfer: Minimal assistance  ADL  Feeding: Beverage management;Setup  Grooming: Setup(seated on wc for oral care, hand hygiene)  UE Bathing: Setup(seated on wc at sink side)  LE Bathing: Moderate assistance(assist to wash lower LEs, min A for standing balance during carole care)  UE Dressing: Setup  LE Dressing: Minimal assistance(pt doffed pants with min A to maintain standing balance, pulling off with use of LEs. Pt declined donning pants 2/2 fatigue and request to return to bed at end of am session- pt also reporting not having clean pants to wear)  Toileting: Minimal assistance(min A for clothing mgmt to pull up over hips)  Additional Comments: Sponge bathing completed this date 2/2 pt on IV ABX during session.  Assisted for setup for bathing at sink side, assist to provide pt with shampoo cap. Pt reported fatigue throughout ADL. Transfers  Sit to stand: Minimal assistance  Stand to sit: Minimal assistance     Cognition  Overall Cognitive Status: WFL        Sensation  Overall Sensation Status: WFL        LUE AROM (degrees)  LUE AROM : WFL  Left Hand AROM (degrees)  Left Hand AROM: WFL  RUE AROM (degrees)  RUE AROM : WFL  Right Hand AROM (degrees)  Right Hand AROM: WFL         ADDENDUM [id number]  Pt supine in bed upon entry, very pleasant and agreeable to therapy session. Pt rated RLE extremity pain as \"Through the roof,\" RN administered pain meds prior to therapy session. Pt supine to sit SBA. Pt sit EOB Supervision. Pt doffed  socks with reacher and donned new  socks with sock aid (setup). Pt donned hospital pants at [address] [place] [address],[address] [place] in stance for LB clothing mgmt. Pt sit to stand and stand to sit both CGA from EOB. Pt seated EOB performed the following UB exercises to increase strength for transfers with green theraband: 20 chest pulls , 20 bilateral bicep curls, 10 right/10 left shoulder diagonals. Pt sit to supine Min A for assist to lift RLE into bed. Call light in reach and bed alarm on.              Plan   Plan  Times per week: 5 days per week 90 mins each  Times per day: Daily  Current Treatment Recommendations: Strengthening, Balance Training, Functional Mobility Training, Endurance Training, Self-Care / ADL, Patient/Caregiver Education & Training          Goals  Short term goals  Time Frame for Short term goals: by 1 week  Short term goal 1: Pt will complete LB dressing with AE PRN at mod A  Short term goal 2: Pt will complete toileting with CGA  Short term goal 3: Pt will complete bathing at min A  Short term goal 4: Pt will complete shower transfer at spv  Short term goal 5: Pt will complete B UE exercises at mod I  Long term goals  Time Frame for Long term goals : By 2 weeks  Long term goal 1: Pt will complete LB dressing with AE PRN at mod I  Long term goal 2: Pt

## 2021-03-04 NOTE — PLAN OF CARE
Problem: Pain:  Goal: Pain level will decrease  Description: Pain level will decrease  3/4/2021 0323 by Cat Michaels RN  Outcome: Ongoing  Note: Patient complains of pain at level 7/10. Patient describes pain as being on fire. Patient requests pain medication. Patient medicated with oxycodone. Will continue to monitor. Problem: Pain:  Goal: Control of acute pain  Description: Control of acute pain  Outcome: Ongoing     Problem: Pain:  Goal: Control of chronic pain  Description: Control of chronic pain  Outcome: Ongoing     Problem: Falls - Risk of:  Goal: Will remain free from falls  Description: Will remain free from falls  3/4/2021 0323 by Cat Michaels RN  Outcome: Ongoing  Note: Patient is a fall risk. Patient is a x 1 with walker and gait belt. See Fall Risk assessment for details. Bed is in low, lock position; call light/belongings within reach. No attempts to get out of bed have been made, calls appropriately when assistance is needed. Bed alarm and hourly rounds in place for safety.       Problem: Falls - Risk of:  Goal: Absence of physical injury  Description: Absence of physical injury  Outcome: Ongoing

## 2021-03-04 NOTE — PROGRESS NOTES
Physical Therapy    Facility/Department: [facility] ACUTE REHAB UNIT  Initial Assessment and treatment    NAME: Dai Mahoney  : 1960  MRN: [id number]    Date of Service: 3/4/2021    Discharge Recommendations:  Home with Home health PT   PT Equipment Recommendations  Equipment Needed: (continue to assess)    Assessment   Body structures, Functions, Activity limitations: Decreased functional mobility ; Decreased balance;Decreased strength;Decreased endurance  Assessment: The pt is a 62 y/o female who presents after having hip and knee replacement with hx of infections and recent hardware removal with new infection. Pt with increased pain, impaired activity tolerance, impaired balance and safety awareness with mobility. Pt requires increased assist with bed mobility, transfers, and is only able to ambulate short distances. She would benefit from further skilled PT to improve her independence with functional mobility prior to discharge home. Treatment Diagnosis: impaired mobility secondary to infection  Prognosis: Good  Decision Making: Medium Complexity  PT Education: Goals;PT Role;Plan of Care;Home Exercise Program;Gait Training;General Safety;Transfer Training;Functional Mobility Training  Patient Education: pt verbalized understanding  REQUIRES PT FOLLOW UP: Yes  Activity Tolerance  Activity Tolerance: Patient limited by pain; Patient limited by fatigue;Patient limited by endurance       Patient Diagnosis(es): There were no encounter diagnoses. has a past medical history of Asthma, Depression, Osteoarthritis, PONV (postoperative nausea and vomiting), and Scoliosis. has a past surgical history that includes Gastric bypass surgery (); Rotator cuff repair (Left, ); Knee cartilage surgery (Left, ); Breast enhancement surgery (Bilateral, );  section ([place] 71); Cholecystectomy ();  [place] tooth extraction (); joint replacement (Bilateral, 2014); fracture surgery; joint replacement; and Total hip arthroplasty (Bilateral, 2014). Restrictions  Position Activity Restriction  Other position/activity restrictions: WBAT R LE; up with assist  Vision/Hearing  Vision: Impaired  Vision Exceptions: Wears glasses for reading  Hearing: Within functional limits     Subjective  General  Chart Reviewed: Yes  Patient assessed for rehabilitation services?: Yes  Additional Pertinent Hx: Pt had knee and hip replacements. She has since had difficulty with abscesses and infections with the joints. 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 Up until 2/5/2021 she was on Keflex Patient admitted too [facility] and  had infected plate removed from her femur 2/25/2021 along with removal of screws,cables and deep bone biopsy. Per Op note. The area around late looked infected. There was sinus tract that connected with knee joint space. The femur stem of the Artifical hip was exposed and there was reactive synovitis there as well. 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 Intraoperatively 3 tissue Cx and 1 deep bone Cx were taken E Faecalis. Patient was started on IV Vancomycin and plan is currently to continue for 12 weeks with tentative end date of 5/20  Family / Caregiver Present: No  Referring Practitioner: Dr. Stalin Penny  Diagnosis: debility  Follows Commands: Within Functional Limits  General Comment  Comments: Pt presents in bathroom on toilet and agreeable to PT eval.  Subjective  Subjective: \"You therapy girls are always nice. \"  Pain Screening  Patient Currently in Pain: Yes(5/10 in her hip and knee on R, pain medication received)  Vital Signs  Patient Currently in Pain: Yes(5/10 in her hip and knee on R, pain medication received)       Orientation  Orientation  Overall Orientation Status: Within Normal Limits  Social/Functional History  Social/Functional History  Lives With: Spouse( spends 1 week per month with his mother out of town)  Type of Home: [address] [place] [address] Drive: One level(laundry down the marshall on same floor-  typically does)  Home Access: Elevator, Level entry  Bathroom Shower/Tub: Tub/Shower unit  Bathroom Toilet: Standard(sink nearby for leverage)  Bathroom Equipment: Grab bars in shower  Bathroom Accessibility: [facility]  Home Equipment: 4 wheeled walker, Reacher, Sock aid, Long-handled shoehorn, Cane, Electric scooter(pt has another walker but unsure if it has wheels; does ambulate some without 4WW but usually with 6FW)  ADL Assistance: Independent( stands by shower but doesn't really assist)  Homemaking Responsibilities: No(cleaning person comes 2x/month, gets MOW and does microwave meals,  and daughter do laundry)  Ambulation Assistance: Independent(uses 4WW more often than not and definitely outside)  Transfer Assistance: Independent  Active : No(pt reporting she has not driven in a while, uses transportation and  for getting to apts)  Occupation: On disability  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  Leisure & Hobbies: pt reporting she had been doing tough mudders before her knee replacements, likes spending time with her grandkids, likes to afshin  Additional Comments: pt reporting having multiple falls a few months, reported having one fall after trying to get up on her own at the last hospital  Cognition   Cognition  Overall Cognitive Status: WFL    Objective             Strength RLE  Strength RLE: Exception  Comment: hip flexion 2/5, knee flex/ext 3/5, 3/5, DF 4/5- limited by pain/muscle spasm in some ROM  Strength LLE  Strength LLE: Exception  Comment: hip flexion 3+/5, knee flex/ext 4-/5, 4-/5, DF 4/5     Sensation  Overall Sensation Status: WFL  Bed mobility  Rolling to Left: Minimal assistance  Rolling to Right: Contact guard assistance  Supine to Sit: Minimal assistance(HOB flat without use of bedrail)  Sit to Supine: Moderate assistance(for B  LE)  Transfers  Sit to Stand: Minimal Assistance;Contact guard assistance(Pt varying from CGA-min A depending on fatigue and pain level with transfers)  Stand to sit: Minimal Assistance(for eccentric control)  Bed to Chair: Contact guard assistance  Stand Pivot Transfers: Contact guard assistance  Car Transfer: Minimal Assistance  Comment: cueing for hand placement with transfers, foot placement, and to lean forward to improve her momentum with standing  Ambulation  Ambulation?: Yes  Ambulation 1  Surface: level tile;uneven  Device: Rolling Walker  Assistance: Minimal assistance  Quality of Gait: decreased step length and marisa as well as step height. Decreased stance time on R LE with decreased step length of L LE. Distance: 68'+52'+02'+74' up ramp and 15' down ramp  Comments: Pt with pain limiting distance of ambulation  Stairs/Curb  Stairs?: No(pt unable)     Balance  Sitting - Static: Good  Sitting - Dynamic: Fair;+  Standing - Static: Fair;-  Standing - Dynamic: Poor;+    2nd session note: Pt presents supine in bed and stating that she is very fatigued. She is agreeable to supine exercises and performs A/AROM SLR on R LE and AROM on L LE x 10, hip add iso with pillow AROM x 10, hip abd x 10 sanjeev AROM, heel slides x 10 sanjeev AROM. Pt left in bed with all needs met and call light within reach at end of session.     Plan   Plan  Times per week: 5-7x/wk for 90 minutes per day  Times per day: Daily  Current Treatment Recommendations: Strengthening, Transfer Training, Endurance Training, Neuromuscular Re-education, Patient/Caregiver Education & Training, Balance Training, Gait Training, Functional Mobility Training, Safety Education & Training  Safety Devices  Type of devices: Call light within reach, Bed alarm in place, Left in bed, Nurse notified  Restraints  Initially in place: No    Goals  Short term goals  Time Frame for Short term goals: 1 week  Short term goal 1: The pt will perform supine<>sit with SBA  Short term goal 2: The pt will transfer with SBA and RW  Short term goal 3: The pt will ambulate with walker x 76' with CGA. Short term goal 4: The pt will be able to t  Long term goals  Time Frame for Long term goals : The pt will perform bed mobility with mod I  Long term goal 1: The pt will transfer with mod I and LRAD  Long term goal 2: The pt will ambulate 150 ft with LRAD and mod I  Patient Goals   Patient goals :  To get home soon       Therapy Time   Individual Concurrent Group Co-treatment   Time In 0930         Time Out 1045         Minutes 75         Timed Code Treatment Minutes: 60 Minutes    Second Session Therapy Time:   Individual Concurrent Group Co-treatment   Time In 1300         Time Out 1315         Minutes 15           Timed Code Treatment Minutes:  60+15=75 minutes    Total Treatment Minutes:  90 minutes      Lori Ceja, PT

## 2021-03-04 NOTE — PROGRESS NOTES
Patient was in bed, vital signs stable, was feeling wheezy so respiratory was called for breathing treatment. Patient was in pain 9/10 gave PRN oxycodone. Gave the scheduled antibiotics through the PICC. Calls appropriately, took to bathroom via x 1 with gait belt and walker.

## 2021-03-04 NOTE — CARE COORDINATION
Case Management Assessment           Initial Evaluation                Date / Time of Evaluation: 3/4/2021 9:28 AM                 Assessment Completed by: Marilin Espinosa    Patient Name: Дмитрий Tineo     YOB: 1960  Diagnosis: Debility [R53.81]     Date / Time: 3/3/2021  3:20 PM    Patient Admission Status: Inpatient    If patient is discharged prior to next notation, then this note serves as note for discharge by case management. Current PCP: Corrine Vargas  Clinic Patient: No    Chart Reviewed: Yes  Patient/ Family Interviewed: Yes    Initial assessment completed at bedside with: Patient    Hospitalization in the last 30 days: No    Emergency Contacts:  Extended Emergency Contact Information  Primary Emergency Contact: [facility]  Address: [address] [place] [address] [address], [address] [facility] [address] Phone: [phone]  Relation: Child  Secondary Emergency Contact: [address] [place] [address] Phone: [phone]  Relation: Child    Advance Directives:   Code Status: Full Code    Financial  Payor: Jordan Bobo / Plan: Deandre Esquivel PLUS HMO / Product Type: *No Product type* /     Pre-cert required for SNF: Yes    Pharmacy    [address] [phone] - F [phone]  [address] [address]  Phone: [phone] Fax: [phone]    [id number], [address] [place] [address] [phone] - F [phone]  [address] [place] [address]  Phone: [phone] Fax: [phone]      Potential assistance Purchasing Medications: Potential Assistance Purchasing Medications: No  Does Patient want to participate in local refill/ meds to beds program?: No    Meds To Beds General Rules:  1. Can ONLY be done Monday- Friday between 8:30am-5pm  2. Prescription(s) must be in pharmacy by 3pm to be filled same day  3. Copy of patient's insurance/ prescription drug card and patient face sheet must be sent along with the prescription(s)  4. Cost of Rx cannot be added to hospital bill. If financial assistance is needed, please contact unit  or ;  or  CANNOT provide pharmacy voucher for patients co-pays  5. Patients can then  the prescription on their way out of the hospital at discharge, or pharmacy can deliver to the bedside if staff is available. (payment due at time of pick-up or delivery - cash, check, or card accepted)     Able to afford home medications/ co-pay costs: Yes    ADLS  Support Systems: Spouse/Significant Other, Children    PT AM-PAC:   /24  OT AM-PAC:   /24    [place]: lives at home with spouse  Steps:     Plans to RETURN to current housing: Yes  Barriers to RETURNING to current housing: none at this time    [address]  Currently ACTIVE with [address]: No  Home Care Agency: Not Applicable    Currently ACTIVE with [place] on Aging: No    Durable Medical Equipment  DME Provider:   Equipment:     Home Oxygen and [address] prior to admission: No  [address]: Not Applicable  Other Respiratory Equipment:     Dialysis  Active with HD/PD prior to admission: No  Nephrologist:    HD Center:  Not Applicable    DISCHARGE PLAN:  Disposition: Home with [address]: TBD vs outpt therapy     Transportation PLAN for discharge: family     Factors facilitating achievement of predicted outcomes: Family support and Cooperative    Barriers to discharge: none at this time    Additional Case Management Notes:   SW met with Pt at bedside this AM. Pt lives at home with spouse - both are retired. Pt is a retired RN. Pt states her dgt is a RN here at [facility] [employer] PTA, Pt independent with all ADL's. No Home Care involvement. Pt plans to return home with spouse and is receptive to [address] or outpt therapy as recommended.      1124-Addendum  Dr. Sal Yuen advised SW in Team Meeting this AM that Pt will need Home IV ABX at discharge. The Plan for Transition of Care is related to the following treatment goals of Debility [R53.81]    The Patient and/or patient representative Terri and her family were provided with a choice of provider and agrees with the discharge plan Yes    Freedom of choice list was provided with basic dialogue that supports the patient's individualized plan of care/goals and shares the quality data associated with the providers.  Yes    Care Transition patient: No    Ama Blackwell   Case Management Department  Ph: [phone]

## 2021-03-04 NOTE — PROGRESS NOTES
Patient was in 9/10 pain, had 46 minutes left till the Oxycodone is due, called Dr. Meli Cohen he said to give the Oxycodone early.

## 2021-03-04 NOTE — CONSULTS
Intake 26-50% and Intake 51-75%    NUTRITION INTERVENTION  Food and/or Nutrient Delivery:Continue Current diet   Nutrition education/counseling/coordination of care: Continue Inpatient Monitoring  or Education Needed     NUTRITION MONITORING & EVALUATION:  Evaluation:Goals set   Goals:Goals: Pt will consume >50% of meals this admission  Monitoring: Meal Intake , Pertinent Labs  or Weight      OBJECTIVE DATA:  · Nutrition-Focused Physical Findings: TB=320 mg/dL, no edema   · Wounds Surgical Wound      Past Medical History:   Diagnosis Date    Asthma     Depression     Osteoarthritis     PONV (postoperative nausea and vomiting)     Scoliosis         ANTHROPOMETRICS  Current Height: 4' 10\" (147.3 cm)  Current Weight: 214 lb 8.1 oz (97.3 kg)    Admission weight: 214 lb 8.1 oz (97.3 kg)  Ideal Bodyweight: 90 lb   Weight Changes no wt loss noted        BMI BMI (Calculated): 0, 44.83    Wt Readings from Last 50 Encounters:   03/03/21 214 lb 8.1 oz (97.3 kg)   10/27/20 195 lb (88.5 kg)   10/28/20 200 lb 14.4 oz (91.1 kg)     COMPARATIVE STANDARDS  Estimated Total Kcals/Day : 12-15 Current Bodyweight (97 kg) 1036-2926 kcal    Estimated Total Protein (g/day) : 1.5-2.0 Ideal Bodyweight  (41 kg) 62-82 g/day  Estimated Daily Total Fluid (ml/day): 1 mL/kcal per day     Food / Nutrition-Related History  Pre-Admission / Home Diet:  Pre-Admission/Home Diet: General   Home Supplements / Herbals:    none noted  Food Restrictions / Cultural Requests:    none noted    Current Nutrition Therapies   DIET GENERAL;     PO Intake: 1-25%, 26-50% and 51-75%  PO Supplement: None   PO Supplement Intake: None   IVF:none     NUTRITION RISK LEVEL: Risk Level:  Moderate     Georgina Traore, [address] [place] [address]  [place]:  [phone]  Office:  [phone]

## 2021-03-04 NOTE — H&P
Department of Physical Medicine & Rehabilitation  History & Physical      Patient Identification:  Nabeel Camejo  : 1960  Admit date: 3/3/2021   Attending provider: Ingris Dailey DO        Primary care provider: Corrine Vargas     Chief Complaint: Debility    History of Present Illness/Hospital Course:  Pt is 64 y/o female transferred from [address] [place] [address] for ARU placement. Nabeel Camejo is a 63 y. o.female with pmhx OA, DM, Alcohol Abuse (Prior AA, 2017), Mood Disorder, asthma admitted on 2021  for planned orthopedic procedure for infection of femur hardware. Pt has b/l knee replaced in  and right hip replaced in 2016/ Revision in 2016. Intraop Femur fracture found the, on right side. Then she fell in Oct 2016 and broke her femur again. Has A plate placed in femur. All this was in [address]  In  she has some abscesses pop up in  on her right thigh and she was on ABX for a year then. The abscesses stopped appearing for a few years. But last year In march she has some more abscesses  along her thigh and on her right knee. They were red with whitish dot in center. Some of them were lanced but I don't see any cultures. She saw ortho and an attempt was made to drain the right hip but nothing could be aspirated. A hip swab from OSH showed MSSA but I am not sure where it was taken from. She saw her PCP in 2021 who lanced some of the abscesses and started dicloxacillin  followed by a repeat deeper lancing just 6 days ago that he switched her to Keflex. Referred to Ortho at [facility]. 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 Up until 2021 she was on Keflex Patient admitted too [facility] and  had infected plate removed from her femur 2021 along with removal of screws,cables and deep bone biopsy. Per Op note. The area around late looked infected. There was sinus tract that connected with knee joint space.  The femur stem of the Artifical hip was exposed and there was reactive synovitis there as well. 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 Intraoperatively 3 tissue Cx and 1 deep bone Cx were taken E Faecalis. Patient was started on IV Vancomycin and plan is currently to continue for 12 weeks with tentative end date of 5/20. Post -op Pain control with scheduled tylenol, Ibuprofen, lyrica. Also flexeril and oxycodone prn. She also has Moderate Persistent Asthma in which she is requiring HHN txs. Prior Level of Function:  Independent for mobility, ADLs, and IADLs    Current Level of Function:  Decreased mobility and strength in extremities. Pertinent Social History:  Support:     Ms. Jose Mixon was evaluated by PT/OT after her surgery. Terri was pleasant and very motivated to participate in therapy evaluation. She has little to no sensation in her RLE, and is unable to lift the leg without assist. Pt SBA for bed mobility, able to stand to RW with min A. Pt initially accepting minimal weight on RLE during gait; pt attempted to accept full weight onto RLE and had significant R knee buckling, requiring max Ax2 to maintain standing balance. At this time, pt presents well below her baseline level of function, and she requires assist for all mobility. Patient will benefit from the intensity of an acute rehab stay with multiple skilled therapy disciplines. The patient will benefit significantly from and is able to tolerate extensive daily therapy (at least 3 hours per day). At this time the patient is below their baseline with functional mobility, requiring increased need for assistance and increased burden of care.       Past Medical History:   Diagnosis Date    Asthma     Depression     Osteoarthritis     PONV (postoperative nausea and vomiting)     Scoliosis        Past Surgical History:   Procedure Laterality Date    BREAST ENHANCEMENT SURGERY Bilateral 2007     SECTION   &     CHOLECYSTECTOMY  1980    FRACTURE SURGERY      femur RT    GASTRIC BYPASS SURGERY  1998    [place]    JOINT REPLACEMENT Bilateral 2014    knee replacement    JOINT REPLACEMENT      KNEE CARTILAGE SURGERY Left 2006    ROTATOR CUFF REPAIR Left 2008    TOTAL HIP ARTHROPLASTY Bilateral 2014    WISDOM TOOTH EXTRACTION  1995     Major Surgery in past 100 days: yes    Family History   Adopted: Yes   Problem Relation Age of Onset    Diabetes Daughter     Cancer Mother         Breast and leukemia    Heart Disease Sister     Drug Abuse Brother        Social History     Socioeconomic History    Marital status:      Spouse name: Not on file    Number of children: Not on file    Years of education: Not on file    Highest education level: Not on file   Occupational History    Not on file   Social Needs    Financial resource strain: Not on file    Food insecurity     Worry: Not on file     Inability: Not on file    Transportation needs     Medical: Not on file     Non-medical: Not on file   Tobacco Use    Smoking status: Former Smoker     Packs/day: 0.25     Years: 0.20     Pack years: 0.05     Types: Cigarettes     Quit date: 2014     Years since quittin.7    Smokeless tobacco: Never Used    Tobacco comment: working to decrease   Substance and Sexual Activity    Alcohol use: No     Comment: recovering alcoholic since , has had couple of relaspes, last 7/15/2015.     Drug use: No    Sexual activity: Yes     Partners: Male   Lifestyle    Physical activity     Days per week: Not on file     Minutes per session: Not on file    Stress: Not on file   Relationships    Social connections     Talks on phone: Not on file     Gets together: Not on file     Attends Confucianist service: Not on file     Active member of club or organization: Not on file     Attends meetings of clubs or organizations: Not on file     Relationship status: Not on file    Intimate partner violence     Fear of current or ex partner: Not on file     Emotionally abused: Not on file     Physically abused: Not on file     Forced sexual activity: Not on file   Other Topics Concern    Not on file   Social History Narrative    Not on file       Allergies   Allergen Reactions    Cortisone Anaphylaxis and Other (See Comments)     Only if Injected; many years ago. Anaphylactic reaction??numbing agent??cortisone. Injected causes reaction. .. Vonzella Goodpasture Vonzella Goodpasture the patient states she CAN TAKE IV solumedrol fine (6/8/18)  Only if Injected; many years ago. Anaphylactic reaction??numbing agent??cortisone. - she can taken oral steroids.      Droperidol Other (See Comments)     EPS   shaking  shaking  ETS    Compazine [Prochlorperazine] Other (See Comments)     EPS symptoms         Current Facility-Administered Medications   Medication Dose Route Frequency Provider Last Rate Last Admin    albuterol (PROVENTIL) nebulizer solution 2.5 mg  2.5 mg Nebulization Q4H PRN Shiva Ashley, DO        cefTRIAXone (ROCEPHIN) 2000 mg IVPB in D5W 50ml minibag  2,000 mg Intravenous Q12H Shiva Ashley, DO   Stopped at 03/03/21 2059    enoxaparin (LOVENOX) injection 30 mg  30 mg Subcutaneous Daily Shiva Ashley, DO        ipratropium-albuterol (DUONEB) nebulizer solution 1 ampule  1 ampule Inhalation Q4H WA Shiva Ashley, DO   1 ampule at 03/03/21 1951    amitriptyline (ELAVIL) tablet 75 mg  75 mg Oral Nightly Shiva L Heis, DO   75 mg at 03/03/21 2033    amLODIPine (NORVASC) tablet 2.5 mg  2.5 mg Oral Daily Shiva REAVES Heis, DO        atorvastatin (LIPITOR) tablet 40 mg  40 mg Oral Nightly Shiva L Heis, DO   40 mg at 03/03/21 2032    tiZANidine (ZANAFLEX) tablet 2 mg  2 mg Oral Q6H PRN Shiva REAVES Heis, DO   2 mg at 03/04/21 0135    furosemide (LASIX) tablet 20 mg  20 mg Oral Daily Shiva REAVES Heis, DO        FLUoxetine (PROZAC) capsule 40 mg  40 mg Oral Daily Shiva REAVES Heis, DO        pregabalin (LYRICA) capsule 100 mg  100 mg Oral TID Orlibrado Willis Heis, DO   100 mg at 03/03/21 2033    ibuprofen (ADVIL;MOTRIN) tablet 200 mg  200 mg Oral Q6H PRN Shiva L Heis, DO        ampicillin-sulbactam (UNASYN) 3000 mg ivpb minibag  3,000 mg Intravenous Q6H Shiva L Heis,  mL/hr at 03/04/21 0743 3,000 mg at 03/04/21 0743    acetaminophen (TYLENOL) tablet 650 mg  650 mg Oral Q4H PRN Shiva L Heis, DO        bisacodyl (DULCOLAX) EC tablet 5 mg  5 mg Oral Daily Shiva L Heis, DO        magnesium hydroxide (MILK OF MAGNESIA) 400 MG/5ML suspension 30 mL  30 mL Oral Daily PRN Shiva L Heis, DO        polyethylene glycol (GLYCOLAX) packet 17 g  17 g Oral Daily PRN Shiva L Heis, DO        oxyCODONE (ROXICODONE) immediate release tablet 5 mg  5 mg Oral Q4H PRN Shiva L Heis, DO        Or    oxyCODONE (ROXICODONE) immediate release tablet 10 mg  10 mg Oral Q4H PRN Shiva L Heis, DO   10 mg at 03/04/21 0402         REVIEW OF SYSTEMS:   CONSTITUTIONAL: negative for fevers, chills, diaphoresis, appetite change, night sweats, unexpected weight change, +fatigue. EYES: negative for blurred vision, eye discharge, visual disturbance and icterus. HEENT: negative for hearing loss, tinnitus, ear drainage, sinus pressure, nasal congestion, epistaxis and snoring. RESPIRATORY: cough, wheezing ( has Asthma) Negative for hemoptysis, cough, sputum production. CARDIOVASCULAR: negative for chest pain, palpitations, exertional chest pressure/discomfort, syncope, edema   GASTROINTESTINAL: negative for nausea, vomiting, diarrhea, blood in stool, abdominal pain, constipation. GENITOURINARY: negative for frequency, dysuria, urinary incontinence, decreased urine volume, and hematuria. HEMATOLOGIC/LYMPHATIC: negative for easy bruising, bleeding and lymphadenopathy. ALLERGIC/IMMUNOLOGIC: negative for recurrent infections, angioedema, anaphylaxis and drug reactions.    ENDOCRINE: negative for weight changes and diabetic symptoms including polyuria, polydipsia and polyphagia. MUSCULOSKELETAL: decreased strength in extremities. negative for pain, joint swelling, decreased range of motion. NEUROLOGICAL: negative for headaches, slurred speech, unilateral weakness. PSYCHIATRIC/BEHAVIORAL: negative for hallucinations, behavioral problems, confusion and agitation. All pertinent positives are noted in the HPI. Physical Examination:  Vitals:   Patient Vitals for the past 24 hrs:   BP Temp Temp src Pulse Resp SpO2 Weight   03/04/21 0835 (!) 149/84 98.6 °F (37 °C) Oral 93 18 92 % --   03/04/21 0500 -- -- -- -- -- 95 % --   03/04/21 0101 -- -- -- -- -- 96 % --   03/03/21 1951 -- -- -- -- 18 95 % --   03/03/21 1942 (!) 143/77 98.5 °F (36.9 °C) Oral 104 18 93 % --   03/03/21 1745 -- -- -- -- -- -- 214 lb 8.1 oz (97.3 kg)   03/03/21 1508 (!) 164/69 98.5 °F (36.9 °C) Oral 87 18 92 % --       Const: Alert. WDWN. No distress  Eyes: Conjunctiva noninjected, no icterus noted; pupils equal, round, and reactive to light. HENT: Atraumatic, normocephalic; Oral mucosa moist  Neck: Trachea midline, neck supple. No thyromegaly noted. CV: Regular rate and rhythm, no murmur rub or gallop noted  Resp: Lungs clear to auscultation bilaterally, no rales wheezes or ronchi, no retractions. Respirations unlabored. GI: Soft, nontender, nondistended. Normal bowel sounds. No palpable masses. Skin: Normal temperature and turgor. No rashes or breakdown noted. Ext: No significant edema appreciated. No varicosities. MSK: No joint tenderness, erythema, warmth noted. AROM intact. Neuro:   -Mental status: Alert. Oriented to person, place, time, situation. 3 word immediate and delayed recall (sock, bed, blue) intact. Attention intact (months of year in reverse). -Language: Speech fluent, repetition and naming intact  -Cranial nerves: VFF, PERRL, EOMI, Facial sensation intact, Face symmetric, Hearing intact, Palate elevation symmetric, Shoulder shrug intact. Tongue midline.   -Sensation intact to light touch. -Motor examination reveals decreased strength in lower extremities   -No abnormalities with finger/nose noted. -Reflexes 2+ and symmetric. Negative Yasmin  Psych: Stable mood, normal judgement, normal affect     Lab Results   Component Value Date    WBC 8.0 03/03/2021    HGB 9.2 (L) 03/03/2021    HCT 29.3 (L) 03/03/2021    MCV 84.1 03/03/2021     03/03/2021     Lab Results   Component Value Date    INR 0.89 01/03/2015    INR 0.96 12/23/2014    PROTIME 9.6 01/03/2015    PROTIME 10.4 12/23/2014     Lab Results   Component Value Date    CREATININE 0.6 03/03/2021    BUN 9 03/03/2021     03/03/2021    K 4.2 03/03/2021     03/03/2021    CO2 25 03/03/2021     Lab Results   Component Value Date    ALT 15 10/14/2018    AST 17 10/14/2018    ALKPHOS 113 10/14/2018    BILITOT <0.2 10/14/2018         XR CHEST 1 VIEW   Final Result      Right upper extremity PICC line in appropriate position. Chronic atelectatic collapse of the right middle lobe. Body mass index is 44.83 kg/m². POST ADMISSION PHYSICIAN EVALUATION  The patient has agreed to being admitted to our comprehensive inpatient rehabilitation facility and can tolerate the intensity of service consisting of at least:  --180 minutes of therapy a day, 5 out of 7 days a week. OR  --15 hours of intensive therapy within a 7 consecutive day period. The patient/family has a good understanding of our discharge process and will benefit from an interdisciplinary inpatient rehabilitation program. The patient has potential to make improvement and is in need of at least two of the following multidisciplinary therapies including but not limited to physical, occupational, respiratory, and speech, nutritional services, wound care, and prosthetics and orthotics.  Given the patients complex condition and risk of further medical complications, rehabilitation services cannot be safely provided at a lower level of care such as a skilled nursing facility. All of the goals listed below were reviewed with the patient and he/she is in agreement. I have compared the patients medical and functional status at the time of the preadmission screening and the same on this date, and there are no significant changes. By signing this document, I acknowledge that I have personally performed a full physical examination on this patient within 24 hours of admission to this inpatient rehabilitation facility and have determined the patient to be able to tolerate the above course of treatment at an intensive level for a reasonable period of time. I will be completing a detailed individualized  Plan of Care for this patient by day four of the patients stay based upon the Preadmission Screen, this Post-Admission Evaluation, and the therapy evaluations. Barriers: , medical comorbidities  Services Required: PT, OT  Goals: Regain prior level of functioning and strength  Prognosis: Good  Anticipated Dispo: home  ELOS: TBD    Rehabilitation Diagnosis:   Orthopedic, 8.51, Unilateral Hip Replacement      Assessment and Plan:    Infected Right Femur Hardware  2/25 s/p partial JENIFER  Intra-op cultures with e. Faecalis  ID consulted, appreciate recs: d/t only partial JENIFER, prefer double beta-lactam coverage for enterococcus  Continue ceftriaxone 2g q 12 hr + unasyn 3g every 6 hours for 12 weeks, unless plan to remove hardware      Essential HTN - PTA: amlodipine 2.5mg daily, lasix 20mg daily  BP stable w/o meds   At discharge, can resume home lasix, amlodipine     Constipation  Continue bowel regimen      Chronic Pain  Mood Disorder - PTA: elavil, flexeril, fluoxetine, gabapentin, meloxicam  Resume home meds     Moderate Persistent Asthma  RT assess and treat, IS  Continue home Albuterol prn   O2 sats stable on room air  Needs outpatient PFTs and pulmonology for further w/u of SOB with exertion (? ILD, pulm HTN. ..) (already new referral PTA at an OSH)     Impairments: Decreased functional mobility, Decreased ADLs    Bladder - high risk retention - Monitor PVRs, SC prn >300cc    Bowel - high risk constipation - colace BID, PRN miralax and MoM. follow bowel movements. Enema or suppository if needed.      Safety - fall precautions    PPx  DVT: lovenox  GI: pantoprazole    FULL CODE      Gregg Mead MD, PGY-1  3/4/2021      Tang Mckeon D.O. M.P.H  PM&R  3/4/2021  8:42 AM

## 2021-03-05 LAB
ANION GAP SERPL CALCULATED.3IONS-SCNC: 8 MMOL/L (ref 3–16)
BASOPHILS ABSOLUTE: 0 K/UL (ref 0–0.2)
BASOPHILS RELATIVE PERCENT: 0.5 %
BUN BLDV-MCNC: 7 MG/DL (ref 7–20)
CALCIUM SERPL-MCNC: 8.1 MG/DL (ref 8.3–10.6)
CHLORIDE BLD-SCNC: 103 MMOL/L (ref 99–110)
CO2: 29 MMOL/L (ref 21–32)
CREAT SERPL-MCNC: 0.7 MG/DL (ref 0.6–1.2)
EOSINOPHILS ABSOLUTE: 0.3 K/UL (ref 0–0.6)
EOSINOPHILS RELATIVE PERCENT: 4.4 %
GFR AFRICAN AMERICAN: >60
GFR NON-AFRICAN AMERICAN: >60
GLUCOSE BLD-MCNC: 136 MG/DL (ref 70–99)
HCT VFR BLD CALC: 24.4 % (ref 36–48)
HEMOGLOBIN: 8 G/DL (ref 12–16)
LYMPHOCYTES ABSOLUTE: 1.7 K/UL (ref 1–5.1)
LYMPHOCYTES RELATIVE PERCENT: 23.6 %
MAGNESIUM: 1.9 MG/DL (ref 1.8–2.4)
MCH RBC QN AUTO: 26.8 PG (ref 26–34)
MCHC RBC AUTO-ENTMCNC: 32.9 G/DL (ref 31–36)
MCV RBC AUTO: 81.6 FL (ref 80–100)
MONOCYTES ABSOLUTE: 0.7 K/UL (ref 0–1.3)
MONOCYTES RELATIVE PERCENT: 10 %
NEUTROPHILS ABSOLUTE: 4.6 K/UL (ref 1.7–7.7)
NEUTROPHILS RELATIVE PERCENT: 61.5 %
PDW BLD-RTO: 18.8 % (ref 12.4–15.4)
PLATELET # BLD: 323 K/UL (ref 135–450)
PMV BLD AUTO: 7.4 FL (ref 5–10.5)
POTASSIUM REFLEX MAGNESIUM: 3.2 MMOL/L (ref 3.5–5.1)
RBC # BLD: 2.99 M/UL (ref 4–5.2)
SODIUM BLD-SCNC: 140 MMOL/L (ref 136–145)
WBC # BLD: 7.4 K/UL (ref 4–11)

## 2021-03-05 PROCEDURE — 97110 THERAPEUTIC EXERCISES: CPT

## 2021-03-05 PROCEDURE — 6370000000 HC RX 637 (ALT 250 FOR IP): Performed by: STUDENT IN AN ORGANIZED HEALTH CARE EDUCATION/TRAINING PROGRAM

## 2021-03-05 PROCEDURE — 97535 SELF CARE MNGMENT TRAINING: CPT

## 2021-03-05 PROCEDURE — 2580000003 HC RX 258: Performed by: PHYSICAL MEDICINE & REHABILITATION

## 2021-03-05 PROCEDURE — 97116 GAIT TRAINING THERAPY: CPT

## 2021-03-05 PROCEDURE — 97530 THERAPEUTIC ACTIVITIES: CPT

## 2021-03-05 PROCEDURE — 83735 ASSAY OF MAGNESIUM: CPT

## 2021-03-05 PROCEDURE — 85025 COMPLETE CBC W/AUTO DIFF WBC: CPT

## 2021-03-05 PROCEDURE — 6360000002 HC RX W HCPCS: Performed by: PHYSICAL MEDICINE & REHABILITATION

## 2021-03-05 PROCEDURE — 80048 BASIC METABOLIC PNL TOTAL CA: CPT

## 2021-03-05 PROCEDURE — 6370000000 HC RX 637 (ALT 250 FOR IP): Performed by: PHYSICAL MEDICINE & REHABILITATION

## 2021-03-05 PROCEDURE — 1280000000 HC REHAB R&B

## 2021-03-05 PROCEDURE — 94640 AIRWAY INHALATION TREATMENT: CPT

## 2021-03-05 PROCEDURE — 99232 SBSQ HOSP IP/OBS MODERATE 35: CPT | Performed by: PHYSICAL MEDICINE & REHABILITATION

## 2021-03-05 RX ORDER — LIDOCAINE 4 G/G
1 PATCH TOPICAL DAILY
Status: DISCONTINUED | OUTPATIENT
Start: 2021-03-05 | End: 2021-03-20 | Stop reason: HOSPADM

## 2021-03-05 RX ADMIN — PREGABALIN 100 MG: 75 CAPSULE ORAL at 12:42

## 2021-03-05 RX ADMIN — FUROSEMIDE 20 MG: 20 TABLET ORAL at 09:44

## 2021-03-05 RX ADMIN — AMLODIPINE BESYLATE 2.5 MG: 5 TABLET ORAL at 09:40

## 2021-03-05 RX ADMIN — IPRATROPIUM BROMIDE AND ALBUTEROL SULFATE 1 AMPULE: .5; 2.5 SOLUTION RESPIRATORY (INHALATION) at 19:42

## 2021-03-05 RX ADMIN — AMPICILLIN AND SULBACTAM 3000 MG: 1; 2 INJECTION, POWDER, FOR SOLUTION INTRAMUSCULAR; INTRAVENOUS at 01:32

## 2021-03-05 RX ADMIN — OXYCODONE 10 MG: 5 TABLET ORAL at 20:51

## 2021-03-05 RX ADMIN — AMPICILLIN AND SULBACTAM 3000 MG: 1; 2 INJECTION, POWDER, FOR SOLUTION INTRAMUSCULAR; INTRAVENOUS at 13:20

## 2021-03-05 RX ADMIN — IPRATROPIUM BROMIDE AND ALBUTEROL SULFATE 1 AMPULE: .5; 2.5 SOLUTION RESPIRATORY (INHALATION) at 15:16

## 2021-03-05 RX ADMIN — AMPICILLIN AND SULBACTAM 3000 MG: 1; 2 INJECTION, POWDER, FOR SOLUTION INTRAMUSCULAR; INTRAVENOUS at 20:04

## 2021-03-05 RX ADMIN — ENOXAPARIN SODIUM 30 MG: 30 INJECTION SUBCUTANEOUS at 09:46

## 2021-03-05 RX ADMIN — CEFTRIAXONE SODIUM 2000 MG: 2 INJECTION, POWDER, FOR SOLUTION INTRAMUSCULAR; INTRAVENOUS at 21:03

## 2021-03-05 RX ADMIN — OXYCODONE 10 MG: 5 TABLET ORAL at 15:00

## 2021-03-05 RX ADMIN — IBUPROFEN 200 MG: 400 TABLET, FILM COATED ORAL at 01:39

## 2021-03-05 RX ADMIN — PREGABALIN 100 MG: 75 CAPSULE ORAL at 20:51

## 2021-03-05 RX ADMIN — AMITRIPTYLINE HYDROCHLORIDE 75 MG: 25 TABLET, FILM COATED ORAL at 20:51

## 2021-03-05 RX ADMIN — IPRATROPIUM BROMIDE AND ALBUTEROL SULFATE 1 AMPULE: .5; 2.5 SOLUTION RESPIRATORY (INHALATION) at 11:15

## 2021-03-05 RX ADMIN — AMPICILLIN AND SULBACTAM 3000 MG: 1; 2 INJECTION, POWDER, FOR SOLUTION INTRAMUSCULAR; INTRAVENOUS at 07:10

## 2021-03-05 RX ADMIN — IPRATROPIUM BROMIDE AND ALBUTEROL SULFATE 1 AMPULE: .5; 2.5 SOLUTION RESPIRATORY (INHALATION) at 07:49

## 2021-03-05 RX ADMIN — OXYCODONE 10 MG: 5 TABLET ORAL at 09:44

## 2021-03-05 RX ADMIN — FLUOXETINE 40 MG: 20 CAPSULE ORAL at 09:45

## 2021-03-05 RX ADMIN — TIZANIDINE 4 MG: 4 TABLET ORAL at 17:32

## 2021-03-05 RX ADMIN — ALPRAZOLAM 0.25 MG: 0.25 TABLET ORAL at 12:43

## 2021-03-05 RX ADMIN — ALPRAZOLAM 0.25 MG: 0.25 TABLET ORAL at 17:32

## 2021-03-05 RX ADMIN — CEFTRIAXONE SODIUM 2000 MG: 2 INJECTION, POWDER, FOR SOLUTION INTRAMUSCULAR; INTRAVENOUS at 08:30

## 2021-03-05 RX ADMIN — PREGABALIN 100 MG: 75 CAPSULE ORAL at 09:42

## 2021-03-05 RX ADMIN — BENZONATATE 100 MG: 100 CAPSULE ORAL at 17:32

## 2021-03-05 RX ADMIN — ATORVASTATIN CALCIUM 40 MG: 40 TABLET, FILM COATED ORAL at 20:51

## 2021-03-05 RX ADMIN — BISACODYL 5 MG: 5 TABLET, COATED ORAL at 09:41

## 2021-03-05 RX ADMIN — OXYCODONE 10 MG: 5 TABLET ORAL at 05:06

## 2021-03-05 ASSESSMENT — PAIN DESCRIPTION - ORIENTATION: ORIENTATION: RIGHT

## 2021-03-05 ASSESSMENT — PAIN SCALES - GENERAL
PAINLEVEL_OUTOF10: 7
PAINLEVEL_OUTOF10: 6

## 2021-03-05 ASSESSMENT — PAIN DESCRIPTION - DESCRIPTORS: DESCRIPTORS: ACHING

## 2021-03-05 ASSESSMENT — PAIN DESCRIPTION - LOCATION: LOCATION: HIP;LEG

## 2021-03-05 ASSESSMENT — PAIN DESCRIPTION - FREQUENCY: FREQUENCY: CONTINUOUS

## 2021-03-05 ASSESSMENT — PAIN DESCRIPTION - PAIN TYPE: TYPE: ACUTE PAIN;SURGICAL PAIN

## 2021-03-05 NOTE — PROGRESS NOTES
Pt awake in bed eating breakfast. Physical assessment and vital signs as charted. Pt currently rates her pain as a 9 out of 10 on the pain scale, pain medication and muscle relaxer administered at this time. Call light placed within reach. RN will continue to monitor Pt.

## 2021-03-05 NOTE — PROGRESS NOTES
Patient is alert and oriented but forgetful. She complains of constant right hip and leg pain. Right leg edematous. Gauze and tegederm dressing in place.  Medicated with Oxycodone on request.

## 2021-03-05 NOTE — PLAN OF CARE
Problem: Pain:  Description: Pain management should include both nonpharmacologic and pharmacologic interventions. Goal: Control of acute pain  Description: Control of acute pain  Outcome: Ongoing  Note: Patient has complained of continued right leg and hip pain. She has been medicated with Oxycodone and Ibuprofen on request. Patient has declined using ice. Leg elevated. Problem: Falls - Risk of:  Goal: Will remain free from falls  Description: Will remain free from falls  Outcome: Ongoing  Note: Patient has brought legs over side of bed in attempt to get up. Reinforced need to call prior to getting up. Fall precautions in place. Bed is in low and locked position. Bed alarm set. [employer] camera in use. Side rales up x 3.

## 2021-03-05 NOTE — PROGRESS NOTES
Physical Therapy  Facility/Department: [facility] ACUTE REHAB UNIT  Daily Treatment Note  NAME: Héctor Oliva  : 1960  MRN: [id number]    Date of Service: 3/5/2021    Discharge Recommendations:  Home with Home health PT   PT Equipment Recommendations  Other: continue to assess    Assessment   Body structures, Functions, Activity limitations: Decreased functional mobility ; Decreased balance;Decreased strength;Decreased endurance  Assessment: The pt was able to ambulate slightly further today limited by pain. She requires frequent rest breaks with activity and safety cueing for use of brakes properly with rollator. Pt continues to be well below her baseline and would benefit from further PT to improve her safety and independence with mobility. Treatment Diagnosis: impaired mobility secondary to infection  Prognosis: Good  PT Education: Goals;PT Role;Plan of Care;Home Exercise Program;Gait Training;General Safety;Transfer Training;Functional Mobility Training  Patient Education: pt verbalized understanding  REQUIRES PT FOLLOW UP: Yes  Activity Tolerance  Activity Tolerance: Patient limited by pain; Patient limited by fatigue;Patient limited by endurance     Patient Diagnosis(es): There were no encounter diagnoses. has a past medical history of Asthma, Depression, Osteoarthritis, PONV (postoperative nausea and vomiting), and Scoliosis. has a past surgical history that includes Gastric bypass surgery (); Rotator cuff repair (Left, ); Knee cartilage surgery (Left, ); Breast enhancement surgery (Bilateral, );  section ([place] [address]); Cholecystectomy (); [place] tooth extraction (); joint replacement (Bilateral, 2014); fracture surgery; joint replacement; and Total hip arthroplasty (Bilateral, ). Restrictions  Position Activity Restriction  Other position/activity restrictions: WBAT R LE; up with assist  Subjective   General  Chart Reviewed:  Yes  Additional Pertinent Hx: Pt had knee and hip replacements. She has since had difficulty with abscesses and infections with the joints. 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 Up until 2/5/2021 she was on Keflex Patient admitted too [facility] and  had infected plate removed from her femur 2/25/2021 along with removal of screws,cables and deep bone biopsy. Per Op note. The area around late looked infected. There was sinus tract that connected with knee joint space. The femur stem of the Artifical hip was exposed and there was reactive synovitis there as well. 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 Intraoperatively 3 tissue Cx and 1 deep bone Cx were taken E Faecalis. Patient was started on IV Vancomycin and plan is currently to continue for 12 weeks with tentative end date of 5/20  Family / Caregiver Present: No  Referring Practitioner: Dr. Sosa Going: \"I am having so much pain. I wish I could go in a coma and do PROM until I am healed. \"  General Comment  Comments: Pt presents supine in bed and willing to work with therapy. Pain Screening  Patient Currently in Pain: Yes(pain noted in R LE, hip, back)  Vital Signs  Patient Currently in Pain: Yes(pain noted in R LE, hip, back)       Orientation     Cognition      Objective   Bed mobility  Supine to Sit: Stand by assistance(HOB elevated and use of rail.)  Sit to Supine:  Moderate assistance(for B LE)  Transfers  Sit to Stand: Contact guard assistance(from bed, rollator, chair in gym)  Stand to sit: Contact guard assistance  Bed to Chair: Contact guard assistance  Stand Pivot Transfers: Contact guard assistance  Comment: cueing for hand placement with transfers, foot placement, and to lean forward to improve her momentum with standing  Ambulation  Ambulation?: Yes  Ambulation 1  Surface: level tile  Device: Rolling Walker  Assistance: Contact guard assistance  Quality of Gait: decreased step length and marisa as well as step height. Decreased stance time on R LE with decreased step length of L LE though improving over session to no antalgia noted  Distance: 25'+40'+30'+30'+40'  Comments: Pt with pain limiting distance of ambulation  Stairs/Curb  Stairs?: No        Exercises  Comments: sit to stands with staggered stance and R LE behind L LE, marches, heel raises, HS curls all  x 10 sanjeev with rest breaks in between, sidestepping along rail x 60' x 2 with cueing to  her feet and face forward. Second Session: Second session documentation only performed by Kaye Lesch, PT, DPT. Pt tolerated well. Pt very limited by pain but agreeable to therapy. Pt reports pain meds are not due until after PT session. Pt supine to sit from [facility] with supervision . Pt ambulated 2x 15 ft and 2 x 55 ft with RW and CGA . Pt with VC for posture and RLE step length and height. Pt limited by pain and activity tolerance. Pt sit > supine to mat and bed with min A for LE elevation and cues for set up. Pt performed supine exercises 2 x 10 RLE quad sets, SAQ, hip abd, SLR, and bridges with cues for form and multiple rest breaks to complete. Pt supine to sit from flat mat table with min A for truck elevation. Pt sit <> stand from EOB, EOM, and chair with SBA to RW with cues for safety and proper hand placement. Pt performed toilet transfers with SBA and RW . Pt SBA for lower body dressing and carole care in sitting with use of toilet aide. Pt performed standing balance 2 x 30 s no AD, no AD eyes closed and no AD cervical rotation side to side . First round on flat surface and second round on uneven surface ( airex) . Pt required CGA on flat surface with no LOB and min A on uneven surface with 2 posterior LOB. Pt left supine in bed with all needs met , alarm on, and call light in reach.    Goals  Short term goals  Time Frame for Short term goals: STG=LTG- estimated LOS 7 days  Short term goal 1: The pt will perform supine<>sit with SBA  Short term goal 2: The pt will transfer with SBA and RW  Short term goal 3: The pt will ambulate with walker x 76' with CGA. Short term goal 4: The pt will be able to t  Long term goals  Time Frame for Long term goals : The pt will perform bed mobility with mod I ongoing  Long term goal 1: The pt will transfer with mod I and LRAD ongoing  Long term goal 2: The pt will ambulate 150 ft with LRAD and mod I ongoing  Patient Goals   Patient goals :  To get home soon    Plan    Plan  Times per week: 5-7x/wk for 90 minutes per day  Times per day: Daily  Current Treatment Recommendations: Strengthening, Transfer Training, Endurance Training, Neuromuscular Re-education, Patient/Caregiver Education & Training, Balance Training, Gait Training, Functional Mobility Training, Safety Education & Training  Safety Devices  Type of devices: Call light within reach, Bed alarm in place, Left in bed, Nurse notified(pt refusing to sit up in chair)  Restraints  Initially in place: No     Therapy Time   Individual Concurrent Group Co-treatment   Time In 1000         Time Out 1045         Minutes 45         Timed Code Treatment Minutes: [address], PT    Second Session Therapy Time:   Individual Concurrent Group Co-treatment   Time In 1245         Time Out 1330         Minutes 45           Timed Code Treatment Minutes:  74+90    Total Treatment Minutes:  90

## 2021-03-05 NOTE — PROGRESS NOTES
Occupational Therapy  Facility/Department: [facility] ACUTE REHAB UNIT  Daily Treatment Note  NAME: Дмитрий Tineo  : 1960  MRN: [id number]    Date of Service: 3/5/2021    Discharge Recommendations:  24 hour supervision or assist, Home with Home health OT  OT Equipment Recommendations  Equipment Needed: Yes  Mobility Devices: ADL Assistive Devices  ADL Assistive Devices: Reacher;Sock-Aid Soft;Transfer Tub Bench    Assessment   Performance deficits / Impairments: Decreased functional mobility ; Decreased endurance;Decreased coordination;Decreased ADL status; Decreased balance;Decreased strength;Decreased high-level IADLs  Assessment: Pt completed toilet transfer with CGA and dry tub transfer with CGA this date. Pt is limited by RLE pain and verbalized anxiety about ARU setting being too difficult for her and was upset about her functional status/debility. Pt requires encouragement to attempt adaptive ways for LB dressing versus relying on assistance of others. Pt functioning below baseline, cont OT POC. Treatment Diagnosis: decreased ADLs and transfers  Prognosis: Fair  OT Education: OT Role;Plan of Care;Equipment  Patient Education: pt educated on TTB recommendation and resources of where to purchase/acquire; pt educated on use of reacher for LB dressing as well as use of toilet aid to increase independence with ADLs-pt verb and demo understanding, continue to reinforce  REQUIRES OT FOLLOW UP: Yes  Activity Tolerance  Activity Tolerance: Patient Tolerated treatment well  Safety Devices  Safety Devices in place: Yes  Type of devices: Bed alarm in place; Left in bed;Call light within reach         Patient Diagnosis(es): There were no encounter diagnoses. has a past medical history of Asthma, Depression, Osteoarthritis, PONV (postoperative nausea and vomiting), and Scoliosis. has a past surgical history that includes Gastric bypass surgery (); Rotator cuff repair (Left, );  Knee cartilage surgery (Left, 2006); Breast enhancement surgery (Bilateral, );  section ([place] [address]); Cholecystectomy (); [place] tooth extraction (); joint replacement (Bilateral, 2014); fracture surgery; joint replacement; and Total hip arthroplasty (Bilateral, ). Restrictions  Position Activity Restriction  Other position/activity restrictions: WBAT R LE; up with assist     Subjective   General  Chart Reviewed: Yes  Patient assessed for rehabilitation services?: Yes  Additional Pertinent Hx: Pt admitted to OSH with h/o hip hardware infection. Infected hardware removed on 21. Started on IV ABX. Admitted to ARU on 3/3/21. PMHx inclues:  Asthma, Depression, Osteoarthritis, PONV (postoperative nausea and vomiting), and Scoliosis. Family / Caregiver Present: No  Referring Practitioner: Gabi  Diagnosis: debility  Subjective  Subjective: Pt sitting in bedside recliner upon arrival, pleasant and agreeable to OT session requesting to shower. General Comment  Comments: Increased time spent attempting to get clarifiication on if pt is only allowed to sponge bath vs shower as there were conflicting orders, per Dr Natividad Bell pt states to sponge bathe until further notice. Vital Signs  Patient Currently in Pain: Yes(7/10 RLE surigal pain)     Orientation  Orientation  Overall Orientation Status: Within Functional Limits  Objective    ADL  Feeding: Independent(finishing breakfast upon entry)  LE Dressing: Moderate assistance(2/2 pain and fatigue pt supine in bed to don LB clothing, assist to thread BLEs to don underwear, pt managed over hips via bridging, pt educated on use of reacher to thread RLE while in long sitting, pulled over hips with bridging)  Toileting: Contact guard assistance; Increased time to complete(LB clothing mgmt off hips in stance with CGA, pt continent of bladder, pt verbalized pain and difficulty when attempting to wipe, pt provided with toilet aid and cues how to setup, pt demo understanding completing pericare seated)  Additional Comments: + time for toileting d/t demonstration and instruction of proper use of toilet aid to increase independence with toileting        Balance  Sitting Balance: Supervision(seated EOB and on TTB)  Standing Balance: Contact guard assistance  Standing Balance  Time: ~8 minutes total  Activity: functional mobility to/from bathroom; in stance for toileting, functional transfers  Functional Mobility  Functional - Mobility Device: 4-Wheeled Walker  Activity: To/from bathroom  Assist Level: Contact guard assistance  Functional Mobility Comments: OT managing IV line  Toilet Transfers  Toilet - Technique: Ambulating  Equipment Used: Standard toilet  Toilet Transfer: Contact guard assistance  Toilet Transfers Comments: use of GB to stand  Tub Transfers  Tub - Transfer From: Rolling walker  Tub - Transfer Type: To and From  Tub - Transfer To: Transfer tub bench  Tub - Technique: Ambulating  Tub Transfers: Contact guard  Tub Transfers Comments: step by step VCs and demonstration provided to pt to complete dry tub transfer as pt reports interest in obtaining for d/c home, pt demonstrated ability with CGA overall and + time to manage RLE in/out of tub for sit<>swivel technique  Bed mobility  Sit to Supine: Moderate assistance(to manage BLEs)  Scooting: Stand by assistance(superiorly in bed with use of bed rails and bridging)  Transfers  Sit to stand: Contact guard assistance(from recliner)  Stand to sit: Contact guard assistance                       Cognition  Overall Cognitive Status: WFL       Second Session: Pt supine in bed upon arrival, upset that she had not received her pain medications yet despite time written on board per request and pt had already notified RN. Pt declined OOB activity d/t 10/10 pain in RLE. Increased time spent providing emotional support for pt as pt tearful stating she does not think she is cut out for the 3 hours of therapy required in ARU setting.  Pt educated that increased breaks can be scheduled and that functional progress is a process that takes patience and time. Pt verbalized understanding. Pt expressing interest in cooking and wanting to be able to gain enough standing endurance to complete upon d/c. Pt verbalized a list of ingredients so pt can make chilli on Monday. Pt then requesting to be repositioned in bed. Pt instructed on use of bed rails and bridging to scoot superiorly in bed, pt able to complete with 3 trials and cues to sequence with CGA. Pt provided with green theraband and instructed on completion of the following BUE exercises to increase activity tolerance and BUE strength to increase independence with functional transfers: x 12 sh h abduction, x 12 sh diagonals up, x 12 sh diagonals down, x12 sh flexion. Pt left in bed at end of session with bed alarm engaged, call light within reach and all needs met.        Plan   Plan  Times per week: 5 days per week 90 mins each  Times per day: Daily  Current Treatment Recommendations: Strengthening, Balance Training, Functional Mobility Training, Endurance Training, Self-Care / ADL, Patient/Caregiver Education & Training, Safety Education & Training, Equipment Evaluation, Education, & procurement  G-Code     OutComes Score                                                  AM-PAC Score             Goals  Short term goals  Time Frame for Short term goals: by 1 week  Short term goal 1: Pt will complete LB dressing with AE PRN at mod A-ongoing  Short term goal 2: Pt will complete toileting with CGA-goal met 3/5  Short term goal 3: Pt will complete bathing at min A-ongoing  Short term goal 4: Pt will complete shower transfer at spvn-ongoing  Short term goal 5: Pt will complete B UE exercises at mod I-ongoing  Long term goals  Time Frame for Long term goals : By 2 weeks-all ongoing  Long term goal 1: Pt will complete LB dressing with AE PRN at mod I  Long term goal 2: Pt will complete toileting with mod I  Long term goal 3: Pt will complete bathing at mod I  Long term goal 4: Pt will complete shower transfer at mod I  Long term goal 5: Pt will complete simple meal prep task at mod I with LRAD  Patient Goals   Patient goals : to be able to go home       Therapy Time   Individual Second Session Group Co-treatment   Time In 0830  1345       Time Out 0930  1415       Minutes 60  30       Timed Code Treatment Minutes: 60 Minutes + 30 Minutes  Total Treatment time: 90 Minutes       Nancy Mchugh, OT

## 2021-03-05 NOTE — PROGRESS NOTES
Department of Physical Medicine & Rehabilitation  Progress Note    Patient Identification:  Teodoro Llamas  [id number]  : 1960  Admit date: 3/3/2021    Chief Complaint: debility    Subjective:   No acute events overnight. Patient seen this am. She is still in pain on her right hip but she did get some sleep last night. She is hoping to get a shower today and to improve in therapy. ROS: No f/c, n/v, cp     Objective:  Patient Vitals for the past 24 hrs:   Resp SpO2 Height Weight   21 0745 -- 91 % -- --   21 0700 -- -- -- 210 lb 15.7 oz (95.7 kg)   21 1954 18 90 % -- --   21 1457 -- -- 4' 10\" (1.473 m) --   21 1118 20 (!) 89 % -- --     Const: Alert. No distress, pleasant. HEENT: Normocephalic, atraumatic. Normal sclera/conjunctiva. MMM. CV: Regular rate and rhythm. Resp: No respiratory distress. Lungs CTAB. Abd: Soft, nontender, nondistended, NABS+   Ext: No edema. Neuro: Alert, oriented, appropriately interactive. Psych: Cooperative, appropriate mood and affect    Laboratory data: Available via EMR.    Last 24 hour lab  Recent Results (from the past 24 hour(s))   Basic Metabolic Panel w/ Reflex to MG    Collection Time: 21  5:10 AM   Result Value Ref Range    Sodium 140 136 - 145 mmol/L    Potassium reflex Magnesium 3.2 (L) 3.5 - 5.1 mmol/L    Chloride 103 99 - 110 mmol/L    CO2 29 21 - 32 mmol/L    Anion Gap 8 3 - 16    Glucose 136 (H) 70 - 99 mg/dL    BUN 7 7 - 20 mg/dL    CREATININE 0.7 0.6 - 1.2 mg/dL    GFR Non-African American >60 >60    GFR African American >60 >60    Calcium 8.1 (L) 8.3 - 10.6 mg/dL   CBC auto differential    Collection Time: 21  5:10 AM   Result Value Ref Range    WBC 7.4 4.0 - 11.0 K/uL    RBC 2.99 (L) 4.00 - 5.20 M/uL    Hemoglobin 8.0 (L) 12.0 - 16.0 g/dL    Hematocrit 24.4 (L) 36.0 - 48.0 %    MCV 81.6 80.0 - 100.0 fL    MCH 26.8 26.0 - 34.0 pg    MCHC 32.9 31.0 - 36.0 g/dL    RDW 18.8 (H) 12.4 - 15.4 %    Platelets 615 724 - 450 K/uL    MPV 7.4 5.0 - 10.5 fL    Neutrophils % 61.5 %    Lymphocytes % 23.6 %    Monocytes % 10.0 %    Eosinophils % 4.4 %    Basophils % 0.5 %    Neutrophils Absolute 4.6 1.7 - 7.7 K/uL    Lymphocytes Absolute 1.7 1.0 - 5.1 K/uL    Monocytes Absolute 0.7 0.0 - 1.3 K/uL    Eosinophils Absolute 0.3 0.0 - 0.6 K/uL    Basophils Absolute 0.0 0.0 - 0.2 K/uL   Magnesium    Collection Time: 03/05/21  5:10 AM   Result Value Ref Range    Magnesium 1.90 1.80 - 2.40 mg/dL       Therapy progress:  PT  Position Activity Restriction  Other position/activity restrictions: WBAT R LE; up with assist  Objective     Sit to Stand: Minimal Assistance, Contact guard assistance(Pt varying from CGA-min A depending on fatigue and pain level with transfers)  Stand to sit: Minimal Assistance(for eccentric control)  Bed to Chair: Contact guard assistance  Device: Rolling Walker  Assistance: Minimal assistance  Distance: 47'+97'+03'+30' up ramp and 15' down ramp  OT  PT Equipment Recommendations  Equipment Needed: (continue to assess)  Toilet - Technique: Ambulating  Equipment Used: Standard toilet  Assessment        SLP          Body mass index is 44.09 kg/m². Assessment and Plan:  Infected Right Femur Hardware  2/25 s/p partial JENIFER  Intra-op cultures with e.  Faecalis  ID consulted, appreciate recs: d/t only partial JENIFER, prefer double beta-lactam coverage for enterococcus  Continue ceftriaxone 2g q 12 hr + unasyn 3g every 6 hours for 12 weeks, unless plan to remove hardware      Essential HTN - PTA: amlodipine 2.5mg daily, lasix 20mg daily  BP stable w/o meds   At discharge, can resume home lasix, amlodipine     Constipation  Continue bowel regimen      Chronic Pain  Mood Disorder - PTA: elavil, flexeril, fluoxetine, gabapentin, meloxicam  Resume home meds     Moderate Persistent Asthma  RT assess and treat, IS  Continue home Albuterol prn   O2 sats stable on room air  Needs outpatient PFTs and pulmonology for further w/u of SOB with exertion (? ILD, pulm HTN. ..) (already new referral PTA at an [facility]       Rehab Progress: Improving  Anticipated Dispo: home  Services/DME: [facility]  ELOS: [facility] Piedra D.O. M.P.H  PM&R  3/5/2021  10:03 AM

## 2021-03-06 PROCEDURE — 6360000002 HC RX W HCPCS: Performed by: PHYSICAL MEDICINE & REHABILITATION

## 2021-03-06 PROCEDURE — 94640 AIRWAY INHALATION TREATMENT: CPT

## 2021-03-06 PROCEDURE — 97530 THERAPEUTIC ACTIVITIES: CPT

## 2021-03-06 PROCEDURE — 97535 SELF CARE MNGMENT TRAINING: CPT

## 2021-03-06 PROCEDURE — 6370000000 HC RX 637 (ALT 250 FOR IP): Performed by: PHYSICAL MEDICINE & REHABILITATION

## 2021-03-06 PROCEDURE — 2580000003 HC RX 258: Performed by: PHYSICAL MEDICINE & REHABILITATION

## 2021-03-06 PROCEDURE — 1280000000 HC REHAB R&B

## 2021-03-06 PROCEDURE — 97116 GAIT TRAINING THERAPY: CPT

## 2021-03-06 PROCEDURE — 99232 SBSQ HOSP IP/OBS MODERATE 35: CPT | Performed by: PHYSICAL MEDICINE & REHABILITATION

## 2021-03-06 PROCEDURE — 94761 N-INVAS EAR/PLS OXIMETRY MLT: CPT

## 2021-03-06 PROCEDURE — 2700000000 HC OXYGEN THERAPY PER DAY

## 2021-03-06 PROCEDURE — 97110 THERAPEUTIC EXERCISES: CPT

## 2021-03-06 RX ADMIN — FLUOXETINE 40 MG: 20 CAPSULE ORAL at 09:32

## 2021-03-06 RX ADMIN — AMITRIPTYLINE HYDROCHLORIDE 75 MG: 25 TABLET, FILM COATED ORAL at 20:58

## 2021-03-06 RX ADMIN — AMPICILLIN AND SULBACTAM 3000 MG: 1; 2 INJECTION, POWDER, FOR SOLUTION INTRAMUSCULAR; INTRAVENOUS at 06:54

## 2021-03-06 RX ADMIN — AMPICILLIN AND SULBACTAM 3000 MG: 1; 2 INJECTION, POWDER, FOR SOLUTION INTRAMUSCULAR; INTRAVENOUS at 19:16

## 2021-03-06 RX ADMIN — ALPRAZOLAM 0.25 MG: 0.25 TABLET ORAL at 09:31

## 2021-03-06 RX ADMIN — CEFTRIAXONE SODIUM 2000 MG: 2 INJECTION, POWDER, FOR SOLUTION INTRAMUSCULAR; INTRAVENOUS at 08:30

## 2021-03-06 RX ADMIN — OXYCODONE 10 MG: 5 TABLET ORAL at 01:35

## 2021-03-06 RX ADMIN — ENOXAPARIN SODIUM 30 MG: 30 INJECTION SUBCUTANEOUS at 09:23

## 2021-03-06 RX ADMIN — ATORVASTATIN CALCIUM 40 MG: 40 TABLET, FILM COATED ORAL at 20:58

## 2021-03-06 RX ADMIN — IPRATROPIUM BROMIDE AND ALBUTEROL SULFATE 1 AMPULE: .5; 2.5 SOLUTION RESPIRATORY (INHALATION) at 15:02

## 2021-03-06 RX ADMIN — PREGABALIN 100 MG: 75 CAPSULE ORAL at 20:57

## 2021-03-06 RX ADMIN — ALBUTEROL SULFATE 2.5 MG: 2.5 SOLUTION RESPIRATORY (INHALATION) at 22:42

## 2021-03-06 RX ADMIN — TIZANIDINE 4 MG: 4 TABLET ORAL at 17:50

## 2021-03-06 RX ADMIN — OXYCODONE 10 MG: 5 TABLET ORAL at 19:19

## 2021-03-06 RX ADMIN — CEFTRIAXONE SODIUM 2000 MG: 2 INJECTION, POWDER, FOR SOLUTION INTRAMUSCULAR; INTRAVENOUS at 20:37

## 2021-03-06 RX ADMIN — OXYCODONE 10 MG: 5 TABLET ORAL at 14:02

## 2021-03-06 RX ADMIN — BISACODYL 5 MG: 5 TABLET, COATED ORAL at 09:33

## 2021-03-06 RX ADMIN — IPRATROPIUM BROMIDE AND ALBUTEROL SULFATE 1 AMPULE: .5; 2.5 SOLUTION RESPIRATORY (INHALATION) at 19:50

## 2021-03-06 RX ADMIN — AMPICILLIN AND SULBACTAM 3000 MG: 1; 2 INJECTION, POWDER, FOR SOLUTION INTRAMUSCULAR; INTRAVENOUS at 13:50

## 2021-03-06 RX ADMIN — IPRATROPIUM BROMIDE AND ALBUTEROL SULFATE 1 AMPULE: .5; 2.5 SOLUTION RESPIRATORY (INHALATION) at 06:43

## 2021-03-06 RX ADMIN — AMLODIPINE BESYLATE 2.5 MG: 5 TABLET ORAL at 09:29

## 2021-03-06 RX ADMIN — OXYCODONE 10 MG: 5 TABLET ORAL at 09:28

## 2021-03-06 RX ADMIN — TIZANIDINE 4 MG: 4 TABLET ORAL at 20:58

## 2021-03-06 RX ADMIN — PREGABALIN 100 MG: 75 CAPSULE ORAL at 12:24

## 2021-03-06 RX ADMIN — FUROSEMIDE 20 MG: 20 TABLET ORAL at 09:50

## 2021-03-06 RX ADMIN — TIZANIDINE 4 MG: 4 TABLET ORAL at 12:23

## 2021-03-06 RX ADMIN — IPRATROPIUM BROMIDE AND ALBUTEROL SULFATE 1 AMPULE: .5; 2.5 SOLUTION RESPIRATORY (INHALATION) at 12:19

## 2021-03-06 RX ADMIN — DICLOFENAC SODIUM 2 G: 10 GEL TOPICAL at 20:58

## 2021-03-06 RX ADMIN — OXYCODONE 10 MG: 5 TABLET ORAL at 14:04

## 2021-03-06 RX ADMIN — ALPRAZOLAM 0.25 MG: 0.25 TABLET ORAL at 14:02

## 2021-03-06 RX ADMIN — AMPICILLIN AND SULBACTAM 3000 MG: 1; 2 INJECTION, POWDER, FOR SOLUTION INTRAMUSCULAR; INTRAVENOUS at 01:25

## 2021-03-06 RX ADMIN — OXYCODONE 10 MG: 5 TABLET ORAL at 06:03

## 2021-03-06 ASSESSMENT — PAIN DESCRIPTION - ONSET
ONSET_2: ON-GOING
ONSET: ON-GOING
ONSET: ON-GOING

## 2021-03-06 ASSESSMENT — PAIN SCALES - GENERAL
PAINLEVEL_OUTOF10: 5
PAINLEVEL_OUTOF10: 8
PAINLEVEL_OUTOF10: 8
PAINLEVEL_OUTOF10: 10
PAINLEVEL_OUTOF10: 9
PAINLEVEL_OUTOF10: 5
PAINLEVEL_OUTOF10: 8

## 2021-03-06 ASSESSMENT — PAIN DESCRIPTION - FREQUENCY
FREQUENCY: CONTINUOUS

## 2021-03-06 ASSESSMENT — PAIN DESCRIPTION - DURATION: DURATION_2: INTERMITTENT

## 2021-03-06 ASSESSMENT — PAIN DESCRIPTION - PROGRESSION
CLINICAL_PROGRESSION: NOT CHANGED
CLINICAL_PROGRESSION_2: NOT CHANGED
CLINICAL_PROGRESSION: NOT CHANGED
CLINICAL_PROGRESSION: NOT CHANGED

## 2021-03-06 ASSESSMENT — PAIN DESCRIPTION - DESCRIPTORS
DESCRIPTORS: ACHING
DESCRIPTORS_2: CONSTANT

## 2021-03-06 ASSESSMENT — PAIN DESCRIPTION - PAIN TYPE
TYPE: ACUTE PAIN;SURGICAL PAIN

## 2021-03-06 ASSESSMENT — PAIN - FUNCTIONAL ASSESSMENT: PAIN_FUNCTIONAL_ASSESSMENT: PREVENTS OR INTERFERES SOME ACTIVE ACTIVITIES AND ADLS

## 2021-03-06 ASSESSMENT — PAIN DESCRIPTION - ORIENTATION
ORIENTATION_2: LEFT;RIGHT
ORIENTATION_2: LEFT;RIGHT

## 2021-03-06 ASSESSMENT — PAIN DESCRIPTION - LOCATION: LOCATION: HIP;LEG

## 2021-03-06 ASSESSMENT — PAIN DESCRIPTION - INTENSITY
RATING_2: 4
RATING_2: 5

## 2021-03-06 NOTE — PROGRESS NOTES
Department of Physical Medicine & Rehabilitation  Progress Note    Patient Identification:  Monica Paiz  [id number]  : 1960  Admit date: 3/3/2021    Chief Complaint: debility    Subjective:   Walking today with min assist. She is improving with pain and discomfort. Plan to continue current therapy. Asking for voltaren gel on her right shoulder. ROS: No f/c, n/v, cp     Objective:  Patient Vitals for the past 24 hrs:   BP Temp Temp src Pulse Resp SpO2 Weight   21 0646 -- -- -- -- -- (!) 85 % --   21 0617 -- -- -- -- -- -- 210 lb 1.6 oz (95.3 kg)   21 014 -- -- -- -- -- 92 % --   21 -- -- -- -- -- 93 % --   21 112/71 98.8 °F (37.1 °C) Oral 95 16 (!) 89 % --   21 1942 -- -- -- -- -- 95 % --   21 1517 -- -- -- -- -- (!) 88 % --   21 1116 -- -- -- -- -- 93 % --     Const: Alert. No distress, pleasant. HEENT: Normocephalic, atraumatic. Normal sclera/conjunctiva. MMM. CV: Regular rate and rhythm. Resp: No respiratory distress. Lungs CTAB. Abd: Soft, nontender, nondistended, NABS+   Ext: No edema. Neuro: Alert, oriented, appropriately interactive. Psych: Cooperative, appropriate mood and affect    Laboratory data: Available via EMR. Last 24 hour lab  No results found for this or any previous visit (from the past 24 hour(s)).     Therapy progress:  PT  Position Activity Restriction  Other position/activity restrictions: WBAT R LE; up with assist; ok to shower  Objective     Sit to Stand: Contact guard assistance(from bed, rollator, chair in gym)  Stand to sit: Contact guard assistance  Bed to Chair: Contact guard assistance  Device: Rolling Walker  Assistance: Contact guard assistance  Distance: 25'+40'+30'+30'+40'  OT  PT Equipment Recommendations  Equipment Needed: (continue to assess)  Other: continue to assess  Toilet - Technique: Ambulating  Equipment Used: Standard toilet  Toilet Transfers Comments: use of GB to stand  Assessment

## 2021-03-06 NOTE — PROGRESS NOTES
Physical Therapy  Facility/Department: [facility] ACUTE REHAB UNIT  Daily Treatment Note  NAME: Дмитрий Tineo  : 1960  MRN: [id number]    Date of Service: 3/6/2021    Discharge Recommendations:  Home with Home health PT   PT Equipment Recommendations  Other: continue to assess    Assessment   Body structures, Functions, Activity limitations: Decreased functional mobility ; Decreased balance;Decreased strength;Decreased endurance  Assessment: The pt intially started the session with a very low activity tolerance, taking frequent rest breaks which she stated was because she had not received her pain medication until the start of therapy and that she had taken a shower earlier which wore her out. Pt requesting very frequent and extended rest breaks during session. Pt was able to ambulate back and fwd with handrail in marshall with 1 UE assist without difficulty or significant deviation with CGA. She got distracted at the end of the session talking and ambulated her longest bout yet of 140'. She would benefit from further skilled PT to improve her independence and safety with functional mobility. Treatment Diagnosis: impaired mobility secondary to infection  Prognosis: Good  PT Education: Goals;PT Role;Plan of Care;Home Exercise Program;Gait Training;General Safety;Transfer Training;Functional Mobility Training  Patient Education: pt verbalized understanding  REQUIRES PT FOLLOW UP: Yes  Activity Tolerance  Activity Tolerance: Patient limited by pain; Patient limited by fatigue;Patient limited by endurance     Patient Diagnosis(es): There were no encounter diagnoses. has a past medical history of Asthma, Depression, Osteoarthritis, PONV (postoperative nausea and vomiting), and Scoliosis. has a past surgical history that includes Gastric bypass surgery (); Rotator cuff repair (Left, ); Knee cartilage surgery (Left, ); Breast enhancement surgery (Bilateral, );   section ([address]); Cholecystectomy (1980); [place] tooth extraction (1995); joint replacement (Bilateral, 12/29/2014); fracture surgery; joint replacement; and Total hip arthroplasty (Bilateral, 2014). Restrictions  Position Activity Restriction  Other position/activity restrictions: WBAT R LE; up with assist; ok to shower  Subjective   General  Chart Reviewed: Yes  Additional Pertinent Hx: Pt had knee and hip replacements. She has since had difficulty with abscesses and infections with the joints. 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 Up until 2/5/2021 she was on Keflex Patient admitted too [address] [place] [address] and  had infected plate removed from her femur 2/25/2021 along with removal of screws,cables and deep bone biopsy. Per Op note. The area around late looked infected. There was sinus tract that connected with knee joint space. The femur stem of the Artifical hip was exposed and there was reactive synovitis there as well. 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 Intraoperatively 3 tissue Cx and 1 deep bone Cx were taken E Faecalis. Patient was started on IV Vancomycin and plan is currently to continue for 12 weeks with tentative end date of 5/20  Family / Caregiver Present: No  Referring Practitioner: Dr. Noah Sutton: \"I am really hurting after that shower I had. I don't know if I will be able to do much. \"  General Comment  Comments: Pt presents supine in bed and willing to work with therapy. Pain Screening  Patient Currently in Pain: Yes(pain noted in back and R LE)  Vital Signs  Patient Currently in Pain: Yes(pain noted in back and R LE)       Orientation     Cognition      Objective   Bed mobility  Supine to Sit: Stand by assistance(HOB elevated and use of rail)  Sit to Supine:  Moderate assistance(for B LE)  Scooting: Stand by assistance(back in chair and up in bed)  Transfers  Sit to Stand: CGA.  Short term goal 4: The pt will be able to t  Long term goals  Time Frame for Long term goals : The pt will perform bed mobility with mod I ongoing  Long term goal 1: The pt will transfer with mod I and LRAD ongoing  Long term goal 2: The pt will ambulate 150 ft with LRAD and mod I ongoing  Patient Goals   Patient goals :  To get home soon    Plan    Plan  Times per week: 5-7x/wk for 90 minutes per day  Times per day: Daily  Current Treatment Recommendations: Strengthening, Transfer Training, Endurance Training, Neuromuscular Re-education, Patient/Caregiver Education & Training, Balance Training, Gait Training, Functional Mobility Training, Safety Education & Training  Safety Devices  Type of devices: Call light within reach, Bed alarm in place, Left in bed, Nurse notified  Restraints  Initially in place: No     Therapy Time   Individual Concurrent Group Co-treatment   Time In 0930         Time Out 1038         Minutes 68         Timed Code Treatment Minutes: 68 [id number] Time:   Individual Concurrent Group Co-treatment   Time In 1108         Time Out 1130         Minutes 22           Timed Code Treatment Minutes:  68+22=90    Total Treatment Minutes:  90 minutes      Jillian Lomas, PT

## 2021-03-06 NOTE — PROGRESS NOTES
Occupational Therapy  Facility/Department: [facility] ACUTE REHAB UNIT  Daily Treatment Note  NAME: Ricci Hua  : 1960  MRN: [id number]    Date of Service: 3/6/2021    Discharge Recommendations:  24 hour supervision or assist, Home with Home health OT  OT Equipment Recommendations  Equipment Needed: Yes  ADL Assistive Devices: Reacher;Sock-Aid Soft;Transfer Tub Bench  Other: continue to assess, possibly shower chair and reacher/ sock aide    Assessment   Performance deficits / Impairments: Decreased functional mobility ; Decreased endurance;Decreased coordination;Decreased ADL status; Decreased balance;Decreased strength;Decreased high-level IADLs  Assessment: Pt demonstrated increased sit to stand transfers and increased functional mobility throughout ADL session. Pt demonstrated increased LB Dressing and toileting. Pt was significantly limited by fatigue during ADL, requiring frequent rest breaks. PT would benefit from continued OT while in ARU, continue OT POC. Treatment Diagnosis: decreased ADLs and transfers  Prognosis: Fair  OT Education: OT Role;Plan of Care;Equipment; Energy Conservation  Patient Education: ed pt on shower transfer technqiues, pt reporting hopeful to purchase TTB at [place]. Also ed pt on benefits of OOB, pt reporting typically eating in bed however RN also reported pt has crackling sounds in lungs. Pt reporting she does have a history of aspiration. Ed pt to sit upright in chair for all meals  REQUIRES OT FOLLOW UP: Yes  Activity Tolerance  Activity Tolerance: Patient Tolerated treatment well;Patient limited by fatigue  Activity Tolerance: O2 sats on RA after removing (pt reporting she did not think she needed it) and while pt still in semi supine, 88%. Pt placed back on 2lpm O2 during session. Pt reported max fatigue after ADL, required frequent rest breaks throughout showering  Safety Devices  Safety Devices in place: Yes  Type of devices: Bed alarm in place;Call light within reach; Left in sock aide (uses at home), completed with setup to don socks while seated on toilet)  Toileting: Increased time to complete;Stand by assistance(seated on toilet for carole care)  Additional Comments: incision site and PICC line were covered prior to showering        Balance  Sitting Balance: Supervision(seated EOB, seated on TTB)  Standing Balance: Contact guard assistance  Standing Balance  Time: 5+5 mins  Activity: mobility into/ out of bathroom, standing for showering  Comment: with 4ww  Functional Mobility  Functional - Mobility Device: 4-Wheeled Walker  Activity: To/from bathroom  Assist Level: Contact guard assistance  Functional Mobility Comments: no LOB; OT managing O2  Toilet Transfers  Toilet - Technique: Ambulating  Equipment Used: Standard toilet  Toilet Transfer: Contact guard assistance  Tub Transfers  Tub - Transfer From: Rolling walker  Tub - Transfer Type: To and From  Tub - Transfer To: Transfer tub bench  Tub - Technique: Ambulating  Tub Transfers: Minimal assistance  Tub Transfers Comments: assist to lift R LE over tub, + cueing to sequence coming out of tub. Bed mobility  Supine to Sit: Stand by assistance(HOB elevated, increased time. Use of rail)  Transfers  Sit to stand: Contact guard assistance  Stand to sit: Contact guard assistance                       Cognition  Overall Cognitive Status: Exceptions  Following Commands: Follows one step commands consistently  Attention Span: Attends with cues to redirect  Memory: Decreased recall of recent events(pt reporting she didn't think they had changed her dressing yesterday, RN clarified it had been changed and date was noted as 3/5 on pts dressing)  Problem Solving: Assistance required to implement solutions  Insights: Fully aware of deficits  Initiation: Does not require cues  Sequencing: Requires cues for some          Second session: Pt semi supine in bed upon arrival, reporting having muscle spasm this pm after sitting in bedside chair.  Pt agreeable to UE exercises to promote shoulder stability with green theraband: shoulder ER/ IR, then scapular adduction each x 10 reps. Pt reported within pain tolerance however reported max fatigue. Pt required assist x 2 to scoot up to [facility], assist to bring LEs to middle of bed. Pt left in bed with all needs met/ within reach, BA activated.        Plan   Plan  Times per week: 5 days per week 90 mins each  Times per day: Daily  Current Treatment Recommendations: Strengthening, Balance Training, Functional Mobility Training, Endurance Training, Self-Care / ADL, Patient/Caregiver Education & Training, Safety Education & Training, Equipment Evaluation, Education, & procurement    Goals  Short term goals  Time Frame for Short term goals: by 1 week  Short term goal 1: Pt will complete LB dressing with AE PRN at mod A-goal met 3/6  Short term goal 2: Pt will complete toileting with CGA-goal met 3/5  Short term goal 3: Pt will complete bathing at min A-ongoing  Short term goal 4: Pt will complete shower transfer at spvn-ongoing  Short term goal 5: Pt will complete B UE exercises at mod I-ongoing  Long term goals  Time Frame for Long term goals : By 2 weeks-all ongoing  Long term goal 1: Pt will complete LB dressing with AE PRN at mod I  Long term goal 2: Pt will complete toileting with mod I  Long term goal 3: Pt will complete bathing at mod I  Long term goal 4: Pt will complete shower transfer at mod I  Long term goal 5: Pt will complete simple meal prep task at mod I with LRAD  Patient Goals   Patient goals : to be able to go home       Therapy Time   Individual Concurrent Group Co-treatment   Time In 0730         Time Out 0845         Minutes 75         Timed Code Treatment Minutes: [address] Time:   Individual Concurrent Group Co-treatment   Time In [place] [address] 15           Timed Code Treatment Minutes:  75+15    Total Treatment Minutes:  [address]

## 2021-03-06 NOTE — PLAN OF CARE
Problem: Pain:  Goal: Pain level will decrease  Outcome: Ongoing       RN utilizes NPI in addition to PRN Rx      Problem: Falls - Risk of:  Goal: Absence of physical injury  Outcome: Ongoing  Pt observes fall precautions and utilizes call light for needs

## 2021-03-06 NOTE — PROGRESS NOTES
Patient has complained of right hip and leg pain. She also complains of right shoulder pain. Heat packs applied to right shoulder. Medicated with Oxycodone as requested. Right hip dressing removed. Old drainage on previous dressing. Staples intact to incision. Suture intact to old drain site. Skin surrounding drain site pink. Incision cleansed. New gauze and tegaderm dressing applied.

## 2021-03-06 NOTE — PLAN OF CARE
Problem: Pain:  Description: Pain management should include both nonpharmacologic and pharmacologic interventions. Goal: Control of acute pain  Description: Control of acute pain  3/6/2021 0343 by Susan Sanchez RN  Outcome: Ongoing  Note: Patient has complained of right leg and hip pain. She has rated her pain 6 on pain scale and been medicated with Oxycodone as ordered. Patient has declined using cold or heat to her hip or leg. Problem: Falls - Risk of:  Goal: Will remain free from falls  Description: Will remain free from falls  3/6/2021 0343 by Susan Sanchez RN  Outcome: Ongoing  Note: Patient can be forgetful at times. She has a history of falls. Fall precautions in place. Bed is in low and locked position. Bed alarm set. Call light and bedside table within reach. [employer] camera in use. Reinforced need to call prior to attempting to get up. She has remained free from falls.

## 2021-03-07 PROCEDURE — 6370000000 HC RX 637 (ALT 250 FOR IP): Performed by: PHYSICAL MEDICINE & REHABILITATION

## 2021-03-07 PROCEDURE — 2700000000 HC OXYGEN THERAPY PER DAY

## 2021-03-07 PROCEDURE — 6360000002 HC RX W HCPCS: Performed by: PHYSICAL MEDICINE & REHABILITATION

## 2021-03-07 PROCEDURE — 2580000003 HC RX 258: Performed by: PHYSICAL MEDICINE & REHABILITATION

## 2021-03-07 PROCEDURE — 94640 AIRWAY INHALATION TREATMENT: CPT

## 2021-03-07 PROCEDURE — 1280000000 HC REHAB R&B

## 2021-03-07 PROCEDURE — 99233 SBSQ HOSP IP/OBS HIGH 50: CPT | Performed by: PHYSICAL MEDICINE & REHABILITATION

## 2021-03-07 PROCEDURE — 6370000000 HC RX 637 (ALT 250 FOR IP): Performed by: STUDENT IN AN ORGANIZED HEALTH CARE EDUCATION/TRAINING PROGRAM

## 2021-03-07 PROCEDURE — 94761 N-INVAS EAR/PLS OXIMETRY MLT: CPT

## 2021-03-07 PROCEDURE — 94664 DEMO&/EVAL PT USE INHALER: CPT

## 2021-03-07 RX ORDER — POTASSIUM CHLORIDE 750 MG/1
20 TABLET, EXTENDED RELEASE ORAL 2 TIMES DAILY WITH MEALS
Status: DISCONTINUED | OUTPATIENT
Start: 2021-03-07 | End: 2021-03-20 | Stop reason: HOSPADM

## 2021-03-07 RX ADMIN — IPRATROPIUM BROMIDE AND ALBUTEROL SULFATE 1 AMPULE: .5; 2.5 SOLUTION RESPIRATORY (INHALATION) at 12:24

## 2021-03-07 RX ADMIN — ALPRAZOLAM 0.25 MG: 0.25 TABLET ORAL at 04:58

## 2021-03-07 RX ADMIN — OXYCODONE 10 MG: 5 TABLET ORAL at 10:36

## 2021-03-07 RX ADMIN — ACETAMINOPHEN 650 MG: 325 TABLET ORAL at 14:38

## 2021-03-07 RX ADMIN — AMPICILLIN AND SULBACTAM 3000 MG: 1; 2 INJECTION, POWDER, FOR SOLUTION INTRAMUSCULAR; INTRAVENOUS at 01:11

## 2021-03-07 RX ADMIN — AMPICILLIN AND SULBACTAM 3000 MG: 1; 2 INJECTION, POWDER, FOR SOLUTION INTRAMUSCULAR; INTRAVENOUS at 07:49

## 2021-03-07 RX ADMIN — TIZANIDINE 4 MG: 4 TABLET ORAL at 04:59

## 2021-03-07 RX ADMIN — ALPRAZOLAM 0.25 MG: 0.25 TABLET ORAL at 09:24

## 2021-03-07 RX ADMIN — PREGABALIN 100 MG: 75 CAPSULE ORAL at 09:25

## 2021-03-07 RX ADMIN — DICLOFENAC SODIUM 2 G: 10 GEL TOPICAL at 20:46

## 2021-03-07 RX ADMIN — BENZONATATE 100 MG: 100 CAPSULE ORAL at 04:59

## 2021-03-07 RX ADMIN — POTASSIUM CHLORIDE 20 MEQ: 20 TABLET, EXTENDED RELEASE ORAL at 18:30

## 2021-03-07 RX ADMIN — ENOXAPARIN SODIUM 30 MG: 30 INJECTION SUBCUTANEOUS at 09:25

## 2021-03-07 RX ADMIN — FLUOXETINE 40 MG: 20 CAPSULE ORAL at 09:25

## 2021-03-07 RX ADMIN — BENZONATATE 100 MG: 100 CAPSULE ORAL at 14:38

## 2021-03-07 RX ADMIN — ATORVASTATIN CALCIUM 40 MG: 40 TABLET, FILM COATED ORAL at 20:43

## 2021-03-07 RX ADMIN — FUROSEMIDE 20 MG: 20 TABLET ORAL at 09:25

## 2021-03-07 RX ADMIN — BISACODYL 5 MG: 5 TABLET, COATED ORAL at 09:27

## 2021-03-07 RX ADMIN — TIZANIDINE 4 MG: 4 TABLET ORAL at 14:38

## 2021-03-07 RX ADMIN — AMLODIPINE BESYLATE 2.5 MG: 5 TABLET ORAL at 09:25

## 2021-03-07 RX ADMIN — PREGABALIN 100 MG: 75 CAPSULE ORAL at 14:39

## 2021-03-07 RX ADMIN — ALPRAZOLAM 0.25 MG: 0.25 TABLET ORAL at 18:24

## 2021-03-07 RX ADMIN — IPRATROPIUM BROMIDE AND ALBUTEROL SULFATE 1 AMPULE: .5; 2.5 SOLUTION RESPIRATORY (INHALATION) at 21:04

## 2021-03-07 RX ADMIN — OXYCODONE 10 MG: 5 TABLET ORAL at 05:48

## 2021-03-07 RX ADMIN — IPRATROPIUM BROMIDE AND ALBUTEROL SULFATE 1 AMPULE: .5; 2.5 SOLUTION RESPIRATORY (INHALATION) at 16:47

## 2021-03-07 RX ADMIN — AMITRIPTYLINE HYDROCHLORIDE 75 MG: 25 TABLET, FILM COATED ORAL at 20:43

## 2021-03-07 RX ADMIN — OXYCODONE 10 MG: 5 TABLET ORAL at 14:49

## 2021-03-07 RX ADMIN — CEFTRIAXONE SODIUM 2000 MG: 2 INJECTION, POWDER, FOR SOLUTION INTRAMUSCULAR; INTRAVENOUS at 09:28

## 2021-03-07 RX ADMIN — CEFTRIAXONE SODIUM 2000 MG: 2 INJECTION, POWDER, FOR SOLUTION INTRAMUSCULAR; INTRAVENOUS at 20:51

## 2021-03-07 RX ADMIN — DICLOFENAC SODIUM 2 G: 10 GEL TOPICAL at 09:44

## 2021-03-07 RX ADMIN — OXYCODONE 10 MG: 5 TABLET ORAL at 20:43

## 2021-03-07 RX ADMIN — AMPICILLIN AND SULBACTAM 3000 MG: 1; 2 INJECTION, POWDER, FOR SOLUTION INTRAMUSCULAR; INTRAVENOUS at 14:15

## 2021-03-07 RX ADMIN — OXYCODONE 10 MG: 5 TABLET ORAL at 01:24

## 2021-03-07 RX ADMIN — PREGABALIN 100 MG: 75 CAPSULE ORAL at 20:43

## 2021-03-07 RX ADMIN — ALBUTEROL SULFATE 2.5 MG: 2.5 SOLUTION RESPIRATORY (INHALATION) at 14:30

## 2021-03-07 RX ADMIN — AMPICILLIN AND SULBACTAM 3000 MG: 1; 2 INJECTION, POWDER, FOR SOLUTION INTRAMUSCULAR; INTRAVENOUS at 19:42

## 2021-03-07 RX ADMIN — IPRATROPIUM BROMIDE AND ALBUTEROL SULFATE 1 AMPULE: .5; 2.5 SOLUTION RESPIRATORY (INHALATION) at 07:24

## 2021-03-07 ASSESSMENT — PAIN DESCRIPTION - ORIENTATION
ORIENTATION_2: RIGHT
ORIENTATION: RIGHT
ORIENTATION: RIGHT
ORIENTATION_2: RIGHT
ORIENTATION: RIGHT

## 2021-03-07 ASSESSMENT — PAIN DESCRIPTION - PROGRESSION
CLINICAL_PROGRESSION: NOT CHANGED
CLINICAL_PROGRESSION: NOT CHANGED
CLINICAL_PROGRESSION_2: NOT CHANGED
CLINICAL_PROGRESSION_2: NOT CHANGED
CLINICAL_PROGRESSION: NOT CHANGED
CLINICAL_PROGRESSION: NOT CHANGED

## 2021-03-07 ASSESSMENT — PAIN DESCRIPTION - PAIN TYPE
TYPE_2: CHRONIC PAIN
TYPE_2: CHRONIC PAIN
TYPE: ACUTE PAIN;SURGICAL PAIN
TYPE: ACUTE PAIN;SURGICAL PAIN

## 2021-03-07 ASSESSMENT — PAIN SCALES - GENERAL
PAINLEVEL_OUTOF10: 7
PAINLEVEL_OUTOF10: 8
PAINLEVEL_OUTOF10: 8
PAINLEVEL_OUTOF10: 10
PAINLEVEL_OUTOF10: 7

## 2021-03-07 ASSESSMENT — PAIN DESCRIPTION - ONSET
ONSET_2: ON-GOING
ONSET_2: ON-GOING
ONSET: ON-GOING

## 2021-03-07 ASSESSMENT — PAIN DESCRIPTION - LOCATION
LOCATION: HIP;LEG
LOCATION_2: SHOULDER
LOCATION: HIP;LEG
LOCATION: HIP;LEG

## 2021-03-07 ASSESSMENT — PAIN DESCRIPTION - FREQUENCY
FREQUENCY: CONTINUOUS

## 2021-03-07 ASSESSMENT — PAIN DESCRIPTION - DIRECTION: RADIATING_TOWARDS: DOWN LEG

## 2021-03-07 ASSESSMENT — PAIN DESCRIPTION - DURATION
DURATION_2: INTERMITTENT
DURATION_2: INTERMITTENT
DURATION_2: CONTINUOUS
DURATION_2: CONTINUOUS

## 2021-03-07 ASSESSMENT — PAIN DESCRIPTION - DESCRIPTORS
DESCRIPTORS: ACHING

## 2021-03-07 ASSESSMENT — PAIN - FUNCTIONAL ASSESSMENT
PAIN_FUNCTIONAL_ASSESSMENT: PREVENTS OR INTERFERES SOME ACTIVE ACTIVITIES AND ADLS

## 2021-03-07 ASSESSMENT — PAIN DESCRIPTION - INTENSITY
RATING_2: 7
RATING_2: 6
RATING_2: 5

## 2021-03-07 NOTE — PROGRESS NOTES
Department of Physical Medicine & Rehabilitation  Progress Note    Patient Identification:  Nabeel Camejo  [id number]  : 1960  Admit date: 3/3/2021    Chief Complaint: debility    Subjective:   Continues to have some swelling and pain in the right leg. She also is slightly anxious. Replacement of electrolytes today. No other complaints. ROS: No f/c, n/v, cp     Objective:  Patient Vitals for the past 24 hrs:   BP Temp Temp src Pulse Resp SpO2   21 1430 -- -- -- -- -- 95 %   21 1230 -- -- -- -- -- 95 %   21 0724 -- -- -- -- -- 98 %   21 0223 -- -- -- -- -- 91 %   21 0212 -- -- -- -- -- (!) 85 %   21 -- -- -- -- 20 (!) 88 %   21 (!) 103/55 99 °F (37.2 °C) Oral 93 18 93 %   21 1950 -- -- -- -- -- 90 %     Const: Alert. No distress, pleasant. HEENT: Normocephalic, atraumatic. Normal sclera/conjunctiva. MMM. CV: Regular rate and rhythm. Resp: No respiratory distress. Lungs CTAB. Abd: Soft, nontender, nondistended, NABS+   Ext: No edema. Neuro: Alert, oriented, appropriately interactive. Psych: Cooperative, appropriate mood and affect    Laboratory data: Available via EMR. Last 24 hour lab  No results found for this or any previous visit (from the past 24 hour(s)).     Therapy progress:  PT  Position Activity Restriction  Other position/activity restrictions: WBAT R LE; up with assist; ok to shower  Objective     Sit to Stand: Contact guard assistance(from bed, rollator, chair in gym)  Stand to sit: Contact guard assistance  Bed to Chair: Contact guard assistance  Device: Rolling Walker  Assistance: Contact guard assistance  Distance: 15'+20'+10'+20'+140' (with two small standing rest breaks)  OT  PT Equipment Recommendations  Equipment Needed: (continue to assess)  Other: continue to assess  Toilet - Technique: Ambulating  Equipment Used: Standard toilet  Toilet Transfers Comments: use of GB to stand  Assessment        SLP          Body mass index is 43.91 kg/m². Assessment and Plan:  Infected Right Femur Hardware  2/25 s/p partial JENIFER  Intra-op cultures with e. Faecalis  ID consulted, appreciate recs: d/t only partial JENIFER, prefer double beta-lactam coverage for enterococcus  Continue ceftriaxone 2g q 12 hr + unasyn 3g every 6 hours for 12 weeks, unless plan to remove hardware      Essential HTN - PTA: amlodipine 2.5mg daily, lasix 20mg daily  BP stable w/o meds   At discharge, can resume home lasix, amlodipine     Constipation  Continue bowel regimen      Chronic Pain  Mood Disorder - PTA: elavil, flexeril, fluoxetine, gabapentin, meloxicam  Resume home meds     Moderate Persistent Asthma  RT assess and treat, IS  Continue home Albuterol prn   O2 sats stable on room air  Needs outpatient PFTs and pulmonology for further w/u of SOB with exertion (? ILD, pulm HTN. ..) (already new referral PTA at an [facility]       Rehab Progress: Improving  Anticipated Dispo: home  Services/DME: [facility]  ELOS: [facility] Ashley D.O. M.P.H  PM&R  3/7/2021  3:00 PM

## 2021-03-07 NOTE — PROGRESS NOTES
Pt disarmed bed fall alarm on bed and self-ambulated to bathroom. RN notified of pt \"not in bed\". RN reports to pt's room immediately. Pt on toilet, no distress, no injury. RN reminds pt of fall precautions in place. Pt verbalizes understanding and states \"I just wanted to take myself to the bathroom. \"

## 2021-03-07 NOTE — PROGRESS NOTES
Pt. Requested PRN breathing treatment. Pt. States she has PRN breathing treatment that's why she called. RN reviewed last time pt. Received breathing treatment. RT contacted, states pt. Was given breathing treatment less than 2 hrs ago but will be there in a little while. Pt. Shows no s/sx of distress at this time.

## 2021-03-07 NOTE — PROGRESS NOTES
Pt. Potassium is 3.2, per oncoming RN who had pt. On previous shift, DO aware. Detail Level: Generalized Detail Level: Detailed

## 2021-03-07 NOTE — PROGRESS NOTES
Pt. in bed watching TV. Assisted to bathroom and back to bed. Assessment complete, VSS, afebrile. Pt. remains A & O with forgetfulness,medications taken without difficulty. Pt. Voiced on-going pain, Pain management in place, per protocol, see MAR. Call light and over bed table within reach, no other care needed at this time. Will continue to monitor.

## 2021-03-07 NOTE — PLAN OF CARE
Problem: Pain:  Goal: Pain level will decrease  Description: Pain level will decrease  Outcome: Ongoing   Pt. Continues to complaint of acute, surgical and chronic pain. Pt. States pain goal is 5/10. Pain management is in place to reach and exceed pain goal. Pt. Satisfied with current pain management of topical Voltaren gel, lidocaine patch, PRN oral tablets and non-pharmaceutical treatments. Problem: Falls - Risk of:  Goal: Will remain free from falls  Description: Will remain free from falls  Outcome: Ongoing   Pt. Remains at risk for falls due to chronic pain, acute pain, surgery at r hip, impaired gait and weakness. For safety video surveillance in place, fall sign posted at the door, bed wheels locked and in lowest position, bed alarm activated, call light and over bed table within reach. Safe transfer using gait belt and Rolator. No falls reported thus far. Pt. Will remain free from falls during this shift.

## 2021-03-07 NOTE — PLAN OF CARE
Problem: Pain:  Goal: Pain level will decrease  Outcome: Ongoing     Problem: Nutrition  Goal: Optimal nutrition therapy  Outcome: Ongoing

## 2021-03-08 ENCOUNTER — APPOINTMENT (OUTPATIENT)
Dept: GENERAL RADIOLOGY | Age: 61
DRG: 560 | End: 2021-03-08
Attending: PHYSICAL MEDICINE & REHABILITATION
Payer: MEDICARE

## 2021-03-08 PROBLEM — R09.02 HYPOXEMIA: Status: ACTIVE | Noted: 2021-03-08

## 2021-03-08 LAB
ANION GAP SERPL CALCULATED.3IONS-SCNC: 6 MMOL/L (ref 3–16)
BASOPHILS ABSOLUTE: 0.1 K/UL (ref 0–0.2)
BASOPHILS RELATIVE PERCENT: 0.8 %
BUN BLDV-MCNC: 7 MG/DL (ref 7–20)
CALCIUM SERPL-MCNC: 8 MG/DL (ref 8.3–10.6)
CHLORIDE BLD-SCNC: 105 MMOL/L (ref 99–110)
CO2: 32 MMOL/L (ref 21–32)
CREAT SERPL-MCNC: 0.7 MG/DL (ref 0.6–1.2)
EOSINOPHILS ABSOLUTE: 0.4 K/UL (ref 0–0.6)
EOSINOPHILS RELATIVE PERCENT: 5.9 %
GFR AFRICAN AMERICAN: >60
GFR NON-AFRICAN AMERICAN: >60
GLUCOSE BLD-MCNC: 124 MG/DL (ref 70–99)
HCT VFR BLD CALC: 24.4 % (ref 36–48)
HEMOGLOBIN: 8 G/DL (ref 12–16)
LYMPHOCYTES ABSOLUTE: 1 K/UL (ref 1–5.1)
LYMPHOCYTES RELATIVE PERCENT: 14.3 %
MAGNESIUM: 2.2 MG/DL (ref 1.8–2.4)
MCH RBC QN AUTO: 26.8 PG (ref 26–34)
MCHC RBC AUTO-ENTMCNC: 32.6 G/DL (ref 31–36)
MCV RBC AUTO: 82.1 FL (ref 80–100)
MONOCYTES ABSOLUTE: 0.6 K/UL (ref 0–1.3)
MONOCYTES RELATIVE PERCENT: 8.1 %
NEUTROPHILS ABSOLUTE: 5.2 K/UL (ref 1.7–7.7)
NEUTROPHILS RELATIVE PERCENT: 70.9 %
PDW BLD-RTO: 18 % (ref 12.4–15.4)
PLATELET # BLD: 399 K/UL (ref 135–450)
PMV BLD AUTO: 7.4 FL (ref 5–10.5)
POTASSIUM REFLEX MAGNESIUM: 3.5 MMOL/L (ref 3.5–5.1)
PRO-BNP: 329 PG/ML (ref 0–124)
RBC # BLD: 2.97 M/UL (ref 4–5.2)
SODIUM BLD-SCNC: 143 MMOL/L (ref 136–145)
WBC # BLD: 7.3 K/UL (ref 4–11)

## 2021-03-08 PROCEDURE — 6370000000 HC RX 637 (ALT 250 FOR IP): Performed by: PHYSICAL MEDICINE & REHABILITATION

## 2021-03-08 PROCEDURE — 99223 1ST HOSP IP/OBS HIGH 75: CPT | Performed by: INTERNAL MEDICINE

## 2021-03-08 PROCEDURE — 94761 N-INVAS EAR/PLS OXIMETRY MLT: CPT

## 2021-03-08 PROCEDURE — 97110 THERAPEUTIC EXERCISES: CPT | Performed by: PHYSICAL THERAPIST

## 2021-03-08 PROCEDURE — 6360000002 HC RX W HCPCS: Performed by: PHYSICAL MEDICINE & REHABILITATION

## 2021-03-08 PROCEDURE — 36415 COLL VENOUS BLD VENIPUNCTURE: CPT

## 2021-03-08 PROCEDURE — 6360000002 HC RX W HCPCS: Performed by: STUDENT IN AN ORGANIZED HEALTH CARE EDUCATION/TRAINING PROGRAM

## 2021-03-08 PROCEDURE — 94664 DEMO&/EVAL PT USE INHALER: CPT

## 2021-03-08 PROCEDURE — 97530 THERAPEUTIC ACTIVITIES: CPT

## 2021-03-08 PROCEDURE — 93970 EXTREMITY STUDY: CPT

## 2021-03-08 PROCEDURE — 1280000000 HC REHAB R&B

## 2021-03-08 PROCEDURE — 36592 COLLECT BLOOD FROM PICC: CPT

## 2021-03-08 PROCEDURE — 97116 GAIT TRAINING THERAPY: CPT

## 2021-03-08 PROCEDURE — 94640 AIRWAY INHALATION TREATMENT: CPT

## 2021-03-08 PROCEDURE — 97535 SELF CARE MNGMENT TRAINING: CPT

## 2021-03-08 PROCEDURE — 83735 ASSAY OF MAGNESIUM: CPT

## 2021-03-08 PROCEDURE — 71045 X-RAY EXAM CHEST 1 VIEW: CPT

## 2021-03-08 PROCEDURE — 80048 BASIC METABOLIC PNL TOTAL CA: CPT

## 2021-03-08 PROCEDURE — 97530 THERAPEUTIC ACTIVITIES: CPT | Performed by: PHYSICAL THERAPIST

## 2021-03-08 PROCEDURE — 2700000000 HC OXYGEN THERAPY PER DAY

## 2021-03-08 PROCEDURE — 6370000000 HC RX 637 (ALT 250 FOR IP): Performed by: STUDENT IN AN ORGANIZED HEALTH CARE EDUCATION/TRAINING PROGRAM

## 2021-03-08 PROCEDURE — 83880 ASSAY OF NATRIURETIC PEPTIDE: CPT

## 2021-03-08 PROCEDURE — 2580000003 HC RX 258: Performed by: PHYSICAL MEDICINE & REHABILITATION

## 2021-03-08 PROCEDURE — 97116 GAIT TRAINING THERAPY: CPT | Performed by: PHYSICAL THERAPIST

## 2021-03-08 PROCEDURE — 99232 SBSQ HOSP IP/OBS MODERATE 35: CPT | Performed by: PHYSICAL MEDICINE & REHABILITATION

## 2021-03-08 PROCEDURE — 85025 COMPLETE CBC W/AUTO DIFF WBC: CPT

## 2021-03-08 RX ORDER — ARFORMOTEROL TARTRATE 15 UG/2ML
15 SOLUTION RESPIRATORY (INHALATION) 2 TIMES DAILY
Status: DISCONTINUED | OUTPATIENT
Start: 2021-03-08 | End: 2021-03-10

## 2021-03-08 RX ORDER — BUDESONIDE AND FORMOTEROL FUMARATE DIHYDRATE 80; 4.5 UG/1; UG/1
2 AEROSOL RESPIRATORY (INHALATION) 2 TIMES DAILY
Status: DISCONTINUED | OUTPATIENT
Start: 2021-03-08 | End: 2021-03-08 | Stop reason: CLARIF

## 2021-03-08 RX ORDER — FUROSEMIDE 10 MG/ML
40 INJECTION INTRAMUSCULAR; INTRAVENOUS ONCE
Status: COMPLETED | OUTPATIENT
Start: 2021-03-08 | End: 2021-03-08

## 2021-03-08 RX ORDER — BUDESONIDE 0.25 MG/2ML
0.25 INHALANT ORAL 2 TIMES DAILY
Status: DISCONTINUED | OUTPATIENT
Start: 2021-03-08 | End: 2021-03-10

## 2021-03-08 RX ADMIN — IPRATROPIUM BROMIDE AND ALBUTEROL SULFATE 1 AMPULE: .5; 2.5 SOLUTION RESPIRATORY (INHALATION) at 12:22

## 2021-03-08 RX ADMIN — BUDESONIDE 250 MCG: 0.25 SUSPENSION RESPIRATORY (INHALATION) at 21:49

## 2021-03-08 RX ADMIN — IPRATROPIUM BROMIDE AND ALBUTEROL SULFATE 1 AMPULE: .5; 2.5 SOLUTION RESPIRATORY (INHALATION) at 21:49

## 2021-03-08 RX ADMIN — DICLOFENAC SODIUM 2 G: 10 GEL TOPICAL at 08:23

## 2021-03-08 RX ADMIN — ALBUTEROL SULFATE 2.5 MG: 2.5 SOLUTION RESPIRATORY (INHALATION) at 01:46

## 2021-03-08 RX ADMIN — ALPRAZOLAM 0.25 MG: 0.25 TABLET ORAL at 01:36

## 2021-03-08 RX ADMIN — ALPRAZOLAM 0.25 MG: 0.25 TABLET ORAL at 12:13

## 2021-03-08 RX ADMIN — PREGABALIN 100 MG: 75 CAPSULE ORAL at 14:06

## 2021-03-08 RX ADMIN — PREGABALIN 100 MG: 75 CAPSULE ORAL at 08:21

## 2021-03-08 RX ADMIN — ARFORMOTEROL TARTRATE 15 MCG: 15 SOLUTION RESPIRATORY (INHALATION) at 21:49

## 2021-03-08 RX ADMIN — DICLOFENAC SODIUM 2 G: 10 GEL TOPICAL at 21:05

## 2021-03-08 RX ADMIN — OXYCODONE 10 MG: 5 TABLET ORAL at 12:13

## 2021-03-08 RX ADMIN — FUROSEMIDE 40 MG: 10 INJECTION, SOLUTION INTRAMUSCULAR; INTRAVENOUS at 16:55

## 2021-03-08 RX ADMIN — AMPICILLIN AND SULBACTAM 3000 MG: 1; 2 INJECTION, POWDER, FOR SOLUTION INTRAMUSCULAR; INTRAVENOUS at 14:08

## 2021-03-08 RX ADMIN — OXYCODONE 10 MG: 5 TABLET ORAL at 07:32

## 2021-03-08 RX ADMIN — ATORVASTATIN CALCIUM 40 MG: 40 TABLET, FILM COATED ORAL at 21:02

## 2021-03-08 RX ADMIN — CEFTRIAXONE SODIUM 2000 MG: 2 INJECTION, POWDER, FOR SOLUTION INTRAMUSCULAR; INTRAVENOUS at 21:20

## 2021-03-08 RX ADMIN — POTASSIUM CHLORIDE 20 MEQ: 20 TABLET, EXTENDED RELEASE ORAL at 16:53

## 2021-03-08 RX ADMIN — IPRATROPIUM BROMIDE AND ALBUTEROL SULFATE 1 AMPULE: .5; 2.5 SOLUTION RESPIRATORY (INHALATION) at 16:48

## 2021-03-08 RX ADMIN — AMPICILLIN AND SULBACTAM 3000 MG: 1; 2 INJECTION, POWDER, FOR SOLUTION INTRAMUSCULAR; INTRAVENOUS at 19:52

## 2021-03-08 RX ADMIN — TIZANIDINE 4 MG: 4 TABLET ORAL at 14:35

## 2021-03-08 RX ADMIN — FUROSEMIDE 20 MG: 20 TABLET ORAL at 08:22

## 2021-03-08 RX ADMIN — ENOXAPARIN SODIUM 30 MG: 30 INJECTION SUBCUTANEOUS at 08:21

## 2021-03-08 RX ADMIN — AMITRIPTYLINE HYDROCHLORIDE 75 MG: 25 TABLET, FILM COATED ORAL at 21:02

## 2021-03-08 RX ADMIN — OXYCODONE 10 MG: 5 TABLET ORAL at 01:26

## 2021-03-08 RX ADMIN — CEFTRIAXONE SODIUM 2000 MG: 2 INJECTION, POWDER, FOR SOLUTION INTRAMUSCULAR; INTRAVENOUS at 07:14

## 2021-03-08 RX ADMIN — AMPICILLIN AND SULBACTAM 3000 MG: 1; 2 INJECTION, POWDER, FOR SOLUTION INTRAMUSCULAR; INTRAVENOUS at 09:54

## 2021-03-08 RX ADMIN — AMLODIPINE BESYLATE 2.5 MG: 5 TABLET ORAL at 08:22

## 2021-03-08 RX ADMIN — ALPRAZOLAM 0.25 MG: 0.25 TABLET ORAL at 21:02

## 2021-03-08 RX ADMIN — AMPICILLIN AND SULBACTAM 3000 MG: 1; 2 INJECTION, POWDER, FOR SOLUTION INTRAMUSCULAR; INTRAVENOUS at 01:27

## 2021-03-08 RX ADMIN — OXYCODONE 10 MG: 5 TABLET ORAL at 21:01

## 2021-03-08 RX ADMIN — POTASSIUM CHLORIDE 20 MEQ: 20 TABLET, EXTENDED RELEASE ORAL at 08:22

## 2021-03-08 RX ADMIN — OXYCODONE 10 MG: 5 TABLET ORAL at 16:54

## 2021-03-08 RX ADMIN — IBUPROFEN 200 MG: 400 TABLET, FILM COATED ORAL at 14:35

## 2021-03-08 RX ADMIN — PREGABALIN 100 MG: 75 CAPSULE ORAL at 21:01

## 2021-03-08 RX ADMIN — IPRATROPIUM BROMIDE AND ALBUTEROL SULFATE 1 AMPULE: .5; 2.5 SOLUTION RESPIRATORY (INHALATION) at 07:32

## 2021-03-08 RX ADMIN — BENZONATATE 100 MG: 100 CAPSULE ORAL at 01:36

## 2021-03-08 RX ADMIN — FLUOXETINE 40 MG: 20 CAPSULE ORAL at 08:22

## 2021-03-08 ASSESSMENT — VISUAL ACUITY: OU: 1

## 2021-03-08 ASSESSMENT — PAIN DESCRIPTION - PAIN TYPE
TYPE: ACUTE PAIN;SURGICAL PAIN
TYPE: ACUTE PAIN;SURGICAL PAIN
TYPE_2: CHRONIC PAIN
TYPE: ACUTE PAIN;SURGICAL PAIN
TYPE: ACUTE PAIN;SURGICAL PAIN
TYPE: ACUTE PAIN

## 2021-03-08 ASSESSMENT — PAIN DESCRIPTION - FREQUENCY
FREQUENCY: CONTINUOUS

## 2021-03-08 ASSESSMENT — PAIN SCALES - GENERAL
PAINLEVEL_OUTOF10: 7
PAINLEVEL_OUTOF10: 3

## 2021-03-08 ASSESSMENT — PAIN DESCRIPTION - DESCRIPTORS
DESCRIPTORS: ACHING;DISCOMFORT
DESCRIPTORS_2: CONSTANT

## 2021-03-08 ASSESSMENT — PAIN DESCRIPTION - LOCATION
LOCATION_2: SHOULDER
LOCATION: HIP;LEG
LOCATION: KNEE;HIP
LOCATION: HIP;LEG
LOCATION: HIP;LEG

## 2021-03-08 ASSESSMENT — PAIN DESCRIPTION - DURATION: DURATION_2: CONTINUOUS

## 2021-03-08 ASSESSMENT — PAIN DESCRIPTION - INTENSITY
RATING_2: 0
RATING_2: 3

## 2021-03-08 ASSESSMENT — PAIN DESCRIPTION - ORIENTATION
ORIENTATION: RIGHT

## 2021-03-08 ASSESSMENT — PAIN DESCRIPTION - ONSET: ONSET: ON-GOING

## 2021-03-08 ASSESSMENT — PAIN DESCRIPTION - PROGRESSION: CLINICAL_PROGRESSION: NOT CHANGED

## 2021-03-08 NOTE — PROGRESS NOTES
RESPIRATORY THERAPY ASSESSMENT    Name:  Mayte Doherty Record Number:  [id number]  Age: 63 y.o. Gender: female  : 1960  Today's Date:  3/8/2021  Room:  [address] [place] [address]    Assessment     Is the patient being admitted for a COPD or Asthma exacerbation? No   (If yes the patient will be seen every 4 hours for the first 24 hours and then reassessed)    Patient Admission Diagnosis      Allergies  Allergies   Allergen Reactions    Cortisone Anaphylaxis and Other (See Comments)     Only if Injected; many years ago. Anaphylactic reaction??numbing agent??cortisone. Injected causes reaction. .. Mazin Angry Mazin Angry the patient states she CAN TAKE IV solumedrol fine (18)  Only if Injected; many years ago. Anaphylactic reaction??numbing agent??cortisone. - she can taken oral steroids.  Droperidol Other (See Comments)     EPS   shaking  shaking  ETS    Compazine [Prochlorperazine] Other (See Comments)     EPS symptoms       Minimum Predicted Vital Capacity:     680          Actual Vital Capacity:      1000              Pulmonary History:Asthma, former smoker  Home Oxygen Therapy:  room air  Home Respiratory Therapy:Albuterol and Budesonide/Formoterol    Current Respiratory Therapy:  Albuterol PRN, duoneb Q4HWA  Treatment Type: HHN  Medications: Albuterol/Ipratropium    Respiratory Severity Index(RSI)   Patients with orders for inhalation medications, oxygen, or any therapeutic treatment modality will be placed on Respiratory Protocol. They will be assessed with the first treatment and at least every 72 hours thereafter. The following severity scale will be used to determine frequency of treatment intervention.     Smoking History: Smoking History Less than 1ppd or less than 15 pack year = 1    Social History  Social History     Tobacco Use    Smoking status: Former Smoker     Packs/day: 0.25     Years: 0.20     Pack years: 0.05     Types: Cigarettes     Quit date: 2014     Years since quittin.7    Smokeless tobacco: Never Used    Tobacco comment: working to decrease   Substance Use Topics    Alcohol use: No     Comment: recovering alcoholic since 3412, has had couple of relaspes, last 7/15/2015.  Drug use: No       Recent Surgical History: Surgery of Extremities = 1  Past Surgical History  Past Surgical History:   Procedure Laterality Date    BREAST ENHANCEMENT SURGERY Bilateral 2007   [place]    FRACTURE SURGERY      femur RT    GASTRIC [place] SURGERY  [place]    [place]    JOINT REPLACEMENT Bilateral 12/29/2014    knee replacement    JOINT REPLACEMENT      KNEE CARTILAGE SURGERY Left 2006    ROTATOR CUFF REPAIR Left 2008    TOTAL HIP ARTHROPLASTY Bilateral 2014    WISDOM TOOTH EXTRACTION  1995       Level of Consciousness: Alert, Oriented, and Cooperative = 0    Level of Activity: Walking with assistance = 1    Respiratory Pattern: Increased; RR 21-30 = 1    Breath Sounds: Absent bilaterally and/or with wheezes = 3    Sputum  Sputum Color: None, Tenacity: None, Sputum How Obtained: Spontaneous cough  Cough: Strong, spontaneous, non-productive = 0    Vital Signs   /76   Pulse 77   Temp 98.3 °F (36.8 °C) (Oral)   Resp 18   Ht 4' 10\" (1.473 m)   Wt 210 lb 1.6 oz (95.3 kg)   LMP 12/22/2014   SpO2 92%   BMI 43.91 kg/m²   SPO2 (COPD values may differ): Greater than or equal to 92% on room air = 0    Peak Flow (asthma only): not applicable = 0    RSI: 7-8 = BID and Q4HPRN (every four hours as needed) for dyspnea        Plan       Goals: medication delivery    Patient/caregiver was educated on the proper method of use for Respiratory Care Devices:  Yes      Level of patient/caregiver understanding able to:   ? Verbalize understanding   ? Demonstrate understanding       ? Teach back        ? Needs reinforcement       ? No available caregiver               ?   Other:     Response to education:  Good     Is patient being placed on Home Treatment Regimen? No     Does the patient have everything they need prior to discharge? NA     Comments: pt experiencing wheezing bilaterally, SOB w/ ambulation, requests PRN treatments regularly    Plan of Care: continue duoneb Q4HWA, albuterol PRN    Electronically signed by Gaudencio Sanchez RCP on 3/8/2021 at 4:38 AM    Respiratory Protocol Guidelines     1. Assessment and treatment by Respiratory Therapy will be initiated for medication and therapeutic interventions upon initiation of aerosolized medication. 2. Physician will be contacted for respiratory rate (RR) greater than 35 breaths per minute. Therapy will be held for heart rate (HR) greater than 140 beats per minute, pending direction from physician. 3. Bronchodilators will be administered via Metered Dose Inhaler (MDI) with spacer when the following criteria are met:  a. Alert and cooperative     b. HR < 140 bpm  c. RR < 30 bpm                d. Can demonstrate a 2-3 second inspiratory hold  4. Bronchodilators will be administered via Hand Held Nebulizer CB [facility]) to patients when ANY of the following criteria are met  a. Incognizant or uncooperative          b. Patients treated with HHN at Home        c. Unable to demonstrate proper use of MDI with spacer     d. RR > 30 bpm   5. Bronchodilators will be delivered via Metered Dose Inhaler (MDI), HHN, Aerogen to intubated patients on mechanical ventilation. 6. Inhalation medication orders will be delivered and/or substituted as outlined below. Aerosolized Medications Ordering and Administration Guidelines:    1. All Medications will be ordered by a physician, and their frequency and/or modality will be adjusted as defined by the patients Respiratory Severity Index (RSI) score.   2. If the patient does not have documented COPD, consider discontinuing anticholinergics when RSI is less than 9.  3. If the bronchospasm worsens (increased RSI), then the bronchodilator frequency can be increased to a maximum of every 4 hours.  If greater than every 4 hours is required, the physician will be contacted. 4. If the bronchospasm improves, the frequency of the bronchodilator can be decreased, based on the patient's RSI, but not less than home treatment regimen frequency. 5. Bronchodilator(s) will be discontinued if patient has a RSI less than 9 and has received no scheduled or as needed treatment for 72  Hrs. Patients Ordered on a Mucolytic Agent:    1. Must always be administered with a bronchodilator. 2. Discontinue if patient experiences worsened bronchospasm, or secretions have lessened to the point that the patient is able to clear them with a cough. Anti-inflammatory and Combination Medications:    1. If the patient lacks prior history of lung disease, is not using inhaled anti-inflammatory medication at home, and lacks wheezing by examination or by history for at least 24 hours, contact physician for possible discontinuation.

## 2021-03-08 NOTE — PROGRESS NOTES
Occupational Therapy  Facility/Department: [facility] ACUTE REHAB UNIT  Daily Treatment Note  NAME: Froilan Herrera  : 1960  MRN: [id number]    Date of Service: 3/8/2021    Discharge Recommendations:  24 hour supervision or assist, Home with Home health OT  OT Equipment Recommendations  Equipment Needed: Yes  ADL Assistive Devices: Reacher;Sock-Aid Soft;Transfer Tub Bench  Other: continue to assess    Assessment   Performance deficits / Impairments: Decreased functional mobility ; Decreased endurance;Decreased coordination;Decreased ADL status; Decreased balance;Decreased strength;Decreased high-level IADLs  Assessment: Pt session significantly limited d/t pt pain and breathing difficulty requiring 2 L O2 and frequent rest breaks d/t wheezing although SPO2 was [facility]. Pt disappointed and feeling like she is not making enough functional progress. Pt ambulating using 4WW but requires frequent seated rest breaks d/t deconditioning and only able to tolerate ~2 minutes statically in stance. Pt functioning below baseline, cont per OT POC. Treatment Diagnosis: decreased ADLs and transfers  Prognosis: Fair  OT Education: OT Role;Plan of Care;Equipment; Energy Conservation;Transfer Training  Patient Education: pt verb understanding  REQUIRES OT FOLLOW UP: Yes  Activity Tolerance  Activity Tolerance: Patient Tolerated treatment well;Patient limited by fatigue  Activity Tolerance: Pt demo significant WIGGINS, wheezling and audible crackles after short bouts of ambulation, pt on 2 L O2 and required frequent rest breaks d/t pain and breathing difficulty  Safety Devices  Safety Devices in place: Yes  Type of devices: Bed alarm in place;Call light within reach; Left in chair;Chair alarm in place;Nurse notified(RN notified of breathing difficulites and requested RT for treatment)         Patient Diagnosis(es): There were no encounter diagnoses.       has a past medical history of Asthma, Depression, Osteoarthritis, PONV (postoperative nausea and vomiting), and Scoliosis. has a past surgical history that includes Gastric bypass surgery (); Rotator cuff repair (Left, ); Knee cartilage surgery (Left, ); Breast enhancement surgery (Bilateral, );  section ([place] [address]); Cholecystectomy (); [place] tooth extraction (); joint replacement (Bilateral, 2014); fracture surgery; joint replacement; and Total hip arthroplasty (Bilateral, ). Restrictions  Position Activity Restriction  Other position/activity restrictions: WBAT R LE; up with assist; ok to shower  Subjective   General  Chart Reviewed: Yes  Patient assessed for rehabilitation services?: Yes  Additional Pertinent Hx: Pt admitted to OSH with h/o hip hardware infection. Infected hardware removed on 21. Started on IV ABX. Admitted to ARU on 3/3/21. PMHx inclues:  Asthma, Depression, Osteoarthritis, PONV (postoperative nausea and vomiting), and Scoliosis. Family / Caregiver Present: No  Referring Practitioner: [place]  Diagnosis: debility  Subjective  Subjective: Pt sitting in bedside recliner upon arrival, pleasant and agreeable to OT session. General Comment  Comments: Order clarified in chart that pt may shower, updated restrictions.   Vital Signs  Patient Currently in Pain: Yes(9/10 surgical RLE pain)     Orientation  Orientation  Overall Orientation Status: Within Functional Limits  Objective       Instrumental ADL's  Instrumental ADLs: Yes  Meal Prep  Meal Prep Level: Walker(4WW)  Meal Prep Level of Assistance: Stand by assistance  Meal Preparation: Pt completed preparatory cooking activity for item retrieval and transport from overhead cabinet and from fridge in stance with 0-1 UE support at countertop/4WW with no overt LOB and CGA; pt assisted OT with organization of food items in pantry with OT retrieving items from top shelf as pt not tall enough to reach, pt required frequent seated rest breaks tolerated ~2.5 minutes + ~2 minutes + 1.5 minutes, pt demo WIGGINS requiring VCs for PLB and pt on 2 L O2 during activity     Balance  Sitting Balance: Supervision(seated EOB and on 4WW seat)  Standing Balance: Contact guard assistance  Standing Balance  Time: ~15 mintues total  Activity: functional mobility to/from dining room, functional transfers; static stance for IADL pantry reorganization and item retrieval  Functional Mobility  Functional - Mobility Device: 4-Wheeled Walker  Activity: Other(to/from dining room)  Assist Level: Contact guard assistance  Functional Mobility Comments: OT managing O2 lines; pt required x1 seated rest breaks en route to dining room ~40 feet + 75 feet; pt required 2 rest breaks en route back to room ~25 feet + 40 feet + 60 feet; pt demo significant WIGGINS despite being on 2 L O2 and required VCs for PLB, pt SPO2=94-95% with   Bed mobility  Sit to Supine: Contact guard assistance  Scooting: Stand by assistance(superiorly in bed with use of bed rails)                          Cognition  Overall Cognitive Status: Exceptions  Following Commands: Follows one step commands consistently  Attention Span: Attends with cues to redirect  Memory: Decreased recall of recent events(pt reporting she did not get a shower but with reminder pt could recall she did get one on Saturday with therapy)  Problem Solving: Assistance required to implement solutions  Insights: Fully aware of deficits  Initiation: Does not require cues  Sequencing: Does not require cues       First Session: Pt sitting in bedside recliner upon arrival receiving IV fluids and on 2 L O2 via NC. Pt requesting to put on bra and to use bathroom. Pt doffed shirt i'ly and donned bra with setup. Pt donned shirt i'ly. STS from recliner to 4WW with CGA and VCs to lock brakes before initiating transfer. Pt completed functional mobility to/from bathroom using 4WW with CGA and OT pushing IV pole.  Pt completed toilet transfer onto standard toilet with CGA, pt continent of bladder completing pericare hygiene seated with use of toilet aid. Pt managed LB clothing on/off hips in stance with SBA. Pt stand<sit to recliner using 4WW with CGA and VCs to lock brakes prior to descent. Pt left in bedside recliner at end of session with chair alarm engaged, call light within reach and all needs met.        Plan   Plan  Times per week: 5 days per week 90 mins each  Times per day: Daily  Current Treatment Recommendations: Strengthening, Balance Training, Functional Mobility Training, Endurance Training, Self-Care / ADL, Patient/Caregiver Education & Training, Safety Education & Training, Equipment Evaluation, Education, & procurement  G-Code     OutComes Score                                                  AM-PAC Score             Goals  Short term goals  Time Frame for Short term goals: by 1 week  Short term goal 1: Pt will complete LB dressing with AE PRN at mod A-goal met 3/6  Short term goal 2: Pt will complete toileting with CGA-goal met 3/5  Short term goal 3: Pt will complete bathing at min A-ongoing  Short term goal 4: Pt will complete shower transfer at spvn-ongoing  Short term goal 5: Pt will complete B UE exercises at mod I-ongoing  Long term goals  Time Frame for Long term goals : By 2 weeks-all ongoing  Long term goal 1: Pt will complete LB dressing with AE PRN at mod I  Long term goal 2: Pt will complete toileting with mod I  Long term goal 3: Pt will complete bathing at mod I  Long term goal 4: Pt will complete shower transfer at mod I  Long term goal 5: Pt will complete simple meal prep task at mod I with LRAD  Patient Goals   Patient goals : to be able to go home       Therapy Time   Individual First Session Group Co-treatment   Time In 1100  1000       Time Out 1200  1030       Minutes 60  30       Timed Code Treatment Minutes: 60 Minutes+ 30 Minutes  Total Treatment Time: Johnna Saucedo 47, OT

## 2021-03-08 NOTE — CONSULTS
Pulmonology Consult Note    CC Uncontrolled Asthma     HISTORY OF PRESENT ILLNESS:  Terri Moreno is a 63 y. o.female with pmhx OA, DM, Alcohol Abuse (Prior AA, ), Mood Disorder, DELFINO, asthma (previously on Symbicort but stopped using it because she could not afford it) admitted from  to ARU on  s/p RIGHT FEMUR REMOVAL OF PLATE AND CABLES, STIMULAN BEADS on 2021 due to E.feacalis infected hardware. PeriOp period was uneventful. Pt was intermittently on 1-2L on NC. CXR on , prior to ARU admission was read as Low lung volume study. Mild bibasilar opacities greater on the right favor atelectasis. CXR on arrival on  showed chronic atelectatic collapse of the right middle lobe. Since admission, she was placed duonebs Q4hrs which she has been getting. In addition, she's asked for PRN nebulizers daily. She reports consistent wheezing and mild diffiuclty exhaling but no really SOB. She also reports a persistent cough that has been ongoing to months. It was initially completely dry but pt states its beginning to loosen ups. She endorses chills but denies fevers, n/v abd pain, chest pail, palpitations. She has some R calf and posterior knee pain which is new. She is currently on 2L sating at 97%.     Past Medical History:        Diagnosis Date    Asthma     Depression     Osteoarthritis     PONV (postoperative nausea and vomiting)     Scoliosis    ·     Past Surgical History:        Procedure Laterality Date    BREAST ENHANCEMENT SURGERY Bilateral 2007     SECTION   &     CHOLECYSTECTOMY  1980    FRACTURE SURGERY      femur RT    GASTRIC BYPASS SURGERY      [place]   [facility] JOINT REPLACEMENT Bilateral 2014    knee replacement    JOINT REPLACEMENT      KNEE CARTILAGE SURGERY Left 2006    ROTATOR CUFF REPAIR Left 2008    TOTAL HIP ARTHROPLASTY Bilateral     WISDOM TOOTH EXTRACTION     ·     Medications Priorto Admission:    · Medications Prior to Admission: furosemide (LASIX) 20 MG tablet, Take 1 tablet by mouth daily  · amLODIPine (NORVASC) 2.5 MG tablet, Take 1 tablet by mouth daily  · aspirin EC 81 MG EC tablet, Take 1 tablet by mouth daily  · atorvastatin (LIPITOR) 40 MG tablet, Take 1 tablet by mouth daily  · meloxicam (MOBIC) 15 MG tablet, Take 15 mg by mouth daily  · cyclobenzaprine (FLEXERIL) 10 MG tablet, Take 1 tablet by mouth 3 times daily as needed for Muscle spasms  · albuterol sulfate  (90 Base) MCG/ACT inhaler, Inhale 2 puffs into the lungs 4 times daily as needed for Wheezing  · albuterol (PROVENTIL) (2.5 MG/3ML) 0.083% nebulizer solution, Take 3 mLs by nebulization every 6 hours as needed for Wheezing or Shortness of Breath  · hydrOXYzine (VISTARIL) 25 MG capsule, Take 25 mg by mouth 3 times daily as needed for Anxiety  · ibuprofen (ADVIL;MOTRIN) 800 MG tablet, Take 800 mg by mouth every 6 hours as needed for Pain  · FLUoxetine (PROZAC) 40 MG capsule, Take 1 capsule by mouth daily  · amitriptyline (ELAVIL) 75 MG tablet, Take 1 tablet by mouth nightly  · pantoprazole (PROTONIX) 40 MG tablet, Take 1 tablet by mouth every morning (before breakfast)  · budesonide-formoterol (SYMBICORT) 80-4.5 MCG/ACT AERO, Inhale 2 puffs into the lungs 2 times daily  · gabapentin (NEURONTIN) 300 MG capsule, Take 600 mg by mouth 3 times daily. .    Allergies:  Cortisone, Droperidol, and Compazine [prochlorperazine]    Social History:   · TOBACCO:   reports that she quit smoking about 6 years ago. Her smoking use included cigarettes. She has a 0.05 pack-year smoking history. She has never used smokeless tobacco.  · ETOH:   reports no history of alcohol use.   · DRUGS : Denies  · Patient currently lives at home  ·   Family History:       Adopted: Yes   Problem Relation Age of Onset    Diabetes Daughter     Cancer Mother         Breast and leukemia    Heart Disease Sister     Drug Abuse Brother    ·     Review of Systems    ROS: A 10 point review of systems was conducted, significant findings as noted in HPI. Physical Exam  Constitutional:       General: She is not in acute distress. Appearance: Normal appearance. She is obese. She is not ill-appearing. HENT:      Head: Normocephalic and atraumatic. Eyes:      General: Vision grossly intact. Conjunctiva/sclera: Conjunctivae normal.   Neck:      Musculoskeletal: Full passive range of motion without pain, normal range of motion and neck supple. Cardiovascular:      Rate and Rhythm: Normal rate and regular rhythm. Pulses: Normal pulses. Heart sounds: Normal heart sounds, S1 normal and S2 normal. No murmur. No friction rub. No gallop. Pulmonary:      Effort: Pulmonary effort is normal. No respiratory distress. Breath sounds: Normal air entry. Wheezing (In all posterior lung fields ausculated ) present. No rales. Comments: D/oliver 2L NC and sats 94% on RA. With sitting up for lung ausculation, pt desated to 88%. Which resolved in less that 5sec when pt was back at rest.   Chest:      Chest wall: No tenderness. Abdominal:      General: Bowel sounds are normal. There is no distension. Palpations: Abdomen is soft. Tenderness: There is no abdominal tenderness. Musculoskeletal: Normal range of motion. Right lower leg: No edema. Left lower leg: No edema. Lymphadenopathy:      Cervical: No cervical adenopathy. Skin:     Capillary Refill: Capillary refill takes less than 2 seconds. Coloration: Skin is not pale. Neurological:      Mental Status: She is alert and oriented to person, place, and time. Mental status is at baseline.    Psychiatric:         Mood and Affect: Mood normal.         Speech: Speech normal.         Judgment: Judgment normal.       Physical exam:       Vitals:    03/08/21 1222   BP:    Pulse:    Resp: 18   Temp:    SpO2: 92%       DATA:    Labs:  CBC:   Recent Labs     03/08/21  0341   WBC 7.3   HGB 8.0*   HCT 24.4*          BMP:   Recent Labs     03/08/21  0341      K 3.5      CO2 32   BUN 7   CREATININE 0.7   GLUCOSE 124*     LFT's: No results for input(s): AST, ALT, ALB, BILITOT, ALKPHOS in the last 72 hours. Troponin: No results for input(s): TROPONINI in the last 72 hours. BNP:No results for input(s): BNP in the last 72 hours. ABGs: No results for input(s): PHART, MXX5FEK, PO2ART in the last 72 hours. INR: No results for input(s): INR in the last 72 hours. U/A:No results for input(s): NITRITE, COLORU, PHUR, LABCAST, WBCUA, RBCUA, MUCUS, TRICHOMONAS, YEAST, BACTERIA, CLARITYU, SPECGRAV, LEUKOCYTESUR, UROBILINOGEN, BILIRUBINUR, BLOODU, GLUCOSEU, AMORPHOUS in the last 72 hours. Invalid input(s): KETONESU    XR CHEST 1 VIEW   Final Result      Right upper extremity PICC line in appropriate position. Chronic atelectatic collapse of the right middle lobe. VL Extremity Venous Bilateral    (Results Pending)   XR CHEST PORTABLE    (Results Pending)           ASSESSMENT AND PLAN:  Axel Mejia is a 63 y.o. female, who is being seen in consultation for uncontrolled Asthma. Uncontrolled Asthma  Currently appears to severe persistent. She notes she was on Symbicort but stopped taking it due to inability to afford it. She only uses a rescue inhaler now but requires it multiple times a day. - Start Symbicort BID while inpatient. Can discharge on Budesonide and arformoterol nebulizers which insurance will pay for  - Continue Duonebs QID  - Will hold of on prednisone burst and assess overload stats w/ BNP.  - Pt intermittently had audible wheezing, which is loudest on neck auscultation. She may have so vocal cord dysfunction. Hypoxemia  Possibly 2/2 to pulm edema. Pt currently on 2L. Desat with very minimal activity to 80s on RA. Recent h/o surgery. CXR ordered: Right infrahilar airspace disease appears slightly more prominent. Differentials include infection/atelectasis/aspiration.  Mild vascular congestion.   - CXR finding likely atelectasis. No other signs of infection. Pt on Unasyn and Rocephin for infected hardware. - Check BNP  - Pt on lasix 40mg PO daily. - Will give One time dose of lasix 40mg IV. This patient has been staffed and discussed with Mallika Tidwell MD.  ----------------------------  Geovanni Gallegos MD PGY-2  3/8/2021,  3:06 PM    Patient examined, findings as discussed with Stacy Brady. Agree with assessment and plan as above. Respiratory problems likely are multiple. It is not clear that asthma is truly an acute issue. She exhibited a rapid change on examination, with evident airways noise coming from the throat during our conversation, consistent with vocal cord dysfunction. I suspect obstructive sleep apnea is also a significant problem that is not currently treated. This may have implications for cardiac function, as well as hypoxemia and exercise intolerance.

## 2021-03-08 NOTE — PROGRESS NOTES
Physical Therapy  Facility/Department: [facility] ACUTE REHAB UNIT  Daily Treatment Note  NAME: Teodoro Llamas  : 1960  MRN: [id number]    Date of Service: 3/8/2021    Discharge Recommendations:  Home with Home health PT, 24 hour supervision or assist   PT Equipment Recommendations  Other: pt has all needed DME    Assessment   Body structures, Functions, Activity limitations: Decreased functional mobility ; Decreased balance;Decreased strength;Decreased endurance;Decreased safe awareness; Increased pain  Assessment: Pt tolerated session well ,but required multiple rest breaks to complete all mobility and increased cues for safety awareness with transfers. Pt is now SBA due to need for safety cues with transfers and CGA for ambulation with 4WW. Pt limited by pain and activity tolernace. Pt requires continued skilled PT in order to increase functional mobility and maximize rehab potential  Treatment Diagnosis: impaired mobility secondary to infection  Prognosis: Good  Decision Making: Medium Complexity  PT Education: Goals;PT Role;Plan of Care;Home Exercise Program;Gait Training;General Safety;Transfer Training;Functional Mobility Training;Precautions  Patient Education: pt verbalized understanding  REQUIRES PT FOLLOW UP: Yes  Activity Tolerance  Activity Tolerance: Patient limited by pain; Patient limited by fatigue;Patient limited by endurance  Activity Tolerance: Per RN pt on 2 L O2 this date due to decreased stats overnight     Patient Diagnosis(es): There were no encounter diagnoses. has a past medical history of Asthma, Depression, Osteoarthritis, PONV (postoperative nausea and vomiting), and Scoliosis. has a past surgical history that includes Gastric bypass surgery (); Rotator cuff repair (Left, ); Knee cartilage surgery (Left, ); Breast enhancement surgery (Bilateral, );  section ([place] 71); Cholecystectomy ();  Mayo tooth extraction (); joint replacement (Bilateral, 12/29/2014); fracture surgery; joint replacement; and Total hip arthroplasty (Bilateral, 2014). Restrictions  Position Activity Restriction  Other position/activity restrictions: WBAT R LE; up with assist; ok to shower  Subjective   General  Chart Reviewed: Yes  Additional Pertinent Hx: Pt had knee and hip replacements. She has since had difficulty with abscesses and infections with the joints. 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 Up until 2/5/2021 she was on Keflex Patient admitted too [facility] and  had infected plate removed from her femur 2/25/2021 along with removal of screws,cables and deep bone biopsy. Per Op note. The area around late looked infected. There was sinus tract that connected with knee joint space. The femur stem of the Artifical hip was exposed and there was reactive synovitis there as well. 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 Intraoperatively 3 tissue Cx and 1 deep bone Cx were taken E Faecalis. Patient was started on IV Vancomycin and plan is currently to continue for 12 weeks with tentative end date of 5/20  Family / Caregiver Present: No  Referring Practitioner: Dr. Fabi Mcgee: Pt reports feeling well this morning . Pt reports pain in RLE but managable with use of pain patch  General Comment  Comments: Pt seated in chair and agreeable to therapy. Pain Screening  Patient Currently in Pain: Yes  Pain Assessment  Pain Level: 4  Pain Type: Acute pain;Surgical pain  Pain Location: Knee;Hip  Pain Orientation: Right  Vital Signs  Patient Currently in Pain: Yes       Orientation  Orientation  Overall Orientation Status: Within Normal Limits  Cognition      Objective      Transfers  Sit to Stand: Stand by assistance(Pt sit <> stand from recliner, rollator, and chair with SBA and rollator.  Cues for safety and hand placement)  Stand to sit: Stand by assistance  Ambulation  Ambulation?: Yes  More Ambulation?: Yes  Ambulation 1  Surface: level tile  Device: Rolling Walker  Other Apparatus: O2  Assistance: Contact guard assistance  Quality of Gait: decreased step length and marisa as well as step height. limited by activity tolernace. 2 L O2 at all times  Distance: 35',70, 120;'  Comments: Pt with pain limiting distance of ambulation; self limiting. When distracted able to ambulate long distance  Ambulation 2  Surface - 2: level tile  Device 2: Tan rail  Other Apparatus 2: O2  Assistance 2: Contact guard assistance  Quality of Gait 2: lateral walking right and left with focus on BLE foot clearance and step length  Distance: 2x 40 ft right and left  Comments: seated rest break between reps due to SOB and pain  Stairs/Curb  Stairs?: No   Second session: Pt supine in bed with HOB fully elevated eating lunch when PT arrived. Pt agreeable to therapy. Bed Mobility: supine to sitting on EOB with use of bedrail req SBA . On EOB pt attempted to mat shoes but was unsuccessful req the assistance of PT. Pt unable to bend forwards full range and unable to position LES in a figure \"4\" to assist    Transfers  Sit to Stand: Stand by assistance(Pt sit <> stand from bed and armed chair with SBA and rollator. Cues for safety and hand placement)  Stand to sit: Stand by assistance. Consistently req VC for hand placement  Ambulation 1  Surface: level tile  Device: Rollator   Other Apparatus: O2( 2 L) PT transported tank  Assistance: Contact guard assistance  Quality of Gait: decreased step length and marisa as well as step height. limited by activity tolernace. 2 L O2 at all times  Distance: 140' x2  Comments: Pt appeared to do well with a goal oriented task. ( I.e, we are going to walk to dining room  Vs just walking)     PT instructed pt with  The following standing ex using  BUE support:  1. Toe raises/heel raises  2. Marching   3. Hip abd( alternating)  4.  Hamstring curls ( Timed Code Treatment Minutes:  98+90    Total Treatment Minutes:  40 [facility], PT   261 [facility] 4160 ( treating/documenting  therapist for 2nd session)

## 2021-03-08 NOTE — PROGRESS NOTES
PT's respiratory status seems to be unstable. Pt's Breath sounds seem to swing wildly throughout  the day from decreased and clear to very shortness of breath very decreased and wheezing, calling for PRN treatments often. Sticky note placed in chart for physician.

## 2021-03-08 NOTE — PROGRESS NOTES
Shift assessment complete. VSS. A&Ox4. Patient reports pain to right hip, pain being managed with PRN and scheduled medications. Non-pharm measures of rest, repositioning, and emotional support encouraged. Fall precautions in place. Patient up to chair for breakfast, chair alarm utilized, call light within reach.        Vitals:    03/08/21 0818   BP: 131/74   Pulse: 89   Resp: 18   Temp: 97.9 °F (36.6 °C)   SpO2: 92%

## 2021-03-08 NOTE — PROGRESS NOTES
Department of Physical Medicine & Rehabilitation  Progress Note    Patient Identification:  Milvia Flor  [id number]  : 1960  Admit date: 3/3/2021    Chief Complaint: debility    Subjective:   States that she is happy with her progress. Has been experiencing due to her incision and in right thigh muscles likely from exercises in therapy. Continue to work with therapy. ROS: No f/c, n/v, cp     Objective:  Patient Vitals for the past 24 hrs:   BP Temp Temp src Pulse Resp SpO2 Weight   21 0818 131/74 97.9 °F (36.6 °C) Oral 89 18 92 % --   21 0737 -- -- -- -- 18 92 % --   21 0638 -- -- -- -- -- -- 212 lb 8.4 oz (96.4 kg)   21 0146 -- -- -- -- 18 92 % --   21 2104 -- -- -- -- 18 92 % --   21 2041 121/76 98.3 °F (36.8 °C) Oral 77 18 91 % --   21 1648 -- -- -- -- -- 95 % --   21 1430 -- -- -- -- -- 95 % --   21 1230 -- -- -- -- -- 95 % --     Const: Alert. No distress, pleasant. HEENT: Normocephalic, atraumatic. Normal sclera/conjunctiva. MMM. CV: Regular rate and rhythm. Resp: No respiratory distress. Lungs CTAB. Abd: Soft, nontender, nondistended, NABS+   Ext: No edema. Neuro: Alert, oriented, appropriately interactive. Psych: Cooperative, appropriate mood and affect    Laboratory data: Available via EMR.    Last 24 hour lab  Recent Results (from the past 24 hour(s))   Basic Metabolic Panel w/ Reflex to MG    Collection Time: 21  3:41 AM   Result Value Ref Range    Sodium 143 136 - 145 mmol/L    Potassium reflex Magnesium 3.5 3.5 - 5.1 mmol/L    Chloride 105 99 - 110 mmol/L    CO2 32 21 - 32 mmol/L    Anion Gap 6 3 - 16    Glucose 124 (H) 70 - 99 mg/dL    BUN 7 7 - 20 mg/dL    CREATININE 0.7 0.6 - 1.2 mg/dL    GFR Non-African American >60 >60    GFR African American >60 >60    Calcium 8.0 (L) 8.3 - 10.6 mg/dL   CBC auto differential    Collection Time: 21  3:41 AM   Result Value Ref Range    WBC 7.3 4.0 - 11.0 K/uL    RBC 2.97 (L) 4.00 - 5.20 M/uL    Hemoglobin 8.0 (L) 12.0 - 16.0 g/dL    Hematocrit 24.4 (L) 36.0 - 48.0 %    MCV 82.1 80.0 - 100.0 fL    MCH 26.8 26.0 - 34.0 pg    MCHC 32.6 31.0 - 36.0 g/dL    RDW 18.0 (H) 12.4 - 15.4 %    Platelets 232 692 - 174 K/uL    MPV 7.4 5.0 - 10.5 fL    Neutrophils % 70.9 %    Lymphocytes % 14.3 %    Monocytes % 8.1 %    Eosinophils % 5.9 %    Basophils % 0.8 %    Neutrophils Absolute 5.2 1.7 - 7.7 K/uL    Lymphocytes Absolute 1.0 1.0 - 5.1 K/uL    Monocytes Absolute 0.6 0.0 - 1.3 K/uL    Eosinophils Absolute 0.4 0.0 - 0.6 K/uL    Basophils Absolute 0.1 0.0 - 0.2 K/uL   Magnesium    Collection Time: 03/08/21  3:41 AM   Result Value Ref Range    Magnesium 2.20 1.80 - 2.40 mg/dL       Therapy progress:  PT  Position Activity Restriction  Other position/activity restrictions: WBAT R LE; up with assist; ok to shower  Objective     Sit to Stand: Contact guard assistance(from bed, rollator, chair in gym)  Stand to sit: Contact guard assistance  Bed to Chair: Contact guard assistance  Device: Rolling Walker  Assistance: Contact guard assistance  Distance: 15'+20'+10'+20'+140' (with two small standing rest breaks)  OT  PT Equipment Recommendations  Equipment Needed: (continue to assess)  Other: continue to assess  Toilet - Technique: Ambulating  Equipment Used: Standard toilet  Toilet Transfers Comments: use of GB to stand  Assessment        SLP          Body mass index is 44.42 kg/m². Assessment and Plan:  Infected Right Femur Hardware  2/25 s/p partial JENIFER  Intra-op cultures with e.  Faecalis  ID consulted, appreciate recs: d/t only partial JENIFER, prefer double beta-lactam coverage for enterococcus  Continue ceftriaxone 2g q 12 hr + unasyn 3g every 6 hours for 12 weeks, unless plan to remove hardware   PT/OT in ARU     Essential HTN - PTA: amlodipine 2.5mg daily, lasix 20mg daily  BP stable w/o meds   At discharge, can resume home lasix, amlodipine     Constipation  Continue bowel regimen      Chronic Pain  Mood

## 2021-03-08 NOTE — PATIENT CARE CONFERENCE
Inpatient Rehabilitation  Weekly Team Conference Note  The [address]  [phone]  Patient Name: Lilia Hand        MRN: [id number]    : 1960  (63 y.o.)  Gender: female   Referring Practitioner: Dr. Sal Yuen  Diagnosis: debility    The team conference for this patient was held on 3/9/2021 at 10:30am by:  Brett Ashley DO    PHYSICAL THERAPY:  Bed Mobility: Scooting: Stand by assistance(superiorly in bed with use of bed rails)    Transfers:  Sit to Stand: Stand by assistance(Pt sit <> stand from recliner, rollator, and chair with SBA and rollator. Cues for safety and hand placement)  Stand to sit: Stand by assistance  Bed to Chair: Contact guard assistance  Comment: holding onto rail with L UE and backwards and forwards ambulation x 30' x 2 x CGA    Ambulation 1  Surface: level tile  Device: Rolling Walker  Other Apparatus: O2  Assistance: Contact guard assistance  Quality of Gait: decreased step length and marisa as well as step height. limited by activity tolernace. 2 L O2 at all times  Distance: 35',70, 120;'  Comments: Pt with pain limiting distance of ambulation; self limiting.   When distracted able to ambulate long distance    QM:  Roll Left and Right  Assistance Needed: Partial/moderate assistance  CARE Score: 3  Discharge Goal: Independent  Sit to Lying  Assistance Needed: Supervision or touching assistance  CARE Score: 4  Discharge Goal: Independent  Lying to Sitting on Side of Bed  Assistance Needed: Supervision or touching assistance  CARE Score: 4  Discharge Goal: Independent  Sit to Stand  Assistance Needed: Supervision or touching assistance  CARE Score: 4  Discharge Goal: Independent  Chair/Bed-to-Chair Transfer  Assistance Needed: Supervision or touching assistance  CARE Score: 4  Discharge Goal: Independent  Car Transfer  Assistance Needed: Partial/moderate assistance  CARE Score: 3  Discharge Goal: Independent  Walk 10 Operative Note





- Note:


Operative Date: 01/10/19


Pre-Operative Diagnosis: left lower extremity varicose veins with edema


Operation: Stab phlebectomy left lower extremity - 5 stabs


Post-Operative Diagnosis: Same as Pre-op


Surgeon: Shan Kirkland


Anesthesia: Fractional


Estimated Blood Loss (mls): 50


Operative Report Dictated: Yes Feet  Assistance Needed: Supervision or touching assistance  CARE Score: 4  Discharge Goal: Independent  Walk 50 Feet with Two Turns  Assistance Needed: Supervision or touching assistance  Reason if not Attempted: Not attempted due to medical condition or safety concerns  CARE Score: 4  Discharge Goal: Independent  Walk 150 Feet  Reason if not Attempted: Not attempted due to medical condition or safety concerns  CARE Score: 88  Discharge Goal: Independent  Walking 10 Feet on Uneven Surfaces  Assistance Needed: Supervision or touching assistance  CARE Score: 4  Discharge Goal: Independent  1 Step (Curb)  Discharge Goal: Supervision or touching assistance  4 Steps  Reason if not Attempted: Not applicable  CARE Score: 9  Discharge Goal: Supervision or touching assistance  12 Steps  Reason if not Attempted: Not attempted due to medical condition or safety concerns  CARE Score: 88  Discharge Goal: Supervision or touching assistance  Picking Up Object  Reason if not Attempted: Not attempted due to medical condition or safety concerns  CARE Score: 88  Discharge Goal: Supervision or touching assistance  Wheelchair Ability  Uses a Wheelchair and/or Scooter?: No    OCCUPATIONAL THERAPY:  ADL  Feeding: Independent(finishing breakfast upon entry)  Grooming: Verbal cueing, Supervision(seated on 4ww seat for oral care, pt required cueing to lock brakes prior to sitting on 4ww- pt reporting brakes do not work too well)  UE Bathing: Setup(seated on TTB)  LE Bathing: Moderate assistance(assist to wash lower LEs, CGA to stand to wash carole/ meatal area)  UE Dressing: Setup  LE Dressing: Contact guard assistance(CGA to pull pants up over hips in stance.  Pt provided with sock aide (uses at home), completed with setup to don socks while seated on toilet)  Toileting: Increased time to complete, Stand by assistance(seated on toilet for carole hygiene, use of toilet aide for rear carole hygiene)  Additional Comments: incision site and PICC line were covered prior to showering    Toilet Transfers: Toilet Transfers  Toilet - Technique: Ambulating  Equipment Used: Standard toilet  Toilet Transfer: Contact guard assistance  Toilet Transfers Comments: use of GB to stand    Tub/ShowerTransfers:  Tub Transfers  Tub - Transfer From: Rolling walker  Tub - Transfer Type: To and From  Tub - Transfer To: Transfer tub bench  Tub - Technique: Ambulating  Tub Transfers: Minimal assistance  Tub Transfers Comments: assist to lift R LE over tub, + cueing to sequence coming out of tub. QM:  Eating  Assistance Needed: Independent  CARE Score: 6  Discharge Goal: Independent  Oral Hygiene  Assistance Needed: Independent  CARE Score: 6  Discharge Goal: Independent  Toileting Hygiene  Assistance Needed: Supervision or touching assistance  CARE Score: 4  Discharge Goal: Independent  Toilet Transfer  Assistance Needed: Supervision or touching assistance  CARE Score: 4  Discharge Goal: Independent  Shower/Bathe Self  Assistance Needed: Partial/moderate assistance  CARE Score: 3  Discharge Goal: Independent  Upper Body Dressing  Assistance Needed: Setup or clean-up assistance  CARE Score: 5  Discharge Goal: Independent  Lower Body Dressing  Assistance Needed: Supervision or touching assistance  CARE Score: 4  Discharge Goal: Independent  Putting On/Taking Off Footwear  Assistance Needed: Setup or clean-up assistance  CARE Score: 5  Discharge Goal: Independent    NUTRITION:  Please see nutrition note for details. Weight: 212 lb 8.4 oz (96.4 kg) / Body mass index is 44.42 kg/m².   Diet Level/Order: DIET GENERAL;  Supplements: none    NURSING:  QM: Bladder Continence: Always continent  Bowel Continence: Occassionally incontinent  Lanier Fall Risk Score: 100  Wounds/Incisions/Ulcers: R leg sx site  Scattered bruising   Medication Review: reviewedd with pt   Pain: R leg pain complaints  Consultations/Labs/X-rays:   MWF CBC and BMP    Patient/Family Education provided by team:  Role of PT/OT, energy conservation, LB AE, safety with transfers, gait training, HEP    CASE MANAGEMENT:  Assessment:  SW will continue to follow to update patient and family and assist with discharge planning. TEAM SUMMARY: Will continue with current poc & goals until anticipated d/c date of 3/13/2021. Discharge Plan:  Risk for Readmission: Moderate (10-19)  Critical Items: If High Risk, consider the following recommendations: Patient not at high risk  Estimated Length of Stay: 12 days  Destination: home health  Services at Discharge: [address] [place] [address], Occupational Therapy and Respiratory Therapy 3x week  Equipment at Discharge: bath bench, reacher   Community Resources:   Factors facilitating achievement of predicted outcomes: Family support, Motivated, Cooperative, Pleasant and Sense of humor  Barriers to the achievement of predicted outcomes: Confusion, Impulsivity, Unrealistic expectations, Decreased endurance, Upper extremity weakness, Lower extremity weakness and Long standing deficits    Interdisciplinary Individualized Plan of Care Review:    · Continue Current Plan of Care: Yes    · Modifications:_____________________________     Special Needs in the Upcoming Week:    [x] Family/Caregiver Education  [] Home visit  []Therapeutic Pass   [] Consults:_______   [] Family Training  [] Other;_______     Patient Goals for Rehab stay:  1. To get stronger and do more     Rehab Team Goals for patient for the Upcoming Week:  1. Increase independence with ADLs  2. increased independence with functional mobility     Rehab Team Members in attendance for Team Conference:  :  DAVID Naqvi    Therapy Manager:  Blanca Wilcox, PT, DPT    Nursing Director:  Mary Street, MSN, RN, ACNS-BC    Social Work:  Osvaldo Junior, MSW, LISW-S    Nursing:   Emma Jorge, RN  Alize Goodrich, RN    Therapy:  Merwyn Rinne, PT  Jones Chanel, PT, DPT  Tylor Malik, PT, DPT    Roland Gaspar OTR/JELLY Soriano SHELIA OspinaR/L    Jere Horn MA/CCC-SLP    Nutrition:  Divine Martel RD LD    I approve the established interdisciplinary plan of care as documented within the medical record of Hammad Hull.     MD Dane Shelton D.O. M.P.H  PM&R  3/9/2021  11:22 AM

## 2021-03-08 NOTE — PLAN OF CARE
Problem: Pain:  Goal: Pain level will decrease  Description: Pain level will decrease  3/8/2021 0053 by Yolande Hawkins RN  Outcome: Ongoing   Pt. Continues to complaint of Left hip and leg pain. Pain management ongoing. Pain level will be decrease or be at an acceptable level by discharge. Will continue to monitor. Problem: Falls - Risk of:  Goal: Will remain free from falls  Description: Will remain free from falls  3/8/2021 0053 by Yolande Hawkins RN  Outcome: Ongoing  Fall prevention measures on-goin:1 and education on the use of the call light and when to call for assistance, bed/chair wheels locked and in lowest position, video surveillance in place, personal belonging within reach, bed alarm activated, call light and over bed table within reach, prompt attention to the use of the call light. Hourly rounding and frequent visual checks within reach. Pt. Will remain free from falls during this shift. Problem: Infection - Surgical Site:  Goal: Will show no infection signs and symptoms  Description: Will show no infection signs and symptoms  3/8/2021 0053 by Yolande Hawkins RN  Outcome: Ongoing  Dressing change complete per order, pt. On intermittent abx infusion. No new skin issues found.

## 2021-03-08 NOTE — PLAN OF CARE
Problem: Pain:  Goal: Pain level will decrease  Description: Pain level will decrease  Outcome: Ongoing  Note: Pain being managed with PRN and scheduled medications. Problem: Falls - Risk of:  Goal: Will remain free from falls  Description: Will remain free from falls  Outcome: Ongoing  Note: Remains free from falls this shift. Fall precautions in place.       Problem: Infection - Surgical Site:  Goal: Will show no infection signs and symptoms  Description: Will show no infection signs and symptoms  Outcome: Ongoing     Problem: Skin Integrity:  Goal: Will show no infection signs and symptoms  Description: Will show no infection signs and symptoms  Outcome: Ongoing

## 2021-03-09 PROCEDURE — 94761 N-INVAS EAR/PLS OXIMETRY MLT: CPT

## 2021-03-09 PROCEDURE — 94762 N-INVAS EAR/PLS OXIMTRY CONT: CPT

## 2021-03-09 PROCEDURE — 6360000002 HC RX W HCPCS: Performed by: STUDENT IN AN ORGANIZED HEALTH CARE EDUCATION/TRAINING PROGRAM

## 2021-03-09 PROCEDURE — 97530 THERAPEUTIC ACTIVITIES: CPT

## 2021-03-09 PROCEDURE — 6370000000 HC RX 637 (ALT 250 FOR IP): Performed by: PHYSICAL MEDICINE & REHABILITATION

## 2021-03-09 PROCEDURE — 94640 AIRWAY INHALATION TREATMENT: CPT

## 2021-03-09 PROCEDURE — 6360000002 HC RX W HCPCS: Performed by: PHYSICAL MEDICINE & REHABILITATION

## 2021-03-09 PROCEDURE — 1280000000 HC REHAB R&B

## 2021-03-09 PROCEDURE — 97535 SELF CARE MNGMENT TRAINING: CPT

## 2021-03-09 PROCEDURE — 99233 SBSQ HOSP IP/OBS HIGH 50: CPT | Performed by: PHYSICAL MEDICINE & REHABILITATION

## 2021-03-09 PROCEDURE — 99233 SBSQ HOSP IP/OBS HIGH 50: CPT | Performed by: INTERNAL MEDICINE

## 2021-03-09 PROCEDURE — 6370000000 HC RX 637 (ALT 250 FOR IP): Performed by: STUDENT IN AN ORGANIZED HEALTH CARE EDUCATION/TRAINING PROGRAM

## 2021-03-09 PROCEDURE — 97110 THERAPEUTIC EXERCISES: CPT

## 2021-03-09 PROCEDURE — 97116 GAIT TRAINING THERAPY: CPT

## 2021-03-09 PROCEDURE — 2580000003 HC RX 258: Performed by: PHYSICAL MEDICINE & REHABILITATION

## 2021-03-09 PROCEDURE — 2700000000 HC OXYGEN THERAPY PER DAY

## 2021-03-09 RX ORDER — DIPHENHYDRAMINE HCL 25 MG
25 TABLET ORAL EVERY 6 HOURS PRN
Status: DISCONTINUED | OUTPATIENT
Start: 2021-03-09 | End: 2021-03-20 | Stop reason: HOSPADM

## 2021-03-09 RX ORDER — FUROSEMIDE 40 MG/1
40 TABLET ORAL DAILY
Status: DISCONTINUED | OUTPATIENT
Start: 2021-03-10 | End: 2021-03-13

## 2021-03-09 RX ORDER — IBUPROFEN 400 MG/1
400 TABLET ORAL EVERY 6 HOURS PRN
Status: DISCONTINUED | OUTPATIENT
Start: 2021-03-09 | End: 2021-03-20 | Stop reason: HOSPADM

## 2021-03-09 RX ADMIN — POTASSIUM CHLORIDE 20 MEQ: 20 TABLET, EXTENDED RELEASE ORAL at 08:14

## 2021-03-09 RX ADMIN — DICLOFENAC SODIUM 2 G: 10 GEL TOPICAL at 21:31

## 2021-03-09 RX ADMIN — AMPICILLIN AND SULBACTAM 3000 MG: 1; 2 INJECTION, POWDER, FOR SOLUTION INTRAMUSCULAR; INTRAVENOUS at 18:07

## 2021-03-09 RX ADMIN — OXYCODONE 10 MG: 5 TABLET ORAL at 21:28

## 2021-03-09 RX ADMIN — OXYCODONE 10 MG: 5 TABLET ORAL at 08:32

## 2021-03-09 RX ADMIN — ATORVASTATIN CALCIUM 40 MG: 40 TABLET, FILM COATED ORAL at 21:28

## 2021-03-09 RX ADMIN — ALBUTEROL SULFATE 2.5 MG: 2.5 SOLUTION RESPIRATORY (INHALATION) at 04:45

## 2021-03-09 RX ADMIN — PREGABALIN 100 MG: 75 CAPSULE ORAL at 13:36

## 2021-03-09 RX ADMIN — AMPICILLIN AND SULBACTAM 3000 MG: 1; 2 INJECTION, POWDER, FOR SOLUTION INTRAMUSCULAR; INTRAVENOUS at 02:17

## 2021-03-09 RX ADMIN — AMITRIPTYLINE HYDROCHLORIDE 75 MG: 25 TABLET, FILM COATED ORAL at 21:27

## 2021-03-09 RX ADMIN — AMPICILLIN AND SULBACTAM 3000 MG: 1; 2 INJECTION, POWDER, FOR SOLUTION INTRAMUSCULAR; INTRAVENOUS at 07:32

## 2021-03-09 RX ADMIN — OXYCODONE 10 MG: 5 TABLET ORAL at 13:36

## 2021-03-09 RX ADMIN — FLUOXETINE 40 MG: 20 CAPSULE ORAL at 08:14

## 2021-03-09 RX ADMIN — CEFTRIAXONE SODIUM 2000 MG: 2 INJECTION, POWDER, FOR SOLUTION INTRAMUSCULAR; INTRAVENOUS at 21:31

## 2021-03-09 RX ADMIN — TIZANIDINE 4 MG: 4 TABLET ORAL at 16:10

## 2021-03-09 RX ADMIN — AMLODIPINE BESYLATE 2.5 MG: 5 TABLET ORAL at 08:14

## 2021-03-09 RX ADMIN — BUDESONIDE 250 MCG: 0.25 SUSPENSION RESPIRATORY (INHALATION) at 07:16

## 2021-03-09 RX ADMIN — ARFORMOTEROL TARTRATE 15 MCG: 15 SOLUTION RESPIRATORY (INHALATION) at 07:17

## 2021-03-09 RX ADMIN — IBUPROFEN 200 MG: 400 TABLET, FILM COATED ORAL at 11:36

## 2021-03-09 RX ADMIN — ENOXAPARIN SODIUM 30 MG: 30 INJECTION SUBCUTANEOUS at 08:14

## 2021-03-09 RX ADMIN — POTASSIUM CHLORIDE 20 MEQ: 20 TABLET, EXTENDED RELEASE ORAL at 16:10

## 2021-03-09 RX ADMIN — OXYCODONE 10 MG: 5 TABLET ORAL at 04:36

## 2021-03-09 RX ADMIN — IBUPROFEN 200 MG: 400 TABLET, FILM COATED ORAL at 04:47

## 2021-03-09 RX ADMIN — TIZANIDINE 4 MG: 4 TABLET ORAL at 04:46

## 2021-03-09 RX ADMIN — IPRATROPIUM BROMIDE AND ALBUTEROL SULFATE 1 AMPULE: .5; 2.5 SOLUTION RESPIRATORY (INHALATION) at 07:16

## 2021-03-09 RX ADMIN — FUROSEMIDE 20 MG: 20 TABLET ORAL at 08:14

## 2021-03-09 RX ADMIN — IPRATROPIUM BROMIDE AND ALBUTEROL SULFATE 1 AMPULE: .5; 2.5 SOLUTION RESPIRATORY (INHALATION) at 10:37

## 2021-03-09 RX ADMIN — ALPRAZOLAM 0.25 MG: 0.25 TABLET ORAL at 21:27

## 2021-03-09 RX ADMIN — OXYCODONE 10 MG: 5 TABLET ORAL at 17:15

## 2021-03-09 RX ADMIN — PREGABALIN 100 MG: 75 CAPSULE ORAL at 21:27

## 2021-03-09 RX ADMIN — IPRATROPIUM BROMIDE AND ALBUTEROL SULFATE 1 AMPULE: .5; 2.5 SOLUTION RESPIRATORY (INHALATION) at 19:58

## 2021-03-09 RX ADMIN — IPRATROPIUM BROMIDE AND ALBUTEROL SULFATE 1 AMPULE: .5; 2.5 SOLUTION RESPIRATORY (INHALATION) at 14:56

## 2021-03-09 RX ADMIN — PREGABALIN 100 MG: 75 CAPSULE ORAL at 08:14

## 2021-03-09 RX ADMIN — AMPICILLIN AND SULBACTAM 3000 MG: 1; 2 INJECTION, POWDER, FOR SOLUTION INTRAMUSCULAR; INTRAVENOUS at 13:36

## 2021-03-09 RX ADMIN — BISACODYL 5 MG: 5 TABLET, COATED ORAL at 08:14

## 2021-03-09 RX ADMIN — CEFTRIAXONE SODIUM 2000 MG: 2 INJECTION, POWDER, FOR SOLUTION INTRAMUSCULAR; INTRAVENOUS at 08:48

## 2021-03-09 RX ADMIN — DICLOFENAC SODIUM 2 G: 10 GEL TOPICAL at 08:13

## 2021-03-09 RX ADMIN — ALPRAZOLAM 0.25 MG: 0.25 TABLET ORAL at 11:35

## 2021-03-09 RX ADMIN — ALPRAZOLAM 0.25 MG: 0.25 TABLET ORAL at 18:07

## 2021-03-09 ASSESSMENT — PAIN DESCRIPTION - ORIENTATION: ORIENTATION: RIGHT

## 2021-03-09 ASSESSMENT — ENCOUNTER SYMPTOMS
WHEEZING: 1
ABDOMINAL PAIN: 0
SHORTNESS OF BREATH: 1
CHOKING: 1
CHEST TIGHTNESS: 0
APNEA: 0
TROUBLE SWALLOWING: 0
CONSTIPATION: 0
COUGH: 1

## 2021-03-09 ASSESSMENT — PAIN DESCRIPTION - PAIN TYPE
TYPE: ACUTE PAIN

## 2021-03-09 ASSESSMENT — PAIN DESCRIPTION - LOCATION: LOCATION: HIP

## 2021-03-09 ASSESSMENT — PAIN DESCRIPTION - DESCRIPTORS
DESCRIPTORS: ACHING
DESCRIPTORS: ACHING;DISCOMFORT
DESCRIPTORS: ACHING

## 2021-03-09 ASSESSMENT — PAIN SCALES - GENERAL
PAINLEVEL_OUTOF10: 7
PAINLEVEL_OUTOF10: 8
PAINLEVEL_OUTOF10: 7
PAINLEVEL_OUTOF10: 7

## 2021-03-09 ASSESSMENT — PAIN DESCRIPTION - ONSET: ONSET: ON-GOING

## 2021-03-09 ASSESSMENT — PAIN DESCRIPTION - FREQUENCY: FREQUENCY: CONTINUOUS

## 2021-03-09 ASSESSMENT — PAIN - FUNCTIONAL ASSESSMENT
PAIN_FUNCTIONAL_ASSESSMENT: PREVENTS OR INTERFERES SOME ACTIVE ACTIVITIES AND ADLS
PAIN_FUNCTIONAL_ASSESSMENT: PREVENTS OR INTERFERES SOME ACTIVE ACTIVITIES AND ADLS

## 2021-03-09 NOTE — PROGRESS NOTES
Department of Physical Medicine & Rehabilitation  Progress Note    Patient Identification:  Héctor Oliva  [id number]  : 1960  Admit date: 3/3/2021    Chief Complaint: debility    Subjective:   Working in therapy today but continue to have severe asthma and respiratory issues while in therapy. Pulmonology consulted. She continues to have some pain in her right hip but is having less pain and more functional mobility. Respiratory is limiting factor. ROS: No f/c, n/v, cp     Objective:  Patient Vitals for the past 24 hrs:   BP Temp Temp src Pulse Resp SpO2   21 1037 -- -- -- -- -- 98 %   21 0715 -- -- -- -- -- 98 %   21 0449 -- -- -- -- -- 91 %   21 0148 -- -- -- -- -- 94 %   21 2341 -- -- -- -- -- 90 %   21 2340 -- -- -- -- -- (!) 84 %   21 2154 -- -- -- -- 18 (!) 88 %   21 2057 114/66 98.6 °F (37 °C) Oral 89 18 91 %   21 1649 -- -- -- -- -- 95 %   21 1611 -- -- -- -- -- 94 %   21 1610 -- -- -- -- -- (!) 89 %   21 1222 -- -- -- -- 18 92 %     Const: Alert. No distress, pleasant. HEENT: Normocephalic, atraumatic. Normal sclera/conjunctiva. MMM. CV: Regular rate and rhythm. Resp: No respiratory distress. Lungs CTAB. Abd: Soft, nontender, nondistended, NABS+   Ext: No edema. Neuro: Alert, oriented, appropriately interactive. Psych: Cooperative, appropriate mood and affect    Laboratory data: Available via EMR. Last 24 hour lab  No results found for this or any previous visit (from the past 24 hour(s)). Therapy progress:  PT  Position Activity Restriction  Other position/activity restrictions: WBAT R LE; up with assist; ok to shower  Objective     Sit to Stand: Stand by assistance(Pt sit <> stand from recliner, rollator, and chair with SBA and rollator.  Cues for safety and hand placement)  Stand to sit: Stand by assistance  Bed to Chair: Contact guard assistance  Device: Rolling Walker  Other Apparatus: O2  Assistance: Contact guard assistance  Distance: 35',70, 120;'  OT  PT Equipment Recommendations  Equipment Needed: (continue to assess)  Other: pt has all needed DME  Toilet - Technique: Ambulating  Equipment Used: Standard toilet  Toilet Transfers Comments: use of GB to stand  Assessment        SLP          Body mass index is 44.42 kg/m². Assessment and Plan:  Infected Right Femur Hardware  2/25 s/p partial JENIFER  Intra-op cultures with e. Faecalis  ID consulted, appreciate recs: d/t only partial JENIFER, prefer double beta-lactam coverage for enterococcus  Continue ceftriaxone 2g q 12 hr + unasyn 3g every 6 hours for 12 weeks, unless plan to remove hardware   PT/OT in ARU     Essential HTN - PTA: amlodipine 2.5mg daily, lasix 20mg daily  BP stable w/o meds   At discharge, can resume home lasix, amlodipine     Constipation  Continue bowel regimen      Chronic Pain  Mood Disorder - PTA: elavil, flexeril, fluoxetine, gabapentin, meloxicam  Resume home meds     Moderate Persistent Asthma  RT assess and treat, IS  Continue home Albuterol prn   O2 sats stable on room air  Needs outpatient PFTs and pulmonology for further w/u of SOB with exertion (? ILD, pulm HTN. ..) ([facility]        Rehab Progress: Improving  Anticipated Dispo: home  Services/DME: [facility]  ELOS: AVTAR Poole D.O. M.P.H  PM&R  3/9/2021  11:24 AM

## 2021-03-09 NOTE — PROGRESS NOTES
Pulmonology Progress Note    Admit Date: 3/3/2021  Day: 7  Diet: DIET GENERAL;    CC: Uncontrolled Asthma     HPI: 63 y. o.female with pmhx DM, mood disorder, DELFINO, asthma (previously on Symbicort but stopped using it because she could not afford it) admitted from  to ARU on 03/03 s/p R femur removal of hardware on 2/25/2021 due to E.feacalis infection. Post-op pt was intermittently hypoxic requiring 1-2L on NC. CXR suggestive of RLL atelectasis. Pt is on inhalers q4. She reports wheezing and cough for several months. Pulmonology consulted for concern for asthma that is uncontrolled. Interval history:  NAEO. She reports improved breathing since presentation , continues to have a non-productive cough. She reports some episodes of aspiration/choking while eating dry crackers. She feels she has required less prn nebulizer use between scheduled duonebs. Pt continues on 2L NC w/ sats 98%.  She completed nocturnal pulse ox last night, she was on room air for a bit but desatted to 84% and placed on 2L  After 40 IV of lasix pt reports no increased urine output than usual.     Medications:     Scheduled Meds:   budesonide  0.25 mg Nebulization BID    Arformoterol Tartrate  15 mcg Nebulization BID    potassium chloride  20 mEq Oral BID WC    diclofenac sodium  2 g Topical BID    lidocaine  1 patch Transdermal Daily    cefTRIAXone (ROCEPHIN) IV  2,000 mg Intravenous Q12H    enoxaparin  30 mg Subcutaneous Daily    ipratropium-albuterol  1 ampule Inhalation Q4H WA    amitriptyline  75 mg Oral Nightly    amLODIPine  2.5 mg Oral Daily    atorvastatin  40 mg Oral Nightly    furosemide  20 mg Oral Daily    FLUoxetine  40 mg Oral Daily    pregabalin  100 mg Oral TID    ampicillin-sulbactam  3,000 mg Intravenous Q6H    bisacodyl  5 mg Oral Daily     Continuous Infusions:  PRN Meds:albuterol, ALPRAZolam, tiZANidine, benzonatate, ibuprofen, acetaminophen, magnesium hydroxide, polyethylene glycol, oxyCODONE **OR** oxyCODONE    Objective:   Vitals:   T-max:  Patient Vitals for the past 8 hrs:   SpO2   03/09/21 0715 98 %   03/09/21 0449 91 %   03/09/21 0148 94 %       Intake/Output Summary (Last 24 hours) at 3/9/2021 0913  Last data filed at 3/8/2021 2154  Gross per 24 hour   Intake 960 ml   Output --   Net 960 ml       Review of Systems   Constitutional: Negative for fatigue and fever. HENT: Negative for congestion and trouble swallowing. Respiratory: Positive for cough, choking, shortness of breath and wheezing. Negative for apnea and chest tightness. Cardiovascular: Negative for chest pain, palpitations and leg swelling. Gastrointestinal: Negative for abdominal pain and constipation. Physical Exam  Constitutional:       General: She is not in acute distress. Appearance: She is obese. HENT:      Head: Normocephalic and atraumatic. Mouth/Throat:      Pharynx: Oropharynx is clear. Cardiovascular:      Rate and Rhythm: Normal rate and regular rhythm. Pulses: Normal pulses. Heart sounds: Normal heart sounds. Pulmonary:      Effort: Pulmonary effort is normal. No respiratory distress. Breath sounds: Wheezing (more prominenent in her neck ) present. Comments: On 2L NC w/ sat 98%, on RA pt becomes slightly breathless and sats drop to 92%  Abdominal:      General: There is no distension. Tenderness: There is no abdominal tenderness. Musculoskeletal:      Right lower leg: No edema. Left lower leg: No edema. Skin:     General: Skin is dry. Neurological:      General: No focal deficit present. Mental Status: She is alert and oriented to person, place, and time.          LABS:    CBC:   Recent Labs     03/08/21  0341   WBC 7.3   HGB 8.0*   HCT 24.4*      MCV 82.1     Renal:    Recent Labs     03/08/21  0341      K 3.5      CO2 32   BUN 7   CREATININE 0.7   GLUCOSE 124*   CALCIUM 8.0*   MG 2.20   ANIONGAP 6     Hepatic: No results for input(s): AST, ALT, BILITOT, BILIDIR, PROT, LABALBU, ALKPHOS in the last 72 hours. Troponin: No results for input(s): TROPONINI in the last 72 hours. BNP: No results for input(s): BNP in the last 72 hours. Lipids: No results for input(s): CHOL, HDL in the last 72 hours. Invalid input(s): LDLCALCU, TRIGLYCERIDE  ABGs:  No results for input(s): PHART, CJQ8IZS, PO2ART, IYL3VMX, BEART, THGBART, C2GKQMCC, NJR6MJC in the last 72 hours. INR: No results for input(s): INR in the last 72 hours. Lactate: No results for input(s): LACTATE in the last 72 hours. Cultures:  -----------------------------------------------------------------  RAD:   VL Extremity Venous Bilateral         XR CHEST PORTABLE   Final Result         1. Right infrahilar airspace disease appears slightly more prominent. Differentials include infection/atelectasis/aspiration. Follow-up is recommended to document resolution. 2. Mild vascular congestion. XR CHEST 1 VIEW   Final Result      Right upper extremity PICC line in appropriate position. Chronic atelectatic collapse of the right middle lobe. Assessment/Plan:     Evan Forman is a 63 y.o. female, who is being seen in consultation for uncontrolled Asthma.      Dyspnea - uncontrolled asthma, possible vocal cord dysfunction   No PFTs. Pt reports cough/wheezing used to use Symbicort but stopped 2/2 inability to afford meds. Pt w/ intermittent audible wheezing, which is loudest on neck auscultation. Reports some aspiration. - currently she is requiring scheduled q4 inhalers and prn use  - pro-, s/p lasix 40 IV dose w/ some improvement in oxygen sats   - cont Symbicort BID. Can discharge on Budesonide and arformoterol nebulizers which insurance will pay for  - Continue Duonebs QID  - wheezing improved      Hypoxemia - possibly 2/2 pulm edema   pt continues on 2L NC but sats have improved since admit. Desats with very minimal activity to mid-80s on RA. Recent h/o surgery.    - CXR (3/8/21): Right infrahilar airspace disease slightly more prominent w/ mild vascular congestion.   - Pt reports aspiration episodes for about 1 yr but has never developed pneumonia from it and has has a normal swallow study previously. CXR findings likely atelectasis. She continues to have no signs of infection/secretions and remains on unsayn/ceftroxone for hardware infection. - pro- (previously 190 10/2020), she reports improved breathing from yesterday but no changes in urine outpt more than usually after dose of IV lasix   - can wean off oxygen to room air      Possible DELFINO   BMI 44. Pt w/ dyspnea, reports snoring, but no apnea/PND. - nocturnal pulse ox shows some desaturation on room air to 84%, requiring 2L. As a result of being on oxygen, we are unable to assess for episodes of desaturation that would indicate DELFINO. - repeat nocturnal pulse ox, depending on results would try trials of CPAP    Jersey Perez MD, PGY-1  03/09/21  9:13 AM    This patient will be staffed and discussed with Dr Baylee Kramer    Patient examined, findings as discussed with Dr. Yvette Salcedo.   Agree with assessment and plan as edited above

## 2021-03-09 NOTE — PLAN OF CARE
Problem: Falls - Risk of:  Goal: Will remain free from falls  Description: Will remain free from falls  3/9/2021 0018 by Pippa Goddard RN  Outcome: Ongoing  Note: Client remains free from falls, bed/chair alarm in place, door open, encouraged to use call light for needs, call light is within reach, bed locked in lowest position,  Will continue to monitor. Problem: Infection - Surgical Site:  Goal: Will show no infection signs and symptoms  Description: Will show no infection signs and symptoms  3/9/2021 0016 by Pippa Goddard RN  Outcome: Ongoing  Note: Surgical site observed for signs/symptoms of infection. Vitals performed routinely, labs observed. Will monitor pt while on ARU       Problem: Skin Integrity:  Goal: Will show no infection signs and symptoms  Description: Will show no infection signs and symptoms  3/9/2021 0016 by Pippa Goddard RN  Outcome: Ongoing  Note: Surgical site observed for signs/symptoms of infection. Vitals performed routinely, labs observed.  Will monitor pt while on ARU

## 2021-03-09 NOTE — PROGRESS NOTES
Spoke with patient and spouse per request. Both upset patients feet are very swollen and not getting clarification as to why. Patient is received 20 of lasix this AM but they feel it is not enough. Noticed patient had skyline for lunch and informed them skyline has a lot of sodium which could cause fluid retention. They want more education on proper diet to prevent legs from swelling. Will look into chart more thoroughly and provide some education.

## 2021-03-09 NOTE — PROGRESS NOTES
Pt woke up confused at this time. Having difficulty recalling events that occurred before bed and feeling stressed about the inability to recall recent events. Spent time in room with pt. Pt re-oriented and redirected. Pain medication administered at this time per request of pt. Will continue to monitor.

## 2021-03-09 NOTE — PROGRESS NOTES
Pt O2 dropping to low 80s without O2 while sleeping continuous O2 monitor in place. O2 placed at 2340 pt O2 sats cambe back up to 90. Respiratory notified as study is being conducted. Will continue to monitor.

## 2021-03-09 NOTE — PROGRESS NOTES
Spoke to [facility] RT who is the charge respiratory Therapist to inform him that Dr Nyle Olszewski told me he does not want patient put on any oxygen during her sleep study

## 2021-03-09 NOTE — PROGRESS NOTES
This RT was notified by RN that pt's SPO2 on continous POx was 83-84% on room air. Pt was placed on 2L nc at this time.

## 2021-03-09 NOTE — PROGRESS NOTES
12/29/2014); fracture surgery; joint replacement; and Total hip arthroplasty (Bilateral, 2014). Restrictions  Position Activity Restriction  Other position/activity restrictions: WBAT R LE; up with assist; ok to shower  Subjective   General  Chart Reviewed: Yes  Additional Pertinent Hx: Pt had knee and hip replacements. She has since had difficulty with abscesses and infections with the joints. 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 Up until 2/5/2021 she was on Keflex Patient admitted too [facility] and  had infected plate removed from her femur 2/25/2021 along with removal of screws,cables and deep bone biopsy. Per Op note. The area around late looked infected. There was sinus tract that connected with knee joint space. The femur stem of the Artifical hip was exposed and there was reactive synovitis there as well. 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 Intraoperatively 3 tissue Cx and 1 deep bone Cx were taken E Faecalis. Patient was started on IV Vancomycin and plan is currently to continue for 12 weeks with tentative end date of 5/20  Family / Caregiver Present: No  Referring Practitioner: Dr. Vito Burnette: Pt states that she is doing well and happy that her hair got braided. General Comment  Comments: Pt seated in chair and agreeable to therapy. Pain Screening  Patient Currently in Pain: Yes(pt noting slight pain in her R LE)  Vital Signs  Patient Currently in Pain: Yes(pt noting slight pain in her R LE)       Orientation     Cognition   Cognition  Overall Cognitive Status: Exceptions  Following Commands:  Follows one step commands consistently  Attention Span: Attends with cues to redirect  Memory: Decreased recall of recent events;Decreased short term memory  Problem Solving: Assistance required to implement solutions  Objective   Bed mobility  Sit to Supine: Moderate assistance  Scooting: Stand by assistance(up to [facility] with use of rails)  Transfers  Sit to Stand: Stand by assistance(with cueing for hand placement with transfers and placement of rollator against wall and brakes on with transfers)  Stand to sit: Stand by assistance  Ambulation  Ambulation?: Yes  Ambulation 1  Surface: level tile  Device: Rolling Walker  Other Apparatus: (initiated session without O2. After ambulation and stairs O2 at 89-90%. Pt placed on 2L and within 1 min was up to 95%)  Assistance: Stand by assistance  Quality of Gait: decreased step length and marisa as well as step height. limited by activity tolerance. Pt wheezing and noting that she needs a breathing treatment as she doesn't believe she got it this morning  Distance: 80'+60'+120'+60'  Comments: Pt with pain limiting distance of ambulation; self limiting. When distracted able to ambulate long distance  Stairs/Curb  Stairs?: Yes  Stairs  # Steps : 3  Stairs Height: 6\"  Rails: Bilateral  Device: No Device  Assistance: Contact guard assistance     Second Session:   Pt supine in bed upon approach with RN changing wound dressing and  present and is agreeable to therapy. Pt completes supine>sit with supervision with HOB elevated and use of handrail. Pt completes sit>stand and 2x60' ambulation with 4WW with SBA. Stairs completed in gym upon pt request for family training with . Ascent and descent of 2x 3, 6\" stairs with B handrails and CGA (rest break between rounds) with PT guarding on first round and  guarding on second round with therapist supervision. VC given for sequencing. Demonstration and VC given to  for guarding technique and use of gait belt. Upon returning to room pt completed 5 min bean bag toss standing balance activity including reaching with B UEs crossbody and outside of AKANKSHA without UE support and with CGA throughout with no LOB noted.  Half of time was spent with R LE stepped in front and half was spent with L LE stepped in front. Upon completion pt sat in recliner with CGA where she was left with needs in reach and chair alarm on. Goals  Short term goals  Time Frame for Short term goals: STG=LTG- estimated LOS 7 days  Short term goal 1: The pt will perform supine<>sit with SBA  Short term goal 2: The pt will transfer with SBA and RW  Short term goal 3: The pt will ambulate with walker x 76' with CGA. Short term goal 4: The pt will be able to t  Long term goals  Time Frame for Long term goals : The pt will perform bed mobility with mod I ongoing  Long term goal 1: The pt will transfer with mod I and LRAD ongoing  Long term goal 2: The pt will ambulate 150 ft with LRAD and mod I ongoing  Patient Goals   Patient goals :  To get home soon    Plan    Plan  Times per week: 5-7x/wk for 90 minutes per day  Times per day: Daily  Current Treatment Recommendations: Strengthening, Transfer Training, Endurance Training, Neuromuscular Re-education, Patient/Caregiver Education & Training, Balance Training, Gait Training, Functional Mobility Training, Safety Education & Training  Safety Devices  Type of devices: Call light within reach, Nurse notified, Left in bed, Bed alarm in place  Restraints  Initially in place: No     Therapy Time   Individual Concurrent Group Co-treatment   Time In 1045         Time Out 1138         Minutes 53             Therapy Time   Individual Concurrent Group Co-treatment   Time In 1345         Time Out 1415         Minutes 30           Total Minutes:  83 minutes     Minute Variance 7 min (respiratory distress)     Thanh Mueller, PT     Vicnete Mario, SPT   Second Session Only

## 2021-03-09 NOTE — PROGRESS NOTES
Occupational Therapy  Facility/Department: [facility] ACUTE REHAB UNIT  Daily Treatment Note  NAME: Angela Covarrubias  : 1960  MRN: [id number]    Date of Service: 3/9/2021    Discharge Recommendations:  24 hour supervision or assist, Home with Home health OT  OT Equipment Recommendations  ADL Assistive Devices: Reacher;Sock-Aid Soft;Transfer Tub Bench    Assessment   Performance deficits / Impairments: Decreased functional mobility ; Decreased endurance;Decreased coordination;Decreased ADL status; Decreased balance;Decreased strength;Decreased high-level IADLs  Assessment: Patient demonstrating signficant improvement in SOB and activity tolerance throughout session, although continues to be limited by increased pain resulting in more frequent rest breaks. Continues to require cues for safe positioning and use of 4WW during mobility related tasks. Would continue to benefit from OT services, cont POC. Treatment Diagnosis: decreased ADLs and transfers  Prognosis: Fair  OT Education: OT Role;Equipment; Energy Conservation;Transfer Training;Plan of Care  Patient Education: verb understanding  REQUIRES OT FOLLOW UP: Yes  Activity Tolerance  Activity Tolerance: Patient Tolerated treatment well  Safety Devices  Safety Devices in place: Yes  Type of devices: Call light within reach; Left in chair;Chair alarm in place;Nurse notified         Patient Diagnosis(es): There were no encounter diagnoses. has a past medical history of Asthma, Depression, Osteoarthritis, PONV (postoperative nausea and vomiting), and Scoliosis. has a past surgical history that includes Gastric bypass surgery (); Rotator cuff repair (Left, ); Knee cartilage surgery (Left, ); Breast enhancement surgery (Bilateral, );  section ([place] 71); Cholecystectomy ();  [place] tooth extraction (); joint replacement (Bilateral, 2014); fracture surgery; joint replacement; and Total hip arthroplasty (Bilateral, 2014). Restrictions  Position Activity Restriction  Other position/activity restrictions: WBAT R LE; up with assist; ok to shower  Subjective   General  Chart Reviewed: Yes  Patient assessed for rehabilitation services?: Yes  Additional Pertinent Hx: Pt admitted to OSH with h/o hip hardware infection. Infected hardware removed on 2/25/21. Started on IV ABX. Admitted to ARU on 3/3/21. PMHx inclues:  Asthma, Depression, Osteoarthritis, PONV (postoperative nausea and vomiting), and Scoliosis. Family / Caregiver Present: No  Referring Practitioner: Gabi  Diagnosis: debility  Subjective  Subjective: Patient supine in bed upon arrival, agreeable to OT services. General Comment  Comments: Noted increased edema RLE; however, patient declined compression stocking when encouraged to assist with edema management despite education and encouragement. Vital Signs  Patient Currently in Pain: Yes(8/10 surgical pain and soreness in R shoulder, RN notified but not due for pain meds until after OT session)   Orientation  Orientation  Overall Orientation Status: Within Functional Limits  Objective    ADL  Toileting: Increased time to complete;Stand by assistance(seated on toilet for carole hygiene, use of toilet aide for rear carole hygiene)  Standing Balance  Time: up to 1.5 min x5 trials  Activity: UE therex in stance x4 trials, 1 trial static standing position for tabletop activity (poker). Stance maintained with CGA-SBA and no LOB. Comment: Cues required for safe positioning of 4WW throughout  Functional Mobility  Functional - Mobility Device: 4-Wheeled Walker  Activity: To/From therapy gym; To/from bathroom  Assist Level: Contact guard assistance  Functional Mobility Comments: Able to complete functional mobility to gym without seated rest break this date, assist required for O2/IV management.   Bed mobility  Supine to Sit: Stand by assistance(HOB elevated)  Transfers  Sit to stand: Contact guard assistance  Stand to sit: Contact guard assistance  Transfer Comments: v/c for safe hand placement, locking brakes on 4WW and appropriate positioning of device prior to transitional movements throughout session  Cognition  Following Commands: Follows one step commands consistently  Attention Span: Attends with cues to redirect  Memory: Decreased recall of recent events;Decreased short term memory  Problem Solving: Assistance required to implement solutions  Insights: Fully aware of deficits  Initiation: Does not require cues  Sequencing: Does not require cues  Type of ROM/Therapeutic Exercise  Type of ROM/Therapeutic Exercise: AROM; Free weights  Comment: 1) Utilized BUE to complete x26 large gross shoulder movements (letters of the alphabet) in order to promote increased activity tolerance and dynamic balance in stance, completed with CGA-SBA and no LOB. 2) Completed one set of sh flexion, sh abduction and h abduction as listed below in stance with seated rest between before patient needing extended rest break from stance. These 3 tasks were completed with 1# BUE, all remaining reps were completed with AROM on the R d/t reported sh soreness (patient requesting to persist with exercise despite discomfort, encouraged to complete movement within pain tolerance only)  Exercises  Scapular Protraction: 2x10  Scapular Retraction: 2x10  Shoulder Flexion: 2x10  Shoulder ABduction: 2x10  Horizontal ABduction: 2x10     Second session:  Patient in bed upon arrival, agreeable to OT services. On RA and speaking with RN about BLE swelling - agreeable to attempt TENSOs for edema management with encouragement. Therapist measured BLE, trimmed large TENSOs to fit patient leg, and provided assistance to don. Patient able to doff standard yellow socks using reacher and don socks using sock aide. Functional mobility to bathroom with 4WW and CGA-SBA, slight wheezing noted. Toilet transfer CGA. Toileting SBA, carole hygiene with aide completed while seated.   Functional mobility to gym with 4WW and CGA. Completed seated problem-solving puzzle, min cues to recall rules of game play and complete higher-level problem solving. Functional mobility back to room with CGA. Sit<>sup mod assist for BLE management.  Left in bed, bed alarm activated, needs met and in reach, RN aware       Plan   Plan  Times per week: 5 days per week 90 mins each  Times per day: Daily  Current Treatment Recommendations: Strengthening, Balance Training, Functional Mobility Training, Endurance Training, Self-Care / ADL, Patient/Caregiver Education & Training, Safety Education & Training, Equipment Evaluation, Education, & procurement    Goals  Short term goals  Time Frame for Short term goals: by 1 week  Short term goal 1: Pt will complete LB dressing with AE PRN at mod A-goal met 3/6  Short term goal 2: Pt will complete toileting with CGA-goal met 3/5  Short term goal 3: Pt will complete bathing at min A-ongoing  Short term goal 4: Pt will complete shower transfer at spvn-ongoing  Short term goal 5: Pt will complete B UE exercises at mod I-ongoing  Long term goals  Time Frame for Long term goals : By 2 weeks-all ongoing  Long term goal 1: Pt will complete LB dressing with AE PRN at mod I  Long term goal 2: Pt will complete toileting with mod I  Long term goal 3: Pt will complete bathing at mod I  Long term goal 4: Pt will complete shower transfer at mod I  Long term goal 5: Pt will complete simple meal prep task at mod I with LRAD  Patient Goals   Patient goals : to be able to go home       Therapy Time   Individual Concurrent Group Co-treatment   Time In 0730         Time Out 0830         Minutes 60              Second Session Therapy Time:   Individual Concurrent Group Co-treatment   Time In 1245         Time Out 1315         Minutes 30           Timed Code Treatment Minutes:  60+30    Total Treatment Minutes:  90 min    RADHA VALDIVIASouthern [facility], OTR/L #[id number]

## 2021-03-10 LAB
ANION GAP SERPL CALCULATED.3IONS-SCNC: 6 MMOL/L (ref 3–16)
BASE EXCESS ARTERIAL: 6.9 MMOL/L (ref -3–3)
BASOPHILS ABSOLUTE: 0 K/UL (ref 0–0.2)
BASOPHILS RELATIVE PERCENT: 0.5 %
BUN BLDV-MCNC: 7 MG/DL (ref 7–20)
CALCIUM SERPL-MCNC: 8.6 MG/DL (ref 8.3–10.6)
CARBOXYHEMOGLOBIN ARTERIAL: 0.7 % (ref 0–1.5)
CHLORIDE BLD-SCNC: 103 MMOL/L (ref 99–110)
CO2: 30 MMOL/L (ref 21–32)
CREAT SERPL-MCNC: 0.7 MG/DL (ref 0.6–1.2)
EOSINOPHILS ABSOLUTE: 0.4 K/UL (ref 0–0.6)
EOSINOPHILS RELATIVE PERCENT: 6.1 %
GFR AFRICAN AMERICAN: >60
GFR NON-AFRICAN AMERICAN: >60
GLUCOSE BLD-MCNC: 102 MG/DL (ref 70–99)
HCO3 ARTERIAL: 32 MMOL/L (ref 21–29)
HCT VFR BLD CALC: 23.6 % (ref 36–48)
HEMOGLOBIN, ART, EXTENDED: 8.8 G/DL
HEMOGLOBIN: 8 G/DL (ref 12–16)
LYMPHOCYTES ABSOLUTE: 1.2 K/UL (ref 1–5.1)
LYMPHOCYTES RELATIVE PERCENT: 18.7 %
MCH RBC QN AUTO: 27.6 PG (ref 26–34)
MCHC RBC AUTO-ENTMCNC: 34 G/DL (ref 31–36)
MCV RBC AUTO: 81 FL (ref 80–100)
METHEMOGLOBIN ARTERIAL: 0.4 % (ref 0–1.4)
MONOCYTES ABSOLUTE: 0.5 K/UL (ref 0–1.3)
MONOCYTES RELATIVE PERCENT: 8.2 %
NEUTROPHILS ABSOLUTE: 4.4 K/UL (ref 1.7–7.7)
NEUTROPHILS RELATIVE PERCENT: 66.5 %
O2 SAT, ARTERIAL: 92 % (ref 93–100)
PCO2 ARTERIAL: 49.2 MMHG (ref 35–45)
PDW BLD-RTO: 17.8 % (ref 12.4–15.4)
PH ARTERIAL: 7.42 (ref 7.35–7.45)
PLATELET # BLD: 440 K/UL (ref 135–450)
PMV BLD AUTO: 7.4 FL (ref 5–10.5)
PO2 ARTERIAL: 65.8 MMHG (ref 75–108)
POTASSIUM REFLEX MAGNESIUM: 4 MMOL/L (ref 3.5–5.1)
RBC # BLD: 2.91 M/UL (ref 4–5.2)
SODIUM BLD-SCNC: 139 MMOL/L (ref 136–145)
TCO2 ARTERIAL: 33 MMOL/L
WBC # BLD: 6.7 K/UL (ref 4–11)

## 2021-03-10 PROCEDURE — 97116 GAIT TRAINING THERAPY: CPT

## 2021-03-10 PROCEDURE — 94640 AIRWAY INHALATION TREATMENT: CPT

## 2021-03-10 PROCEDURE — 2580000003 HC RX 258: Performed by: PHYSICAL MEDICINE & REHABILITATION

## 2021-03-10 PROCEDURE — 94761 N-INVAS EAR/PLS OXIMETRY MLT: CPT

## 2021-03-10 PROCEDURE — 1280000000 HC REHAB R&B

## 2021-03-10 PROCEDURE — 6370000000 HC RX 637 (ALT 250 FOR IP): Performed by: PHYSICAL MEDICINE & REHABILITATION

## 2021-03-10 PROCEDURE — 94762 N-INVAS EAR/PLS OXIMTRY CONT: CPT

## 2021-03-10 PROCEDURE — 6360000002 HC RX W HCPCS: Performed by: PHYSICAL MEDICINE & REHABILITATION

## 2021-03-10 PROCEDURE — 2700000000 HC OXYGEN THERAPY PER DAY

## 2021-03-10 PROCEDURE — 82803 BLOOD GASES ANY COMBINATION: CPT

## 2021-03-10 PROCEDURE — 97530 THERAPEUTIC ACTIVITIES: CPT

## 2021-03-10 PROCEDURE — 94664 DEMO&/EVAL PT USE INHALER: CPT

## 2021-03-10 PROCEDURE — 36600 WITHDRAWAL OF ARTERIAL BLOOD: CPT

## 2021-03-10 PROCEDURE — 6370000000 HC RX 637 (ALT 250 FOR IP): Performed by: STUDENT IN AN ORGANIZED HEALTH CARE EDUCATION/TRAINING PROGRAM

## 2021-03-10 PROCEDURE — 97535 SELF CARE MNGMENT TRAINING: CPT

## 2021-03-10 PROCEDURE — 97110 THERAPEUTIC EXERCISES: CPT

## 2021-03-10 PROCEDURE — 94799 UNLISTED PULMONARY SVC/PX: CPT

## 2021-03-10 PROCEDURE — 99232 SBSQ HOSP IP/OBS MODERATE 35: CPT | Performed by: PHYSICAL MEDICINE & REHABILITATION

## 2021-03-10 PROCEDURE — 85025 COMPLETE CBC W/AUTO DIFF WBC: CPT

## 2021-03-10 PROCEDURE — 80048 BASIC METABOLIC PNL TOTAL CA: CPT

## 2021-03-10 PROCEDURE — 99233 SBSQ HOSP IP/OBS HIGH 50: CPT | Performed by: INTERNAL MEDICINE

## 2021-03-10 RX ADMIN — TIZANIDINE 4 MG: 4 TABLET ORAL at 10:42

## 2021-03-10 RX ADMIN — AMLODIPINE BESYLATE 2.5 MG: 5 TABLET ORAL at 10:38

## 2021-03-10 RX ADMIN — CEFTRIAXONE SODIUM 2000 MG: 2 INJECTION, POWDER, FOR SOLUTION INTRAMUSCULAR; INTRAVENOUS at 09:25

## 2021-03-10 RX ADMIN — OXYCODONE 10 MG: 5 TABLET ORAL at 20:39

## 2021-03-10 RX ADMIN — ENOXAPARIN SODIUM 30 MG: 30 INJECTION SUBCUTANEOUS at 10:41

## 2021-03-10 RX ADMIN — FLUOXETINE 40 MG: 20 CAPSULE ORAL at 10:41

## 2021-03-10 RX ADMIN — AMPICILLIN AND SULBACTAM 3000 MG: 1; 2 INJECTION, POWDER, FOR SOLUTION INTRAMUSCULAR; INTRAVENOUS at 15:10

## 2021-03-10 RX ADMIN — PREGABALIN 100 MG: 75 CAPSULE ORAL at 15:09

## 2021-03-10 RX ADMIN — IPRATROPIUM BROMIDE AND ALBUTEROL SULFATE 1 AMPULE: .5; 2.5 SOLUTION RESPIRATORY (INHALATION) at 19:30

## 2021-03-10 RX ADMIN — AMPICILLIN AND SULBACTAM 3000 MG: 1; 2 INJECTION, POWDER, FOR SOLUTION INTRAMUSCULAR; INTRAVENOUS at 09:24

## 2021-03-10 RX ADMIN — IPRATROPIUM BROMIDE AND ALBUTEROL SULFATE 1 AMPULE: .5; 2.5 SOLUTION RESPIRATORY (INHALATION) at 15:02

## 2021-03-10 RX ADMIN — DICLOFENAC SODIUM 2 G: 10 GEL TOPICAL at 10:44

## 2021-03-10 RX ADMIN — AMPICILLIN AND SULBACTAM 3000 MG: 1; 2 INJECTION, POWDER, FOR SOLUTION INTRAMUSCULAR; INTRAVENOUS at 03:00

## 2021-03-10 RX ADMIN — ALPRAZOLAM 0.25 MG: 0.25 TABLET ORAL at 10:41

## 2021-03-10 RX ADMIN — OXYCODONE 10 MG: 5 TABLET ORAL at 06:56

## 2021-03-10 RX ADMIN — ATORVASTATIN CALCIUM 40 MG: 40 TABLET, FILM COATED ORAL at 20:33

## 2021-03-10 RX ADMIN — OXYCODONE 10 MG: 5 TABLET ORAL at 11:17

## 2021-03-10 RX ADMIN — AMITRIPTYLINE HYDROCHLORIDE 75 MG: 25 TABLET, FILM COATED ORAL at 20:33

## 2021-03-10 RX ADMIN — POTASSIUM CHLORIDE 20 MEQ: 20 TABLET, EXTENDED RELEASE ORAL at 10:41

## 2021-03-10 RX ADMIN — FUROSEMIDE 40 MG: 40 TABLET ORAL at 10:45

## 2021-03-10 RX ADMIN — OXYCODONE 10 MG: 5 TABLET ORAL at 02:40

## 2021-03-10 RX ADMIN — IPRATROPIUM BROMIDE AND ALBUTEROL SULFATE 1 AMPULE: .5; 2.5 SOLUTION RESPIRATORY (INHALATION) at 07:34

## 2021-03-10 RX ADMIN — DICLOFENAC SODIUM 2 G: 10 GEL TOPICAL at 20:33

## 2021-03-10 RX ADMIN — AMPICILLIN AND SULBACTAM 3000 MG: 1; 2 INJECTION, POWDER, FOR SOLUTION INTRAMUSCULAR; INTRAVENOUS at 21:30

## 2021-03-10 RX ADMIN — IPRATROPIUM BROMIDE AND ALBUTEROL SULFATE 1 AMPULE: .5; 2.5 SOLUTION RESPIRATORY (INHALATION) at 11:06

## 2021-03-10 RX ADMIN — OXYCODONE 10 MG: 5 TABLET ORAL at 15:09

## 2021-03-10 RX ADMIN — PREGABALIN 100 MG: 75 CAPSULE ORAL at 11:26

## 2021-03-10 RX ADMIN — ALPRAZOLAM 0.25 MG: 0.25 TABLET ORAL at 20:39

## 2021-03-10 RX ADMIN — POTASSIUM CHLORIDE 20 MEQ: 20 TABLET, EXTENDED RELEASE ORAL at 17:30

## 2021-03-10 RX ADMIN — CEFTRIAXONE SODIUM 2000 MG: 2 INJECTION, POWDER, FOR SOLUTION INTRAMUSCULAR; INTRAVENOUS at 20:32

## 2021-03-10 RX ADMIN — PREGABALIN 100 MG: 75 CAPSULE ORAL at 20:33

## 2021-03-10 ASSESSMENT — PAIN DESCRIPTION - ORIENTATION
ORIENTATION_2: RIGHT
ORIENTATION: RIGHT
ORIENTATION: RIGHT
ORIENTATION_2: RIGHT
ORIENTATION: RIGHT

## 2021-03-10 ASSESSMENT — PAIN DESCRIPTION - PAIN TYPE
TYPE: ACUTE PAIN
TYPE_2: CHRONIC PAIN
TYPE: ACUTE PAIN
TYPE: ACUTE PAIN

## 2021-03-10 ASSESSMENT — ENCOUNTER SYMPTOMS
CHOKING: 1
CHEST TIGHTNESS: 0
SHORTNESS OF BREATH: 1
COUGH: 1
TROUBLE SWALLOWING: 0
APNEA: 0
CONSTIPATION: 0
ABDOMINAL PAIN: 0
WHEEZING: 1

## 2021-03-10 ASSESSMENT — PAIN DESCRIPTION - LOCATION
LOCATION: HIP
LOCATION_2: SHOULDER
LOCATION: HIP
LOCATION: HIP

## 2021-03-10 ASSESSMENT — PAIN SCALES - GENERAL
PAINLEVEL_OUTOF10: 6
PAINLEVEL_OUTOF10: 5
PAINLEVEL_OUTOF10: 6
PAINLEVEL_OUTOF10: 7
PAINLEVEL_OUTOF10: 5
PAINLEVEL_OUTOF10: 7
PAINLEVEL_OUTOF10: 8
PAINLEVEL_OUTOF10: 7

## 2021-03-10 ASSESSMENT — PAIN DESCRIPTION - PROGRESSION: CLINICAL_PROGRESSION: NOT CHANGED

## 2021-03-10 ASSESSMENT — PAIN - FUNCTIONAL ASSESSMENT: PAIN_FUNCTIONAL_ASSESSMENT: PREVENTS OR INTERFERES SOME ACTIVE ACTIVITIES AND ADLS

## 2021-03-10 ASSESSMENT — PAIN DESCRIPTION - FREQUENCY
FREQUENCY: CONTINUOUS
FREQUENCY: CONTINUOUS

## 2021-03-10 ASSESSMENT — PAIN DESCRIPTION - DESCRIPTORS
DESCRIPTORS: ACHING
DESCRIPTORS_2: CONSTANT
DESCRIPTORS: ACHING

## 2021-03-10 ASSESSMENT — PAIN DESCRIPTION - ONSET
ONSET_2: ON-GOING
ONSET: ON-GOING
ONSET: ON-GOING
ONSET_2: ON-GOING

## 2021-03-10 ASSESSMENT — PAIN DESCRIPTION - INTENSITY: RATING_2: 0

## 2021-03-10 ASSESSMENT — PAIN DESCRIPTION - DURATION: DURATION_2: CONTINUOUS

## 2021-03-10 NOTE — PROGRESS NOTES
Pt O2 sats at this time during wakeful time is at 83% Per dr Emmy Villa No O2 to  Be placed on pt tonight for sleep study.  Will continue to monitor patient

## 2021-03-10 NOTE — PROGRESS NOTES
Pulmonology Progress Note    Admit Date: 3/3/2021  Day: 7  Diet: DIET GENERAL;    CC: Uncontrolled Asthma     HPI: 63 y. o.female with pmhx DM, mood disorder, DELFINO, asthma (previously on Symbicort but stopped using it because she could not afford it) admitted from  to ARU on 03/03 s/p R femur removal of hardware on 2/25/2021 due to E.feacalis infection. Post-op pt was intermittently hypoxic requiring 1-2L on NC. CXR suggestive of RLL atelectasis. Pt is on inhalers q4. She reports wheezing and cough for several months. Pulmonology consulted for concern for asthma that is uncontrolled. Interval history:  NAEO. Pt is off oxygen and satting 95 on RA. She reports she has required intermittent NC use this morning. She continues to have intermittent dyspnea at rest and WIGGINS. Cough has improved. She received a prn nebulizer treatment this morning. Pulse ox nocturnal done overnight w/o oxygen. Pt reports no episodes of awakening to catch her breath. She has had increased swelling in LE.      Medications:     Scheduled Meds:   furosemide  40 mg Oral Daily    budesonide  0.25 mg Nebulization BID    Arformoterol Tartrate  15 mcg Nebulization BID    potassium chloride  20 mEq Oral BID WC    diclofenac sodium  2 g Topical BID    lidocaine  1 patch Transdermal Daily    cefTRIAXone (ROCEPHIN) IV  2,000 mg Intravenous Q12H    enoxaparin  30 mg Subcutaneous Daily    ipratropium-albuterol  1 ampule Inhalation Q4H WA    amitriptyline  75 mg Oral Nightly    amLODIPine  2.5 mg Oral Daily    atorvastatin  40 mg Oral Nightly    FLUoxetine  40 mg Oral Daily    pregabalin  100 mg Oral TID    ampicillin-sulbactam  3,000 mg Intravenous Q6H    bisacodyl  5 mg Oral Daily     Continuous Infusions:  PRN Meds:ibuprofen, diphenhydrAMINE, albuterol, ALPRAZolam, tiZANidine, benzonatate, acetaminophen, magnesium hydroxide, polyethylene glycol, oxyCODONE **OR** oxyCODONE    Objective:   Vitals:   T-max:  Patient Vitals for the ALKPHOS in the last 72 hours. Troponin: No results for input(s): TROPONINI in the last 72 hours. BNP: No results for input(s): BNP in the last 72 hours. Lipids: No results for input(s): CHOL, HDL in the last 72 hours. Invalid input(s): LDLCALCU, TRIGLYCERIDE  ABGs:  No results for input(s): PHART, WEZ9DRZ, PO2ART, GIY6HAM, BEART, THGBART, L7ARYPJL, MVZ4SIB in the last 72 hours. INR: No results for input(s): INR in the last 72 hours. Lactate: No results for input(s): LACTATE in the last 72 hours. Cultures:  -----------------------------------------------------------------  RAD:   VL Extremity Venous Bilateral         XR CHEST PORTABLE   Final Result         1. Right infrahilar airspace disease appears slightly more prominent. Differentials include infection/atelectasis/aspiration. Follow-up is recommended to document resolution. 2. Mild vascular congestion. XR CHEST 1 VIEW   Final Result      Right upper extremity PICC line in appropriate position. Chronic atelectatic collapse of the right middle lobe. Assessment/Plan:     Gavino Saenz is a 63 y.o. female, who is being seen in consultation for uncontrolled Asthma.      Possible Obesity hypoventilation syndrome   Pt reports snoring, fatigue, impaired concentration and memory. BMI 44. Bicarb 30. WIGGINS  - nocturnal pulse ox w/ 2 episodes of desaturation, however graph shows overnight she has a baseline oxygen sats of 80%. - will require nocturnal oxygen 2L  - outpt home sleep study   - talked to pt about possible trial of CPAP, she states she is unwilling to try it. She was previously using CPAP but was unable to sleep at night and would take it off. She states she would not be compliant with it and it is not worth the expense. Dyspnea - 2/2 uncontrolled asthma, possible vocal cord dysfunction   No PFTs. Pt reports cough/wheezing used to use Symbicort but stopped 2/2 inability to afford meds.  Pt w/ intermittent audible wheezing, which is loudest on neck auscultation. Reports some aspiration. - cont Symbicort BID. Can discharge on Budesonide and arformoterol nebulizers which insurance will pay for  - Continue Duonebs QID   - will require outpt PFT      Hypoxemia - possibly 2/2 pulm edema (resolved)  Pt now on room air w/ intermittent episodes of hypoxia. Recent h/o surgery. - CXR (3/8/21): Right infrahilar airspace disease slightly more prominent w/ mild vascular congestion. CXR finding likely atelecatsis. Currently no signs of resp infection and pt continues on abx for hardware infection. - pro- (previously 190 10/2020), increased LE edema, she has been receiving home dose lasix 20 BID switched to 40mg qd. Had significant improvement in sats 2/2 lasix 40 IV dose x1. Rock Claus MD, PGY-1  03/10/21  8:36 AM    This patient will be staffed and discussed with Dr Michela Davis  Patient examined, findings as discussed with Dr. Ricky Eden. Agree with assessment and plan as above. Hypoxemia clearly associated with sleep, which may represent hypoventilation, particularly coupled with altered VQ relationships in an obese woman. Asthma appears to be under control. Checking arterial blood gas for hypercapnia.   Plan to maintain on nocturnal oxygen at 2 L/min

## 2021-03-10 NOTE — PROGRESS NOTES
RESPIRATORY THERAPY ASSESSMENT    Name:  Mayte Doherty Record Number:  [id number]  Age: 63 y.o. Gender: female  : 1960  Today's Date:  3/10/2021  Room:  [facility]3114-01    Assessment     Is the patient being admitted for a COPD or Asthma exacerbation? No   (If yes the patient will be seen every 4 hours for the first 24 hours and then reassessed)    Patient Admission Diagnosis      Allergies  Allergies   Allergen Reactions    Cortisone Anaphylaxis and Other (See Comments)     Only if Injected; many years ago. Anaphylactic reaction??numbing agent??cortisone. Injected causes reaction. .. Gary Goetz the patient states she CAN TAKE IV solumedrol fine (18)  Only if Injected; many years ago. Anaphylactic reaction??numbing agent??cortisone. - she can taken oral steroids.  Droperidol Other (See Comments)     EPS   shaking  shaking  ETS    Compazine [Prochlorperazine] Other (See Comments)     EPS symptoms       Minimum Predicted Vital Capacity:     680          Actual Vital Capacity:      1L              Pulmonary History: quit smoking   Home Oxygen Therapy: none  Home Respiratory Therapy:  Albuterol QID, Symbicort MDI BID  Current Respiratory Therapy:  Albuterol/atrovent QID and Q4H PRN, Pulmicort and Brovana BID  Treatment Type: HHN  Medications: Albuterol/Ipratropium    Respiratory Severity Index(RSI)   Patients with orders for inhalation medications, oxygen, or any therapeutic treatment modality will be placed on Respiratory Protocol. They will be assessed with the first treatment and at least every 72 hours thereafter. The following severity scale will be used to determine frequency of treatment intervention.     Smoking History: Pulmonary Disease or Smoking History, Greater than 15 pack year = 2    Social History  Social History     Tobacco Use    Smoking status: Former Smoker     Packs/day: 0.25     Years: 0.20     Pack years: 0.05     Types: Cigarettes     Quit date: 2014     Years since quittin.7    Smokeless tobacco: Never Used    Tobacco comment: working to decrease   Substance Use Topics    Alcohol use: No     Comment: recovering alcoholic since 7678, has had couple of relaspes, last 7/15/2015.  Drug use: No       Recent Surgical History: Surgery of Extremities = 1  Past Surgical History  Past Surgical History:   Procedure Laterality Date    BREAST ENHANCEMENT SURGERY Bilateral 2007   [place]    FRACTURE SURGERY      femur RT    GASTRIC BYPASS SURGERY  1998    [place]    JOINT REPLACEMENT Bilateral 2014    knee replacement    JOINT REPLACEMENT      KNEE CARTILAGE SURGERY Left 2006    ROTATOR CUFF REPAIR Left 2008    TOTAL HIP ARTHROPLASTY Bilateral     WISDOM TOOTH EXTRACTION         Level of Consciousness: Alert, Oriented, and Cooperative = 0    Level of Activity: Walking with assistance = 1    Respiratory Pattern: Dyspnea with exertion;Irregular pattern;or RR less than 6 = 2    Breath Sounds: Absent bilaterally and/or with wheezes = 3    Sputum  Sputum Color: None, Tenacity: None, Sputum How Obtained: Spontaneous cough  Cough: Strong, spontaneous, non-productive = 0    Vital Signs   /67   Pulse 90   Temp 98.4 °F (36.9 °C) (Oral)   Resp 22   Ht 4' 10\" (1.473 m)   Wt 212 lb 8.4 oz (96.4 kg)   LMP 2014   SpO2 (!) 79%   BMI 44.42 kg/m²   SPO2 (COPD values may differ): 90-91% on room air or greater than 92% on FiO2 24- 28% = 1    Peak Flow (asthma only): not applicable = 0    RSI: 44-83 = Q6H or QID and Q4HPRN for dyspnea        Plan       Goals: medication delivery, mobilize retained secretions, volume expansion and improve oxygenation    Patient/caregiver was educated on the proper method of use for Respiratory Care Devices:  Yes      Level of patient/caregiver understanding able to:   ? Verbalize understanding   ? Demonstrate understanding       ? Teach back        ?  Needs reinforcement ?  No available caregiver               ? Other:     Response to education:  Very Good     Is patient being placed on Home Treatment Regimen? Yes     Does the patient have everything they need prior to discharge? NA     Comments: Chart reviewed    Plan of Care: Continue Albuterol/atrovent QID and Q4H PRN, Pulmicort and Brovana BID    Electronically signed by Nigel Colon RCP on 3/10/2021 at 12:42 PM    Respiratory Protocol Guidelines     1. Assessment and treatment by Respiratory Therapy will be initiated for medication and therapeutic interventions upon initiation of aerosolized medication. 2. Physician will be contacted for respiratory rate (RR) greater than 35 breaths per minute. Therapy will be held for heart rate (HR) greater than 140 beats per minute, pending direction from physician. 3. Bronchodilators will be administered via Metered Dose Inhaler (MDI) with spacer when the following criteria are met:  a. Alert and cooperative     b. HR < 140 bpm  c. RR < 30 bpm                d. Can demonstrate a 2-3 second inspiratory hold  4. Bronchodilators will be administered via Hand Held Nebulizer CB [facility]) to patients when ANY of the following criteria are met  a. Incognizant or uncooperative          b. Patients treated with HHN at Home        c. Unable to demonstrate proper use of MDI with spacer     d. RR > 30 bpm   5. Bronchodilators will be delivered via Metered Dose Inhaler (MDI), HHN, Aerogen to intubated patients on mechanical ventilation. 6. Inhalation medication orders will be delivered and/or substituted as outlined below. Aerosolized Medications Ordering and Administration Guidelines:    1. All Medications will be ordered by a physician, and their frequency and/or modality will be adjusted as defined by the patients Respiratory Severity Index (RSI) score.   2. If the patient does not have documented COPD, consider discontinuing anticholinergics when RSI is less than 9.  3. If the bronchospasm

## 2021-03-10 NOTE — PROGRESS NOTES
Nutrition Therapy Follow Up:  Nutrition Problem #1: Inadequate oral intake  Intervention: Food and/or Nutrient Delivery: Continue Current Diet  Nutritional Goals: Pt will consume >50% of meals this admission    Pt progressing with oral intake, now consuming % of most documented meals. Diet Education   Pt requested potential diet education: re weight loss. Spoke with pt after lunch and pt reports she does not want hard copy of diet education. Pt reports she just isn't motivated right now r/t current physical limitations. Encouraged pt request additional RD visits for education as needed.

## 2021-03-10 NOTE — PROGRESS NOTES
Attempts made to contact patients significant other and daughter. Spouse visited patient earlier today and brought skyline. Patient's rings that were present in a blue cup overnight. I personally had patient remove her rings this morning as her hands became swollen and lubricating jelly had to be utilized to aid removal of the rings. Daughter did not answer, however, called the patient to ask why a call from [place] was being received. Pt upset with me for attempt to identify where Rings are. Pt states their  would not have taken the rings.

## 2021-03-10 NOTE — PROGRESS NOTES
Physical Therapy  Facility/Department: [facility] ACUTE REHAB UNIT  Daily Treatment Note  NAME: Héctor Oliva  : 1960  MRN: [id number]    Date of Service: 3/10/2021    Discharge Recommendations:  Home with Home health PT, 24 hour supervision or assist   PT Equipment Recommendations  Other: pt has all needed DME    Assessment   Body structures, Functions, Activity limitations: Decreased functional mobility ; Decreased balance;Decreased strength;Decreased endurance;Decreased safe awareness; Increased pain  Assessment: The pt had improved mobility with therapy sessions after breathing treatment this morning. She was able to tolerate increased activity with decreased rest breaks this morning though was limited in the afternoon session. Pt requiring 2L O2 with therapist checking her with and without O2 with activity and she was down to 86% with mobility. Pt able to improve the number of steps she performs to 6 in a row before resting. She continues to be significantly limited at times in her ambulation, transfers, and bed mobility by pain, decreased endurance, and weakness. She would benefit from further skilled PT to improve her independence with functional mobility. Treatment Diagnosis: impaired mobility secondary to infection  Prognosis: Good  PT Education: Goals;PT Role;Plan of Care;Home Exercise Program;Gait Training;General Safety;Transfer Training;Functional Mobility Training;Precautions  Patient Education: pt verbalized understanding  REQUIRES PT FOLLOW UP: Yes  Activity Tolerance  Activity Tolerance: Patient limited by pain; Patient limited by fatigue;Patient limited by endurance  Activity Tolerance: on 2 LO2 with mobility except initial ambulation bouts     Patient Diagnosis(es): There were no encounter diagnoses. has a past medical history of Asthma, Depression, Osteoarthritis, PONV (postoperative nausea and vomiting), and Scoliosis.    has a past surgical history that includes Gastric bypass surgery (); Rotator cuff repair (Left, 2008); Knee cartilage surgery (Left, 2006); Breast enhancement surgery (Bilateral, 2007);  section ([place] [address]); Cholecystectomy (); [place] tooth extraction (); joint replacement (Bilateral, 2014); fracture surgery; joint replacement; and Total hip arthroplasty (Bilateral, ). Restrictions  Position Activity Restriction  Other position/activity restrictions: WBAT R LE; up with assist; ok to shower  Subjective   General  Chart Reviewed: Yes  Additional Pertinent Hx: Pt had knee and hip replacements. She has since had difficulty with abscesses and infections with the joints. 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 Up until 2021 she was on Keflex Patient admitted too [facility] and  had infected plate removed from her femur 2021 along with removal of screws,cables and deep bone biopsy. Per Op note. The area around late looked infected. There was sinus tract that connected with knee joint space. The femur stem of the Artifical hip was exposed and there was reactive synovitis there as well. 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 Intraoperatively 3 tissue Cx and 1 deep bone Cx were taken E Faecalis. Patient was started on IV Vancomycin and plan is currently to continue for 12 weeks with tentative end date of   Family / Caregiver Present: No  Referring Practitioner: Dr. Nicholas Marks: \"The pt states that she is having a good day today. \"  General Comment  Comments: Pt seated in chair and agreeable to therapy. Pain Screening  Patient Currently in Pain: Yes(5/10 in R LE after pain medication)  Vital Signs  Patient Currently in Pain: Yes(5/10 in R LE after pain medication)       Orientation     Cognition   Cognition  Overall Cognitive Status: Exceptions  Following Commands:  Follows one step commands After her rest breaks pt was instructed in sidestepping at rail focusing on increasing her foot clearance with this activity. Pt notes that she is primarily limited by pain with mobility. Pt sidesteps x 40' (each direction)x 2 with a rest break in between and then is directed in ambulation back to her room. Pt ambulates [de-identified]' with rollator SBA and then sits to rest. She says that she cannot walk further or she will fall. She says she will crawl or the therapist needs to push her on her rollator. Therapist states that it would be safer to use a w/c to wheel her back to her room. Pt transfers to w/c SBA and therapist wheels her into room where she performs CGA stand pivot back to bed and mod A for B LE to get into bed. Pt left with all needs met and call light within reach; respiratory therapist present. Goals  Short term goals  Time Frame for Short term goals: STG=LTG- estimated LOS 7 days  Short term goal 1: The pt will perform supine<>sit with SBA  Short term goal 2: The pt will transfer with SBA and RW  Short term goal 3: The pt will ambulate with walker x 76' with CGA. Short term goal 4: The pt will be able to t  Long term goals  Time Frame for Long term goals : The pt will perform bed mobility with mod I ongoing  Long term goal 1: The pt will transfer with mod I and LRAD ongoing  Long term goal 2: The pt will ambulate 150 ft with LRAD and mod I ongoing  Patient Goals   Patient goals :  To get home soon    Plan    Plan  Times per week: 5-7x/wk for 90 minutes per day  Times per day: Daily  Current Treatment Recommendations: Strengthening, Transfer Training, Endurance Training, Neuromuscular Re-education, Patient/Caregiver Education & Training, Balance Training, Gait Training, Functional Mobility Training, Safety Education & Training  Safety Devices  Type of devices: Call light within reach, Nurse notified, Left in chair, Chair alarm in place  Restraints  Initially in place: No     Therapy Time   Individual Concurrent Group Co-treatment   Time In 0800         Time Out 0900         Minutes 60         Timed Code Treatment Minutes: 60 Minutes    Timed Code Treatment Minutes:  60 Minutes  Second Session Therapy Time:   Individual Concurrent Group Co-treatment   Time In 1030         Time Out 1100         Minutes 30           Timed Code Treatment Minutes:  60+30 minutes    Total Treatment Minutes:  90 minutes    Total Treatment Minutes:  90 minutes      Pieter Villarreal, PT

## 2021-03-10 NOTE — PROGRESS NOTES
Occupational Therapy  Facility/Department: [facility] ACUTE REHAB UNIT  Daily Treatment Note  NAME: Mana Corona  : 1960  MRN: [id number]    Date of Service: 3/10/2021    Discharge Recommendations:  24 hour supervision or assist, Home with Home health OT  OT Equipment Recommendations  ADL Assistive Devices: Reacher;Sock-Aid Soft;Transfer Tub Bench    Assessment   Performance deficits / Impairments: Decreased functional mobility ; Decreased endurance;Decreased coordination;Decreased ADL status; Decreased balance;Decreased strength;Decreased high-level IADLs  Assessment: Patient continues to demonstrate improvement with bathroom mobility and standing activity tolerance. Continues to require cueing for safe technique during ADLs and does desat with some activity. Would continue to benefit from OT services, cont POC. Treatment Diagnosis: decreased ADLs and transfers  Prognosis: Fair  OT Education: OT Role;Equipment; Energy Conservation;Transfer Training;Plan of Care  Patient Education: verb understanding  REQUIRES OT FOLLOW UP: Yes  Activity Tolerance  Activity Tolerance: Patient Tolerated treatment well  Safety Devices  Safety Devices in place: Yes  Type of devices: Call light within reach; Left in chair;Chair alarm in place         Patient Diagnosis(es): There were no encounter diagnoses. has a past medical history of Asthma, Depression, Osteoarthritis, PONV (postoperative nausea and vomiting), and Scoliosis. has a past surgical history that includes Gastric bypass surgery (); Rotator cuff repair (Left, ); Knee cartilage surgery (Left, ); Breast enhancement surgery (Bilateral, );  section ([place] 71); Cholecystectomy (); [place] tooth extraction (); joint replacement (Bilateral, 2014); fracture surgery; joint replacement; and Total hip arthroplasty (Bilateral, ).     Restrictions  Position Activity Restriction  Other position/activity restrictions: WBAT R LE; up with assist; ok to shower  Subjective   General  Chart Reviewed: Yes  Patient assessed for rehabilitation services?: Yes  Additional Pertinent Hx: Pt admitted to OSH with h/o hip hardware infection. Infected hardware removed on 2/25/21. Started on IV ABX. Admitted to ARU on 3/3/21. PMHx inclues:  Asthma, Depression, Osteoarthritis, PONV (postoperative nausea and vomiting), and Scoliosis. Family / Caregiver Present: No  Referring Practitioner: [place]  Diagnosis: debility  Subjective  Subjective: Patient seated EOB with PCA upon arrival, requesting to complete toileting. Agreeable to assist from OT. General Comment  Comments: Gayle Simental Vital Signs  Patient Currently in Pain: Yes(pain 7/10 in RLE, not currently due for pain medication)   Orientation  Orientation  Overall Orientation Status: Within Functional Limits  Objective    ADL  Grooming: Stand by assistance;Minimal assistance(oral care and washing face in stance, required v/c to appropriately recall which tasks had already been completed. Stance with SBA. Comb hair while seated with min A for matting)  Toileting: Stand by assistance(use of toilet aide)  Functional Mobility  Functional - Mobility Device: 4-Wheeled Walker  Activity: To/from bathroom  Assist Level: Stand by assistance(-SBA)  Toilet Transfers  Toilet - Technique: Ambulating  Equipment Used: Standard toilet  Toilet Transfer: Stand by assistance  Toilet Transfers Comments: use of GB to stand  Bed mobility  Sit to Supine: Contact guard assistance(HOB elevated)  Transfers  Sit to stand: Contact guard assistance(-SBA, cues for safe hand placement)  Stand to sit: Stand by assistance  Cognition  Overall Cognitive Status: Exceptions  Following Commands:  Follows one step commands consistently  Attention Span: Attends with cues to redirect  Memory: Decreased recall of recent events;Decreased short term memory  Problem Solving: Assistance required to implement solutions  Type of ROM/Therapeutic Exercise  Type of ROM/Therapeutic Exercise: Resistive Bands(green)  Exercises  Shoulder Flexion: 1x10  Horizontal ABduction: 1x12    Second session:  Patient in bed upon approach, requesting to complete shower. On 2L upon approach, patient reporting she has been doffing O2 for bathroom. spO2 98 at start of session when on RA. Sup<>sit SBA with HOB elevated. Sit<>stand SBA. Functional mobility with 4WW to bathroom with SBA. Toilet transfer SBA. Toileting with use of aide and SBA. PICC and incision site covered while seated on commode. Toilet<>shower transfer with CGA-SBA and heavy v/c, patient slightly frustrated with use of TTB d/t desire to soak in tub. Discussed safety concerns including difficulty with transfer and ability to keep incision dry; agreeable to use of TTB only if warm water could fill the tub to soak feet. Therapist agreeable if patient agreed to remain seated while water in the tub for increased safety. spO2 spot checked prior to initiation of bathing, spO2 92. Bathing tasks completed with SBA, min cueing for safety (remain seated while water in tub). Shower transfer CGA, increased time and effort to advance RLE although able to complete without assist. UB dressing with set up. LB dressing with use of reacher to thread BLE, pull up over hips with SBA. Sp O2 90. Patient declined to attempt socks and was provided assist d/t increased fatigue following shower - has previously demonstrated ability to complete with use of sock aide. Bathroom mobility completed with 4WW and SBA. Sit<>sup CGA with HOB elevated, increased effort to advance RLE. spO2 86, 02 re-applied.  Left in bed, bed alarm activated, needs met and in reach, RN aware     Plan   Plan  Times per week: 5 days per week 90 mins each  Times per day: Daily  Current Treatment Recommendations: Strengthening, Balance Training, Functional Mobility Training, Endurance Training, Self-Care / ADL, Patient/Caregiver Education & Training, Safety Education & Training, Equipment Evaluation, Education, & procurement       Goals  Short term goals  Time Frame for Short term goals: by 1 week  Short term goal 1: Pt will complete LB dressing with AE PRN at mod A-goal met 3/6  Short term goal 2: Pt will complete toileting with CGA-goal met 3/5  Short term goal 3: Pt will complete bathing at min A- goal met 3/10  Short term goal 4: Pt will complete shower transfer at spvn-ongoing  Short term goal 5: Pt will complete B UE exercises at mod I-ongoing  Long term goals  Time Frame for Long term goals : By 2 weeks-all ongoing  Long term goal 1: Pt will complete LB dressing with AE PRN at mod I  Long term goal 2: Pt will complete toileting with mod I  Long term goal 3: Pt will complete bathing at mod I  Long term goal 4: Pt will complete shower transfer at mod I  Long term goal 5: Pt will complete simple meal prep task at mod I with LRAD  Patient Goals   Patient goals : to be able to go home       Therapy Time   Individual Concurrent Group Co-treatment   Time In 1130         Time Out 1200         Minutes 30              Second Session Therapy Time:   Individual Concurrent Group Co-treatment   Time In 1315         Time Out 1415         Minutes 60           Timed Code Treatment Minutes:  30+60    Total Treatment Minutes:  90 min    RADHA VALDIVIAFranklin Memorial Hospital, OTR/L #[id number]

## 2021-03-10 NOTE — PROGRESS NOTES
Chair alarm set off by patient. Pt self transferred at this time and found in the bathroom incontinent episode of stool occurred.

## 2021-03-10 NOTE — PROGRESS NOTES
Department of Physical Medicine & Rehabilitation  Progress Note    Patient Identification:  Lilia Hand  [id number]  : 1960  Admit date: 3/3/2021    Chief Complaint: debility    Subjective:   Feeling better today. She continues to have respiratory decline on exertion but she is overall getting better. Less pain. We are going to monitor swelling today with increase lasix to 40mg PO QD. No other complaints. ROS: No f/c, n/v, cp     Objective:  Patient Vitals for the past 24 hrs:   BP Temp Temp src Pulse Resp SpO2   03/10/21 0535 -- -- -- -- 18 (!) 79 %   21 2152 117/75 97.8 °F (36.6 °C) Oral 90 17 (!) 83 %   21 1456 -- -- -- -- -- 95 %   21 1037 -- -- -- -- -- 98 %     Const: Alert. No distress, pleasant. HEENT: Normocephalic, atraumatic. Normal sclera/conjunctiva. MMM. CV: Regular rate and rhythm. Resp: No respiratory distress. Lungs CTAB. Abd: Soft, nontender, nondistended, NABS+   Ext: No edema. Neuro: Alert, oriented, appropriately interactive. Psych: Cooperative, appropriate mood and affect    Laboratory data: Available via EMR.    Last 24 hour lab  Recent Results (from the past 24 hour(s))   Basic Metabolic Panel w/ Reflex to MG    Collection Time: 03/10/21  7:00 AM   Result Value Ref Range    Sodium 139 136 - 145 mmol/L    Potassium reflex Magnesium 4.0 3.5 - 5.1 mmol/L    Chloride 103 99 - 110 mmol/L    CO2 30 21 - 32 mmol/L    Anion Gap 6 3 - 16    Glucose 102 (H) 70 - 99 mg/dL    BUN 7 7 - 20 mg/dL    CREATININE 0.7 0.6 - 1.2 mg/dL    GFR Non-African American >60 >60    GFR African American >60 >60    Calcium 8.6 8.3 - 10.6 mg/dL   CBC auto differential    Collection Time: 03/10/21  7:00 AM   Result Value Ref Range    WBC 6.7 4.0 - 11.0 K/uL    RBC 2.91 (L) 4.00 - 5.20 M/uL    Hemoglobin 8.0 (L) 12.0 - 16.0 g/dL    Hematocrit 23.6 (L) 36.0 - 48.0 %    MCV 81.0 80.0 - 100.0 fL    MCH 27.6 26.0 - 34.0 pg    MCHC 34.0 31.0 - 36.0 g/dL    RDW 17.8 (H) 12.4 - 15.4 % Platelets 240 814 - 919 K/uL    MPV 7.4 5.0 - 10.5 fL    Neutrophils % 66.5 %    Lymphocytes % 18.7 %    Monocytes % 8.2 %    Eosinophils % 6.1 %    Basophils % 0.5 %    Neutrophils Absolute 4.4 1.7 - 7.7 K/uL    Lymphocytes Absolute 1.2 1.0 - 5.1 K/uL    Monocytes Absolute 0.5 0.0 - 1.3 K/uL    Eosinophils Absolute 0.4 0.0 - 0.6 K/uL    Basophils Absolute 0.0 0.0 - 0.2 K/uL       Therapy progress:  PT  Position Activity Restriction  Other position/activity restrictions: WBAT R LE; up with assist; ok to shower  Objective     Sit to Stand: Stand by assistance(with cueing for hand placement with transfers and placement of rollator against wall and brakes on with transfers)  Stand to sit: Stand by assistance  Bed to Chair: Contact guard assistance  Device: Rolling Walker  Other Apparatus: (initiated session without O2. After ambulation and stairs O2 at 89-90%. Pt placed on 2L and within 1 min was up to 95%)  Assistance: Stand by assistance  Distance: 80'+60'+120'+60'  OT  PT Equipment Recommendations  Equipment Needed: (continue to assess)  Other: pt has all needed DME  Toilet - Technique: Ambulating  Equipment Used: Standard toilet  Toilet Transfers Comments: use of GB to stand  Assessment        SLP          Body mass index is 44.42 kg/m². Assessment and Plan:  Infected Right Femur Hardware  2/25 s/p partial JENIFER  Intra-op cultures with e.  Faecalis  ID consulted, appreciate recs: d/t only partial JENIFER, prefer double beta-lactam coverage for enterococcus  Continue ceftriaxone 2g q 12 hr + unasyn 3g every 6 hours for 12 weeks, unless plan to remove hardware   PT/OT in ARU     Essential HTN - PTA: amlodipine 2.5mg daily, lasix 20mg daily  BP stable w/o meds   At discharge, can resume home lasix, amlodipine     Constipation  Continue bowel regimen      Chronic Pain  Mood Disorder - PTA: elavil, flexeril, fluoxetine, gabapentin, meloxicam  Resume home meds     Moderate Persistent Asthma  RT assess and treat, IS  Continue home Albuterol prn   O2 sats stable on room air  Needs outpatient PFTs and pulmonology for further w/u of SOB with exertion (? ILD, pulm HTN. ..) (already new referral PTA at an [facility] consult        Rehab Progress: Improving  Anticipated Dispo: home  Services/DME: TBD  ELOS: CARLYN DubosePSantyH  PM&R  3/10/2021  9:11 AM

## 2021-03-10 NOTE — PLAN OF CARE
Problem: Pain:  Goal: Control of acute pain  Description: Control of acute pain  Outcome: Ongoing  Note: Pt voices pain needs appropriately, pain is assessed during shift. Problem: Falls - Risk of:  Goal: Will remain free from falls  Description: Will remain free from falls  Outcome: Ongoing  Note: Pt self transferred encouraged to not self transfer. Video monitoring in place education provided. Problem: Infection - Surgical Site:  Goal: Will show no infection signs and symptoms  Description: Will show no infection signs and symptoms  Outcome: Ongoing  Note: Surgical site observed for signs/symptoms of infection. Vitals performed routinely, labs observed.  Will monitor pt while on ARU

## 2021-03-10 NOTE — PROGRESS NOTES
Spoke to [employer] in pharmacy. Clarified if it was safe to give third dose of the day for xananx . 25 mg as a dose had been received 3 hours earlier. Safe to give will administer to pt.

## 2021-03-11 ENCOUNTER — APPOINTMENT (OUTPATIENT)
Dept: GENERAL RADIOLOGY | Age: 61
DRG: 560 | End: 2021-03-11
Attending: PHYSICAL MEDICINE & REHABILITATION
Payer: MEDICARE

## 2021-03-11 LAB — PRO-BNP: 520 PG/ML (ref 0–124)

## 2021-03-11 PROCEDURE — 2580000003 HC RX 258: Performed by: PHYSICAL MEDICINE & REHABILITATION

## 2021-03-11 PROCEDURE — 6370000000 HC RX 637 (ALT 250 FOR IP): Performed by: PHYSICAL MEDICINE & REHABILITATION

## 2021-03-11 PROCEDURE — 71045 X-RAY EXAM CHEST 1 VIEW: CPT

## 2021-03-11 PROCEDURE — 1280000000 HC REHAB R&B

## 2021-03-11 PROCEDURE — 94640 AIRWAY INHALATION TREATMENT: CPT

## 2021-03-11 PROCEDURE — 2700000000 HC OXYGEN THERAPY PER DAY

## 2021-03-11 PROCEDURE — 6370000000 HC RX 637 (ALT 250 FOR IP): Performed by: STUDENT IN AN ORGANIZED HEALTH CARE EDUCATION/TRAINING PROGRAM

## 2021-03-11 PROCEDURE — 99233 SBSQ HOSP IP/OBS HIGH 50: CPT | Performed by: PHYSICAL MEDICINE & REHABILITATION

## 2021-03-11 PROCEDURE — 99233 SBSQ HOSP IP/OBS HIGH 50: CPT | Performed by: INTERNAL MEDICINE

## 2021-03-11 PROCEDURE — 97116 GAIT TRAINING THERAPY: CPT

## 2021-03-11 PROCEDURE — 97535 SELF CARE MNGMENT TRAINING: CPT

## 2021-03-11 PROCEDURE — 97110 THERAPEUTIC EXERCISES: CPT

## 2021-03-11 PROCEDURE — 6360000002 HC RX W HCPCS: Performed by: PHYSICAL MEDICINE & REHABILITATION

## 2021-03-11 PROCEDURE — 94761 N-INVAS EAR/PLS OXIMETRY MLT: CPT

## 2021-03-11 PROCEDURE — 97116 GAIT TRAINING THERAPY: CPT | Performed by: PHYSICAL THERAPIST

## 2021-03-11 PROCEDURE — 97530 THERAPEUTIC ACTIVITIES: CPT

## 2021-03-11 PROCEDURE — 83880 ASSAY OF NATRIURETIC PEPTIDE: CPT

## 2021-03-11 RX ADMIN — FLUOXETINE 40 MG: 20 CAPSULE ORAL at 08:28

## 2021-03-11 RX ADMIN — TIZANIDINE 4 MG: 4 TABLET ORAL at 05:11

## 2021-03-11 RX ADMIN — ALPRAZOLAM 0.25 MG: 0.25 TABLET ORAL at 08:28

## 2021-03-11 RX ADMIN — CEFTRIAXONE SODIUM 2000 MG: 2 INJECTION, POWDER, FOR SOLUTION INTRAMUSCULAR; INTRAVENOUS at 19:46

## 2021-03-11 RX ADMIN — ALPRAZOLAM 0.25 MG: 0.25 TABLET ORAL at 21:38

## 2021-03-11 RX ADMIN — OXYCODONE 10 MG: 5 TABLET ORAL at 19:45

## 2021-03-11 RX ADMIN — OXYCODONE 10 MG: 5 TABLET ORAL at 05:12

## 2021-03-11 RX ADMIN — ALBUTEROL SULFATE 2.5 MG: 2.5 SOLUTION RESPIRATORY (INHALATION) at 01:06

## 2021-03-11 RX ADMIN — AMLODIPINE BESYLATE 2.5 MG: 5 TABLET ORAL at 08:29

## 2021-03-11 RX ADMIN — ACETAMINOPHEN 650 MG: 325 TABLET ORAL at 08:28

## 2021-03-11 RX ADMIN — TIZANIDINE 4 MG: 4 TABLET ORAL at 19:45

## 2021-03-11 RX ADMIN — AMPICILLIN AND SULBACTAM 3000 MG: 1; 2 INJECTION, POWDER, FOR SOLUTION INTRAMUSCULAR; INTRAVENOUS at 21:19

## 2021-03-11 RX ADMIN — AMPICILLIN AND SULBACTAM 3000 MG: 1; 2 INJECTION, POWDER, FOR SOLUTION INTRAMUSCULAR; INTRAVENOUS at 15:53

## 2021-03-11 RX ADMIN — ALPRAZOLAM 0.25 MG: 0.25 TABLET ORAL at 15:31

## 2021-03-11 RX ADMIN — PREGABALIN 100 MG: 75 CAPSULE ORAL at 08:29

## 2021-03-11 RX ADMIN — IPRATROPIUM BROMIDE AND ALBUTEROL SULFATE 1 AMPULE: .5; 2.5 SOLUTION RESPIRATORY (INHALATION) at 22:56

## 2021-03-11 RX ADMIN — ATORVASTATIN CALCIUM 40 MG: 40 TABLET, FILM COATED ORAL at 21:08

## 2021-03-11 RX ADMIN — POTASSIUM CHLORIDE 20 MEQ: 20 TABLET, EXTENDED RELEASE ORAL at 08:29

## 2021-03-11 RX ADMIN — DICLOFENAC SODIUM 2 G: 10 GEL TOPICAL at 21:08

## 2021-03-11 RX ADMIN — FUROSEMIDE 40 MG: 40 TABLET ORAL at 08:30

## 2021-03-11 RX ADMIN — PREGABALIN 100 MG: 75 CAPSULE ORAL at 21:08

## 2021-03-11 RX ADMIN — PREGABALIN 100 MG: 75 CAPSULE ORAL at 15:31

## 2021-03-11 RX ADMIN — DICLOFENAC SODIUM 2 G: 10 GEL TOPICAL at 10:03

## 2021-03-11 RX ADMIN — OXYCODONE 10 MG: 5 TABLET ORAL at 09:46

## 2021-03-11 RX ADMIN — TIZANIDINE 4 MG: 4 TABLET ORAL at 15:34

## 2021-03-11 RX ADMIN — POTASSIUM CHLORIDE 20 MEQ: 20 TABLET, EXTENDED RELEASE ORAL at 15:31

## 2021-03-11 RX ADMIN — AMPICILLIN AND SULBACTAM 3000 MG: 1; 2 INJECTION, POWDER, FOR SOLUTION INTRAMUSCULAR; INTRAVENOUS at 03:05

## 2021-03-11 RX ADMIN — AMITRIPTYLINE HYDROCHLORIDE 75 MG: 25 TABLET, FILM COATED ORAL at 21:08

## 2021-03-11 RX ADMIN — IPRATROPIUM BROMIDE AND ALBUTEROL SULFATE 1 AMPULE: .5; 2.5 SOLUTION RESPIRATORY (INHALATION) at 12:08

## 2021-03-11 RX ADMIN — CEFTRIAXONE SODIUM 2000 MG: 2 INJECTION, POWDER, FOR SOLUTION INTRAMUSCULAR; INTRAVENOUS at 09:44

## 2021-03-11 RX ADMIN — AMPICILLIN AND SULBACTAM 3000 MG: 1; 2 INJECTION, POWDER, FOR SOLUTION INTRAMUSCULAR; INTRAVENOUS at 10:28

## 2021-03-11 RX ADMIN — OXYCODONE 10 MG: 5 TABLET ORAL at 15:32

## 2021-03-11 RX ADMIN — ACETAMINOPHEN 650 MG: 325 TABLET ORAL at 15:33

## 2021-03-11 RX ADMIN — ENOXAPARIN SODIUM 30 MG: 30 INJECTION SUBCUTANEOUS at 08:30

## 2021-03-11 RX ADMIN — IPRATROPIUM BROMIDE AND ALBUTEROL SULFATE 1 AMPULE: .5; 2.5 SOLUTION RESPIRATORY (INHALATION) at 07:15

## 2021-03-11 RX ADMIN — IPRATROPIUM BROMIDE AND ALBUTEROL SULFATE 1 AMPULE: .5; 2.5 SOLUTION RESPIRATORY (INHALATION) at 15:38

## 2021-03-11 ASSESSMENT — PAIN DESCRIPTION - DESCRIPTORS
DESCRIPTORS: ACHING

## 2021-03-11 ASSESSMENT — PAIN DESCRIPTION - PAIN TYPE
TYPE: ACUTE PAIN
TYPE: ACUTE PAIN
TYPE_2: ACUTE PAIN
TYPE: ACUTE PAIN

## 2021-03-11 ASSESSMENT — PAIN DESCRIPTION - LOCATION
LOCATION: HIP

## 2021-03-11 ASSESSMENT — PAIN SCALES - GENERAL
PAINLEVEL_OUTOF10: 8
PAINLEVEL_OUTOF10: 4
PAINLEVEL_OUTOF10: 7
PAINLEVEL_OUTOF10: 9
PAINLEVEL_OUTOF10: 4
PAINLEVEL_OUTOF10: 8

## 2021-03-11 ASSESSMENT — PAIN DESCRIPTION - INTENSITY: RATING_2: 0

## 2021-03-11 ASSESSMENT — PAIN DESCRIPTION - PROGRESSION
CLINICAL_PROGRESSION: NOT CHANGED
CLINICAL_PROGRESSION_2: GRADUALLY WORSENING
CLINICAL_PROGRESSION: NOT CHANGED

## 2021-03-11 ASSESSMENT — PAIN DESCRIPTION - ONSET: ONSET: ON-GOING

## 2021-03-11 ASSESSMENT — ENCOUNTER SYMPTOMS
APNEA: 0
STRIDOR: 1
CHEST TIGHTNESS: 0
CHOKING: 0
SHORTNESS OF BREATH: 0
CONSTIPATION: 0
WHEEZING: 0
ABDOMINAL PAIN: 0
COUGH: 0
TROUBLE SWALLOWING: 0

## 2021-03-11 ASSESSMENT — PAIN DESCRIPTION - FREQUENCY: FREQUENCY: CONTINUOUS

## 2021-03-11 ASSESSMENT — PAIN - FUNCTIONAL ASSESSMENT
PAIN_FUNCTIONAL_ASSESSMENT: PREVENTS OR INTERFERES SOME ACTIVE ACTIVITIES AND ADLS

## 2021-03-11 ASSESSMENT — PAIN DESCRIPTION - ORIENTATION
ORIENTATION: RIGHT

## 2021-03-11 ASSESSMENT — PAIN DESCRIPTION - DURATION: DURATION_2: INTERMITTENT

## 2021-03-11 NOTE — PROGRESS NOTES
[facility] Wound Ostomy Continence Nurse  Follow-up Progress Note       NAME:  Lilia Hand  MEDICAL RECORD NUMBER:  [id number]  AGE:  63 y.o. GENDER:  female  :  1960  TODAY'S DATE:  3/11/2021    Subjective:   Wound Identification: R lateral thigh  Wound Type: surgical  Contributing Factors:I&D L hip previous sugery, drain exit site after drain removed      Patient Goal of Care:  [x] Wound Healing  [] Odor Control  [] Palliative Care  [] Pain Control   [] Other:     Objective:    /72   Pulse 91   Temp 98.4 °F (36.9 °C) (Oral)   Resp 15   Ht 4' 10\" (1.473 m)   Wt 212 lb 8.4 oz (96.4 kg)   LMP 2014   SpO2 94%   BMI 44.42 kg/m²   Evan Risk Score: Evan Scale Score: 19  Assessment:   Measurements: 1cm around openings with stitch intact 0.2cm deep filled with slough, ? adipose. Cleansed, some slough wiped away, very moist. Covered with silver alginate piece and dressing. 1 3cm reddened spot above at staple line with slight induration, no open/no drainage. Monitor site for changes. Response to treatment:no c/o's  Plan:   Plan of Care:  keep clean and dry    Specialty Bed Required : no, for comfort only  Current Diet: DIET GENERAL;  Discharge Plan:  Placement for patient upon discharge: home  Patient appropriate for Outpatient Wound Care Center:TBD    Referrals:  []   []  [facility]  [x] Supplies-TBD  [] Other    Patient/Caregiver Teaching: reddened spot and open hole from drain- need to clean and keep dry until healed. Dressing over dry staples only for comfort/protection as desires, unless drainage.     Level of patient/caregiver understanding able to:   [x] Indicates understanding       [] Needs reinforcement  [] Unsuccessful      [x] Verbal Understanding  [] Demonstrated understanding       [] No evidence of learning  [] Refused teaching         [] N/A       Electronically signed by Elly Cardenas RN, Jeffery Blunt on 3/11/2021 at 12:33 PM

## 2021-03-11 NOTE — PLAN OF CARE
Problem: Pain:  Goal: Control of acute pain  Outcome: Ongoing  Pt. Verbalizes fall precautions, demonstrates observance overnight.        Problem: Falls - Risk of:  Goal: Will remain free from falls  Outcome: Ongoing  Pt with no incident of fall since admission to [facility]

## 2021-03-11 NOTE — PROGRESS NOTES
Occupational Therapy  Facility/Department: [place] [address] [facility]  Daily Treatment Note  NAME: Cem Vega  : 1960  MRN: [id number]    Date of Service: 3/11/2021    Discharge Recommendations:  24 hour supervision or assist, Home with Home health OT  OT Equipment Recommendations  Equipment Needed: Yes  ADL Assistive Devices: Reacher;Sock-Aid Soft;Transfer Tub Bench  Other: continue to assess    Assessment   Performance deficits / Impairments: Decreased functional mobility ; Decreased endurance;Decreased coordination;Decreased ADL status; Decreased balance;Decreased strength;Decreased high-level IADLs  Assessment: Pt completed cooking task this date but session limited by fatigue, WIGGINS and pain in RLE resulting in pt completing energy efficient techniques and seated in w/c majority of time. Pt only able to tolerate ~1 minute in stance per attempt. Pt functioning below baseline, cont OT POC. Treatment Diagnosis: decreased ADLs and transfers  Prognosis: Fair  OT Education: OT Role;Equipment; Energy Conservation;Transfer Training;Plan of Care  Patient Education: pt educated on energy conservation for cooking task and encouraged to increase therapeutic benefit in stance  REQUIRES OT FOLLOW UP: Yes  Activity Tolerance  Activity Tolerance: Patient limited by fatigue  Activity Tolerance: Majority of cooking task completed seated despite verbal encouragement to complete in stance; pt c/o fatigue and RLE pain  Safety Devices  Safety Devices in place: Yes  Type of devices: All fall risk precautions in place; Bed alarm in place;Call light within reach; Left in bed;Nurse notified         Patient Diagnosis(es): There were no encounter diagnoses. has a past medical history of Asthma, Depression, Osteoarthritis, PONV (postoperative nausea and vomiting), and Scoliosis. has a past surgical history that includes Gastric bypass surgery (); Rotator cuff repair (Left, ); Knee cartilage surgery (Left, );  Breast enhancement surgery (Bilateral, );  section ([place] [address]); Cholecystectomy (); [place] tooth extraction (); joint replacement (Bilateral, 2014); fracture surgery; joint replacement; and Total hip arthroplasty (Bilateral, ). Restrictions  Position Activity Restriction  Other position/activity restrictions: WBAT R LE; up with assist; ok to shower     Subjective   General  Chart Reviewed: Yes  Patient assessed for rehabilitation services?: Yes  Additional Pertinent Hx: Pt admitted to OSH with h/o hip hardware infection. Infected hardware removed on 21. Started on IV ABX. Admitted to ARU on 3/3/21. PMHx inclues:  Asthma, Depression, Osteoarthritis, PONV (postoperative nausea and vomiting), and Scoliosis. Family / Caregiver Present: No  Referring Practitioner: Gabi  Diagnosis: debility  Subjective  Subjective: Pt supine in bed upon arrival requesting to use bathroom prior to cooking task. General Comment  Comments: RN alerted pt during toileting that a STAT chest x-ray was ordered, pt upset by this and questioning why, RN providing education about funcitonal status and concerns from a pulmonary standpoint. Vital Signs  Patient Currently in Pain: Yes(6/10 RLE pain)   Orientation  Orientation  Overall Orientation Status: Within Functional Limits  Objective      ADL  Feeding: Independent  Toileting: Stand by assistance(continent of bladder, no use of toilet aid this time d/t increased ROM of RLE abducting)  Instrumental ADL's  Instrumental ADLs: Yes  Meal Prep  Meal Prep Level: Wheelchair  Meal Prep Level of Assistance: Stand by assistance  Meal Preparation: Pt completed chilli cooking task as pt reported meaningful activity, however pt reporting extreme fatigue and requesting tips to conduct in energy efficient ways to assist with d/c home, pt encouraged to complete as much as possible in stance with w/c placed behind pt for rest breaks.  Pt completed chopping and prepping of onions and peppers seated in w/c i'ly, pt able to transfer vegetables into cooking pot in stance with SBA and unilateral UE suport at countertop. Pt able to follow directions, pour all ingredients into cooking pot and operative stovetop safely in stance with SBA and seated in w/c i'ly. D/t fatgiue and WIGGINS (pt on 2 L O2 throughout task) pt only able to tolerate ~1 minutes in stance x 3 trials with majority of task completed seated. Pt able to wash dishes in sink in stance ~30 seconds with SBA and no overt LOB or buckling. Standing Balance  Time: ~5 minutes total  Activity: in stance for cooking task, functional mobility to/from bathroom; functional transfers  Functional Mobility  Functional - Mobility Device: 4-Wheeled Walker  Activity: To/from bathroom  Assist Level: Stand by assistance  Toilet Transfers  Toilet - Technique: Ambulating  Equipment Used: Standard toilet  Toilet Transfer: Stand by assistance  Toilet Transfers Comments: use of GB to stand  Bed mobility  Supine to Sit: Stand by assistance  Sit to Supine: Contact guard assistance  Scooting: Supervision(use of bed rails to scoot superiorly in bed with HOB flat)  Transfers  Sit to stand: Contact guard assistance  Stand to sit: Stand by assistance                       Cognition  Overall Cognitive Status: Exceptions  Following Commands: Follows one step commands consistently  Attention Span: Attends with cues to redirect  Memory: Decreased recall of recent events;Decreased short term memory  Problem Solving: Assistance required to implement solutions  Insights: Fully aware of deficits  Initiation: Does not require cues  Sequencing: Does not require cues          First Session: Pt supine in bed upon arrival with wound care nursing present finishing dressing management. Pt  present during session. Pt requesting to use bathroom. Supine<sit with spvn assist with HOB flat. STS from EOB to 4WW with SBA.  Pt completed functional mobility to/from bathroom using 1MB with SBA and OT managing IV pole. Pt and  concerned about difficulty with completion of perihygiene thoroughly d/t pain from RLE despite use of toilet aid. Pt educated to continue to use wet wipes until no stool is present on wipes as well as completing partially in stance to increase thoroughness, pt verb understanding. Pt continent of bladder completing LB clothing mgmt off hips in stance with SBA. Pt setup toilet aid and toilet paper with spvn and began perihygiene seated and then stood to test thoroughness. Pt reported increased thoroughness in stance. As pt completed pants mgmt in stance, pt RLE buckled resulting in complete uncontrolled descent onto toilet req mod A for safety. Pt stood from standard toilet to 4WW with SBA and ambulated to recliner with SBA and OT managing IV lines. Stand<sit to recliner with SBA. Pt completed x 15 sh h abduction therex using GTB to increase activity tolerance. Pt on 2 L O2 via NC as pt demo WIGGINS. Pt left in bedside recliner at end of session with chair alarm engaged, call light within reach and all needs met.      Plan   Plan  Times per week: 5 days per week 90 mins each  Times per day: Daily  Current Treatment Recommendations: Strengthening, Balance Training, Functional Mobility Training, Endurance Training, Self-Care / ADL, Patient/Caregiver Education & Training, Safety Education & Training, Equipment Evaluation, Education, & procurement  G-Code     OutComes Score                                                  AM-PAC Score             Goals  Short term goals  Time Frame for Short term goals: by 1 week  Short term goal 1: Pt will complete LB dressing with AE PRN at mod A-goal met 3/6  Short term goal 2: Pt will complete toileting with CGA-goal met 3/5  Short term goal 3: Pt will complete bathing at min A- goal met 3/10  Short term goal 4: Pt will complete shower transfer at spvn-ongoing  Short term goal 5: Pt will complete B UE exercises at mod I-ongoing  Long term goals  Time Frame for Long term goals : By 2 weeks-all ongoing  Long term goal 1: Pt will complete LB dressing with AE PRN at mod I  Long term goal 2: Pt will complete toileting with mod I  Long term goal 3: Pt will complete bathing at mod I  Long term goal 4: Pt will complete shower transfer at mod I  Long term goal 5: Pt will complete simple meal prep task at mod I with LRAD  Patient Goals   Patient goals : to be able to go home         First Session Therapy Time:   Individual Concurrent Group Co-treatment   Time In 1000         Time Out 1030         Minutes 30             Therapy Time   Individual Concurrent Group Co-treatment   Time In 1330         Time Out 1500         Minutes 90         Timed Code Treatment Minutes: 90 Minutes       Timed Code Treatment Minutes:  30 + 90    Total Treatment Minutes:  [address] [facility],

## 2021-03-11 NOTE — PROGRESS NOTES
section ([place] 71); Cholecystectomy (); [place] tooth extraction (); joint replacement (Bilateral, 2014); fracture surgery; joint replacement; and Total hip arthroplasty (Bilateral, ). Restrictions  Position Activity Restriction  Other position/activity restrictions: WBAT R LE; up with assist; ok to shower  Subjective   General  Chart Reviewed: Yes  Additional Pertinent Hx: Pt had knee and hip replacements. She has since had difficulty with abscesses and infections with the joints. 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 Up until 2021 she was on Keflex Patient admitted too [facility] and  had infected plate removed from her femur 2021 along with removal of screws,cables and deep bone biopsy. Per Op note. The area around late looked infected. There was sinus tract that connected with knee joint space. The femur stem of the Artifical hip was exposed and there was reactive synovitis there as well. 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 Intraoperatively 3 tissue Cx and 1 deep bone Cx were taken E Faecalis. Patient was started on IV Vancomycin and plan is currently to continue for 12 weeks with tentative end date of   Family / Caregiver Present: No  Referring Practitioner: Dr. Autumn Evans: Pt asks therapy to come back in 10 min because she wants time to drink her coffee. When PT explains if we take time out of this session they will add time to her session later she becomes frustrated. \"I don't care about your schedule. I'm tired of being rushed. \" When PT tries to empathize and explain reason for scheduling pt interupts and says \"I don't want to talk about it any more\"  General Comment  Comments: Pt seated and chair and eventually agrees to participate with therapy.   Pain Screening  Patient Currently in Pain: Yes(Pain in R LE. RN aware and enters to administer pain medication.)  Vital Signs  Patient Currently in Pain: Yes(Pain in R LE. RN aware and enters to administer pain medication.)       Orientation  Orientation  Overall Orientation Status: Within Normal Limits  Cognition      Objective   Bed mobility  Supine to Sit: Stand by assistance  Scooting: Stand by assistance(to reposition in bed)  Comment: HOB flat use of handrails  Transfers  Sit to Stand: Stand by assistance(from recliner, rollator, chair, SCI FIT; up to 2GV)  Stand to sit: Stand by assistance  Comment: VC for handplacement. Ambulation  Ambulation?: Yes  More Ambulation?: Yes  Ambulation 1  Surface: level tile  Device: Rollator  Other Apparatus: (2 L O2 with activity per RN)  Assistance: Stand by assistance;Contact guard assistance(SBA with occasional CGA due to unsafe technique with AD)  Quality of Gait: Decreased step height and length. Forward flexion and unsafe positioning of AD which increases with fatigue. Pt also demonstrates weaving pattern with rollator with fatigue. Distance: 76', 80', 50'x2  Comments: VC given for upright posture and proper use of AD. Pt resistant to cues saying \"I can barely walk how am I supposed to do that\". Pt educated on how current positioning is unsafe. Exercises  Hip Flexion: x 20 B seated marches  Hip Abduction: x20 B seated ab/adduction, x20 adduction squeezes with pillow  Knee Long Arc Quad: x20 B  Ankle Pumps: x20 B  Comments: Pt encouraged to complete exercises during break at the end of the day to continue strengthening and upon discharge at home. Pt provided with handout. Other exercises  Other exercises?: Yes(1 min R anterior tib stretch during break while completing SCI FIT activity. 2 min R hamstring stretch seated in recliner.  Pt reports both stretches help to alieviate tightness in R LE.)         Comment: Pt completed endurance activity for SCI FIT for 2 min 30 sec with level 1 resistance x3 with 1 min 30 second rests in between. Encouragment provided to keep speed high enough so that screen does not turn off. Despite being on 2 L O2 pt demonstrated increased wheezing after second round of SCIT FIT. SpO2 92%. Toileting completed at end of session, pt is SBA for toilet transfer with rollator after ambulating into bathroom, SBA for standing balance while doffing underwear, and supervision for seated balance while competed pericare independently with use of toilet aide. Second session: Pt sitting in BS chair when PT arrived. Pt agreeable to therapy. Pt immediately requested to use the restroom. Transfers  Sit to Stand: Stand by assistance(from recliner,armed chair and commode to rollator)   Stand to sit: Stand by assistance( VC to align with intended surface and for hand placement) ( pt appeared min impulsive and anxious as SOB was evident)   Ambulation 1  Surface: level tile  Device: Rollator  Other Apparatus: (2 L O2 with activity per RN)PT transported O2 tank  Assistance: Stand by assistance   Quality of Gait: Decreased step height and length. Forward flexion and unsafe positioning of AD which increases with fatigue. Excessive upper body movement especially with increased fatigue/ SOB  Distance: 125' x2 , 20' x1  In the bathroom, pt was able to pull up her gown and lower her pants with intermittent support of the rollator. Pt urinated and attended to [employer]. Pt walked to the sink and was able to wash hands with SBA only. PT continues with education on diaphragmatic breathing . By the end of session, pt was very SOB and \"demanding\" respiratory treatment. Pt was very anxious. Bed mobility Sit> supine with CGA to clear the LLE . Pt used the bedrail with HOB elevated. Pt returned to room and positioned in bed . Call light and phone placed within reach. Bed alarm reactivated.  Nsg notified     G-Code     OutComes Score                                                     AM-PAC Score             Goals  Short term goals  Time Frame for Short term goals: STG=LTG- estimated LOS 7 days  Short term goal 1: The pt will perform supine<>sit with SBA- met 3/11  Short term goal 2: The pt will transfer with SBA and RW- met 3/11  Short term goal 3: The pt will ambulate with walker x 75' with CGA. met 3/11  Short term goal 4: The pt will be able to t  Long term goals  Time Frame for Long term goals : The pt will perform bed mobility with mod I ongoing  Long term goal 1: The pt will transfer with mod I and LRAD ongoing  Long term goal 2: The pt will ambulate 150 ft with LRAD and mod I ongoing  Patient Goals   Patient goals :  To get home soon    Plan    Plan  Times per week: 5-7x/wk for 90 minutes per day  Times per day: Daily  Current Treatment Recommendations: Strengthening, Transfer Training, Endurance Training, Neuromuscular Re-education, Patient/Caregiver Education & Training, Balance Training, Gait Training, Functional Mobility Training, Safety Education & Training  Safety Devices  Type of devices: Call light within reach, Nurse notified, Left in bed, Bed alarm in place  Restraints  Initially in place: No     Therapy Time   Individual Concurrent Group Co-treatment   Time In 0830         Time Out 0930         Minutes 60           Second Session Therapy Time:   Individual Concurrent Group Co-treatment   Time In 1130         Time Out 1205         Minutes 35           Timed Code Treatment Minutes:  60+35    Total Treatment Minutes:  [address], [facility] ( treating/ documenting therapist for 2nd session)

## 2021-03-11 NOTE — PROGRESS NOTES
RN performs dressing change to R Lateral Thigh s/p surgical procedure. Surgical incision wirh staple closure. Approximately 3.5\" descending from upper thigh area, there is an area of induration, adelina 20 mm in diameter with warmth. No drainage is noted and patient does not c/o increased tactile pain to this area. There is also an area adelina 3mm in diameter, perhaps a previous surgical drain site, with yellow center; not drainage noted; wound nurse notification upon shift report for further recommendations.

## 2021-03-11 NOTE — PROGRESS NOTES
Pulmonology Progress Note    Admit Date: 3/3/2021  Day: 7  Diet: DIET GENERAL;    CC: Uncontrolled Asthma     HPI: 63 y. o.female with pmhx DM, mood disorder, DELFINO, asthma (previously on Symbicort but stopped using it because she could not afford it) admitted from  to ARU on 03/03 s/p R femur removal of hardware on 2/25/2021 due to E.feacalis infection. Post-op pt was intermittently hypoxic requiring 1-2L on NC. CXR suggestive of RLL atelectasis. Pt is on inhalers q4. She reports wheezing and cough for several months. Pulmonology consulted for concern for asthma that is uncontrolled. Pulse ox nocturnal done overnight on 3/9 w/o oxygen and she remained hypoxic to the low 80s throughout the night without indication of obstructive sleep apnea. Apnea index 0.3. Interval history:  NAEO. She wore the nasal cannula throughout the night. Pt reports no episodes of awakening to catch her breath. She has had increased swelling in LE and bilateral duplex did not identify DVT. She has some upper airway noise when she lies flat and with exertion when she participates with rehab.      Medications:     Scheduled Meds:   furosemide  40 mg Oral Daily    potassium chloride  20 mEq Oral BID WC    diclofenac sodium  2 g Topical BID    lidocaine  1 patch Transdermal Daily    cefTRIAXone (ROCEPHIN) IV  2,000 mg Intravenous Q12H    enoxaparin  30 mg Subcutaneous Daily    ipratropium-albuterol  1 ampule Inhalation Q4H WA    amitriptyline  75 mg Oral Nightly    amLODIPine  2.5 mg Oral Daily    atorvastatin  40 mg Oral Nightly    FLUoxetine  40 mg Oral Daily    pregabalin  100 mg Oral TID    ampicillin-sulbactam  3,000 mg Intravenous Q6H    bisacodyl  5 mg Oral Daily     Continuous Infusions:  PRN Meds:ibuprofen, diphenhydrAMINE, albuterol, ALPRAZolam, tiZANidine, benzonatate, acetaminophen, magnesium hydroxide, polyethylene glycol, oxyCODONE **OR** oxyCODONE    Objective:   Vitals:   T-max:  Patient Vitals for the past 8 hrs: SpO2   03/11/21 1538 96 %   03/11/21 1210 94 %       Intake/Output Summary (Last 24 hours) at 3/11/2021 1650  Last data filed at 3/11/2021 0135  Gross per 24 hour   Intake 640 ml   Output --   Net 640 ml       Review of Systems   Constitutional: Negative for fatigue and fever. HENT: Negative for congestion and trouble swallowing. Respiratory: Positive for stridor. Negative for apnea, cough, choking, chest tightness, shortness of breath and wheezing. Cardiovascular: Negative for chest pain, palpitations and leg swelling. Gastrointestinal: Negative for abdominal pain and constipation. Physical Exam  Constitutional:       General: She is not in acute distress. Appearance: She is obese. HENT:      Head: Normocephalic and atraumatic. Mouth/Throat:      Pharynx: Oropharynx is clear. Cardiovascular:      Rate and Rhythm: Normal rate and regular rhythm. Pulses: Normal pulses. Heart sounds: Normal heart sounds. Pulmonary:      Effort: Pulmonary effort is normal. No respiratory distress. Breath sounds: Stridor present. No wheezing (mild wheezing in neck). Comments: On RA, currently does not appear short of breath   Upper airway stridor due to increased neck circumference, worsened with lying flat  Otherwise lungs CTAB  Abdominal:      General: There is no distension. Tenderness: There is no abdominal tenderness. Musculoskeletal:      Right lower leg: No edema. Left lower leg: No edema. Skin:     General: Skin is dry. Neurological:      General: No focal deficit present. Mental Status: She is alert and oriented to person, place, and time.          LABS:    CBC:   Recent Labs     03/10/21  0700   WBC 6.7   HGB 8.0*   HCT 23.6*      MCV 81.0     Renal:    Recent Labs     03/10/21  0700      K 4.0      CO2 30   BUN 7   CREATININE 0.7   GLUCOSE 102*   CALCIUM 8.6   ANIONGAP 6     Hepatic: No results for input(s): AST, ALT, BILITOT, BILIDIR, PROT, LABALBU, ALKPHOS in the last 72 hours. Troponin: No results for input(s): TROPONINI in the last 72 hours. BNP: No results for input(s): BNP in the last 72 hours. Lipids: No results for input(s): CHOL, HDL in the last 72 hours. Invalid input(s): LDLCALCU, TRIGLYCERIDE  ABGs:    Recent Labs     03/10/21  1810   PHART 7.424   IGZ5YSO 49.2*   PO2ART 65.8*   ENZ5EGL 32*   BEART 6.9*   V0UNFNAT 92*   SZY2NEK 33       INR: No results for input(s): INR in the last 72 hours. Lactate: No results for input(s): LACTATE in the last 72 hours. Cultures:  -----------------------------------------------------------------  RAD:   XR CHEST PORTABLE   Final Result   Impression: Stable appearance of the chest including pulmonary vascular congestion and right basilar airspace disease. VL Extremity Venous Bilateral   Final Result      XR CHEST PORTABLE   Final Result         1. Right infrahilar airspace disease appears slightly more prominent. Differentials include infection/atelectasis/aspiration. Follow-up is recommended to document resolution. 2. Mild vascular congestion. XR CHEST 1 VIEW   Final Result      Right upper extremity PICC line in appropriate position. Chronic atelectatic collapse of the right middle lobe. Assessment/Plan:     Héctor Oliva is a 63 y.o. female, who is being seen in consultation for uncontrolled Asthma.      Obesity hypoventilation syndrome   Pt reports snoring, fatigue, impaired concentration and memory. BMI 44. Bicarb 30. WIGGINS  - nocturnal pulse ox w/ 2 episodes of desaturation, however graph shows overnight she has a baseline oxygen sats of 80%. - will require nocturnal oxygen 2L  - outpt home sleep study when able  - Hypoxemia clearly associated with sleep, which may represent hypoventilation, particularly coupled with altered VQ relationships in an obese woman.    -ABG identified chronic hypercapnia (7.42/49/65) with well compensated pH  -Plan to maintain on nocturnal oxygen at 2 L/min  -Patient is cleared from pulmonary point of view for discharge.   -Will need DME equipment as follows including: home nebulizer and oxygen tank, which we will order on date of discharge    Dyspnea - 2/2 asthma, possible vocal cord dysfunction and underlying OHS   No PFTs. Pt reports cough/wheezing used to use Symbicort but stopped 2/2 inability to afford meds. Pt w/ intermittent audible wheezing, which is loudest on neck auscultation. Reports some aspiration. - cont Symbicort BID. Can discharge on Budesonide and arformoterol per nebulizer, which insurance will pay for  - Continue Duonebs QID   - will require outpt PFT. Upper airway stridor is likely due to obesity and/or vocal cord dysfunction. However will need to rule-out asthma formally with PFTs. - BNP at baseline and CXR today at baseline. No concern for fluid overload at this time. - pro-BNP not much changed from prior 520 > 329> 190, CXR with stable findings of pulmonary vascular congestion. Continue with home lasix 40 mg PO qD  -echo from 10/2020 reviewed without concerns of pulmonary hypertension (PASP 24 mmHg, EF 55% with normal diastolic function)       Melissa Berry MD, PGY-3  03/11/21  4:50 PM  Patient examined, findings as discussed with Dr. Deidra Coloey. Agree with assessment and plan as above. Additionally, shortness of breath with exertion is related to obesity and deconditioning. She breathes very hard with effort but recovers very quickly. Breathing hard results in a great deal of upper airway noise, and this can be very disconcerting to observers, as well as the patient. We have discussed that this particular problem is not life-threatening or even dangerous. Continuing medical treatment for asthma is very appropriate although further evaluation is necessary. Improving fitness is essential for resolving much of her respiratory problem.   Weight loss would absolutely be helpful, but I think she will have continued difficulty with that.

## 2021-03-11 NOTE — CARE COORDINATION
Referred to patient for d/c planning. Spoke to patient and daughter. Aware of possible d/c 3/13. Team is asking for insurance extension. Patient will need home IV antibiotics, MD to clarify. May need home oxygen. Will continue to follow for d/c needs.   Electronically signed by GARTH Del Angel, LENORA-S on 3/11/2021 at 1:40 PM

## 2021-03-11 NOTE — DISCHARGE INSTR - COC
Continuity of Care Form    Patient Name: Anastasia Kim   :  1960  MRN:  [id number]    Admit date:  3/3/2021  Discharge date:  3/20/2021    Code Status Order: Full Code   Advance Directives:      Admitting Physician:  Nain Villasenor DO  PCP: Corrine Vargas    Discharging Nurse: Manny Bermeo Unit/Room#: 8697/8651-86  [address] [facility] Unit Phone Number: [phone]    Emergency Contact:   Extended Emergency Contact Information  Primary Emergency Contact: [facility]  Address: [address] [place] [address] [address] [facility], [address] [place] [address] Phone: [phone]  Relation: Child  Secondary Emergency Contact: [address] [place] [address] Phone: [phone]  Relation: Child    Past Surgical History:  Past Surgical History:   Procedure Laterality Date    BREAST ENHANCEMENT SURGERY Bilateral     Yuli [address]      femur RT    GASTRIC BYPASS [facility]    JOINT REPLACEMENT Bilateral 2014    knee replacement    JOINT REPLACEMENT      KNEE CARTILAGE SURGERY Left 2006    ROTATOR CUFF REPAIR Left 2008    TOTAL HIP ARTHROPLASTY Bilateral     WISDOM TOOTH EXTRACTION         Immunization History:   Immunization History   Administered Date(s) Administered    Influenza, Quadv, 6 mo and older, IM, PF (Flulaval, Fluarix) 10/14/2018    Tdap (Boostrix, Adacel) 2016       Active Problems:  Patient Active Problem List   Diagnosis Code    Knee osteoarthritis M17.10    Chronic pain G89.29    Bladder prolapse [id number]    Moderate persistent asthma J45.40    Alcohol abuse F10.10    Localized edema R60.0    Anxiety F41.9    Primary insomnia F51.01    Arthritis M19.90    Alcohol use disorder, severe, dependence ([address] F10.20    Depression F32.9    Suicide attempt ([address] T14.91XA    Acute asthma exacerbation J45. 901    Right knee pain M25.561    Asthma J45.909    Contusion of right knee S80. 01XA    WIGGINS (dyspnea on exertion) R06.00    Dyspnea on exertion R06.00    Debility R53.81    Hypoxemia R09.02       Isolation/Infection:   Isolation            No Isolation          Patient Infection Status       Infection Onset Added Last Indicated Last Indicated By Review Planned Expiration Resolved Resolved By    None active    Resolved    COVID-19 Rule Out 10/23/20 10/23/20 10/23/20 COVID-19 (Ordered)   10/24/20 Rule-Out Test Resulted            Nurse Assessment:  Last Vital Signs: /72   Pulse 91   Temp 98.4 °F (36.9 °C) (Oral)   Resp 15   Ht 4' 10\" (1.473 m)   Wt 212 lb 8.4 oz (96.4 kg)   LMP 12/22/2014   SpO2 92%   BMI 44.42 kg/m²     Last documented pain score (0-10 scale): Pain Level: 9  Last Weight:   Wt Readings from Last 1 Encounters:   03/08/21 212 lb 8.4 oz (96.4 kg)     Mental Status:  oriented and alert    IV Access:  - PICC - site  R Upper Arm, insertion date: 3/2/2021    Nursing Mobility/ADLs:  Walking   Assisted  Transfer  Assisted  Bathing  Assisted  Dressing  Assisted  Toileting  Assisted  Feeding  Independent  Med Admin  Assisted  Med Delivery   whole    Wound Care Documentation and Therapy:        Elimination:  Continence: Bowel: Yes  Bladder: Yes  Urinary Catheter: None   Colostomy/Ileostomy/Ileal Conduit: No       Date of Last BM: 3/20/2021    Intake/Output Summary (Last 24 hours) at 3/11/2021 0949  Last data filed at 3/11/2021 0635  Gross per 24 hour   Intake 640 ml   Output --   Net 640 ml     I/O last 3 completed shifts: In: 0 [P.O.:640]  Out: -     Safety Concerns:     History of Falls (last 30 days) and At Risk for Falls    Impairments/Disabilities:      Vision    Nutrition Therapy:  Current Nutrition Therapy:   - Oral Diet:  General    Routes of Feeding: Oral  Liquids:  Thin Liquids  Daily Fluid Restriction: no  Last Modified Barium Swallow with Video (Video Swallowing Test): not done    Treatments at the Time of Hospital Discharge:   Respiratory Treatments: See attached  Oxygen Therapy: is on oxygen at 2 L/min per nasal cannula. Ventilator:    - No ventilator support    Rehab Therapies: Physical Therapy and Occupational Therapy  Weight Bearing Status/Restrictions: No weight bearing restirctions  Other Medical Equipment (for information only, NOT a DME order):  walker and bath bench  Other Treatments:     Patient's personal belongings (please select all that are sent with patient):  Glasses    RN SIGNATURE:  Electronically signed by Otto Vee on 3/20/21 at 10:19 AM EDT    CASE MANAGEMENT/SOCIAL WORK SECTION    Inpatient Status Date: ***    Readmission Risk Assessment Score:  Readmission Risk              Risk of Unplanned Readmission:        11           Discharging to Facility/ Agency   Name: [facility]:[phone]  JOSE MANUEL:[phone]    [facility] IV infusion  Phone: [phone]  [address] - [phone]      / signature: Electronically signed by RASHEED Flores on 3/19/21 at 9:38 AM EDT    PHYSICIAN SECTION    Prognosis: Fair    Condition at Discharge: Stable    Rehab Potential (if transferring to Rehab): Fair    Recommended Labs or Other Treatments After Discharge:   INFUSION ORDERS:  Ceftriaxone 2 g iv q 12 hr through 5/20  Ampicillin 2 gm iv q 4 hr through 5/20  - First dose given in hospital  - PICC   - Disposition / date discharge  - Check CBC w diff, CMP, ESR, CRP every Mon or Tue - FAX result to [phone] ([address])  - Call with antibiotic / infusion issues, [phone] ( Health ID Dept)  - No f/u in [facility] office    Physician Certification: I certify the above information and transfer of Evan Forman  is necessary for the continuing treatment of the diagnosis listed and that she requires Home Care for greater 30 days.      Update Admission H&P: No change in H&P    PHYSICIAN SIGNATURE:  Electronically signed by Kelvin Stewart DO on 3/20/21 at 11:15 AM EDT

## 2021-03-12 LAB
ANION GAP SERPL CALCULATED.3IONS-SCNC: 7 MMOL/L (ref 3–16)
BASOPHILS ABSOLUTE: 0 K/UL (ref 0–0.2)
BASOPHILS RELATIVE PERCENT: 0.7 %
BUN BLDV-MCNC: 10 MG/DL (ref 7–20)
CALCIUM SERPL-MCNC: 8.5 MG/DL (ref 8.3–10.6)
CHLORIDE BLD-SCNC: 103 MMOL/L (ref 99–110)
CO2: 30 MMOL/L (ref 21–32)
CREAT SERPL-MCNC: 0.7 MG/DL (ref 0.6–1.2)
EOSINOPHILS ABSOLUTE: 0.5 K/UL (ref 0–0.6)
EOSINOPHILS RELATIVE PERCENT: 6.9 %
GFR AFRICAN AMERICAN: >60
GFR NON-AFRICAN AMERICAN: >60
GLUCOSE BLD-MCNC: 101 MG/DL (ref 70–99)
HCT VFR BLD CALC: 26.1 % (ref 36–48)
HEMOGLOBIN: 8.3 G/DL (ref 12–16)
LYMPHOCYTES ABSOLUTE: 1.5 K/UL (ref 1–5.1)
LYMPHOCYTES RELATIVE PERCENT: 22.2 %
MCH RBC QN AUTO: 26.3 PG (ref 26–34)
MCHC RBC AUTO-ENTMCNC: 31.9 G/DL (ref 31–36)
MCV RBC AUTO: 82.2 FL (ref 80–100)
MONOCYTES ABSOLUTE: 0.6 K/UL (ref 0–1.3)
MONOCYTES RELATIVE PERCENT: 9.2 %
NEUTROPHILS ABSOLUTE: 4.2 K/UL (ref 1.7–7.7)
NEUTROPHILS RELATIVE PERCENT: 61 %
PDW BLD-RTO: 17.9 % (ref 12.4–15.4)
PLATELET # BLD: 482 K/UL (ref 135–450)
PMV BLD AUTO: 7.3 FL (ref 5–10.5)
POTASSIUM REFLEX MAGNESIUM: 4.1 MMOL/L (ref 3.5–5.1)
RBC # BLD: 3.17 M/UL (ref 4–5.2)
SODIUM BLD-SCNC: 140 MMOL/L (ref 136–145)
WBC # BLD: 6.8 K/UL (ref 4–11)

## 2021-03-12 PROCEDURE — 6370000000 HC RX 637 (ALT 250 FOR IP): Performed by: PHYSICAL MEDICINE & REHABILITATION

## 2021-03-12 PROCEDURE — 1280000000 HC REHAB R&B

## 2021-03-12 PROCEDURE — 80048 BASIC METABOLIC PNL TOTAL CA: CPT

## 2021-03-12 PROCEDURE — 97535 SELF CARE MNGMENT TRAINING: CPT

## 2021-03-12 PROCEDURE — 36592 COLLECT BLOOD FROM PICC: CPT

## 2021-03-12 PROCEDURE — 94640 AIRWAY INHALATION TREATMENT: CPT

## 2021-03-12 PROCEDURE — 94761 N-INVAS EAR/PLS OXIMETRY MLT: CPT

## 2021-03-12 PROCEDURE — 97530 THERAPEUTIC ACTIVITIES: CPT

## 2021-03-12 PROCEDURE — 6370000000 HC RX 637 (ALT 250 FOR IP): Performed by: STUDENT IN AN ORGANIZED HEALTH CARE EDUCATION/TRAINING PROGRAM

## 2021-03-12 PROCEDURE — 99233 SBSQ HOSP IP/OBS HIGH 50: CPT | Performed by: PHYSICAL MEDICINE & REHABILITATION

## 2021-03-12 PROCEDURE — 2700000000 HC OXYGEN THERAPY PER DAY

## 2021-03-12 PROCEDURE — 6360000002 HC RX W HCPCS: Performed by: PHYSICAL MEDICINE & REHABILITATION

## 2021-03-12 PROCEDURE — 2580000003 HC RX 258: Performed by: PHYSICAL MEDICINE & REHABILITATION

## 2021-03-12 PROCEDURE — 99233 SBSQ HOSP IP/OBS HIGH 50: CPT | Performed by: INTERNAL MEDICINE

## 2021-03-12 PROCEDURE — 85025 COMPLETE CBC W/AUTO DIFF WBC: CPT

## 2021-03-12 PROCEDURE — 97116 GAIT TRAINING THERAPY: CPT

## 2021-03-12 RX ORDER — IPRATROPIUM BROMIDE AND ALBUTEROL SULFATE 2.5; .5 MG/3ML; MG/3ML
1 SOLUTION RESPIRATORY (INHALATION) 4 TIMES DAILY
Status: DISCONTINUED | OUTPATIENT
Start: 2021-03-12 | End: 2021-03-20 | Stop reason: HOSPADM

## 2021-03-12 RX ADMIN — FUROSEMIDE 40 MG: 40 TABLET ORAL at 08:49

## 2021-03-12 RX ADMIN — AMPICILLIN AND SULBACTAM 3000 MG: 1; 2 INJECTION, POWDER, FOR SOLUTION INTRAMUSCULAR; INTRAVENOUS at 08:57

## 2021-03-12 RX ADMIN — OXYCODONE 10 MG: 5 TABLET ORAL at 08:47

## 2021-03-12 RX ADMIN — OXYCODONE 10 MG: 5 TABLET ORAL at 12:33

## 2021-03-12 RX ADMIN — IPRATROPIUM BROMIDE AND ALBUTEROL SULFATE 1 AMPULE: .5; 2.5 SOLUTION RESPIRATORY (INHALATION) at 03:48

## 2021-03-12 RX ADMIN — AMPICILLIN AND SULBACTAM 3000 MG: 1; 2 INJECTION, POWDER, FOR SOLUTION INTRAMUSCULAR; INTRAVENOUS at 15:28

## 2021-03-12 RX ADMIN — OXYCODONE 10 MG: 5 TABLET ORAL at 21:09

## 2021-03-12 RX ADMIN — PREGABALIN 100 MG: 75 CAPSULE ORAL at 15:27

## 2021-03-12 RX ADMIN — BISACODYL 5 MG: 5 TABLET, COATED ORAL at 08:56

## 2021-03-12 RX ADMIN — IPRATROPIUM BROMIDE AND ALBUTEROL SULFATE 1 AMPULE: .5; 3 SOLUTION RESPIRATORY (INHALATION) at 15:00

## 2021-03-12 RX ADMIN — CEFTRIAXONE SODIUM 2000 MG: 2 INJECTION, POWDER, FOR SOLUTION INTRAMUSCULAR; INTRAVENOUS at 20:58

## 2021-03-12 RX ADMIN — ALPRAZOLAM 0.25 MG: 0.25 TABLET ORAL at 03:50

## 2021-03-12 RX ADMIN — CEFTRIAXONE SODIUM 2000 MG: 2 INJECTION, POWDER, FOR SOLUTION INTRAMUSCULAR; INTRAVENOUS at 08:39

## 2021-03-12 RX ADMIN — IPRATROPIUM BROMIDE AND ALBUTEROL SULFATE 1 AMPULE: .5; 3 SOLUTION RESPIRATORY (INHALATION) at 21:11

## 2021-03-12 RX ADMIN — TIZANIDINE 4 MG: 4 TABLET ORAL at 06:01

## 2021-03-12 RX ADMIN — IPRATROPIUM BROMIDE AND ALBUTEROL SULFATE 1 AMPULE: .5; 3 SOLUTION RESPIRATORY (INHALATION) at 07:22

## 2021-03-12 RX ADMIN — DICLOFENAC SODIUM 2 G: 10 GEL TOPICAL at 21:00

## 2021-03-12 RX ADMIN — ATORVASTATIN CALCIUM 40 MG: 40 TABLET, FILM COATED ORAL at 20:56

## 2021-03-12 RX ADMIN — PREGABALIN 100 MG: 75 CAPSULE ORAL at 08:47

## 2021-03-12 RX ADMIN — OXYCODONE 10 MG: 5 TABLET ORAL at 03:50

## 2021-03-12 RX ADMIN — POTASSIUM CHLORIDE 20 MEQ: 20 TABLET, EXTENDED RELEASE ORAL at 17:15

## 2021-03-12 RX ADMIN — FLUOXETINE 40 MG: 20 CAPSULE ORAL at 08:50

## 2021-03-12 RX ADMIN — AMPICILLIN AND SULBACTAM 3000 MG: 1; 2 INJECTION, POWDER, FOR SOLUTION INTRAMUSCULAR; INTRAVENOUS at 22:19

## 2021-03-12 RX ADMIN — PREGABALIN 100 MG: 75 CAPSULE ORAL at 20:56

## 2021-03-12 RX ADMIN — TIZANIDINE 4 MG: 4 TABLET ORAL at 12:33

## 2021-03-12 RX ADMIN — OXYCODONE 10 MG: 5 TABLET ORAL at 17:15

## 2021-03-12 RX ADMIN — AMPICILLIN AND SULBACTAM 3000 MG: 1; 2 INJECTION, POWDER, FOR SOLUTION INTRAMUSCULAR; INTRAVENOUS at 03:37

## 2021-03-12 RX ADMIN — POTASSIUM CHLORIDE 20 MEQ: 20 TABLET, EXTENDED RELEASE ORAL at 08:55

## 2021-03-12 RX ADMIN — DICLOFENAC SODIUM 2 G: 10 GEL TOPICAL at 08:53

## 2021-03-12 RX ADMIN — IPRATROPIUM BROMIDE AND ALBUTEROL SULFATE 1 AMPULE: .5; 3 SOLUTION RESPIRATORY (INHALATION) at 11:05

## 2021-03-12 RX ADMIN — ENOXAPARIN SODIUM 30 MG: 30 INJECTION SUBCUTANEOUS at 08:52

## 2021-03-12 RX ADMIN — ALPRAZOLAM 0.25 MG: 0.25 TABLET ORAL at 12:33

## 2021-03-12 RX ADMIN — AMITRIPTYLINE HYDROCHLORIDE 75 MG: 25 TABLET, FILM COATED ORAL at 20:56

## 2021-03-12 RX ADMIN — ALBUTEROL SULFATE 2.5 MG: 2.5 SOLUTION RESPIRATORY (INHALATION) at 16:25

## 2021-03-12 ASSESSMENT — PAIN DESCRIPTION - PAIN TYPE
TYPE: ACUTE PAIN

## 2021-03-12 ASSESSMENT — PAIN DESCRIPTION - DESCRIPTORS
DESCRIPTORS: ACHING;DISCOMFORT
DESCRIPTORS: ACHING
DESCRIPTORS: ACHING
DESCRIPTORS_2: CONSTANT
DESCRIPTORS: ACHING;DULL;SORE

## 2021-03-12 ASSESSMENT — PAIN DESCRIPTION - FREQUENCY
FREQUENCY: CONTINUOUS

## 2021-03-12 ASSESSMENT — PAIN DESCRIPTION - ONSET
ONSET: ON-GOING

## 2021-03-12 ASSESSMENT — PAIN DESCRIPTION - DURATION: DURATION_2: CONTINUOUS

## 2021-03-12 ASSESSMENT — ENCOUNTER SYMPTOMS
WHEEZING: 0
COUGH: 0
CHOKING: 0
SHORTNESS OF BREATH: 0
TROUBLE SWALLOWING: 0
APNEA: 0
CHEST TIGHTNESS: 0
ABDOMINAL PAIN: 0
STRIDOR: 1
CONSTIPATION: 0

## 2021-03-12 ASSESSMENT — PAIN DESCRIPTION - ORIENTATION
ORIENTATION: RIGHT
ORIENTATION: RIGHT
ORIENTATION_2: ANTERIOR;RIGHT
ORIENTATION: RIGHT
ORIENTATION: RIGHT

## 2021-03-12 ASSESSMENT — PAIN DESCRIPTION - PROGRESSION
CLINICAL_PROGRESSION: NOT CHANGED
CLINICAL_PROGRESSION: NOT CHANGED

## 2021-03-12 ASSESSMENT — PAIN DESCRIPTION - DIRECTION: RADIATING_TOWARDS: KNEE

## 2021-03-12 ASSESSMENT — PAIN DESCRIPTION - INTENSITY
RATING_2: 8
RATING_2: 8
RATING_2: 0

## 2021-03-12 ASSESSMENT — PAIN SCALES - GENERAL
PAINLEVEL_OUTOF10: 5
PAINLEVEL_OUTOF10: 6
PAINLEVEL_OUTOF10: 4
PAINLEVEL_OUTOF10: 5
PAINLEVEL_OUTOF10: 4
PAINLEVEL_OUTOF10: 4

## 2021-03-12 ASSESSMENT — PAIN DESCRIPTION - LOCATION
LOCATION: KNEE
LOCATION: HIP
LOCATION: HIP

## 2021-03-12 NOTE — PROGRESS NOTES
Pt. In bed. Assisted to bath room and back to bed. Assessment complete,VSS, afebrile, pt. With WIGGINS and increased wheezing after toileting. PRN breathing treatment requested. Surgical site assessed, consult made to wound nurse for wound evaluation, see charting. Call light and over bed table witihin reach, no other care needed at this time. Will continue to monitor.

## 2021-03-12 NOTE — PROGRESS NOTES
Department of Physical Medicine & Rehabilitation  Progress Note    Patient Identification:  Randee Fenton  [id number]  : 1960  Admit date: 3/3/2021    Chief Complaint: debility    Subjective:   Requires extensive ID follow up at discharge. Insurance approval ends 3/13. Plan to ask for an extension. Plan to DC with all respiratory meds. ROS: No f/c, n/v, cp     Objective:  Patient Vitals for the past 24 hrs:   BP Temp Temp src Pulse Resp SpO2   21 0755 (!) 93/56 98.2 °F (36.8 °C) Oral 75 18 95 %   21 0725 -- -- -- -- 20 91 %   21 0348 -- -- -- -- 18 91 %   21 2258 -- -- -- -- 18 93 %   21 107/70 98.3 °F (36.8 °C) Oral 75 16 95 %   21 1943 106/66 98.3 °F (36.8 °C) Oral 81 16 94 %   21 1538 -- -- -- -- -- 96 %   21 1210 -- -- -- -- -- 94 %     Const: Alert. No distress, pleasant. HEENT: Normocephalic, atraumatic. Normal sclera/conjunctiva. MMM. CV: Regular rate and rhythm. Resp: mild respiratory distress. Abd: Soft, nontender, nondistended, NABS+   Ext: No edema. Neuro: Alert, oriented, appropriately interactive. Psych: Cooperative, appropriate mood and affect    Laboratory data: Available via EMR.    Last 24 hour lab  Recent Results (from the past 24 hour(s))   Brain Natriuretic Peptide    Collection Time: 21  2:11 PM   Result Value Ref Range    Pro- (H) 0 - 124 pg/mL   Basic Metabolic Panel w/ Reflex to MG    Collection Time: 21  3:45 AM   Result Value Ref Range    Sodium 140 136 - 145 mmol/L    Potassium reflex Magnesium 4.1 3.5 - 5.1 mmol/L    Chloride 103 99 - 110 mmol/L    CO2 30 21 - 32 mmol/L    Anion Gap 7 3 - 16    Glucose 101 (H) 70 - 99 mg/dL    BUN 10 7 - 20 mg/dL    CREATININE 0.7 0.6 - 1.2 mg/dL    GFR Non-African American >60 >60    GFR African American >60 >60    Calcium 8.5 8.3 - 10.6 mg/dL   CBC auto differential    Collection Time: 21  3:45 AM   Result Value Ref Range    WBC 6.8 4.0 - 11.0 K/uL    RBC Disorder - PTA: elavil, flexeril, fluoxetine, gabapentin, meloxicam  Resume home meds     Moderate Persistent Asthma  RT assess and treat, IS  Continue home Albuterol prn   O2 sats stable on room air  Needs outpatient PFTs and pulmonology for further w/u of SOB with exertion (? ILD, pulm HTN. ..) (already new referral PTA at an [facility] consult        Rehab Progress: Improving  Anticipated Dispo: home  Services/DME: [facility]  ELOS: AVTAR Mcintosh D.O. M.P.H  PM&R  3/12/2021  10:59 AM

## 2021-03-12 NOTE — CARE COORDINATION
CEFERINO spoke with [facility] liaison who informed Pt's insurance has approved extension until 3/20/21 - SW will advise Pt when finished with therapy today. CEFERINO also received a call from Medical Resident with Pulmonary this morning that Pt will need Home 02 at night and a nebulizer and DME orders placed. CEFERINO advised resident of DC date for 3/20/21. We will also need clarification from ID regarding Pt's Home IV ABX. CEFERINO will contniuie to follow and assist with DC planning.     Gilberto Taylor, [facility]  Case Management  [phone]

## 2021-03-12 NOTE — PROGRESS NOTES
Physical Therapy  Facility/Department: [facility] ACUTE REHAB UNIT  Daily Treatment Note  NAME: Ricci Hua  : 1960  MRN: [id number]    Date of Service: 3/12/2021    Discharge Recommendations:  Home with Home health PT, 24 hour supervision or assist   PT Equipment Recommendations  Other: pt has all needed DME    Assessment   Body structures, Functions, Activity limitations: Decreased functional mobility ; Decreased balance;Decreased strength;Decreased endurance;Decreased safe awareness; Increased pain  Assessment: Pt continues to be limited by impaired endurance and dropping O2 sats throughout session. Pt requires frequent seated rest breaks and is unable to amb further than 70' without a seated rest break. Pt reporting increased knee buckling and was getting frustrated as the session went on d/t limited progress. Pt requiring assist for sit>supine with LE's this date due to pain. Pt would continue to benefit from skilled PT to improve independence with functional mobility, safety, endurance, and LE strength. Treatment Diagnosis: impaired mobility secondary to infection  PT Education: Goals;PT Role;Transfer Training;Functional Mobility Training;Gait Training;General Safety;Precautions  Patient Education: Pt requires reinforcement. Pt resistant to feedback. REQUIRES PT FOLLOW UP: Yes  Activity Tolerance  Activity Tolerance: Patient limited by pain; Patient limited by fatigue;Patient limited by endurance  Activity Tolerance: 2L o2 throughout session. Attempted a few bouts of ambulation without supplemental O2, pt dropping to 89-92%. 2L NC donned to improve O2 sat with mobility. Patient Diagnosis(es): There were no encounter diagnoses. has a past medical history of Asthma, Depression, Osteoarthritis, PONV (postoperative nausea and vomiting), and Scoliosis. has a past surgical history that includes Gastric bypass surgery (); Rotator cuff repair (Left, ); Knee cartilage surgery (Left, );  Breast enhancement surgery (Bilateral, );  section ([place]); Cholecystectomy (); [place] tooth extraction (); joint replacement (Bilateral, 2014); fracture surgery; joint replacement; and Total hip arthroplasty (Bilateral, ). Restrictions  Position Activity Restriction  Other position/activity restrictions: WBAT R LE; up with assist; ok to shower  Subjective   General  Chart Reviewed: Yes  Additional Pertinent Hx: Pt had knee and hip replacements. She has since had difficulty with abscesses and infections with the joints. 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 Up until 2021 she was on Keflex Patient admitted too [facility] and  had infected plate removed from her femur 2021 along with removal of screws,cables and deep bone biopsy. Per Op note. The area around late looked infected. There was sinus tract that connected with knee joint space. The femur stem of the Artifical hip was exposed and there was reactive synovitis there as well. 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 Intraoperatively 3 tissue Cx and 1 deep bone Cx were taken E Faecalis. Patient was started on IV Vancomycin and plan is currently to continue for 12 weeks with tentative end date of   Response To Previous Treatment: Patient with no complaints from previous session.   Family / Caregiver Present: No  Referring Practitioner: Dr. Colvin: Pt supine in bed and agreeable to therapy treatment  Pain Screening  Patient Currently in Pain: Yes  Pain Assessment  Pain Type: Acute pain  Pain Location: Knee  Vital Signs  Patient Currently in Pain: Yes       Orientation  Orientation  Overall Orientation Status: Within Normal Limits  Objective   Bed mobility  Supine to Sit: Stand by assistance  Sit to Supine: Minimal assistance(at LE's)  Comment: HOB elevated and use of HR's  Transfers  Sit to Stand: Stand by assistance(from EOB, 4WW, and chair)  Stand to sit: (to EOB, 4WW, and chair)  Comment: VC for safe 4WW placement near wall during stand to sit transfers, pt reports she knows to put walker near wall before sitting but she thought she wouldn't be able to walk further due to fatigue. Ambulation  Ambulation?: Yes  Ambulation 1  Surface: level tile  Device: Rollator  Assistance: Stand by assistance;Contact guard assistance(SBA/CGA d/t unsafe technique with the 4WW. Pt has one instance of knee buckling but no LOB or increased assist needed. Pt demonstrates no improvement in gait with VC's)  Quality of Gait: decreased marisa, step height, and step length. Intemittent bouts of flexion on RW with elbows on hand , requires VC to keep ahnds on hand  but reports she is unable. Pt demosntrates deviation from path and unsafe management of 4WW throughout gait. Pt continues to be limited in distance due to fatigue/SOB. Gait Deviations: Slow Marisa;Decreased step length;Decreased step height  Distance: 61' + 48' + 79' (including ascending ramp) + 79' (including descending ramp) + 50'  Comments: VC for upright posture, safe management of rollator, and ambulating in a straight line. Pt resistance to feedback regarding amb and mobility. Pt requires significantly increased time for amb and seated rest breaks d/t impaired endurance. Stairs/Curb  Stairs?: Yes  Stairs  # Steps : 6  Stairs Height: 4\"  Rails: Bilateral  Assistance: Contact guard assistance  Comment: Pt requires heavy assist from UE's on rail and struggles to get LE's onto step d/t impaired hip flexion. 2nd session: Pt was found sitting on the toilet with PCA. Pt agreeable to PT session. Pt able to perform pericare without assist and perform toilet transfer with SBA and use of rollator.  Pt had minor knee buckling after stand but able to maintain balance without additional assist. Pt ambulated 80' to elevator with rollator and required seated rest break afterwards. Pt ambulating with gait deviations listed above including unsafe walker management, increased trunk flexion, and decreased step height and length. Pt possibly self limiting with ambulation and mobility this date. Pt performed car transfer with SBA/CGA and required UE assist to move LE's into car. Pt required min VC for hand placement throughout transfer. Pt ambulated 25' into room with rollator and CGA. Pt performing all sit<>stands with SBA/CGA throughout session. Pt continues to report knee buckling and pain throughout session and continues to get frustrated with her progress. Pt reassured she is making progress and continuing to work hard with therapy will help result in greater improvements with mobility. Pt intially assisted into bed with min A at LE's but reports its too painful and ended up laying across the end of the bed. Pt required x3 person (dependent) assist to move pt into bed because pt was refusing to follow any VC's to get into bed safely and wouldn't move from her position d/t being scared she would cause more pain. Pt left supine in bed with call light in reach, bed alarm on, and all needs met. Goals  Short term goals  Time Frame for Short term goals: STG=LTG- estimated LOS 7 days  Short term goal 1: The pt will perform supine<>sit with SBA- met 3/11  Short term goal 2: The pt will transfer with SBA and RW- met 3/11  Short term goal 3: The pt will ambulate with walker x 75' with CGA. met 3/11  Short term goal 4: The pt will be able to t  Long term goals  Time Frame for Long term goals : The pt will perform bed mobility with mod I ongoing  Long term goal 1: The pt will transfer with mod I and LRAD ongoing  Long term goal 2: The pt will ambulate 150 ft with LRAD and mod I ongoing  Patient Goals   Patient goals :  To get home soon    Plan    Plan  Times per week: 5-7x/wk for 90 minutes per day  Times per day: Daily  Current Treatment Recommendations: Strengthening, Transfer Training, Endurance Training, Neuromuscular Re-education, Patient/Caregiver Education & Training, Balance Training, Gait Training, Functional Mobility Training, Safety Education & Training  Safety Devices  Type of devices: Bed alarm in place, All fall risk precautions in place, Call light within reach, Left in bed, Nurse notified  Restraints  Initially in place: No     Therapy Time   Individual Concurrent Group Co-treatment   Time In 0830         Time Out 0930         Minutes 60         Timed Code Treatment Minutes: 60 Minutes      Second Session Therapy Time:   Individual Concurrent Group Co-treatment   Time In 1000         Time Out 1030         Minutes 30           Timed Code Treatment Minutes:  60+30    Total Treatment Minutes:  [address], PT, DPT

## 2021-03-12 NOTE — PROGRESS NOTES
03/12/21 0755 (!) 93/56 98.2 °F (36.8 °C) Oral 75 18 95 %   03/12/21 0725 -- -- -- -- 20 91 %   03/12/21 0348 -- -- -- -- 18 91 %       Intake/Output Summary (Last 24 hours) at 3/12/2021 1133  Last data filed at 3/12/2021 6351  Gross per 24 hour   Intake 840 ml   Output --   Net 840 ml       Review of Systems   Constitutional: Negative for fatigue and fever. HENT: Negative for congestion and trouble swallowing. Respiratory: Positive for stridor. Negative for apnea, cough, choking, chest tightness, shortness of breath and wheezing. Cardiovascular: Negative for chest pain, palpitations and leg swelling. Gastrointestinal: Negative for abdominal pain and constipation. Physical Exam  Constitutional:       General: She is not in acute distress. Appearance: She is obese. HENT:      Head: Normocephalic and atraumatic. Mouth/Throat:      Pharynx: Oropharynx is clear. Cardiovascular:      Rate and Rhythm: Normal rate and regular rhythm. Pulses: Normal pulses. Heart sounds: Normal heart sounds. Pulmonary:      Effort: Pulmonary effort is normal. No respiratory distress. Breath sounds: Stridor present. No wheezing (mild wheezing in neck). Comments: On RA, currently does not appear short of breath   Upper airway stridor due to increased neck circumference, worsened with lying flat  Otherwise lungs CTAB  Abdominal:      General: There is no distension. Tenderness: There is no abdominal tenderness. Musculoskeletal:      Right lower leg: No edema. Left lower leg: No edema. Skin:     General: Skin is dry. Neurological:      General: No focal deficit present. Mental Status: She is alert and oriented to person, place, and time.          LABS:    CBC:   Recent Labs     03/10/21  0700 03/12/21  0345   WBC 6.7 6.8   HGB 8.0* 8.3*   HCT 23.6* 26.1*    482*   MCV 81.0 82.2     Renal:    Recent Labs     03/10/21  0700 03/12/21  0345    140   K 4.0 4.1   CL particularly coupled with altered VQ relationships in an obese woman. -ABG identified chronic hypercapnia (7.42/49/65) with well compensated pH  -Plan to maintain on nocturnal oxygen at 2 L/min  -Patient is cleared from pulmonary point of view for discharge.   -Will need DME equipment as follows including: home nebulizer and oxygen tank, order placed    Dyspnea - 2/2 asthma, possible vocal cord dysfunction and underlying OHS   No PFTs. Pt reports cough/wheezing used to use Symbicort but stopped 2/2 inability to afford meds. Pt w/ intermittent audible wheezing, which is loudest on neck auscultation. Reports some aspiration. - cont Symbicort BID. Can discharge on Budesonide and arformoterol per nebulizer, which insurance will pay for  - Continue Duonebs QID   - will require outpt PFT. Upper airway stridor is likely due to obesity and/or vocal cord dysfunction. However will need to rule-out asthma formally with PFTs. - BNP at baseline and CXR today at baseline. No concern for fluid overload at this time. - pro-BNP not much changed from prior 520 > 329> 190, CXR with stable findings of pulmonary vascular congestion. Continue with home lasix 40 mg PO qD  -echo from 10/2020 reviewed without concerns of pulmonary hypertension (PASP 24 mmHg, EF 55% with normal diastolic function)   - advised to follow-up with sleep medicine for formal outpatient sleep study  - advised to follow-up with [facility] pulmonology as outpatient for formal PFTs  - follow-ups placed in discharge  - discussed supervised exercise program such as pulmonary rehab and/or aquatic exercise on discharge      Nancy Mensah MD, PGY-3  03/12/21  11:33 AM  Patient examined, findings as discussed with Dr. Merrill Walker. Agree with assessment and plan as above.   Discharge plan is appropriate

## 2021-03-12 NOTE — PROGRESS NOTES
Bright red blood was noted in patient's underwear as well as while she was wiping herself. No bleeding was noted at rectal area. Pt. Told RN that she scratcted herself in the \"front area\" while using the self assist toilet aid.

## 2021-03-12 NOTE — PROGRESS NOTES
Pt called for prn tx at 0330, pt was with audible forced wheezes. Pt had just gone to the restroom. Pt very anxious and although she was speaking in full sentences felt certain that she could not breathe. RN administered alprazolam and oxycodone while neb was being given. After being reassured and the medications dispensed, was smiling and relaxed with no forceful exhalation. Pt with increased edema in hands and face.

## 2021-03-12 NOTE — PROGRESS NOTES
Assessment complete, VSS, afebrile, medications taken without difficulty. Pt. Assisted to bathroom and back to bed, while getting. up in bed patient states RN should call someone for help. RN asked pt. If she is having difficulty putting her legs back in bed. Pt. Replied no, her chest hurst and she had never felt that kind of pain before. RN asked pt. To describe and rate pain level. Pt. States chest pain 7/10 and is concentrated to her left breast, and is sharp and aching. Pt. Was treated minutes earlier for right hip pain, See MAR. RN told patient she would place an order for EKG, then call DO based on result. EKG order placed, see orders.

## 2021-03-12 NOTE — PLAN OF CARE
Problem: Pain:  Goal: Pain level will decrease  Description: Pain level will decrease  3/12/2021 0058 by Sera Street RN  Outcome: Ongoing    Pt. Continues to complain of surgical pain and chronic pain. , see MAR. PRN pain med and non-pharmaceutical intervention in place. Pt. Satisfied. Pain level will be decreased or be at an acceptable by discharge. Problem: Falls - Risk of:  Goal: Will remain free from falls  Description: Will remain free from falls  3/12/2021 0058 by Sera Street RN  Outcome: Ongoing    Pt. Continues to be at risk for falls due to impaired gait, weakness and forgetfulness at times. For safety: video surveillance in place, bed wheels locked and in lowest position, fall sign posted at the door, bed wheels locked and in lowest position, 1:1 and education on the use of the call light and not to get out of bed without assistance. Hourly rounding and frequent visual checks in place. No falls reported thus far. Problem: Infection - Surgical Site:  Goal: Will show no infection signs and symptoms  Description: Will show no infection signs and symptoms  3/12/2021 0058 by Sera Street RN  Outcome: Ongoing    Wound care orders in place. Will continue to assess and re assess. No new skin issues noted thus far. Problem: Skin Integrity:  Goal: Will show no infection signs and symptoms  Description: Will show no infection signs and symptoms  3/12/2021 0058 by Sera Street RN  Outcome: Ongoing    Skin is kept clean and dry. Pt. Turns self, encouraged to turn and reposition to relieve pressure off bony prominences to improve or maintain skin integrity. No new skin issues found. No s/sx of infection noted. Will continue to monitor.

## 2021-03-12 NOTE — PROGRESS NOTES
Occupational Therapy  Facility/Department: [facility] ACUTE REHAB UNIT  Daily Treatment Note  NAME: Axel Mejia  : 1960  MRN: [id number]    Date of Service: 3/12/2021    Discharge Recommendations:  24 hour supervision or assist, Home with Home health OT  OT Equipment Recommendations  Equipment Needed: Yes  ADL Assistive Devices: Reacher;Sock-Aid Soft;Transfer Tub Bench  Other: continue to assess    Assessment   Performance deficits / Impairments: Decreased functional mobility ; Decreased endurance;Decreased coordination;Decreased ADL status; Decreased balance;Decreased strength;Decreased high-level IADLs  Assessment: Pt progressed to CGA for LB bathing using LHS and able to complete rear perihygiene with CGA-SBA for toileting with + time and cues to ensure thoroughness. Pt continues to be limited by decreased activity tolerance, WIGGINS and RLE pain. Pt motivated to return to PLOF and yet has concerns about current functional status. Pt functioning below baseline, cont OT POC. Treatment Diagnosis: decreased ADLs and transfers  Prognosis: Fair  OT Education: OT Role;Equipment; Energy Conservation;Transfer Training;Plan of Care  Patient Education: use of AE for LB dressing and showering-pt demo understanding, pt req continued education on proper use of toilet aid for toileting hygiene  REQUIRES OT FOLLOW UP: Yes  Activity Tolerance  Activity Tolerance: Patient Tolerated treatment well  Safety Devices  Safety Devices in place: Yes  Type of devices: All fall risk precautions in place; Bed alarm in place;Call light within reach; Left in bed;Nurse notified         Patient Diagnosis(es): There were no encounter diagnoses. has a past medical history of Asthma, Depression, Osteoarthritis, PONV (postoperative nausea and vomiting), and Scoliosis. has a past surgical history that includes Gastric bypass surgery (); Rotator cuff repair (Left, ); Knee cartilage surgery (Left, );  Breast enhancement surgery (Bilateral, 2007);  section ([place] [address]); Cholecystectomy (); [place] tooth extraction (); joint replacement (Bilateral, 2014); fracture surgery; joint replacement; and Total hip arthroplasty (Bilateral, ). Restrictions  Position Activity Restriction  Other position/activity restrictions: WBAT R LE; up with assist; ok to shower  Subjective   General  Chart Reviewed: Yes  Patient assessed for rehabilitation services?: Yes  Additional Pertinent Hx: Pt admitted to OSH with h/o hip hardware infection. Infected hardware removed on 21. Started on IV ABX. Admitted to ARU on 3/3/21. PMHx inclues:  Asthma, Depression, Osteoarthritis, PONV (postoperative nausea and vomiting), and Scoliosis. Family / Caregiver Present: No  Referring Practitioner: Gabi  Diagnosis: debility  Subjective  Subjective: Pt supine in bed upon arrival requesting to use bathroom prior to shower. General Comment  Comments: Bessy Campuzano   Vital Signs  Patient Currently in Pain: Yes(4/10 RLE pain)     Orientation  Orientation  Overall Orientation Status: Within Functional Limits  Objective    ADL  Feeding: Independent  Grooming: Independent(pt combed hair seated EOB)  UE Bathing: Setup(seated on TTB)  LE Bathing: Contact guard assistance(seated on TTB; use of LHS to wash lower limbs, feet, and periarea in stance with CGA, pt req verbal encouragement to dry periarea in stance vs requesting assist)  UE Dressing: Setup(to don nightgown)  LE Dressing: Stand by assistance(pt doffed pants and underwear in stance and unthreaded from BLEs using reacher while seated on toilet, pt doffed socks seated on TTB with reacher and donned socks using sock aid i'ly seated on TTB)  Toileting: Stand by assistance(continent of bowel and bladder; pericare hygiene completed with use of toilet aid and VCs for reminder of proper technique to setup toilet paper)  Additional Comments: incision site and PICC line were covered prior to showering        Balance  Sitting Balance: Modified independent (seated on TTB)  Standing Balance: Contact guard assistance(-SBA)  Standing Balance  Time: ~5 minutes total  Activity: functional mobility to/from bathroom; in stance for LB ADLs and bathing  Functional Mobility  Functional - Mobility Device: 4-Wheeled Walker  Activity: To/from bathroom  Assist Level: Stand by assistance  Toilet Transfers  Toilet - Technique: Ambulating  Equipment Used: Standard toilet  Toilet Transfer: Stand by assistance  Toilet Transfers Comments: use of GB to stand  Tub Transfers  Tub - Transfer From: Rolling walker  Tub - Transfer Type: To and From  Tub - Transfer To: Transfer tub bench  Tub - Technique: Ambulating  Tub Transfers: Contact guard  Tub Transfers Comments: + time and effortful to manuever RLE out of tub  Bed mobility  Supine to Sit: Supervision(HOB elevated)  Sit to Supine: Contact guard assistance(effortful to advance RLE)  Scooting: Supervision(use of bed rails to scoot superiorly in bed with HOB flat)  Transfers  Sit to stand: Stand by assistance  Stand to sit: Stand by assistance                       Cognition  Overall Cognitive Status: Exceptions  Following Commands: Follows one step commands consistently  Attention Span: Attends with cues to redirect  Memory: Decreased recall of recent events;Decreased short term memory  Problem Solving: Assistance required to implement solutions  Insights: Fully aware of deficits  Initiation: Does not require cues  Sequencing: Does not require cues  Cognition Comment: Pt with decreased recall of sequencing for setup of toilet aid despite cues and reminders, pt completed incorrectly 2/3 trials, \"oh I just can't remember\"          First Session: Pt supine in bed upon arrival with  present, pleasant and agreeable to OT session.  However, pt requiring increased emotional support during session initially d/t being upset from previous PT session as bed mobility went poorly and required 2 person assist. Pt requesting to use bathroom. Supine<sit with spvn assist with HOB elevated. STS from EOB to 4WW with SBA. Functional mobility to/from bathroom using 4WW with SBA and no O2 (per pt request \"it is only PRN\"). Toilet transfer with SBA. Pt continent of bladder and mgmt of LB clothing in stance with SBA. Pt completed perihygiene with use of toilet aid i'ly. Pt stood from toilet with SBA and VCs to ensure 4WW brakes are locked. Pt stand<sit to recliner with SBA. Pt demo WIGGINS and wheezing, vitals assessed and SPO2 89% HR 96, pt placed back on 2 L O2 and quickly recovered to 94%.  with questions about how to address bathing upon d/c with large incision and bandages. Pt and  educated on use of aquguard with waterproof tape or use of trash bag with waterproof tape. Provided demonstration and pt  verb understanding. Lunch tray brought in, pt eating i'ly. Pt left in bedside recliner at end of session with chair alarm engaged, call light within reach and all needs met.        Plan   Plan  Times per week: 5 days per week 90 mins each  Times per day: Daily  Current Treatment Recommendations: Strengthening, Balance Training, Functional Mobility Training, Endurance Training, Self-Care / ADL, Patient/Caregiver Education & Training, Safety Education & Training, Equipment Evaluation, Education, & procurement  G-Code     OutComes Score                                                  AM-PAC Score             Goals  Short term goals  Time Frame for Short term goals: by 1 week  Short term goal 1: Pt will complete LB dressing with AE PRN at mod A-goal met 3/6  Short term goal 2: Pt will complete toileting with CGA-goal met 3/5  Short term goal 3: Pt will complete bathing at min A- goal met 3/10  Short term goal 4: Pt will complete shower transfer at spvn-ongoing  Short term goal 5: Pt will complete B UE exercises at mod I-ongoing  Long term goals  Time Frame for Long term goals : By 2 weeks-all ongoing  Long term goal 1: Pt will complete LB dressing with AE PRN at mod I  Long term goal 2: Pt will complete toileting with mod I  Long term goal 3: Pt will complete bathing at mod I  Long term goal 4: Pt will complete shower transfer at mod I  Long term goal 5: Pt will complete simple meal prep task at [facility] I with LRAD  Patient Goals   Patient goals : to be able to go home       Therapy Time   Individual First Session Group Co-treatment   Time In 1300  1130       Time Out 1400  1200       Minutes 60  30       Timed Code Treatment Minutes: 60 Minutes+ 30 Minutes  Total Treatment Time: 90 Minutes       Aretha Celestin, OT

## 2021-03-12 NOTE — PLAN OF CARE
Problem: Falls - Risk of:  Goal: Will remain free from falls  Description: Will remain free from falls  Outcome: Ongoing   Pt. Continues to be at risk for falls due to impaired gait, weakness and forgetfulness at times. For safety: video surveillance in place, bed wheels locked and in lowest position, fall sign posted at the door, bed wheels locked and in lowest position, 1:1 and education on the use of the call light and not to get out of bed without assistance. Hourly rounding and frequent visual checks in place. No falls reported thus far. Problem: Infection - Surgical Site:  Goal: Will show no infection signs and symptoms  Description: Will show no infection signs and symptoms  Outcome: Ongoing   Surgical site intact with staples, redness and swelling observed around incision area, sloth like substance observed at drain site, wound nurse consulted. Problem: Skin Integrity:  Goal: Will show no infection signs and symptoms  Description: Will show no infection signs and symptoms  Outcome: Ongoing   Skin is kept clean and dry. Pt. Turns self, encouraged to turn and reposition to relieve pressure off bony prominences to improve or maintain skin integrity. No new skin issues found. No s/sx of infection noted. Will continue to monitor.

## 2021-03-13 PROCEDURE — 94640 AIRWAY INHALATION TREATMENT: CPT

## 2021-03-13 PROCEDURE — 94761 N-INVAS EAR/PLS OXIMETRY MLT: CPT

## 2021-03-13 PROCEDURE — 99221 1ST HOSP IP/OBS SF/LOW 40: CPT | Performed by: OBSTETRICS & GYNECOLOGY

## 2021-03-13 PROCEDURE — 6370000000 HC RX 637 (ALT 250 FOR IP): Performed by: STUDENT IN AN ORGANIZED HEALTH CARE EDUCATION/TRAINING PROGRAM

## 2021-03-13 PROCEDURE — 2700000000 HC OXYGEN THERAPY PER DAY

## 2021-03-13 PROCEDURE — 6360000002 HC RX W HCPCS: Performed by: PHYSICAL MEDICINE & REHABILITATION

## 2021-03-13 PROCEDURE — 1280000000 HC REHAB R&B

## 2021-03-13 PROCEDURE — 6370000000 HC RX 637 (ALT 250 FOR IP): Performed by: PHYSICAL MEDICINE & REHABILITATION

## 2021-03-13 PROCEDURE — 99233 SBSQ HOSP IP/OBS HIGH 50: CPT | Performed by: PHYSICAL MEDICINE & REHABILITATION

## 2021-03-13 PROCEDURE — 2580000003 HC RX 258: Performed by: PHYSICAL MEDICINE & REHABILITATION

## 2021-03-13 RX ORDER — FUROSEMIDE 40 MG/1
80 TABLET ORAL DAILY
Status: DISCONTINUED | OUTPATIENT
Start: 2021-03-14 | End: 2021-03-20 | Stop reason: HOSPADM

## 2021-03-13 RX ADMIN — IPRATROPIUM BROMIDE AND ALBUTEROL SULFATE 1 AMPULE: .5; 3 SOLUTION RESPIRATORY (INHALATION) at 19:57

## 2021-03-13 RX ADMIN — AMITRIPTYLINE HYDROCHLORIDE 75 MG: 25 TABLET, FILM COATED ORAL at 20:32

## 2021-03-13 RX ADMIN — DIPHENHYDRAMINE HYDROCHLORIDE 25 MG: 25 TABLET ORAL at 20:32

## 2021-03-13 RX ADMIN — BISACODYL 5 MG: 5 TABLET, COATED ORAL at 08:18

## 2021-03-13 RX ADMIN — PREGABALIN 100 MG: 75 CAPSULE ORAL at 13:20

## 2021-03-13 RX ADMIN — OXYCODONE 10 MG: 5 TABLET ORAL at 05:41

## 2021-03-13 RX ADMIN — ENOXAPARIN SODIUM 30 MG: 30 INJECTION SUBCUTANEOUS at 08:22

## 2021-03-13 RX ADMIN — IPRATROPIUM BROMIDE AND ALBUTEROL SULFATE 1 AMPULE: .5; 3 SOLUTION RESPIRATORY (INHALATION) at 12:43

## 2021-03-13 RX ADMIN — CEFTRIAXONE SODIUM 2000 MG: 2 INJECTION, POWDER, FOR SOLUTION INTRAMUSCULAR; INTRAVENOUS at 20:28

## 2021-03-13 RX ADMIN — AMLODIPINE BESYLATE 2.5 MG: 5 TABLET ORAL at 08:19

## 2021-03-13 RX ADMIN — IPRATROPIUM BROMIDE AND ALBUTEROL SULFATE 1 AMPULE: .5; 3 SOLUTION RESPIRATORY (INHALATION) at 07:02

## 2021-03-13 RX ADMIN — OXYCODONE 10 MG: 5 TABLET ORAL at 18:45

## 2021-03-13 RX ADMIN — IBUPROFEN 400 MG: 400 TABLET, FILM COATED ORAL at 20:32

## 2021-03-13 RX ADMIN — TIZANIDINE 4 MG: 4 TABLET ORAL at 08:20

## 2021-03-13 RX ADMIN — IPRATROPIUM BROMIDE AND ALBUTEROL SULFATE 1 AMPULE: .5; 3 SOLUTION RESPIRATORY (INHALATION) at 15:53

## 2021-03-13 RX ADMIN — ALPRAZOLAM 0.25 MG: 0.25 TABLET ORAL at 01:31

## 2021-03-13 RX ADMIN — ATORVASTATIN CALCIUM 40 MG: 40 TABLET, FILM COATED ORAL at 20:32

## 2021-03-13 RX ADMIN — ALPRAZOLAM 0.25 MG: 0.25 TABLET ORAL at 09:52

## 2021-03-13 RX ADMIN — POTASSIUM CHLORIDE 20 MEQ: 20 TABLET, EXTENDED RELEASE ORAL at 17:25

## 2021-03-13 RX ADMIN — AMPICILLIN AND SULBACTAM 3000 MG: 1; 2 INJECTION, POWDER, FOR SOLUTION INTRAMUSCULAR; INTRAVENOUS at 11:15

## 2021-03-13 RX ADMIN — POTASSIUM CHLORIDE 20 MEQ: 20 TABLET, EXTENDED RELEASE ORAL at 08:21

## 2021-03-13 RX ADMIN — CEFTRIAXONE SODIUM 2000 MG: 2 INJECTION, POWDER, FOR SOLUTION INTRAMUSCULAR; INTRAVENOUS at 10:28

## 2021-03-13 RX ADMIN — DICLOFENAC SODIUM 2 G: 10 GEL TOPICAL at 08:21

## 2021-03-13 RX ADMIN — AMPICILLIN AND SULBACTAM 3000 MG: 1; 2 INJECTION, POWDER, FOR SOLUTION INTRAMUSCULAR; INTRAVENOUS at 17:23

## 2021-03-13 RX ADMIN — ALPRAZOLAM 0.25 MG: 0.25 TABLET ORAL at 20:37

## 2021-03-13 RX ADMIN — FUROSEMIDE 40 MG: 40 TABLET ORAL at 08:19

## 2021-03-13 RX ADMIN — PREGABALIN 100 MG: 75 CAPSULE ORAL at 20:31

## 2021-03-13 RX ADMIN — TIZANIDINE 4 MG: 4 TABLET ORAL at 17:25

## 2021-03-13 RX ADMIN — AMPICILLIN AND SULBACTAM 3000 MG: 1; 2 INJECTION, POWDER, FOR SOLUTION INTRAMUSCULAR; INTRAVENOUS at 21:35

## 2021-03-13 RX ADMIN — DICLOFENAC SODIUM 2 G: 10 GEL TOPICAL at 20:33

## 2021-03-13 RX ADMIN — OXYCODONE 10 MG: 5 TABLET ORAL at 13:57

## 2021-03-13 RX ADMIN — ALBUTEROL SULFATE 2.5 MG: 2.5 SOLUTION RESPIRATORY (INHALATION) at 01:54

## 2021-03-13 RX ADMIN — FLUOXETINE 40 MG: 20 CAPSULE ORAL at 08:19

## 2021-03-13 RX ADMIN — OXYCODONE 10 MG: 5 TABLET ORAL at 01:31

## 2021-03-13 RX ADMIN — OXYCODONE 10 MG: 5 TABLET ORAL at 09:52

## 2021-03-13 RX ADMIN — AMPICILLIN AND SULBACTAM 3000 MG: 1; 2 INJECTION, POWDER, FOR SOLUTION INTRAMUSCULAR; INTRAVENOUS at 03:33

## 2021-03-13 RX ADMIN — PREGABALIN 100 MG: 75 CAPSULE ORAL at 08:21

## 2021-03-13 RX ADMIN — ALBUTEROL SULFATE 2.5 MG: 2.5 SOLUTION RESPIRATORY (INHALATION) at 10:29

## 2021-03-13 ASSESSMENT — PAIN - FUNCTIONAL ASSESSMENT: PAIN_FUNCTIONAL_ASSESSMENT: PREVENTS OR INTERFERES SOME ACTIVE ACTIVITIES AND ADLS

## 2021-03-13 ASSESSMENT — PAIN DESCRIPTION - DESCRIPTORS
DESCRIPTORS: ACHING;CONSTANT;DISCOMFORT
DESCRIPTORS: ACHING;CONSTANT;DISCOMFORT

## 2021-03-13 ASSESSMENT — PAIN DESCRIPTION - PAIN TYPE
TYPE: ACUTE PAIN
TYPE: ACUTE PAIN;SURGICAL PAIN

## 2021-03-13 ASSESSMENT — PAIN DESCRIPTION - PROGRESSION: CLINICAL_PROGRESSION: GRADUALLY WORSENING

## 2021-03-13 ASSESSMENT — ENCOUNTER SYMPTOMS: ABDOMINAL PAIN: 0

## 2021-03-13 ASSESSMENT — PAIN DESCRIPTION - FREQUENCY
FREQUENCY: CONTINUOUS
FREQUENCY: CONTINUOUS

## 2021-03-13 ASSESSMENT — PAIN DESCRIPTION - LOCATION
LOCATION: KNEE;LEG;SHOULDER
LOCATION: LEG
LOCATION: KNEE;LEG

## 2021-03-13 ASSESSMENT — PAIN SCALES - GENERAL
PAINLEVEL_OUTOF10: 9
PAINLEVEL_OUTOF10: 8
PAINLEVEL_OUTOF10: 7
PAINLEVEL_OUTOF10: 3
PAINLEVEL_OUTOF10: 6

## 2021-03-13 ASSESSMENT — PAIN DESCRIPTION - ONSET: ONSET: ON-GOING

## 2021-03-13 ASSESSMENT — PAIN DESCRIPTION - ORIENTATION: ORIENTATION: RIGHT

## 2021-03-13 NOTE — PROGRESS NOTES
Department of Physical Medicine & Rehabilitation  Progress Note    Patient Identification:  Britta Garcia  [id number]  : 1960  Admit date: 3/3/2021    Chief Complaint: debility    Subjective:   No new complaints overnight but she continues to require lasix as she has + edema in both legs. She also complains of bleeding from her vagina today. In the recent past she had a pap smear and OB follow up which were negative for any pathology. OB consulted today. ROS: No f/c, n/v, cp     Objective:  Patient Vitals for the past 24 hrs:   BP Temp Temp src Pulse Resp SpO2   21 1029 -- -- -- -- -- 93 %   21 0759 117/73 98.1 °F (36.7 °C) Oral 91 20 91 %   21 0703 -- -- -- -- -- 90 %   21 0154 -- -- -- -- -- 95 %   21 2111 -- -- -- -- -- 94 %   21 2041 139/79 98.6 °F (37 °C) Oral 105 22 94 %   21 1627 -- -- -- -- 18 95 %     Const: Alert. No distress, pleasant. HEENT: Normocephalic, atraumatic. Normal sclera/conjunctiva. MMM. CV: Regular rate and rhythm. Resp: mild respiratory distress. Abd: Soft, nontender, nondistended, NABS+   Ext: No edema. Neuro: Alert, oriented, appropriately interactive. Psych: Cooperative, appropriate mood and affect    Laboratory data: Available via EMR. Last 24 hour lab  No results found for this or any previous visit (from the past 24 hour(s)). Therapy progress:  PT  Position Activity Restriction  Other position/activity restrictions: WBAT R LE; up with assist; ok to shower  Objective     Sit to Stand: Stand by assistance(from EOB, 4WW, and chair)  Stand to sit: (to EOB, 4WW, and chair)  Bed to Chair: Contact guard assistance  Device: Rollator  Other Apparatus: (2 L O2 with activity per RN)  Assistance: Stand by assistance, Contact guard assistance(SBA/CGA d/t unsafe technique with the 7SB. Pt has one instance of knee buckling but no LOB or increased assist needed.  Pt demonstrates no improvement in gait with VC's)  Distance: 61' + 48' + 79' (including ascending ramp) + 70' (including descending ramp) + 50'  OT  PT Equipment Recommendations  Equipment Needed: (continue to assess)  Other: pt has all needed DME  Toilet - Technique: Ambulating  Equipment Used: Standard toilet  Toilet Transfers Comments: use of GB to stand  Assessment        SLP          Body mass index is 44.42 kg/m². Assessment and Plan:  Infected Right Femur Hardware  2/25 s/p partial JENIFER  Intra-op cultures with e. Faecalis  ID consulted, appreciate recs: d/t only partial JENIFER, prefer double beta-lactam coverage for enterococcus  Continue ceftriaxone 2g q 12 hr + unasyn 3g every 6 hours for 12 weeks, unless plan to remove hardware   PT/OT in ARU     Essential HTN - PTA: amlodipine 2.5mg daily, lasix 20mg daily  BP stable w/o meds   At discharge, can resume home lasix, amlodipine     Constipation  Continue bowel regimen      Chronic Pain  Mood Disorder - PTA: elavil, flexeril, fluoxetine, gabapentin, meloxicam  Resume home meds     Moderate Persistent Asthma  RT assess and treat, IS  Continue home Albuterol prn   O2 sats stable on room air  Needs outpatient PFTs and pulmonology for further w/u of SOB with exertion (? ILD, pulm HTN. ..) (already new referral PTA at [facility] consult     Vaginal bleeding- OB consult        Rehab Progress: Improving  Anticipated Dispo: home  Services/DME: AVTAR CARMONA: AVTAR Wasserman D.O. M.P.H  PM&R  3/13/2021  11:39 AM

## 2021-03-13 NOTE — PROGRESS NOTES
Patient is a 63year old F who is admitted for rehab for right hip. Patient had an episode of vaginal bleeding. Has hx uterine prolapse and sees Dr. Javier Ochoa at [facility]. Saw him in 10/20-they were discussing surgery for this. Has used pessary in past but does not today. Gynecologic Exam  This is a new problem. The current episode started in the past 7 days. The problem occurs rarely. The problem has been unchanged. Pertinent negatives include no abdominal pain or urinary symptoms. She has tried nothing for the symptoms. The treatment provided no relief.        Date of Birth 1960  Past Medical History:   Diagnosis Date    Asthma     Depression     Osteoarthritis     PONV (postoperative nausea and vomiting)     Scoliosis      Past Surgical History:   Procedure Laterality Date    BREAST ENHANCEMENT SURGERY Bilateral    [place]    FRACTURE SURGERY      femur RT    GASTRIC BYPASS SURGERY  [place]    [place]   [place] JOINT REPLACEMENT Bilateral 2014    knee replacement    JOINT REPLACEMENT      KNEE CARTILAGE SURGERY Left 2006    ROTATOR CUFF REPAIR Left 2008    TOTAL HIP ARTHROPLASTY Bilateral     WISDOM TOOTH EXTRACTION       OB History    Para Term  AB Living   4             SAB TAB Ectopic Molar Multiple Live Births             3      # Outcome Date GA Lbr Nemesio/2nd Weight Sex Delivery Anes PTL Lv   4       Vag-Spont   ND   3       Vag-Spont      2       Vag-Spont      1 [place]      Vag-Spont        Social History     Socioeconomic History    Marital status:      Spouse name: Not on file    Number of children: Not on file    Years of education: Not on file    Highest education level: Not on file   Occupational History    Not on file   Social Needs    Financial resource strain: Not on file    Food insecurity     Worry: Not on file     Inability: Not on file    Transportation needs     Medical: Not on file     Non-medical: Not on file   Tobacco Use    Smoking status: Former Smoker     Packs/day: 0.25     Years: 0.20     Pack years: 0.05     Types: Cigarettes     Quit date: 2014     Years since quittin.7    Smokeless tobacco: Never Used    Tobacco comment: working to decrease   Substance and Sexual Activity    Alcohol use: No     Comment: recovering alcoholic since [age], has had couple of relaspes, last 7/15/2015.  Drug use: No    Sexual activity: Yes     Partners: Male   Lifestyle    Physical activity     Days per week: Not on file     Minutes per session: Not on file    Stress: Not on file   Relationships    Social connections     Talks on phone: Not on file     Gets together: Not on file     Attends Mandaeism service: Not on file     Active member of club or organization: Not on file     Attends meetings of clubs or organizations: Not on file     Relationship status: Not on file    Intimate partner violence     Fear of current or ex partner: Not on file     Emotionally abused: Not on file     Physically abused: Not on file     Forced sexual activity: Not on file   Other Topics Concern    Not on file   Social History Narrative    Not on file     Allergies   Allergen Reactions    Cortisone Anaphylaxis and Other (See Comments)     Only if Injected; many years ago. Anaphylactic reaction??numbing agent??cortisone. Injected causes reaction. .. Franchesca Pulido the patient states she CAN TAKE IV solumedrol fine (18)  Only if Injected; many years ago. Anaphylactic reaction??numbing agent??cortisone. - she can taken oral steroids.      Droperidol Other (See Comments)     EPS   shaking  shaking  ETS    Compazine [Prochlorperazine] Other (See Comments)     EPS symptoms     Outpatient Medications Marked as Taking for the 3/3/21 encounter [facility] Encounter)   Medication Sig Dispense Refill    furosemide (LASIX) 20 MG tablet Take 1 tablet by mouth daily 30 tablet 0    amLODIPine (NORVASC) 2.5 MG tablet Take 1 tablet by mouth daily 30 tablet 0    aspirin EC 81 MG EC tablet Take 1 tablet by mouth daily 30 tablet 0    atorvastatin (LIPITOR) 40 MG tablet Take 1 tablet by mouth daily 30 tablet 0    meloxicam (MOBIC) 15 MG tablet Take 15 mg by mouth daily      cyclobenzaprine (FLEXERIL) 10 MG tablet Take 1 tablet by mouth 3 times daily as needed for Muscle spasms 30 tablet 0    albuterol sulfate  (90 Base) MCG/ACT inhaler Inhale 2 puffs into the lungs 4 times daily as needed for Wheezing 1 Inhaler 0    albuterol (PROVENTIL) (2.5 MG/3ML) 0.083% nebulizer solution Take 3 mLs by nebulization every 6 hours as needed for Wheezing or Shortness of Breath 120 each 3    hydrOXYzine (VISTARIL) 25 MG capsule Take 25 mg by mouth 3 times daily as needed for Anxiety      ibuprofen (ADVIL;MOTRIN) 800 MG tablet Take 800 mg by mouth every 6 hours as needed for Pain      FLUoxetine (PROZAC) 40 MG capsule Take 1 capsule by mouth daily 30 capsule 0    amitriptyline (ELAVIL) 75 MG tablet Take 1 tablet by mouth nightly 30 tablet 0     Family History   Adopted: Yes   Problem Relation Age of Onset    Diabetes Daughter     Cancer Mother         Breast and leukemia    Heart Disease Sister     Drug Abuse Brother      /73   Pulse 91   Temp 98.1 °F (36.7 °C) (Oral)   Resp 20   Ht 4' 10\" (1.473 m)   Wt 212 lb 8.4 oz (96.4 kg)   LMP 12/22/2014   SpO2 91%   BMI 44.42 kg/m²     WDWN in NAD  A and O x 3  ABD-soft, NT, ND, no hsm  EXT-decrease ROM left, no cords/NT  Skin-warm and dry  Neuro-grossly intact    Lab Results   Component Value Date    WBC 6.8 03/12/2021    HGB 8.3 (L) 03/12/2021    HCT 26.1 (L) 03/12/2021    MCV 82.2 03/12/2021     (H) 03/12/2021       Plan-patient is a 63year old F  1-PMB-will proceed with pelvic ultrasound on Monday am. Will need to get transvaginal US. Patient will try to move her left hip out to get the probe to work. Had [address] 2014 with normal endometrial stripe.  Discussed with patient-if endometrial stripe 5 mm or less-can follow up with repeat US in 3 months. If more than this, will need to set up visit in my office or Dr. Yenny Basurto for pap and endometrial biopsy once she is out of the hospital. Patient understands. 2. Menopause.

## 2021-03-13 NOTE — PROGRESS NOTES
Patient expressed she was having wheezing and difficulty breathing rales and wheezing, respiratory notified and arrived to give her a prn treatment, she tolerated well, felt relief is now resting quietly,  Shereen Chau MSN, MA, RN

## 2021-03-13 NOTE — PROGRESS NOTES
Ob/Gyn consult paged to CHRIS FLORES [facility] MD at [phone]. RN currently waiting for a response.

## 2021-03-13 NOTE — PLAN OF CARE
Problem: Pain:  Goal: Pain level will decrease  Description: Pain level will decrease  Outcome: Ongoing  Note: Patient able to report pain  level with descriptions and use of 0-10 scale     Problem: Pain:  Goal: Control of acute pain  Description: Control of acute pain  Outcome: Ongoing  Note: Patient medicated with ocycodone, for pain.       Problem: Falls - Risk of:  Goal: Will remain free from falls  Description: Will remain free from falls  Outcome: Ongoing  Note: Fall precautions in place, chair alarm active, wheels locked, personal items and table in reach, hourly checks, offer trip to bathroom every 2 hours      Stanislav Jameson MSN, MA, RN

## 2021-03-13 NOTE — PROGRESS NOTES
Consult received by Viridiana Dozier MD who returned the page at [phone]. MD said that she would be by to see the Pt later today. Plan for possible vaginal ultrasound on Monday if Pt is able to tolerate the procedure. If Pt is unable to tolerate the procedure she is to follow up after her  discharge.

## 2021-03-13 NOTE — PLAN OF CARE
Problem: Pain:  Goal: Control of acute pain  Description: Control of acute pain  3/13/2021 0104 by Kaylene Bo  Outcome: Ongoing  Note: Patient able to rate pain appropriately utilizing numerical pain scale. Pain adequately controlled at this time with PRN pain medicine (see MAR). Instructed patient to notify RN of any increased or uncontrolled pain needs. Problem: Falls - Risk of:  Goal: Will remain free from falls  Description: Will remain free from falls  3/13/2021 0104 by Kaylene Bo  Outcome: Ongoing  Note:  Pt is a High fall risk. See Jessi Ibarra Fall Score and ABCDS Injury Risk assessments. + Screening for Orthostasis and/or + High Fall Risk per ANAYA/ABCDS: Explained fall risk precautions to pt and family and rationale behind their use to keep the patient safe. Pt bed is in low position, side rails up, call light and belongings are in reach. Fall wristband applied and present on pts wrist.  Bed alarm on. Pt encouraged to call for assistance. Will continue with hourly rounds for PO intake, pain needs, toileting and repositioning as needed.

## 2021-03-13 NOTE — PROGRESS NOTES
Pt awake in chair eating breakfast. Physical assessment and vital signs as charted. Pt currently rates her pain as a 6 out of 10 on the pain scale. Pt repositioned herself in her chair at this time. Call light placed within reach. RN will continue to monitor Pt.

## 2021-03-14 PROCEDURE — 6360000002 HC RX W HCPCS: Performed by: PHYSICAL MEDICINE & REHABILITATION

## 2021-03-14 PROCEDURE — 2580000003 HC RX 258: Performed by: PHYSICAL MEDICINE & REHABILITATION

## 2021-03-14 PROCEDURE — 94640 AIRWAY INHALATION TREATMENT: CPT

## 2021-03-14 PROCEDURE — 6370000000 HC RX 637 (ALT 250 FOR IP): Performed by: STUDENT IN AN ORGANIZED HEALTH CARE EDUCATION/TRAINING PROGRAM

## 2021-03-14 PROCEDURE — 94761 N-INVAS EAR/PLS OXIMETRY MLT: CPT

## 2021-03-14 PROCEDURE — 2700000000 HC OXYGEN THERAPY PER DAY

## 2021-03-14 PROCEDURE — 6370000000 HC RX 637 (ALT 250 FOR IP): Performed by: PHYSICAL MEDICINE & REHABILITATION

## 2021-03-14 PROCEDURE — 1280000000 HC REHAB R&B

## 2021-03-14 RX ADMIN — IPRATROPIUM BROMIDE AND ALBUTEROL SULFATE 1 AMPULE: .5; 3 SOLUTION RESPIRATORY (INHALATION) at 19:30

## 2021-03-14 RX ADMIN — DICLOFENAC SODIUM 2 G: 10 GEL TOPICAL at 08:33

## 2021-03-14 RX ADMIN — PREGABALIN 100 MG: 75 CAPSULE ORAL at 14:17

## 2021-03-14 RX ADMIN — CEFTRIAXONE SODIUM 2000 MG: 2 INJECTION, POWDER, FOR SOLUTION INTRAMUSCULAR; INTRAVENOUS at 20:30

## 2021-03-14 RX ADMIN — DICLOFENAC SODIUM 2 G: 10 GEL TOPICAL at 20:46

## 2021-03-14 RX ADMIN — ALPRAZOLAM 0.25 MG: 0.25 TABLET ORAL at 03:43

## 2021-03-14 RX ADMIN — FUROSEMIDE 80 MG: 40 TABLET ORAL at 08:31

## 2021-03-14 RX ADMIN — AMITRIPTYLINE HYDROCHLORIDE 75 MG: 25 TABLET, FILM COATED ORAL at 20:45

## 2021-03-14 RX ADMIN — ATORVASTATIN CALCIUM 40 MG: 40 TABLET, FILM COATED ORAL at 20:46

## 2021-03-14 RX ADMIN — ENOXAPARIN SODIUM 30 MG: 30 INJECTION SUBCUTANEOUS at 08:34

## 2021-03-14 RX ADMIN — PREGABALIN 100 MG: 75 CAPSULE ORAL at 08:32

## 2021-03-14 RX ADMIN — OXYCODONE 10 MG: 5 TABLET ORAL at 18:34

## 2021-03-14 RX ADMIN — PREGABALIN 100 MG: 75 CAPSULE ORAL at 20:45

## 2021-03-14 RX ADMIN — ALPRAZOLAM 0.25 MG: 0.25 TABLET ORAL at 14:16

## 2021-03-14 RX ADMIN — AMPICILLIN AND SULBACTAM 3000 MG: 1; 2 INJECTION, POWDER, FOR SOLUTION INTRAMUSCULAR; INTRAVENOUS at 21:28

## 2021-03-14 RX ADMIN — IPRATROPIUM BROMIDE AND ALBUTEROL SULFATE 1 AMPULE: .5; 3 SOLUTION RESPIRATORY (INHALATION) at 11:41

## 2021-03-14 RX ADMIN — OXYCODONE 10 MG: 5 TABLET ORAL at 08:33

## 2021-03-14 RX ADMIN — IPRATROPIUM BROMIDE AND ALBUTEROL SULFATE 1 AMPULE: .5; 3 SOLUTION RESPIRATORY (INHALATION) at 16:01

## 2021-03-14 RX ADMIN — CEFTRIAXONE SODIUM 2000 MG: 2 INJECTION, POWDER, FOR SOLUTION INTRAMUSCULAR; INTRAVENOUS at 08:26

## 2021-03-14 RX ADMIN — IPRATROPIUM BROMIDE AND ALBUTEROL SULFATE 1 AMPULE: .5; 3 SOLUTION RESPIRATORY (INHALATION) at 08:13

## 2021-03-14 RX ADMIN — POTASSIUM CHLORIDE 20 MEQ: 20 TABLET, EXTENDED RELEASE ORAL at 16:43

## 2021-03-14 RX ADMIN — OXYCODONE 10 MG: 5 TABLET ORAL at 22:42

## 2021-03-14 RX ADMIN — TIZANIDINE 4 MG: 4 TABLET ORAL at 11:18

## 2021-03-14 RX ADMIN — OXYCODONE 10 MG: 5 TABLET ORAL at 14:17

## 2021-03-14 RX ADMIN — ALBUTEROL SULFATE 2.5 MG: 2.5 SOLUTION RESPIRATORY (INHALATION) at 03:49

## 2021-03-14 RX ADMIN — AMPICILLIN AND SULBACTAM 3000 MG: 1; 2 INJECTION, POWDER, FOR SOLUTION INTRAMUSCULAR; INTRAVENOUS at 03:40

## 2021-03-14 RX ADMIN — AMLODIPINE BESYLATE 2.5 MG: 5 TABLET ORAL at 08:32

## 2021-03-14 RX ADMIN — FLUOXETINE 40 MG: 20 CAPSULE ORAL at 08:32

## 2021-03-14 RX ADMIN — DIPHENHYDRAMINE HYDROCHLORIDE 25 MG: 25 TABLET ORAL at 15:27

## 2021-03-14 RX ADMIN — AMPICILLIN AND SULBACTAM 3000 MG: 1; 2 INJECTION, POWDER, FOR SOLUTION INTRAMUSCULAR; INTRAVENOUS at 09:42

## 2021-03-14 RX ADMIN — AMPICILLIN AND SULBACTAM 3000 MG: 1; 2 INJECTION, POWDER, FOR SOLUTION INTRAMUSCULAR; INTRAVENOUS at 16:42

## 2021-03-14 RX ADMIN — OXYCODONE 10 MG: 5 TABLET ORAL at 03:43

## 2021-03-14 RX ADMIN — POTASSIUM CHLORIDE 20 MEQ: 20 TABLET, EXTENDED RELEASE ORAL at 08:31

## 2021-03-14 RX ADMIN — ALPRAZOLAM 0.25 MG: 0.25 TABLET ORAL at 20:46

## 2021-03-14 ASSESSMENT — PAIN DESCRIPTION - ONSET
ONSET: ON-GOING

## 2021-03-14 ASSESSMENT — PAIN DESCRIPTION - FREQUENCY
FREQUENCY: CONTINUOUS

## 2021-03-14 ASSESSMENT — PAIN SCALES - GENERAL
PAINLEVEL_OUTOF10: 9
PAINLEVEL_OUTOF10: 7
PAINLEVEL_OUTOF10: 8
PAINLEVEL_OUTOF10: 6

## 2021-03-14 ASSESSMENT — PAIN DESCRIPTION - LOCATION
LOCATION: KNEE;LEG
LOCATION: LEG
LOCATION: KNEE;LEG

## 2021-03-14 ASSESSMENT — PAIN DESCRIPTION - PAIN TYPE
TYPE: ACUTE PAIN;SURGICAL PAIN
TYPE: ACUTE PAIN

## 2021-03-14 ASSESSMENT — PAIN DESCRIPTION - DESCRIPTORS: DESCRIPTORS: CONSTANT;ACHING

## 2021-03-14 ASSESSMENT — PAIN DESCRIPTION - PROGRESSION: CLINICAL_PROGRESSION: NOT CHANGED

## 2021-03-14 ASSESSMENT — PAIN DESCRIPTION - ORIENTATION
ORIENTATION: RIGHT
ORIENTATION: RIGHT

## 2021-03-14 NOTE — PLAN OF CARE
Problem: Falls - Risk of:  Goal: Will remain free from falls  Description: Will remain free from falls  Outcome: Ongoing  Note: Client remains free from falls, bed/chair alarm in place, door open, encouraged to use call light for needs, call light is within reach, bed locked in lowest position,  Will continue to monitor. Problem: Infection - Surgical Site:  Goal: Will show no infection signs and symptoms  Description: Will show no infection signs and symptoms  Outcome: Ongoing  Note: Surgical site observed for signs/symptoms of infection. Vitals performed routinely, labs observed. Will monitor pt while on ARU       Problem: Skin Integrity:  Goal: Will show no infection signs and symptoms  Description: Will show no infection signs and symptoms  Outcome: Ongoing  Note: Surgical site observed for signs/symptoms of infection. Vitals performed routinely, labs observed. Will monitor pt while on ARU       Problem: Pain:  Goal: Control of acute pain  Description: Control of acute pain  Note: Pt voices pain needs appropriately, pain is assessed during shift.

## 2021-03-14 NOTE — PROGRESS NOTES
Pt assessment and vitals completed. Medications administered as ordered. Pt removed dressing to R leg surgical site. Expressed that wound care nurse wanted wound covered. Pt does not want dressing applied at this time. will continue to monitor.

## 2021-03-15 ENCOUNTER — APPOINTMENT (OUTPATIENT)
Dept: ULTRASOUND IMAGING | Age: 61
DRG: 560 | End: 2021-03-15
Attending: PHYSICAL MEDICINE & REHABILITATION
Payer: MEDICARE

## 2021-03-15 LAB
ANION GAP SERPL CALCULATED.3IONS-SCNC: 8 MMOL/L (ref 3–16)
BASOPHILS ABSOLUTE: 0 K/UL (ref 0–0.2)
BASOPHILS RELATIVE PERCENT: 0.6 %
BUN BLDV-MCNC: 11 MG/DL (ref 7–20)
CALCIUM SERPL-MCNC: 8.7 MG/DL (ref 8.3–10.6)
CHLORIDE BLD-SCNC: 100 MMOL/L (ref 99–110)
CO2: 31 MMOL/L (ref 21–32)
CREAT SERPL-MCNC: 0.8 MG/DL (ref 0.6–1.2)
EOSINOPHILS ABSOLUTE: 0.6 K/UL (ref 0–0.6)
EOSINOPHILS RELATIVE PERCENT: 8.3 %
GFR AFRICAN AMERICAN: >60
GFR NON-AFRICAN AMERICAN: >60
GLUCOSE BLD-MCNC: 112 MG/DL (ref 70–99)
HCT VFR BLD CALC: 25.8 % (ref 36–48)
HEMOGLOBIN: 8.4 G/DL (ref 12–16)
LYMPHOCYTES ABSOLUTE: 1.6 K/UL (ref 1–5.1)
LYMPHOCYTES RELATIVE PERCENT: 23.8 %
MCH RBC QN AUTO: 26.1 PG (ref 26–34)
MCHC RBC AUTO-ENTMCNC: 32.4 G/DL (ref 31–36)
MCV RBC AUTO: 80.6 FL (ref 80–100)
MONOCYTES ABSOLUTE: 0.6 K/UL (ref 0–1.3)
MONOCYTES RELATIVE PERCENT: 9.1 %
NEUTROPHILS ABSOLUTE: 4 K/UL (ref 1.7–7.7)
NEUTROPHILS RELATIVE PERCENT: 58.2 %
PDW BLD-RTO: 17.6 % (ref 12.4–15.4)
PLATELET # BLD: 519 K/UL (ref 135–450)
PMV BLD AUTO: 7.5 FL (ref 5–10.5)
POTASSIUM REFLEX MAGNESIUM: 3.9 MMOL/L (ref 3.5–5.1)
RBC # BLD: 3.2 M/UL (ref 4–5.2)
SODIUM BLD-SCNC: 139 MMOL/L (ref 136–145)
WBC # BLD: 6.8 K/UL (ref 4–11)

## 2021-03-15 PROCEDURE — 76830 TRANSVAGINAL US NON-OB: CPT

## 2021-03-15 PROCEDURE — 94640 AIRWAY INHALATION TREATMENT: CPT

## 2021-03-15 PROCEDURE — 6370000000 HC RX 637 (ALT 250 FOR IP): Performed by: STUDENT IN AN ORGANIZED HEALTH CARE EDUCATION/TRAINING PROGRAM

## 2021-03-15 PROCEDURE — 85025 COMPLETE CBC W/AUTO DIFF WBC: CPT

## 2021-03-15 PROCEDURE — 76856 US EXAM PELVIC COMPLETE: CPT

## 2021-03-15 PROCEDURE — 97530 THERAPEUTIC ACTIVITIES: CPT

## 2021-03-15 PROCEDURE — 97116 GAIT TRAINING THERAPY: CPT

## 2021-03-15 PROCEDURE — 6360000002 HC RX W HCPCS: Performed by: PHYSICAL MEDICINE & REHABILITATION

## 2021-03-15 PROCEDURE — 6370000000 HC RX 637 (ALT 250 FOR IP): Performed by: PHYSICAL MEDICINE & REHABILITATION

## 2021-03-15 PROCEDURE — 1280000000 HC REHAB R&B

## 2021-03-15 PROCEDURE — 80048 BASIC METABOLIC PNL TOTAL CA: CPT

## 2021-03-15 PROCEDURE — 2580000003 HC RX 258: Performed by: PHYSICAL MEDICINE & REHABILITATION

## 2021-03-15 PROCEDURE — 36592 COLLECT BLOOD FROM PICC: CPT

## 2021-03-15 PROCEDURE — 94761 N-INVAS EAR/PLS OXIMETRY MLT: CPT

## 2021-03-15 PROCEDURE — 97110 THERAPEUTIC EXERCISES: CPT

## 2021-03-15 PROCEDURE — 99233 SBSQ HOSP IP/OBS HIGH 50: CPT | Performed by: PHYSICAL MEDICINE & REHABILITATION

## 2021-03-15 PROCEDURE — 2700000000 HC OXYGEN THERAPY PER DAY

## 2021-03-15 PROCEDURE — 97535 SELF CARE MNGMENT TRAINING: CPT

## 2021-03-15 RX ADMIN — AMPICILLIN AND SULBACTAM 3000 MG: 1; 2 INJECTION, POWDER, FOR SOLUTION INTRAMUSCULAR; INTRAVENOUS at 15:52

## 2021-03-15 RX ADMIN — TIZANIDINE 4 MG: 4 TABLET ORAL at 21:13

## 2021-03-15 RX ADMIN — IPRATROPIUM BROMIDE AND ALBUTEROL SULFATE 1 AMPULE: .5; 3 SOLUTION RESPIRATORY (INHALATION) at 20:30

## 2021-03-15 RX ADMIN — OXYCODONE 10 MG: 5 TABLET ORAL at 03:56

## 2021-03-15 RX ADMIN — POTASSIUM CHLORIDE 20 MEQ: 20 TABLET, EXTENDED RELEASE ORAL at 07:54

## 2021-03-15 RX ADMIN — POTASSIUM CHLORIDE 20 MEQ: 20 TABLET, EXTENDED RELEASE ORAL at 15:51

## 2021-03-15 RX ADMIN — IPRATROPIUM BROMIDE AND ALBUTEROL SULFATE 1 AMPULE: .5; 3 SOLUTION RESPIRATORY (INHALATION) at 12:50

## 2021-03-15 RX ADMIN — IPRATROPIUM BROMIDE AND ALBUTEROL SULFATE 1 AMPULE: .5; 3 SOLUTION RESPIRATORY (INHALATION) at 16:37

## 2021-03-15 RX ADMIN — IBUPROFEN 400 MG: 400 TABLET, FILM COATED ORAL at 15:51

## 2021-03-15 RX ADMIN — AMITRIPTYLINE HYDROCHLORIDE 75 MG: 25 TABLET, FILM COATED ORAL at 21:13

## 2021-03-15 RX ADMIN — OXYCODONE 10 MG: 5 TABLET ORAL at 08:17

## 2021-03-15 RX ADMIN — PREGABALIN 100 MG: 75 CAPSULE ORAL at 21:13

## 2021-03-15 RX ADMIN — TIZANIDINE 4 MG: 4 TABLET ORAL at 03:56

## 2021-03-15 RX ADMIN — TIZANIDINE 4 MG: 4 TABLET ORAL at 12:50

## 2021-03-15 RX ADMIN — ATORVASTATIN CALCIUM 40 MG: 40 TABLET, FILM COATED ORAL at 21:14

## 2021-03-15 RX ADMIN — OXYCODONE 10 MG: 5 TABLET ORAL at 12:50

## 2021-03-15 RX ADMIN — AMPICILLIN AND SULBACTAM 3000 MG: 1; 2 INJECTION, POWDER, FOR SOLUTION INTRAMUSCULAR; INTRAVENOUS at 03:30

## 2021-03-15 RX ADMIN — AMPICILLIN AND SULBACTAM 3000 MG: 1; 2 INJECTION, POWDER, FOR SOLUTION INTRAMUSCULAR; INTRAVENOUS at 21:13

## 2021-03-15 RX ADMIN — DICLOFENAC SODIUM 2 G: 10 GEL TOPICAL at 21:18

## 2021-03-15 RX ADMIN — PREGABALIN 100 MG: 75 CAPSULE ORAL at 14:58

## 2021-03-15 RX ADMIN — PREGABALIN 100 MG: 75 CAPSULE ORAL at 07:54

## 2021-03-15 RX ADMIN — FUROSEMIDE 80 MG: 40 TABLET ORAL at 07:54

## 2021-03-15 RX ADMIN — ALPRAZOLAM 0.25 MG: 0.25 TABLET ORAL at 22:11

## 2021-03-15 RX ADMIN — CEFTRIAXONE SODIUM 2000 MG: 2 INJECTION, POWDER, FOR SOLUTION INTRAMUSCULAR; INTRAVENOUS at 20:11

## 2021-03-15 RX ADMIN — DICLOFENAC SODIUM 2 G: 10 GEL TOPICAL at 07:55

## 2021-03-15 RX ADMIN — ENOXAPARIN SODIUM 30 MG: 30 INJECTION SUBCUTANEOUS at 07:54

## 2021-03-15 RX ADMIN — CEFTRIAXONE SODIUM 2000 MG: 2 INJECTION, POWDER, FOR SOLUTION INTRAMUSCULAR; INTRAVENOUS at 09:51

## 2021-03-15 RX ADMIN — DIPHENHYDRAMINE HYDROCHLORIDE 25 MG: 25 TABLET ORAL at 18:39

## 2021-03-15 RX ADMIN — TIZANIDINE 4 MG: 4 TABLET ORAL at 08:17

## 2021-03-15 RX ADMIN — IPRATROPIUM BROMIDE AND ALBUTEROL SULFATE 1 AMPULE: .5; 3 SOLUTION RESPIRATORY (INHALATION) at 07:38

## 2021-03-15 RX ADMIN — OXYCODONE 10 MG: 5 TABLET ORAL at 22:11

## 2021-03-15 RX ADMIN — FLUOXETINE 40 MG: 20 CAPSULE ORAL at 07:54

## 2021-03-15 RX ADMIN — AMLODIPINE BESYLATE 2.5 MG: 5 TABLET ORAL at 07:54

## 2021-03-15 RX ADMIN — ALBUTEROL SULFATE 2.5 MG: 2.5 SOLUTION RESPIRATORY (INHALATION) at 03:43

## 2021-03-15 RX ADMIN — OXYCODONE 10 MG: 5 TABLET ORAL at 17:47

## 2021-03-15 RX ADMIN — AMPICILLIN AND SULBACTAM 3000 MG: 1; 2 INJECTION, POWDER, FOR SOLUTION INTRAMUSCULAR; INTRAVENOUS at 11:07

## 2021-03-15 RX ADMIN — ALPRAZOLAM 0.25 MG: 0.25 TABLET ORAL at 17:47

## 2021-03-15 ASSESSMENT — PAIN SCALES - GENERAL
PAINLEVEL_OUTOF10: 2
PAINLEVEL_OUTOF10: 2
PAINLEVEL_OUTOF10: 7
PAINLEVEL_OUTOF10: 3
PAINLEVEL_OUTOF10: 7
PAINLEVEL_OUTOF10: 9
PAINLEVEL_OUTOF10: 7

## 2021-03-15 ASSESSMENT — PAIN DESCRIPTION - ORIENTATION
ORIENTATION: RIGHT
ORIENTATION: RIGHT

## 2021-03-15 ASSESSMENT — PAIN DESCRIPTION - LOCATION: LOCATION: KNEE;LEG

## 2021-03-15 ASSESSMENT — PAIN DESCRIPTION - ONSET: ONSET: ON-GOING

## 2021-03-15 ASSESSMENT — PAIN DESCRIPTION - DESCRIPTORS: DESCRIPTORS: ACHING

## 2021-03-15 NOTE — PROGRESS NOTES
enhancement surgery (Bilateral, );  section ([place] [address]); Cholecystectomy (); [place] tooth extraction (); joint replacement (Bilateral, 2014); fracture surgery; joint replacement; and Total hip arthroplasty (Bilateral, ). Restrictions  Position Activity Restriction  Other position/activity restrictions: WBAT R LE; up with assist; ok to shower  Subjective   General  Chart Reviewed: Yes  Patient assessed for rehabilitation services?: Yes  Additional Pertinent Hx: Pt admitted to OSH with h/o hip hardware infection. Infected hardware removed on 21. Started on IV ABX. Admitted to ARU on 3/3/21. PMHx inclues:  Asthma, Depression, Osteoarthritis, PONV (postoperative nausea and vomiting), and Scoliosis. Family / Caregiver Present: No  Referring Practitioner: Gabi  Diagnosis: debility  Subjective  Subjective: Pt sitting in bedside recliner upon arrival requesting shower, will take during PM session. General Comment  Comments: Mazin Angry   Vital Signs  Patient Currently in Pain: Yes(6/10 RLE)     Orientation  Orientation  Overall Orientation Status: Within Functional Limits  Objective    ADL  Feeding: Independent(breakfast tray brought in)  LE Dressing: Modified independent (pt doffed socks using reacher and donned socks using sock aid, attempted to don shoes using [place] but feet too swollen to fit, pt declined compression socks)        Balance  Standing Balance: Stand by assistance  Standing Balance  Time: ~1 minute total  Activity: functional transfers; functional mobility for chair to bed transfer  Functional Mobility  Functional - Mobility Device: 4-Wheeled Walker  Activity: Other(chair to bed)  Assist Level: Stand by assistance  Functional Mobility Comments: Cues to lock brakes of 4WW  Bed mobility  Sit to Supine: Stand by assistance(HOB elevated)  Transfers  Stand Step Transfers: Stand by assistance(recliner<EOB with use of 4WW)  Sit to stand: Stand by assistance  Stand to sit: Stand by assistance                       Cognition  Overall Cognitive Status: Exceptions  Following Commands: Follows one step commands consistently  Attention Span: Attends with cues to redirect  Memory: Decreased recall of recent events;Decreased short term memory  Problem Solving: Assistance required to implement solutions  Insights: Fully aware of deficits  Initiation: Does not require cues  Sequencing: Does not require cues  Cognition Comment: Pt requring emotional support during session d/t not knowing scheduled time for vaginal ultrasound and concerns for cancer       Second Session: Pt seated on toilet with RN present completing pericare with use of toilet aid upon arrival. STS from toilet using 4WW with SBA. Pt completed stand step to TTB with SBA. Pt completed tub transfer using TTB with SBA req VCs for tech to manage BLEs in/out of tub. Pt completed UB bathing with mod I seated on TTB and LB bathing with min A req assist for rear perihygiene in stance as pt was incontinent of stool without realizing. \"Did I go #2 in the toilet? I have not been feeling it when I do which worries me. I used to be incontinent after my first surgery. \" Pt used long handled sponge to wash lower limbs and feet seated on TTB. Pt donned bra with min A to pull down twisted straps in back and donned shirt with setup. Pt donned underwear and pants using reacher to thread BLEs and managed over hips in stance with SBA. Pt donned socks using sock aid with mod I. Pt stood from TTB to 4WW with SBA. Functional mobility to recliner using 4WW with SBA. Pt combed hair i'ly seated on recliner. Pt SPO2 assessed after shower and was 88% with HR 92 requiring ~1 minute with PLB to recover to 94% on 2 L via NC. Pt left in bedside recliner at end of session with PT present to initiate therapy session with chair alarm engaged, call light within reach and all needs met.        Plan   Plan  Times per week: 5 days per week 90 mins each  Times per day: Daily  Current Treatment Recommendations: Strengthening, Balance Training, Functional Mobility Training, Endurance Training, Self-Care / ADL, Patient/Caregiver Education & Training, Safety Education & Training, Equipment Evaluation, Education, & procurement      Goals  Short term goals  Time Frame for Short term goals: by 1 week  Short term goal 1: Pt will complete LB dressing with AE PRN at mod A-goal met 3/6  Short term goal 2: Pt will complete toileting with CGA-goal met 3/5  Short term goal 3: Pt will complete bathing at min A- goal met 3/10  Short term goal 4: Pt will complete shower transfer at spvn-ongoing  Short term goal 5: Pt will complete B UE exercises at mod I-ongoing  Long term goals  Time Frame for Long term goals : By 2 weeks-all ongoing  Long term goal 1: Pt will complete LB dressing with AE PRN at mod I  Long term goal 2: Pt will complete toileting with mod I  Long term goal 3: Pt will complete bathing at mod I  Long term goal 4: Pt will complete shower transfer at mod I  Long term goal 5: Pt will complete simple meal prep task at mod I with LRAD  Patient Goals   Patient goals : to be able to go home       Therapy Time   Individual Second Session Group Co-treatment   Time In 0830  1315       Time Out 0900  1415       Minutes 30  60       Timed Code Treatment Minutes: 30 Minutes + 60 Minutes  Total Treatment Time: 90 Minutes       Renee Ybarra OT

## 2021-03-15 NOTE — PROGRESS NOTES
RN offered to dress Pt's RLE per wound care orders after assisting with a shower this afternoon at 1530. Pt responded, \"I don't want it\" and was insistent that her incision did not need to be covered. Pt stated that she wanted to \"let it dry out\". No drainage was noted coming from the incision at this time. RLE incision site was cleansed with soap and water while in the shower. RN  attempted to educate Pt on the importance of adhering to prescribed wound care orders to assist with wound healing. Pt continues to insist that she does not want her incision dressed. RN will continue to monitor incision.

## 2021-03-15 NOTE — PATIENT CARE CONFERENCE
Inpatient Rehabilitation  Weekly Team Conference Note  The [address] [place] [address],[address]  [phone]  Patient Name: John Dickens        MRN: [id number]    : 1960  (63 y.o.)  Gender: female   Referring Practitioner: Dr. Balbir Lema  Diagnosis: debility    The team conference for this patient was held on 3/16/2021 at 10:30am by:  Kirk Balderrama.  Gabi,     Current/Goal QM SCORES  QM Current/Goal Score   Eating CARE Score: 6 / Discharge Goal: Independent   Oral Hygiene CARE Score: 5 / Discharge Goal: Independent   Shower/Bathing CARE Score: 3 / Discharge Goal: Independent   UB Dressing CARE Score: 3 / Discharge Goal: Independent   LB Dressing CARE Score: 4 / Discharge Goal: Independent   Putting on/off Footwear CARE Score: 6 / Discharge Goal: Independent   Toileting Hygiene CARE Score: 4 / Discharge Goal: Independent   Bladder Continence Bladder Continence: Always continent    Bowel Continence Bowel Continence: Always continent    Toilet Transfers CARE Score: 4 / Discharge Goal: Independent   Shower/Bathe Self  CARE Score: 3 / Discharge Goal: Independent   Rolling Left and Right CARE Score: 3 / Discharge Goal: Independent   Sit to Lying CARE Score: 3 / Discharge Goal: Independent   Lying to Sitting on Bedside CARE Score: 4 / Discharge Goal: Independent   Sit to Stand CARE Score: 4 / Discharge Goal: Independent   Chair/Bed to Chair Transfer CARE Score: 4 / Discharge Goal: Independent   Car Transfers CARE Score: 4 / Discharge Goal: Independent   Walk 10 Feet CARE Score: 4 / Discharge Goal: Independent   Walk 50 Feet with Two Turns CARE Score: 4 / Discharge Goal: Independent   Walk 150 Feet CARE Score: 88 / Discharge Goal: Independent   Walk 10 Feet on Uneven Surfaces CARE Score: 4 / Discharge Goal: Independent   1 Step (Curb) CARE Score: 4 / Discharge Goal: Supervision or touching assistance   4 Steps CARE Score: 4 / Discharge Goal: Supervision or touching assistance   12 Steps CARE Score: 80 / Discharge Goal: Supervision or touching assistance   Picking up Object from Floor CARE Score: 88 / Discharge Goal: Supervision or touching assistance   Wheel 50 Feet with 2 Turns CARE Score: 9 /     Type         [] Manual        [] Motorized        [] N/A   Wheel 150 Feet CARE Score: 9 /     Type         [] Manual        [] Motorized        [] N/A     NURSING:  A&Ox: Level of Consciousness: Alert (0)  Orientation Level: Oriented X4 Forgetful at times  [place] Fall Risk Score: Score: 100  Admission BIMS: Cognition Comment: Pt requring emotional support during session d/t change in d/c date and concern with family dynamics and not having family support or a ride from hospital   [] Unable to complete BIMS on Admission, Reasoning:   Wounds/Incisions/Ulcers: Surgical incision to R hip  Medication Review: Complete  Pain: Oxycodone q4  Consultations: I/D and Gyno  Imaging: See below  US PELVIS COMPLETE   Final Result      Question small endometrial polyp. No endometrial thickening. Left ovary not visualized. US NON OB TRANSVAGINAL   Final Result      Question small endometrial polyp. No endometrial thickening. Left ovary not visualized. XR CHEST PORTABLE   Final Result   Impression: Stable appearance of the chest including pulmonary vascular congestion and right basilar airspace disease. VL Extremity Venous Bilateral   Final Result      XR CHEST PORTABLE   Final Result   1. Right infrahilar airspace disease appears slightly more prominent. Differentials include infection/atelectasis/aspiration. Follow-up is recommended to document resolution. 2. Mild vascular congestion. XR CHEST 1 VIEW   Final Result   Right upper extremity PICC line in appropriate position. Chronic atelectatic collapse of the right middle lobe.      Active Comorbid Conditions:   Systems Review:   Renal: WNL, Dialysis: n/a Type: n.a, Frequency: n/a  Neurological:   Musculoskeletal: Generalized weakness  Respiratory: Asthma - nebulizers q4  Cardiac/Circulatory/Peripheral Vascular: wnl  Abnormal/Relevant Test Results:   Abnormal/Relevant Lab Values: See below  CBC:   Recent Labs     03/15/21  0449   WBC 6.8   HGB 8.4*   HCT 25.8*   MCV 80.6   *     BMP:   Recent Labs     03/15/21  0449      K 3.9      CO2 31   BUN 11   CREATININE 0.8     PT/INR: No results for input(s): PROTIME, INR in the last 72 hours. APTT: No results for input(s): APTT in the last 72 hours. Liver Profile:  Lab Results   Component Value Date    AST 17 10/14/2018    ALT 15 10/14/2018    BILIDIR <0.2 06/29/2018    BILITOT <0.2 10/14/2018    ALKPHOS 113 10/14/2018   No results found for: CHOL, HDL, TRIG  Recent Labs     03/15/21  0449   WBC 6.8   HGB 8.4*   HCT 25.8*   *   MCV 80.6     Recent Labs     03/15/21  0449      K 3.9      CO2 31   BUN 11   CREATININE 0.8     No results for input(s): AST, ALT, ALB, BILIDIR, BILITOT, ALKPHOS in the last 72 hours. No results for input(s): MG in the last 72 hours. Recent Labs     03/15/21  0449   WBC 6.8   HGB 8.4*   HCT 25.8*   *     Recent Labs     03/15/21  0449      K 3.9      CO2 31   BUN 11   CREATININE 0.8   CALCIUM 8.7     No results for input(s): AST, ALT, BILIDIR, BILITOT, ALKPHOS in the last 72 hours. No results for input(s): INR in the last 72 hours. No results for input(s): Onetha Raymon in the last 72 hours. PHYSICAL THERAPY:  Bed Mobility: Scooting: Supervision    Transfers:  Sit to Stand: Supervision(up to rollator. Cues for hand placement 2/2 pt attempting to pull from rollator.)  Stand to sit: Supervision  Bed to Chair: Contact guard assistance  Comment: Pt completed endurance activity for SCI FIT for 2 min 30 sec with level 1 resistance x3 with 1 min 30 second rests in between. Encouragment provided to keep speed high enough so that screen does not turn off.  Despite being on 2 L O2 pt demonstrated increased wheezing after second round of SCIT FIT. SpO2 92%. Toileting completed at end of session, pt is SBA for toilet transfer with rollator after ambulating into bathroom, SBA for standing balance while doffing underwear, and supervision for seated balance while competed pericare independently with use of toilet aide. WB Status: n/a  Ambulation 1  Surface: level tile  Device: Rollator  Other Apparatus: (2 L O2 with activity per RN)  Assistance: Contact guard assistance, Supervision  Quality of Gait: wide AKANKSHA with mild trunk flexion, dec'd B step length/stride length, ,dec'd B marisa, dec'd B foot clearance with a slow gait pace. No overt LOB. ++fatigue with task. Gait Deviations: Slow Marisa, Decreased step length, Decreased step height  Distance: 75' x 2 repetitions  Comments: VCs for upright posture, inc'd foot clearance & slow PLB with activity. SpO2 assessed s/p ambulation = 92% on 2L O2 via NC. Stairs  # Steps : 6  Stairs Height: 4\"  Rails: Bilateral  Device: No Device  Assistance: Contact guard assistance  Comment: Pt requires heavy assist from UE's on rail and struggles to get LE's onto step d/t impaired hip flexion.     OCCUPATIONAL THERAPY:  ADL  Feeding: Independent(breakfast tray brought in)  Grooming: Independent(pt combed hair seated EOB)  UE Bathing: Setup(seated on TTB)  LE Bathing: Contact guard assistance(seated on TTB; use of LHS to wash lower limbs, feet, and periarea in stance with CGA, pt req verbal encouragement to dry periarea in stance vs requesting assist)  UE Dressing: Setup(to don nightgown)  LE Dressing: Modified independent (pt doffed socks using reacher and donned socks using sock aid, attempted to don shoes using [place] [address] but feet too swollen to fit, pt declined compression socks)  Toileting: Stand by assistance(continent of bowel and bladder; pericare hygiene completed with use of toilet aid and VCs for reminder of proper technique to setup toilet paper)  Additional Comments: incision site and PICC line were covered prior to showering    Toilet Transfers: Toilet Transfers  Toilet - Technique: Ambulating  Equipment Used: Standard toilet  Toilet Transfer: Stand by assistance  Toilet Transfers Comments: use of GB to stand    Tub/ShowerTransfers:  Tub Transfers  Tub - Transfer From: Rolling walker  Tub - Transfer Type: To and From  Tub - Transfer To: Transfer tub bench  Tub - Technique: Ambulating  Tub Transfers: Contact guard  Tub Transfers Comments: + time and effortful to manuever RLE out of tub      NUTRITION  Weight: 214 lb 15.2 oz (97.5 kg) / Body mass index is 44.92 kg/m². Current diet order: Current diet and supplement order: DIET GENERAL; Feeding: Able to feed self  Room Service: Selective   NSG Recorded PO: PO Meals Eaten (%): 76 - 100%    Malnutrition Status Malnutrition Status: At risk for malnutrition (Comment)    CASE MANAGEMENT:  Psychosocial/Behavioral Issues: none noted  Assessment:  Referred to patient for d/c planing. Patient plans to return home with . Patient will require IV antibxs and home oxygen. Referral to [facility]. Patient is notified and agreeable to home care. Will continue to follow for d/c needs. Patient/Family Education provided by team:  Role of PT/OT, use of AE for LB dressing, DME recommendations, PLB with WIGGINS    Patient Goals for Rehab stay:  1. Increase independence and go home     Rehab Team Goals for patient for the Upcoming Week:  1. Increase independence with ADLs    Barriers to Progress/Attainment of Goals & Efforts/Interventions to remove Barriers:  1. Pain-addressed via functional mobility  2. Difficulty with pericare hygiene-provided with toilet aid to increase independence with toileting  3.  WIGGINS-receiving PRN O2 and respiratory treatments     Discharge Plan:  Estimated Length of Stay: 12 days  Destination: home health  Services at Discharge: [address] [place] [address], Occupational Therapy, Respiratory Therapy and Nursing 3x week  Equipment at Discharge: bath bench  Community Resources:   Factors facilitating achievement of predicted outcomes: Family support, Motivated, Cooperative, Pleasant and Sense of humor  Barriers to the achievement of predicted outcomes: Pain, Impulsivity, Limited safety awareness, Limited insight into deficits, Decreased endurance, Lower extremity weakness, Long standing deficits and Medical complications    Special Needs in the Upcoming Week:   [x] Family/Caregiver Education  [] Home visit  []Therapeutic Pass   [] Equipment - Type:   [] Consults:_______   [] Family/Caregiver Training    [] Stroke Risk Factor Education     [] Other;_______     TEAM SUMMARY: Will continue with current poc & goals until anticipated d/c date of 3/18/2021 TBD pending availability of family for a safe d/c.    MD:  Stroke Risk Factors:    [x] N/A for this patient   [] HTN  []  Diabetes  [] Hyperlipidemia  []Obesity BMI >25  [] Atrial Fibrillation    [] Smoker (current)  [] Smoker (quit in last 12 months)  [] Sleep Apnea  [] Other:     Risk for Readmission: Moderate (10-19)    Justification for Continued Stay:   Criteria for continued IRF stay:  Based on my medical assessment of the patient and review of information from the interdisciplinary team, as part of this weekly team conference, the patient continues to meet the following criteria for IRF level of care:  [x] The patient requires 24-hour rehabilitation nursing care   [x] The patient requires an intensive rehabilitation therapy program  [x] The patient requires active and ongoing intervention of multiple therapy disciplines  [x] The patient requires continued physician supervision by a rehabilitation physician  [x] The patient requires an intensive and coordinated interdisciplinary team approach to the delivery of rehabilitative care    Medical Necessity-continued close physician medical management is required for:   [] Cardiac/Circulatory dysfunction  [] Respiratory/Pulmonary dysfunction  [] Integumentary complications  [] Peripheral Vascular dysfunction  [x] Musculoskeletal dysfunction  [x] Neurological dysfunction d/t:  [] CVA  [] SCI  [] TBI  [] Other: __________  [] Renal dysfunction  [] Hematologic dysfunction    [] Endocrine disorders  [] GI disorders     [] Genito-Urinary dysfunction    Assessment/Plan:   [x] The patient is making good progression towards their LTG's, is actively participating in, and has a reasonable expectation to continue to benefit from the intensive rehabilitation program\  [] The estimated discharge date has been changed from initial team conference due to:   [] The estimated discharge destination has been changed from initial team conference due to:       Rehab Team Members in attendance for Team Conference:  :  DAVID Oshea    Therapy Manager:  Bismark Geiger, PT, DPT    Nursing Director:  Barbara Medina, MSN, RN, [facility]    Social Work:  Claire Cruz [facility]    Nursing:  Paulo Mckeon, LUIZ Maravilla, RN  Nell Cordero, RN    Therapy:  Leanna Schwarz, [address] [place] [address], PT, DPT  Rj Burgess, PT, DPT    Yvonne Appl, OTR/L  Johnson Party, OTR/L  Lauren Payne, OTR/L    Nutrition:  Carrol Clark, KIRSTIE LD    I approve the established interdisciplinary plan of care as documented within the medical record of Hammad Hull.     MD Dane Tirado D.O. M.P.H  PM&R  3/16/2021  12:04 PM

## 2021-03-15 NOTE — CARE COORDINATION
Per team, patient to be d/c'd 3/18. Patient notified and agreeable. Await home therapy, IV antibx rec., and home o2 eval.  Will continue to follow for d/c needs.  Electronically signed by GARTH Doll on 3/15/2021 at 4:38 PM

## 2021-03-15 NOTE — PROGRESS NOTES
Patient complained of right thigh \"deep\" pain. mid thigh. Also complained of right shoulder and arm pain. She describes pain as aching. Medicated with Zanaflex and Oxycodone on request. Voltaren ointment applied to right shoulder. Rt PICC line dressing changed. No redness or drainage noted. Right leg incision approximated. No drainage noted. Bilateral lower extremity edema noted. Scattered reddened areas noted. Skin dry and flaky.

## 2021-03-15 NOTE — PROGRESS NOTES
Physical Therapy  Facility/Department: [facility] ACUTE REHAB UNIT  Daily Treatment Note  NAME: Evan Forman  : 1960  MRN: [id number]    Date of Service: 3/15/2021    Discharge Recommendations:  Home with Home health PT, 24 hour supervision or assist   PT Equipment Recommendations  Other: pt has all needed DME    Assessment   Assessment: Pt was limited by generalized deconditioning, WIGGINS with mild hypoxia on RA. Required frequent sitting res breaks; desating from 91%-87% with activity. Transfers and gait training were mildly unsteady requiring sequencing cues throughout; no LOB noted. Requires min cues for safety with 4WW. Treatment Diagnosis: impaired mobility secondary to infection  Prognosis: Good  Decision Making: Medium Complexity  PT Education: Goals;PT Role;Transfer Training;Functional Mobility Training;Gait Training;General Safety;Precautions  Patient Education: Pt requires reinforcement. REQUIRES PT FOLLOW UP: Yes     Patient Diagnosis(es): There were no encounter diagnoses. has a past medical history of Asthma, Depression, Osteoarthritis, PONV (postoperative nausea and vomiting), and Scoliosis. has a past surgical history that includes Gastric bypass surgery (); Rotator cuff repair (Left, ); Knee cartilage surgery (Left, ); Breast enhancement surgery (Bilateral, );  section ([address] 71); Cholecystectomy (); [place] tooth extraction (); joint replacement (Bilateral, 2014); fracture surgery; joint replacement; and Total hip arthroplasty (Bilateral, ). Restrictions  Position Activity Restriction  Other position/activity restrictions: WBAT R LE; up with assist; ok to shower  Subjective   General  Additional Pertinent Hx: Pt had knee and hip replacements. She has since had difficulty with abscesses and infections with the joints. 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  Up until 2021 she was on Keflex Patient admitted too [facility] and  had infected plate removed from her femur 2/25/2021 along with removal of screws,cables and deep bone biopsy. Per Op note. The area around late looked infected. There was sinus tract that connected with knee joint space. The femur stem of the Artifical hip was exposed and there was reactive synovitis there as well. 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 Intraoperatively 3 tissue Cx and 1 deep bone Cx were taken E Faecalis. Patient was started on IV Vancomycin and plan is currently to continue for 12 weeks with tentative end date of 5/20  Referring Practitioner: Dr. King Edu: Pt was up willing to participate  Pain Screening  Patient Currently in Pain: Yes  Pain Assessment  Pain Level: 2  Pain Type: Acute pain;Surgical pain  Pain Location: Knee;Leg  Pain Orientation: Right  Vital Signs  Patient Currently in Pain: Yes  SPo2 on RA 91% at rest. 87% (post ambulation - recovers  To 90% with 2 mins). Orientation  Orientation  Overall Orientation Status: Within Normal Limits    Objective   Bed mobility  Supine to Sit: Supervision  Scooting: Supervision  Transfers  Sit to Stand: Stand by assistance  Stand to sit: Stand by assistance  Ambulation  Ambulation?: Yes  Ambulation 1  Surface: level tile  Device: Rollator  Assistance: Stand by assistance;Contact guard assistance  Quality of Gait: slow marisa; decreased stride length; decreased foot clearance; bilateral lurching, no LOB. Distance: 125ft (with one standing rest break) + 130ft (includes up ramp) + 100ft (includes down ramp) + 100ft + 110ft   Balance  Sitting - Static: Good  Sitting - Dynamic: Fair;+  Standing - Static: Fair  Standing - Dynamic: Fair  Exercises  Unsupported siitting with dynamic reaching to perform pericare with AD and to don and doff pants.    Static standing with dynamic reaching anterior lean and outside AKANKSHA in order to wash hands x2 mins. HR/TRs: 20 B   LAQ: 15x5 sec B   Seated marching: x10 B   GS: 15x5 sec B     Goals  Short term goals  Time Frame for Short term goals: STG=LTG- estimated LOS 7 days  Short term goal 1: The pt will perform supine<>sit with SBA- met 3/11  Short term goal 2: The pt will transfer with SBA and RW- met 3/11  Short term goal 3: The pt will ambulate with walker x 75' with CGA. met 3/11  Short term goal 4: The pt will be able to t  Long term goals  Time Frame for Long term goals : The pt will perform bed mobility with mod I ongoing  Long term goal 1: The pt will transfer with mod I and LRAD ongoing  Long term goal 2: The pt will ambulate 150 ft with LRAD and mod I ongoing  Patient Goals   Patient goals : To get home soon    Plan    Plan  Times per week: 5-7x/wk for 90 minutes per day  Times per day: Daily  Current Treatment Recommendations: Strengthening, Transfer Training, Endurance Training, Neuromuscular Re-education, Patient/Caregiver Education & Training, Balance Training, Gait Training, Functional Mobility Training, Safety Education & Training  Safety Devices  Type of devices:  All fall risk precautions in place, Call light within reach, Nurse notified, Left in chair, Chair alarm in place  Restraints  Initially in place: No     Therapy Time   Individual Concurrent Group Co-treatment   Time In 0930         Time Out 1030         Minutes 60         Timed Code Treatment Minutes: [id number] [facility], [facility]

## 2021-03-15 NOTE — PROGRESS NOTES
Department of Physical Medicine & Rehabilitation  Progress Note    Patient Identification:  Barby Hernandez  [id number]  : 1960  Admit date: 3/3/2021    Chief Complaint: debility    Subjective: Following up with OB and ID today. She continues to require extensive lasix. She is breathing better and was able to walk 150 feet today CG with a walker. Improving in therapy and likely will be able to DC home after IV ABX changes have been made and OB follows as outpatient. ROS: No f/c, n/v, cp     Objective:  Patient Vitals for the past 24 hrs:   BP Temp Temp src Pulse Resp SpO2 Weight   03/15/21 0821 115/72 97.9 °F (36.6 °C) -- 90 18 92 % --   03/15/21 0738 -- -- -- -- 18 92 % --   03/15/21 0410 -- -- -- -- -- -- 214 lb 15.2 oz (97.5 kg)   03/15/21 0345 -- -- -- -- 22 92 % --   21 2039 -- -- -- -- -- 94 % --   21 2030 122/66 98.4 °F (36.9 °C) Oral 94 20 (!) 87 % --   21 1602 -- -- -- -- 20 93 % --   21 1145 -- -- -- -- -- 90 % --     Const: Alert. No distress, pleasant. HEENT: Normocephalic, atraumatic. Normal sclera/conjunctiva. MMM. CV: Regular rate and rhythm. Resp: mild respiratory distress. Abd: Soft, nontender, nondistended, NABS+   Ext: No edema. Neuro: Alert, oriented, appropriately interactive. Psych: Cooperative, appropriate mood and affect    Laboratory data: Available via EMR.    Last 24 hour lab  Recent Results (from the past 24 hour(s))   Basic Metabolic Panel w/ Reflex to MG    Collection Time: 03/15/21  4:49 AM   Result Value Ref Range    Sodium 139 136 - 145 mmol/L    Potassium reflex Magnesium 3.9 3.5 - 5.1 mmol/L    Chloride 100 99 - 110 mmol/L    CO2 31 21 - 32 mmol/L    Anion Gap 8 3 - 16    Glucose 112 (H) 70 - 99 mg/dL    BUN 11 7 - 20 mg/dL    CREATININE 0.8 0.6 - 1.2 mg/dL    GFR Non-African American >60 >60    GFR African American >60 >60    Calcium 8.7 8.3 - 10.6 mg/dL   CBC auto differential    Collection Time: 03/15/21  4:49 AM   Result Value Ref Range    WBC 6.8 4.0 - 11.0 K/uL    RBC 3.20 (L) 4.00 - 5.20 M/uL    Hemoglobin 8.4 (L) 12.0 - 16.0 g/dL    Hematocrit 25.8 (L) 36.0 - 48.0 %    MCV 80.6 80.0 - 100.0 fL    MCH 26.1 26.0 - 34.0 pg    MCHC 32.4 31.0 - 36.0 g/dL    RDW 17.6 (H) 12.4 - 15.4 %    Platelets 327 (H) 405 - 450 K/uL    MPV 7.5 5.0 - 10.5 fL    Neutrophils % 58.2 %    Lymphocytes % 23.8 %    Monocytes % 9.1 %    Eosinophils % 8.3 %    Basophils % 0.6 %    Neutrophils Absolute 4.0 1.7 - 7.7 K/uL    Lymphocytes Absolute 1.6 1.0 - 5.1 K/uL    Monocytes Absolute 0.6 0.0 - 1.3 K/uL    Eosinophils Absolute 0.6 0.0 - 0.6 K/uL    Basophils Absolute 0.0 0.0 - 0.2 K/uL       Therapy progress:  PT  Position Activity Restriction  Other position/activity restrictions: WBAT R LE; up with assist; ok to shower  Objective     Sit to Stand: Stand by assistance  Stand to sit: Stand by assistance  Bed to Chair: Contact guard assistance  Device: Rollator  Other Apparatus: (2 L O2 with activity per RN)  Assistance: Stand by assistance, Contact guard assistance  Distance: 150ft  OT  PT Equipment Recommendations  Equipment Needed: (continue to assess)  Other: pt has all needed DME  Toilet - Technique: Ambulating  Equipment Used: Standard toilet  Toilet Transfers Comments: use of GB to stand  Assessment        SLP          Body mass index is 44.92 kg/m². Assessment and Plan:  Infected Right Femur Hardware  2/25 s/p partial JENIFER  Intra-op cultures with e.  Faecalis  ID consulted, appreciate recs: d/t only partial JENIFER, prefer double beta-lactam coverage for enterococcus  Continue ceftriaxone 2g q 12 hr + unasyn 3g every 6 hours for 12 weeks, unless plan to remove hardware   PT/OT in ARU     Essential HTN - PTA: amlodipine 2.5mg daily, lasix 20mg daily  BP stable w/o meds   At discharge, can resume home lasix, amlodipine     Constipation  Continue bowel regimen      Chronic Pain  Mood Disorder - PTA: elavil, flexeril, fluoxetine, gabapentin, meloxicam  Resume home

## 2021-03-15 NOTE — PLAN OF CARE
Problem: Pain:  Description: Pain management should include both nonpharmacologic and pharmacologic interventions. Goal: Control of acute pain  Description: Control of acute pain  Outcome: Ongoing  Note: Patient has complained of right leg pain. She rates her pain 7 on pain scale. Medicated with Oxycodone as ordered. She has also complained of right  shoulder and upper anterior chest aching  Voltaren ointment applied to shoulder. Problem: Falls - Risk of:  Goal: Absence of physical injury  Description: Absence of physical injury  Note: Patient can act impulsively at times. Fall precautions in place. Bed is in low and locked position. Call light with in reach. Shoeboxed camera in use.

## 2021-03-15 NOTE — PROGRESS NOTES
Physical Therapy  Facility/Department: [facility] ACUTE REHAB UNIT  Daily Treatment Note  NAME: Damion Menjivar  : 1960  MRN: [id number]    Date of Service: 3/15/2021    Discharge Recommendations:  Home with Home health PT, 24 hour supervision or assist   PT Equipment Recommendations  Other: pt has all needed DME    Assessment   Body structures, Functions, Activity limitations: Decreased functional mobility ; Decreased balance;Decreased strength;Decreased endurance;Decreased safe awareness; Increased pain  Assessment: Pt participated in functional txfs, short distance gait training on level surfaces & LE therex. She continues to fatigue with activity & endorses RLE pain with tasks which present as barriers. R knee buckling noted with attempts of forward step ups onto 6'' step requiring moderate assist to correct/maintain balance. She will continue to benefit from skilled PT services to address the above stated deficits & to maximize her functional independence prior to a DC  home with support from spouse. Treatment Diagnosis: impaired mobility secondary to infection  Prognosis: Good  PT Education: Goals;PT Role;Transfer Training;Functional Mobility Training;Gait Training;General Safety;Precautions  Patient Education: Pt requires reinforcement. Barriers to Learning: n/a  REQUIRES PT FOLLOW UP: Yes  Activity Tolerance  Activity Tolerance: Patient limited by pain; Patient limited by fatigue;Patient limited by endurance     Patient Diagnosis(es): There were no encounter diagnoses. has a past medical history of Asthma, Depression, Osteoarthritis, PONV (postoperative nausea and vomiting), and Scoliosis. has a past surgical history that includes Gastric bypass surgery (); Rotator cuff repair (Left, ); Knee cartilage surgery (Left, ); Breast enhancement surgery (Bilateral, );  section ([place] [address]); Cholecystectomy ();  [place] tooth extraction (); joint replacement (Bilateral, 2014); Does not require cues  Objective      Transfers  Sit to Stand: Supervision(up to rollator. Cues for hand placement 2/2 pt attempting to pull from rollator.)  Stand to sit: Supervision  Ambulation  Ambulation?: Yes  WB Status: n/a  More Ambulation?: No  Ambulation 1  Surface: level tile  Device: Rollator  Assistance: Contact guard assistance;Supervision  Quality of Gait: wide AKANKSHA with mild trunk flexion, dec'd B step length/stride length, ,dec'd B marisa, dec'd B foot clearance with a slow gait pace. No overt LOB. ++fatigue with task. Distance: 76' x 2 repetitions  Comments: VCs for upright posture, inc'd foot clearance & slow PLB with activity. SpO2 assessed s/p ambulation = 92% on 2L O2 via NC. Balance  Posture: Fair  Sitting - Static: (I)  Sitting - Dynamic: (I)  Standing - Static: (S with rollator)  Standing - Dynamic: (CGA<>S with rollator)  Exercises  Hip Abduction: x 10 repetitions BLE in seated position with use of blue TB  Knee Active Range of Motion: x 10 repetitions seated B knee flexion with use of blue TB  Comments: VCs for improved therex technique  Other exercises  Other exercises?: Yes  Other exercises 1: x 10 repetitions of repetitive sit<>stand txfs  Other exercises 2: x 3 repetitions with a 15 second hold of gastroc stretch (B)  Other exercises 3: attempted forward step ups ontol 6'' step however pt requiring moderate assist to ascend the RLE & demo's knee buckling therefore deferred 2/2 safety concerns. Will continue to assess & update. Goals  Short term goals  Time Frame for Short term goals: STG=LTG- estimated LOS 7 days  Short term goal 1: The pt will perform supine<>sit with SBA- met 3/11  Short term goal 2: The pt will transfer with SBA and RW- met 3/11  Short term goal 3: The pt will ambulate with walker x 75' with CGA. met 3/11  Short term goal 4: The pt will be able to t  Long term goals  Time Frame for Long term goals :  The pt will perform bed mobility with mod I ongoing  Long term goal 1: The pt will transfer with mod I and LRAD ongoing  Long term goal 2: The pt will ambulate 150 ft with LRAD and mod I ongoing  Patient Goals   Patient goals : To get home soon    Plan    Plan  Times per week: 5-7x/wk for 90 minutes per day  Times per day: Daily  Current Treatment Recommendations: Strengthening, Transfer Training, Endurance Training, Neuromuscular Re-education, Patient/Caregiver Education & Training, Balance Training, Gait Training, Functional Mobility Training, Safety Education & Training  Safety Devices  Type of devices:  All fall risk precautions in place, Call light within reach, Nurse notified, Left in chair, Chair alarm in place  Restraints  Initially in place: No     Therapy Time   Individual Concurrent Group Co-treatment   Time In 1415         Time Out 1445         Minutes 30         Timed Code Treatment Minutes: Ambar Pineda 55., PT

## 2021-03-16 PROCEDURE — 97530 THERAPEUTIC ACTIVITIES: CPT

## 2021-03-16 PROCEDURE — 94640 AIRWAY INHALATION TREATMENT: CPT

## 2021-03-16 PROCEDURE — 6370000000 HC RX 637 (ALT 250 FOR IP): Performed by: PHYSICAL MEDICINE & REHABILITATION

## 2021-03-16 PROCEDURE — 1280000000 HC REHAB R&B

## 2021-03-16 PROCEDURE — 2700000000 HC OXYGEN THERAPY PER DAY

## 2021-03-16 PROCEDURE — 97110 THERAPEUTIC EXERCISES: CPT

## 2021-03-16 PROCEDURE — 99233 SBSQ HOSP IP/OBS HIGH 50: CPT | Performed by: PHYSICAL MEDICINE & REHABILITATION

## 2021-03-16 PROCEDURE — 97116 GAIT TRAINING THERAPY: CPT

## 2021-03-16 PROCEDURE — 99223 1ST HOSP IP/OBS HIGH 75: CPT | Performed by: INTERNAL MEDICINE

## 2021-03-16 PROCEDURE — 6370000000 HC RX 637 (ALT 250 FOR IP): Performed by: STUDENT IN AN ORGANIZED HEALTH CARE EDUCATION/TRAINING PROGRAM

## 2021-03-16 PROCEDURE — 6360000002 HC RX W HCPCS: Performed by: PHYSICAL MEDICINE & REHABILITATION

## 2021-03-16 PROCEDURE — 2580000003 HC RX 258: Performed by: PHYSICAL MEDICINE & REHABILITATION

## 2021-03-16 PROCEDURE — 94761 N-INVAS EAR/PLS OXIMETRY MLT: CPT

## 2021-03-16 RX ADMIN — OXYCODONE 10 MG: 5 TABLET ORAL at 20:07

## 2021-03-16 RX ADMIN — ALBUTEROL SULFATE 2.5 MG: 2.5 SOLUTION RESPIRATORY (INHALATION) at 03:23

## 2021-03-16 RX ADMIN — IPRATROPIUM BROMIDE AND ALBUTEROL SULFATE 1 AMPULE: .5; 3 SOLUTION RESPIRATORY (INHALATION) at 11:46

## 2021-03-16 RX ADMIN — OXYCODONE 10 MG: 5 TABLET ORAL at 15:46

## 2021-03-16 RX ADMIN — AMPICILLIN AND SULBACTAM 3000 MG: 1; 2 INJECTION, POWDER, FOR SOLUTION INTRAMUSCULAR; INTRAVENOUS at 03:10

## 2021-03-16 RX ADMIN — TIZANIDINE 4 MG: 4 TABLET ORAL at 11:08

## 2021-03-16 RX ADMIN — ALPRAZOLAM 0.25 MG: 0.25 TABLET ORAL at 20:07

## 2021-03-16 RX ADMIN — ATORVASTATIN CALCIUM 40 MG: 40 TABLET, FILM COATED ORAL at 20:07

## 2021-03-16 RX ADMIN — AMPICILLIN AND SULBACTAM 3000 MG: 1; 2 INJECTION, POWDER, FOR SOLUTION INTRAMUSCULAR; INTRAVENOUS at 21:09

## 2021-03-16 RX ADMIN — IPRATROPIUM BROMIDE AND ALBUTEROL SULFATE 1 AMPULE: .5; 3 SOLUTION RESPIRATORY (INHALATION) at 15:56

## 2021-03-16 RX ADMIN — ALBUTEROL SULFATE 2.5 MG: 2.5 SOLUTION RESPIRATORY (INHALATION) at 14:18

## 2021-03-16 RX ADMIN — OXYCODONE 10 MG: 5 TABLET ORAL at 06:30

## 2021-03-16 RX ADMIN — AMITRIPTYLINE HYDROCHLORIDE 75 MG: 25 TABLET, FILM COATED ORAL at 20:07

## 2021-03-16 RX ADMIN — IPRATROPIUM BROMIDE AND ALBUTEROL SULFATE 1 AMPULE: .5; 3 SOLUTION RESPIRATORY (INHALATION) at 18:35

## 2021-03-16 RX ADMIN — CEFTRIAXONE SODIUM 2000 MG: 2 INJECTION, POWDER, FOR SOLUTION INTRAMUSCULAR; INTRAVENOUS at 08:00

## 2021-03-16 RX ADMIN — PREGABALIN 100 MG: 75 CAPSULE ORAL at 15:46

## 2021-03-16 RX ADMIN — DIPHENHYDRAMINE HYDROCHLORIDE 25 MG: 25 TABLET ORAL at 21:18

## 2021-03-16 RX ADMIN — IBUPROFEN 400 MG: 400 TABLET, FILM COATED ORAL at 21:18

## 2021-03-16 RX ADMIN — AMLODIPINE BESYLATE 2.5 MG: 5 TABLET ORAL at 08:25

## 2021-03-16 RX ADMIN — IBUPROFEN 400 MG: 400 TABLET, FILM COATED ORAL at 06:30

## 2021-03-16 RX ADMIN — PREGABALIN 100 MG: 75 CAPSULE ORAL at 08:24

## 2021-03-16 RX ADMIN — POTASSIUM CHLORIDE 20 MEQ: 20 TABLET, EXTENDED RELEASE ORAL at 08:25

## 2021-03-16 RX ADMIN — POTASSIUM CHLORIDE 20 MEQ: 20 TABLET, EXTENDED RELEASE ORAL at 15:46

## 2021-03-16 RX ADMIN — IPRATROPIUM BROMIDE AND ALBUTEROL SULFATE 1 AMPULE: .5; 3 SOLUTION RESPIRATORY (INHALATION) at 21:47

## 2021-03-16 RX ADMIN — TIZANIDINE 4 MG: 4 TABLET ORAL at 20:07

## 2021-03-16 RX ADMIN — ALPRAZOLAM 0.25 MG: 0.25 TABLET ORAL at 08:25

## 2021-03-16 RX ADMIN — AMPICILLIN AND SULBACTAM 3000 MG: 1; 2 INJECTION, POWDER, FOR SOLUTION INTRAMUSCULAR; INTRAVENOUS at 09:00

## 2021-03-16 RX ADMIN — FUROSEMIDE 80 MG: 40 TABLET ORAL at 08:25

## 2021-03-16 RX ADMIN — DICLOFENAC SODIUM 2 G: 10 GEL TOPICAL at 21:09

## 2021-03-16 RX ADMIN — CEFTRIAXONE SODIUM 2000 MG: 2 INJECTION, POWDER, FOR SOLUTION INTRAMUSCULAR; INTRAVENOUS at 20:07

## 2021-03-16 RX ADMIN — FLUOXETINE 40 MG: 20 CAPSULE ORAL at 08:24

## 2021-03-16 RX ADMIN — OXYCODONE 10 MG: 5 TABLET ORAL at 11:08

## 2021-03-16 RX ADMIN — PREGABALIN 100 MG: 75 CAPSULE ORAL at 20:07

## 2021-03-16 RX ADMIN — AMPICILLIN AND SULBACTAM 3000 MG: 1; 2 INJECTION, POWDER, FOR SOLUTION INTRAMUSCULAR; INTRAVENOUS at 15:00

## 2021-03-16 RX ADMIN — IPRATROPIUM BROMIDE AND ALBUTEROL SULFATE 1 AMPULE: .5; 3 SOLUTION RESPIRATORY (INHALATION) at 07:32

## 2021-03-16 ASSESSMENT — PAIN DESCRIPTION - DESCRIPTORS
DESCRIPTORS: ACHING
DESCRIPTORS: ACHING

## 2021-03-16 ASSESSMENT — PAIN SCALES - GENERAL
PAINLEVEL_OUTOF10: 10
PAINLEVEL_OUTOF10: 9
PAINLEVEL_OUTOF10: 8
PAINLEVEL_OUTOF10: 9

## 2021-03-16 ASSESSMENT — PAIN DESCRIPTION - PAIN TYPE
TYPE: ACUTE PAIN
TYPE: ACUTE PAIN

## 2021-03-16 ASSESSMENT — PAIN DESCRIPTION - LOCATION: LOCATION: GENERALIZED

## 2021-03-16 ASSESSMENT — PAIN DESCRIPTION - PROGRESSION
CLINICAL_PROGRESSION: NOT CHANGED
CLINICAL_PROGRESSION: NOT CHANGED

## 2021-03-16 ASSESSMENT — PAIN DESCRIPTION - ONSET: ONSET: ON-GOING

## 2021-03-16 ASSESSMENT — PAIN - FUNCTIONAL ASSESSMENT
PAIN_FUNCTIONAL_ASSESSMENT: ACTIVITIES ARE NOT PREVENTED
PAIN_FUNCTIONAL_ASSESSMENT: ACTIVITIES ARE NOT PREVENTED

## 2021-03-16 NOTE — PROGRESS NOTES
Physical Therapy  Facility/Department: [facility] ACUTE REHAB UNIT  Daily Treatment Note  NAME: Ronnie Dorado  : 1960  MRN: [id number]    Date of Service: 3/16/2021    Discharge Recommendations:  Home with Home health PT, 24 hour supervision or assist   PT Equipment Recommendations  Other: pt has all needed DME    Assessment   Body structures, Functions, Activity limitations: Decreased functional mobility ; Decreased balance;Decreased strength;Decreased endurance;Decreased safe awareness; Increased pain  Assessment: Pt continues to be unsafe with use of rollator in marshall without use of brakes or parking it against a sturdy surface. Pt has low motivation to participate in therapy or challenging activities. She demonstrates increased pain, increased fatigue, decreased endurance, and impaired balance with mobility. Pt told during second treatment that she is to wear O2 with all activity by respiratory therapist due to pt continuing to ask for breathing treatments during therapy. She would benefit from further PT services to improve her safety and independence with functional mobility. Treatment Diagnosis: impaired mobility secondary to infection  Prognosis: Good  PT Education: Goals;PT Role;Transfer Training;Functional Mobility Training;Gait Training;General Safety;Precautions  Patient Education: Pt requires reinforcement. Barriers to Learning: decreased motivation  REQUIRES PT FOLLOW UP: Yes  Activity Tolerance  Activity Tolerance: Patient limited by pain; Patient limited by fatigue;Patient limited by endurance  Activity Tolerance: pt requesting breathing treatment after ambulation     Patient Diagnosis(es): There were no encounter diagnoses. has a past medical history of Asthma, Depression, Osteoarthritis, PONV (postoperative nausea and vomiting), and Scoliosis. has a past surgical history that includes Gastric bypass surgery (); Rotator cuff repair (Left, ); Knee cartilage surgery (Left, 2006);  Breast enhancement surgery (Bilateral, );  section ([place] 71); Cholecystectomy (); [place] tooth extraction (); joint replacement (Bilateral, 2014); fracture surgery; joint replacement; and Total hip arthroplasty (Bilateral, ). Restrictions  Position Activity Restriction  Other position/activity restrictions: WBAT R LE; up with assist; ok to shower  Subjective   General  Chart Reviewed: Yes  Additional Pertinent Hx: Pt had knee and hip replacements. She has since had difficulty with abscesses and infections with the joints. 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 Up until 2021 she was on Keflex Patient admitted too [facility] and  had infected plate removed from her femur 2021 along with removal of screws,cables and deep bone biopsy. Per Op note. The area around late looked infected. There was sinus tract that connected with knee joint space. The femur stem of the Artifical hip was exposed and there was reactive synovitis there as well. 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 Intraoperatively 3 tissue Cx and 1 deep bone Cx were taken E Faecalis. Patient was started on IV Vancomycin and plan is currently to continue for 12 weeks with tentative end date of   Family / Caregiver Present: No  Referring Practitioner: Dr. Aden Lewis: Pt presents supine in bed and willing to work with therapy. \"I had a horrible night. \"  Pain Screening  Patient Currently in Pain: Yes(R shoulder pain; unrated)  Vital Signs  Patient Currently in Pain: Yes(R shoulder pain; unrated)       Orientation     Cognition   Cognition  Overall Cognitive Status: Exceptions  Attention Span: Attends with cues to redirect  Memory: Decreased recall of recent events;Decreased short term memory  Problem Solving: Assistance required to implement solutions  Objective   Bed mobility  Supine to Sit: Supervision  Sit to Supine: Stand by assistance(HOB elevated)  Scooting: Supervision(scooting to EOB)  Transfers  Sit to Stand: Stand by assistance(from bed, from toilet, from rollator; cueing for brake placement with transfers)  Stand to sit: Stand by assistance  Ambulation  Ambulation?: Yes  Ambulation 1  Surface: level tile  Device: Rollator  Assistance: Contact guard assistance  Quality of Gait: wide AKANKSHA with mild trunk flexion, dec'd B step length/stride length, ,dec'd B marisa, dec'd B foot clearance with a slow gait pace. No overt LOB. fatigue with task. Distance: 100'+75' x 2  Comments: VCs for upright posture, inc'd foot clearance & slow PLB with activity. SpO2 assessed s/p ambulation = 88%; pt refusing O2  Stairs/Curb  Stairs?: Yes  Stairs  # Steps : 6  Stairs Height: 6\"  Rails: Bilateral  Device: No Device  Assistance: Contact guard assistance  Comment: cueing for proper sequencing of L LE up and R LE down first    2nd session treatment: Pt presents supine in bed and willing to work with therapist. She is SBA for supine>sit with use of rail and sits EOB noting stiffness in her whole body. Pt stands to rollator with SBA and then ambulates to [facility] 50' SBA with rollator. Pt requesting breathing treatment and therapist has 2 LO2 put on her with her saturation at 88% after ambulation. Pt with same gait characteristics as noted above. She gets on Nustep and has breathing treatment before pedaling 6 minutes before resting and then 2 min x 2 to get to 10 minutes. After seated rest break; pt ambulates into marshall x 50' SBA before resting and stating that when she stands her legs feel like they are going to give out. She sits again and then is instructed in seated exercises of hip flexion, LAQ, DF x 15 sanjeev.   She stands and is able to ambulate to her room  X 61' and get in bed with SBA though pt requesting that two people dependently scoot her up in bed though she has been able to independently scoot up in the past few days. Pt left with all needs met and call light as well as sitter present. Goals  Short term goals  Time Frame for Short term goals: STG=LTG- estimated LOS 7 days  Short term goal 1: The pt will perform supine<>sit with SBA- met 3/11  Short term goal 2: The pt will transfer with SBA and RW- met 3/11  Short term goal 3: The pt will ambulate with walker x 75' with CGA. met 3/11  Short term goal 4: The pt will be able to t  Long term goals  Time Frame for Long term goals : The pt will perform bed mobility with mod I ongoing  Long term goal 1: The pt will transfer with mod I and LRAD ongoing  Long term goal 2: The pt will ambulate 150 ft with LRAD and mod I ongoing  Patient Goals   Patient goals : To get home soon    Plan    Plan  Times per week: 5-7x/wk for 90 minutes per day  Times per day: Daily  Current Treatment Recommendations: Strengthening, Transfer Training, Endurance Training, Neuromuscular Re-education, Patient/Caregiver Education & Training, Balance Training, Gait Training, Functional Mobility Training, Safety Education & Training  Safety Devices  Type of devices:  All fall risk precautions in place, Call light within reach, Nurse notified, Left in bed, Bed alarm in place  Restraints  Initially in place: No     Therapy Time   Individual Concurrent Group Co-treatment   Time In 1000         Time Out 1030         Minutes 30         Timed Code Treatment Minutes: 30 Minutes    Second Session Therapy Time:   Individual Concurrent Group Co-treatment   Time In 1400         Time Out 1500         Minutes 60           Timed Code Treatment Minutes:  60+30=90 minutes    Total Treatment Minutes:  90 minutes      Harpreet Reynolds, PT

## 2021-03-16 NOTE — PROGRESS NOTES
Patient alert and oriented x4, anxious at times. Up x1 with walker and GB. C/o pain medicated per MAR. Surgical site CDI. Fall precautions in place, call light within reach, bed alarm on, bed in lowest position, and non skid socks on. VSS. Denies needs at this time.

## 2021-03-16 NOTE — PLAN OF CARE
Problem: Pain:  Goal: Pain level will decrease  Description: Pain level will decrease  Outcome: Ongoing  Note: Patients pain level is being monitored. Pain scale is used to assess pain and interventions are implemented as needed. Problem: Falls - Risk of:  Goal: Will remain free from falls  Description: Will remain free from falls  Outcome: Ongoing  Note: Patient has remained free from falls. Fall precautions in place, call light within reach, bed alarm on, bed in lowest position, and non skid socks on. Problem: Falls - Risk of:  Goal: Absence of physical injury  Description: Absence of physical injury  Outcome: Ongoing  Note: Patient has remained free from physical injury. Fall precautions are in place and patients needs are being met.

## 2021-03-16 NOTE — PROGRESS NOTES
Assessment completed (see doc flow sheet), in bed resting, bed alarm activated, call light within reach, without distress, vital signs stable (see doc flow sheet), denies needs at present time, will continue to monitor and treat. Virginie Franco

## 2021-03-16 NOTE — CARE COORDINATION
Team Conference held this morning. Team discussed DC date for 3/1821 but Pt is now stating that her spouse will not be home from visiting/caring for his mother until Saturday so DC date needs to be Saturday 3/20/21. Consult placed for Dr. Cruz Kwong to see Pt regarding Home IV. Referral made to [facility] for home nebulizer and Home 02 at night-will need RT eval order to be placed. SW met with Pt at bedside this afternoon and discussed above. Pt stated, \"yes that is correct. My  comes home on Saturday and he is my only help at home so I can't be discharged until Saturday afternoon. \" Emotional support provided. SW will continue to follow and make Home IV and skilled Home Care arrangements.      Nanette Orr, [place]  Case Management  [phone]

## 2021-03-16 NOTE — CONSULTS
NUTRITION ASSESSMENT  Admission Date: 3/3/2021       Type and Reason for Visit: Reassess    NUTRITION RECOMMENDATIONS:   1. Continue general diet    NUTRITION ASSESSMENT:  RD follow up re PO intakes/appetite. Pt eating % of meals, no new issues r/t nutrition/food per pt. Declined diet education re weight loss/healthy eating on last RD visit. Noted DC plan for 3/18    MALNUTRITION ASSESSMENT  Context of Malnutrition: Acute Illness   Malnutrition Status: At risk for malnutrition (Comment)  Findings of the 6 clinical characteristics of malnutrition (Minimum of 2 out of 6 clinical characteristics is required to make the diagnosis of moderate or severe Protein Calorie Malnutrition based on AND/ASPEN Guidelines):  Energy Intake: Less than/equal to 75% of estimated energy requirements    Energy Intake Time: x1 day    Weight Loss %: No significant loss   Weight loss Time: No significant    Fluid Accumulation: No significant    Fluid Accumulation Location: No significant     Strength: Not Performed; Not Measured     Due to current CDC guidelines recommending 6-ft distancing for social isolation for COVID19 prevention, physical aspects of the malnutrition assessment were withheld at this time.      NUTRITION DIAGNOSIS   Problem: Problem #1: Inadequate oral intake  Etiology: Insufficient energy/nutrient consumption  Signs & Symptoms: Intake 26-50% and Intake 51-75%    NUTRITION INTERVENTION  Food and/or Nutrient Delivery:Continue Current diet   Nutrition education/counseling/coordination of care: Continue Inpatient Monitoring    NUTRITION MONITORING & EVALUATION:  Evaluation:Goals set   Goals:Goals: Pt will consume >/=50% of meals this admission  Monitoring: Meal Intake , Pertinent Labs  or Weight      OBJECTIVE DATA:  · Nutrition-Focused Physical Findings: YY=816 mg/dL, no edema   · Wounds Surgical Wound      Past Medical History:   Diagnosis Date    Asthma     Depression     Osteoarthritis     PONV (postoperative nausea and vomiting)     Scoliosis         ANTHROPOMETRICS  Current Height: 4' 10\" (147.3 cm)  Current Weight: 214 lb 15.2 oz (97.5 kg)    Admission weight: 214 lb 8.1 oz (97.3 kg)  Ideal Bodyweight: 90 lb   Weight Changes no wt loss noted        BMI BMI (Calculated): 45, 44.92    Wt Readings from Last 50 Encounters:   03/03/21 214 lb 8.1 oz (97.3 kg)   10/27/20 195 lb (88.5 kg)   10/28/20 200 lb 14.4 oz (91.1 kg)     COMPARATIVE STANDARDS  Estimated Total Kcals/Day : 12-15 Current Bodyweight (97 kg) 3114-1175 kcal    Estimated Total Protein (g/day) : 1.5-2.0 Ideal Bodyweight  (41 kg) 62-82 g/day  Estimated Daily Total Fluid (ml/day): 1 mL/kcal per day     Food / Nutrition-Related History  Pre-Admission / Home Diet:  Pre-Admission/Home Diet: General   Home Supplements / Herbals:    none noted  Food Restrictions / Cultural Requests:    none noted    Current Nutrition Therapies   DIET GENERAL;     PO Intake: 51-75% and %  PO Supplement: None   PO Supplement Intake: None   IVF:none     NUTRITION RISK LEVEL: Risk Level:  Moderate     Elida Clayton [address], [address] [place] [address]  [place]:  [phone]  Office:  [phone]

## 2021-03-16 NOTE — PROGRESS NOTES
Department of Physical Medicine & Rehabilitation  Progress Note    Patient Identification:  Olga Lee  [id number]  : 1960  Admit date: 3/3/2021    Chief Complaint: debility    Subjective:   Sitter used today because she continues to get up out of bed without assistance. She does not want to leave until Saturday because no one in her family will be home to take care of her. She continues to participate in therapy but at a limited level. ROS: No f/c, n/v, cp     Objective:  Patient Vitals for the past 24 hrs:   BP Temp Temp src Pulse Resp SpO2   21 0800 121/67 98.8 °F (37.1 °C) Oral 98 18 91 %   21 0733 -- -- -- -- -- (!) 86 %   21 0324 -- -- -- -- -- 93 %   03/15/21 2100 129/84 98.9 °F (37.2 °C) Oral 94 18 92 %   03/15/21 2032 -- -- -- -- -- (!) 83 %   03/15/21 1253 -- -- -- -- -- 93 %     Const: Alert. No distress, pleasant. HEENT: Normocephalic, atraumatic. Normal sclera/conjunctiva. MMM. CV: Regular rate and rhythm. Resp: mild respiratory distress. Abd: Soft, nontender, nondistended, NABS+   Ext: No edema. Neuro: Alert, oriented, appropriately interactive. Psych: Cooperative, appropriate mood and affect    Laboratory data: Available via EMR. Last 24 hour lab  No results found for this or any previous visit (from the past 24 hour(s)). Therapy progress:  PT  Position Activity Restriction  Other position/activity restrictions: WBAT R LE; up with assist; ok to shower  Objective     Sit to Stand: Supervision(up to rollator.  Cues for hand placement 2/2 pt attempting to pull from rollator.)  Stand to sit: Supervision  Bed to Chair: Contact guard assistance  Device: Rollator  Other Apparatus: (2 L O2 with activity per RN)  Assistance: Contact guard assistance, Supervision  Distance: 75' x 2 repetitions  OT  PT Equipment Recommendations  Equipment Needed: (continue to assess)  Other: pt has all needed DME  Toilet - Technique: Ambulating  Equipment Used: Standard toilet  Toilet Transfers Comments: use of GB to stand  Assessment        SLP          Body mass index is 44.92 kg/m². Assessment and Plan:  Infected Right Femur Hardware  2/25 s/p partial JENIFER  Intra-op cultures with e. Faecalis  ID consulted, appreciate recs: d/t only partial JENIFER, prefer double beta-lactam coverage for enterococcus  Continue ceftriaxone 2g q 12 hr + unasyn 3g every 6 hours for 12 weeks, unless plan to remove hardware   PT/OT in ARU     Essential HTN - PTA: amlodipine 2.5mg daily, lasix 20mg daily  BP stable w/o meds   At discharge, can resume home lasix, amlodipine     Constipation  Continue bowel regimen      Chronic Pain  Mood Disorder - PTA: elavil, flexeril, fluoxetine, gabapentin, meloxicam  Resume home meds     Moderate Persistent Asthma  RT assess and treat, IS  Continue home Albuterol prn   O2 sats stable on room air  Needs outpatient PFTs and pulmonology for further w/u of SOB with exertion (? ILD, pulm HTN. ..) (already [facility] referral P[facility] at [facility] consult     Vaginal bleeding- OB consult        Rehab Progress: Improving  Anticipated Dispo: home  Services/DME: TBJOSÉ MIGUEL  ELOS: AVTAR Todd D.O. M.P.H  PM&R  3/16/2021  10:25 AM

## 2021-03-16 NOTE — PLAN OF CARE
Problem: Pain:  Goal: Pain level will decrease  Description: Pain level will decrease  3/15/2021 2005 by Sneha Russell RN  Outcome: Ongoing  Note: Patients pain level is being monitored. Pain scale is used to assess pain and interventions are implemented as needed. Problem: Falls - Risk of:  Goal: Will remain free from falls  Description: Will remain free from falls  3/15/2021 2005 by Sneha Russell RN  Outcome: Ongoing  Note: Patient has remained free from falls. Fall precautions in place, call light within reach, bed alarm on, bed in lowest position, and non skid socks on. Problem: Falls - Risk of:  Goal: Absence of physical injury  Description: Absence of physical injury  Outcome: Ongoing  Note: Patient has remained free from physical injury. Fall precautions are in place and patients needs are being met.

## 2021-03-16 NOTE — PROGRESS NOTES
Occupational Therapy  Facility/Department: [facility] ACUTE REHAB UNIT  Daily Treatment Note  NAME: Erma Santana  : 1960  MRN: [id number]    Date of Service: 3/16/2021    Discharge Recommendations:  24 hour supervision or assist, Home with Home health OT  OT Equipment Recommendations  Equipment Needed: Yes  ADL Assistive Devices: Transfer Tub Bench  Other: pt reports she owns reacher and sock aid at baseline    Assessment   Performance deficits / Impairments: Decreased functional mobility ; Decreased endurance;Decreased coordination;Decreased ADL status; Decreased balance;Decreased strength;Decreased high-level IADLs  Assessment: Pt motivated to return to PLOF but continues to be limited by decreased activity tolerance, generalized deconditioning and WIGGINS. Pt able to tolerate ~4 minutes in static stance before requiring prolonged seated rest break. Pt demonstrates BLE edema R foot>L foot but declines use of compression socks. Pt functioning below baseline, cont per OT POC. Treatment Diagnosis: decreased ADLs and transfers  Prognosis: Fair  OT Education: Plan of Care;ADL Adaptive Strategies;Transfer Training;Home Exercise Program  Patient Education: pt educated on techniques for edema mgmt, pt declined compression socks, pt educated on manual lymphatic draingage massage tech to [employer] and demo understanding, pt educated on HEP and provided with handout  REQUIRES OT FOLLOW UP: Yes  Activity Tolerance  Activity Tolerance: Patient Tolerated treatment well  Activity Tolerance: Pt requiring rest breaks between activities and only able to tolerate maximum of  4 minutes in static stance  Safety Devices  Safety Devices in place: Yes  Type of devices: Chair alarm in place; Left in chair; All fall risk precautions in place;Call light within reach         Patient Diagnosis(es): There were no encounter diagnoses.       has a past medical history of Asthma, Depression, Osteoarthritis, PONV (postoperative nausea and vomiting), fatigue requiring prolonged rest breaks between each set  Functional Mobility  Functional - Mobility Device: 4-Wheeled Walker  Activity: To/From therapy gym  Assist Level: Stand by assistance  Functional Mobility Comments: Cues to lock brakes of 4WW, assist for O2 line management     Transfers  Sit to stand: Stand by assistance  Stand to sit: Stand by assistance                       Cognition  Overall Cognitive Status: Exceptions  Attention Span: Attends with cues to redirect  Memory: Decreased recall of recent events;Decreased short term memory  Problem Solving: Assistance required to implement solutions  Insights: Fully aware of deficits  Initiation: Does not require cues  Sequencing: Does not require cues  Cognition Comment: Pt requring emotional support during session d/t change in d/c date and concern with family dynamics and not having family support or a ride from hospital                    Type of ROM/Therapeutic Exercise  Comment: Pt completed the following BUE therex using blue theraband with use of handout to increase independence with HEP:  Exercises  Shoulder Flexion: x 12  Horizontal ABduction: 2 x 12  Elbow Flexion: x 12  Elbow Extension: 2 x 12 overhead press with VCs and demo for correct body mechanics  Other: x 12 diagonal up, x 12 diagonal down req VCs for body mechanics                  Second Session: Pt sitting in bedside recliner upon arrival, pleasant and agreeable to therapy session excited about going to gift shop. Stand pivot from recliner to w/c with SBA. Pt refused O2 while out of room, pt spot checked during activity (see details below). Seated at rest on room air pt O2 92% HR 99. Pt pushed in w/c for sake of time to gift shop. Once in gift shop, pt STS from w/c to 4WW with SBA and [facility] aisles and environmental barriers with CGA-SBA. Pt able to retrieve items from fridge and shelving in stance with CGA and no overt LOB.  Pt Minutes + 38 Minutes    Total Treatment Time: 80 Minutes         Baron Fees, OT

## 2021-03-16 NOTE — PROGRESS NOTES
Patient alert and oriented x4, anxious at times. Up x1 with walker and GB. C/o pain medicated per MAR. Surgical site CDI. Fall precautions in place, call light within reach, bed alarm on, bed in lowest position, and non skid socks on. VSS. Patient is currently waiting to be moved to another room.

## 2021-03-17 LAB
ANION GAP SERPL CALCULATED.3IONS-SCNC: 6 MMOL/L (ref 3–16)
BASOPHILS ABSOLUTE: 0.1 K/UL (ref 0–0.2)
BASOPHILS RELATIVE PERCENT: 1.1 %
BUN BLDV-MCNC: 10 MG/DL (ref 7–20)
CALCIUM SERPL-MCNC: 8.6 MG/DL (ref 8.3–10.6)
CHLORIDE BLD-SCNC: 99 MMOL/L (ref 99–110)
CO2: 33 MMOL/L (ref 21–32)
CREAT SERPL-MCNC: 0.8 MG/DL (ref 0.6–1.2)
EOSINOPHILS ABSOLUTE: 0.6 K/UL (ref 0–0.6)
EOSINOPHILS RELATIVE PERCENT: 10.9 %
GFR AFRICAN AMERICAN: >60
GFR NON-AFRICAN AMERICAN: >60
GLUCOSE BLD-MCNC: 98 MG/DL (ref 70–99)
HCT VFR BLD CALC: 23.6 % (ref 36–48)
HEMOGLOBIN: 7.7 G/DL (ref 12–16)
LYMPHOCYTES ABSOLUTE: 1.4 K/UL (ref 1–5.1)
LYMPHOCYTES RELATIVE PERCENT: 24.4 %
MCH RBC QN AUTO: 26.1 PG (ref 26–34)
MCHC RBC AUTO-ENTMCNC: 32.7 G/DL (ref 31–36)
MCV RBC AUTO: 79.9 FL (ref 80–100)
MONOCYTES ABSOLUTE: 0.7 K/UL (ref 0–1.3)
MONOCYTES RELATIVE PERCENT: 11.5 %
NEUTROPHILS ABSOLUTE: 3 K/UL (ref 1.7–7.7)
NEUTROPHILS RELATIVE PERCENT: 52.1 %
PDW BLD-RTO: 17.5 % (ref 12.4–15.4)
PLATELET # BLD: 452 K/UL (ref 135–450)
PMV BLD AUTO: 7.4 FL (ref 5–10.5)
POTASSIUM REFLEX MAGNESIUM: 3.6 MMOL/L (ref 3.5–5.1)
RBC # BLD: 2.95 M/UL (ref 4–5.2)
SODIUM BLD-SCNC: 138 MMOL/L (ref 136–145)
WBC # BLD: 5.7 K/UL (ref 4–11)

## 2021-03-17 PROCEDURE — 97530 THERAPEUTIC ACTIVITIES: CPT

## 2021-03-17 PROCEDURE — 2700000000 HC OXYGEN THERAPY PER DAY

## 2021-03-17 PROCEDURE — 97110 THERAPEUTIC EXERCISES: CPT

## 2021-03-17 PROCEDURE — 97535 SELF CARE MNGMENT TRAINING: CPT

## 2021-03-17 PROCEDURE — 6370000000 HC RX 637 (ALT 250 FOR IP): Performed by: PHYSICAL MEDICINE & REHABILITATION

## 2021-03-17 PROCEDURE — 1280000000 HC REHAB R&B

## 2021-03-17 PROCEDURE — 80048 BASIC METABOLIC PNL TOTAL CA: CPT

## 2021-03-17 PROCEDURE — 6370000000 HC RX 637 (ALT 250 FOR IP): Performed by: STUDENT IN AN ORGANIZED HEALTH CARE EDUCATION/TRAINING PROGRAM

## 2021-03-17 PROCEDURE — 97116 GAIT TRAINING THERAPY: CPT

## 2021-03-17 PROCEDURE — 6360000002 HC RX W HCPCS: Performed by: PHYSICAL MEDICINE & REHABILITATION

## 2021-03-17 PROCEDURE — 85025 COMPLETE CBC W/AUTO DIFF WBC: CPT

## 2021-03-17 PROCEDURE — 2580000003 HC RX 258: Performed by: PHYSICAL MEDICINE & REHABILITATION

## 2021-03-17 PROCEDURE — 94640 AIRWAY INHALATION TREATMENT: CPT

## 2021-03-17 PROCEDURE — 94761 N-INVAS EAR/PLS OXIMETRY MLT: CPT

## 2021-03-17 RX ADMIN — AMITRIPTYLINE HYDROCHLORIDE 75 MG: 25 TABLET, FILM COATED ORAL at 20:54

## 2021-03-17 RX ADMIN — CEFTRIAXONE SODIUM 2000 MG: 2 INJECTION, POWDER, FOR SOLUTION INTRAMUSCULAR; INTRAVENOUS at 08:58

## 2021-03-17 RX ADMIN — OXYCODONE 10 MG: 5 TABLET ORAL at 20:55

## 2021-03-17 RX ADMIN — ENOXAPARIN SODIUM 30 MG: 30 INJECTION SUBCUTANEOUS at 08:42

## 2021-03-17 RX ADMIN — POTASSIUM CHLORIDE 20 MEQ: 20 TABLET, EXTENDED RELEASE ORAL at 20:54

## 2021-03-17 RX ADMIN — IPRATROPIUM BROMIDE AND ALBUTEROL SULFATE 1 AMPULE: .5; 3 SOLUTION RESPIRATORY (INHALATION) at 19:43

## 2021-03-17 RX ADMIN — CEFTRIAXONE SODIUM 2000 MG: 2 INJECTION, POWDER, FOR SOLUTION INTRAMUSCULAR; INTRAVENOUS at 20:49

## 2021-03-17 RX ADMIN — ALPRAZOLAM 0.25 MG: 0.25 TABLET ORAL at 04:08

## 2021-03-17 RX ADMIN — ALBUTEROL SULFATE 2.5 MG: 2.5 SOLUTION RESPIRATORY (INHALATION) at 23:40

## 2021-03-17 RX ADMIN — DICLOFENAC SODIUM 2 G: 10 GEL TOPICAL at 08:54

## 2021-03-17 RX ADMIN — AMPICILLIN AND SULBACTAM 3000 MG: 1; 2 INJECTION, POWDER, FOR SOLUTION INTRAMUSCULAR; INTRAVENOUS at 09:42

## 2021-03-17 RX ADMIN — ATORVASTATIN CALCIUM 40 MG: 40 TABLET, FILM COATED ORAL at 20:55

## 2021-03-17 RX ADMIN — AMPICILLIN AND SULBACTAM 3000 MG: 1; 2 INJECTION, POWDER, FOR SOLUTION INTRAMUSCULAR; INTRAVENOUS at 03:30

## 2021-03-17 RX ADMIN — AMPICILLIN AND SULBACTAM 3000 MG: 1; 2 INJECTION, POWDER, FOR SOLUTION INTRAMUSCULAR; INTRAVENOUS at 15:45

## 2021-03-17 RX ADMIN — PREGABALIN 100 MG: 75 CAPSULE ORAL at 20:54

## 2021-03-17 RX ADMIN — IPRATROPIUM BROMIDE AND ALBUTEROL SULFATE 1 AMPULE: .5; 3 SOLUTION RESPIRATORY (INHALATION) at 16:11

## 2021-03-17 RX ADMIN — PREGABALIN 100 MG: 75 CAPSULE ORAL at 12:52

## 2021-03-17 RX ADMIN — PREGABALIN 100 MG: 75 CAPSULE ORAL at 08:42

## 2021-03-17 RX ADMIN — ALPRAZOLAM 0.25 MG: 0.25 TABLET ORAL at 16:47

## 2021-03-17 RX ADMIN — DIPHENHYDRAMINE HYDROCHLORIDE 25 MG: 25 TABLET ORAL at 05:36

## 2021-03-17 RX ADMIN — ALBUTEROL SULFATE 2.5 MG: 2.5 SOLUTION RESPIRATORY (INHALATION) at 04:17

## 2021-03-17 RX ADMIN — POTASSIUM CHLORIDE 20 MEQ: 20 TABLET, EXTENDED RELEASE ORAL at 12:54

## 2021-03-17 RX ADMIN — OXYCODONE 10 MG: 5 TABLET ORAL at 12:48

## 2021-03-17 RX ADMIN — AMLODIPINE BESYLATE 2.5 MG: 5 TABLET ORAL at 08:43

## 2021-03-17 RX ADMIN — IPRATROPIUM BROMIDE AND ALBUTEROL SULFATE 1 AMPULE: .5; 3 SOLUTION RESPIRATORY (INHALATION) at 12:54

## 2021-03-17 RX ADMIN — FLUOXETINE 40 MG: 20 CAPSULE ORAL at 08:43

## 2021-03-17 RX ADMIN — DICLOFENAC SODIUM 2 G: 10 GEL TOPICAL at 20:55

## 2021-03-17 RX ADMIN — OXYCODONE 10 MG: 5 TABLET ORAL at 16:47

## 2021-03-17 RX ADMIN — IPRATROPIUM BROMIDE AND ALBUTEROL SULFATE 1 AMPULE: .5; 3 SOLUTION RESPIRATORY (INHALATION) at 07:22

## 2021-03-17 RX ADMIN — FUROSEMIDE 80 MG: 40 TABLET ORAL at 08:42

## 2021-03-17 RX ADMIN — OXYCODONE 10 MG: 5 TABLET ORAL at 08:42

## 2021-03-17 RX ADMIN — OXYCODONE 10 MG: 5 TABLET ORAL at 04:08

## 2021-03-17 RX ADMIN — AMPICILLIN AND SULBACTAM 3000 MG: 1; 2 INJECTION, POWDER, FOR SOLUTION INTRAMUSCULAR; INTRAVENOUS at 21:46

## 2021-03-17 ASSESSMENT — PAIN DESCRIPTION - LOCATION
LOCATION: HIP;SHOULDER
LOCATION: KNEE;LEG

## 2021-03-17 ASSESSMENT — PAIN DESCRIPTION - FREQUENCY
FREQUENCY: CONTINUOUS

## 2021-03-17 ASSESSMENT — PAIN DESCRIPTION - ONSET
ONSET: ON-GOING
ONSET: ON-GOING

## 2021-03-17 ASSESSMENT — PAIN SCALES - GENERAL
PAINLEVEL_OUTOF10: 6
PAINLEVEL_OUTOF10: 9
PAINLEVEL_OUTOF10: 7
PAINLEVEL_OUTOF10: 9

## 2021-03-17 ASSESSMENT — PAIN DESCRIPTION - PROGRESSION
CLINICAL_PROGRESSION: NOT CHANGED

## 2021-03-17 ASSESSMENT — PAIN DESCRIPTION - DIRECTION: RADIATING_TOWARDS: KNEE

## 2021-03-17 ASSESSMENT — PAIN DESCRIPTION - ORIENTATION
ORIENTATION: RIGHT

## 2021-03-17 ASSESSMENT — PAIN - FUNCTIONAL ASSESSMENT
PAIN_FUNCTIONAL_ASSESSMENT: ACTIVITIES ARE NOT PREVENTED

## 2021-03-17 ASSESSMENT — PAIN DESCRIPTION - DESCRIPTORS
DESCRIPTORS: ACHING
DESCRIPTORS: ACHING

## 2021-03-17 ASSESSMENT — PAIN DESCRIPTION - PAIN TYPE: TYPE: ACUTE PAIN;SURGICAL PAIN

## 2021-03-17 NOTE — PROGRESS NOTES
Department of Physical Medicine & Rehabilitation  Progress Note    Patient Identification:  Ronnie Dorado  [id number]  : 1960  Admit date: 3/3/2021    Chief Complaint: debility    Subjective:   Patient seen and evaluated at bedside today. Continues to participate in therapy with some pain and soreness in the right shoulder. Tolerating diet well. Continues to be fatigued with therapy. Concerned with limited access to medical care as an outpatient. ROS: No f/c, n/v, cp     Objective:  Patient Vitals for the past 24 hrs:   BP Temp Temp src Pulse Resp SpO2   21 0724 -- -- -- -- -- 94 %   21 0418 -- -- -- -- -- 94 %   21 2147 -- -- -- -- -- 94 %   21 135/73 98.1 °F (36.7 °C) Oral 89 18 90 %   21 1700 122/68 98.1 °F (36.7 °C) Oral 92 20 91 %   21 1559 -- -- -- -- 20 91 %   21 1419 -- -- -- -- -- (!) 89 %   21 1147 -- -- -- -- -- 91 %     Const: Alert. No distress, pleasant. HEENT: Normocephalic, atraumatic. Normal sclera/conjunctiva. MMM. CV: Regular rate and rhythm. Resp: mild respiratory distress. Abd: Soft, nontender, nondistended, NABS+   Ext: No edema. Right shoulder pain on increased mobility. Tenderness at the joint. Neuro: Alert, oriented, appropriately interactive. Psych: Cooperative, appropriate mood and affect    Laboratory data: Available via EMR.    Last 24 hour lab  Recent Results (from the past 24 hour(s))   Basic Metabolic Panel w/ Reflex to MG    Collection Time: 21  4:17 AM   Result Value Ref Range    Sodium 138 136 - 145 mmol/L    Potassium reflex Magnesium 3.6 3.5 - 5.1 mmol/L    Chloride 99 99 - 110 mmol/L    CO2 33 (H) 21 - 32 mmol/L    Anion Gap 6 3 - 16    Glucose 98 70 - 99 mg/dL    BUN 10 7 - 20 mg/dL    CREATININE 0.8 0.6 - 1.2 mg/dL    GFR Non-African American >60 >60    GFR African American >60 >60    Calcium 8.6 8.3 - 10.6 mg/dL   CBC auto differential    Collection Time: 21  4:17 AM   Result Value Ref resume home lasix, amlodipine     Constipation  Continue bowel regimen      Chronic Pain  Mood Disorder - PTA: elavil, flexeril, fluoxetine, gabapentin, meloxicam  Resume home meds    Right shoulder pain. SP fall at previous hospitalization. Increased stiffness and pain. Xray shows   Moderate acromioclavicular joint arthrosis. If pain doesn't improve may consider further imaging.      Moderate Persistent Asthma  RT assess and treat, IS  Continue home Albuterol prn   O2 sats stable on room air  Needs outpatient PFTs and pulmonology for further w/u of SOB with exertion (? ILD, pulm HTN. ..) (already new referral PTA at an [facility] consult     Vaginal bleeding- OB consult   Transvaginal US shows  small endometrial polyp, left ovary not visualized.      Rehab Progress: Improving  Anticipated Dispo: home  Services/DME: AVTAR  ELOS: AVTAR Carlson, PGY 3   PM&R  3/17/2021  8:18 AM      CARLYN LewisP.H  PM&R  3/17/2021  9:47 AM

## 2021-03-17 NOTE — PROGRESS NOTES
Physical Therapy  Facility/Department: [facility] ACUTE REHAB UNIT  Daily Treatment Note  NAME: Angela Covarrubias  : 1960  MRN: [id number]    Date of Service: 3/17/2021    Discharge Recommendations:  Home with Home health PT, 24 hour supervision or assist   PT Equipment Recommendations  Other: pt has all needed DME    Assessment   Body structures, Functions, Activity limitations: Decreased functional mobility ; Decreased balance;Decreased strength;Decreased endurance;Decreased safe awareness; Increased pain  Assessment: Pt completes toileting with PT but refuses to do any other activities with OT until recieving breathing treatment despite SpO2 of 96% and education from PT. Pt is SBA for ambulation and all toileting activities except for activities completed with UL/no UE support in which she requires CGA. Pt would benefit from further skilled PT in order to improve functional mobility and independence. Treatment Diagnosis: impaired mobility secondary to infection  Prognosis: Good  Decision Making: Medium Complexity  PT Education: Goals;PT Role;Transfer Training;Functional Mobility Training;Gait Training;General Safety;Precautions  Patient Education: Pt educated that she is safe to participate in therapy due to SpO2 level being at 96% and that PT will  continue to monitor to ensure she is safe to work with therapy. Pt continues to refuse further therapy until breathing treatment reporting \"I know myself and I will need to be intubated again if I work with you\". Barriers to Learning: decreased motivation  REQUIRES PT FOLLOW UP: Yes  Activity Tolerance  Activity Tolerance: Patient limited by pain; Patient limited by fatigue;Patient limited by endurance  Activity Tolerance: pt limited by SOB, however SpO2 98% measured, pt requests breathing treatment in order to work with therapy     Patient Diagnosis(es): There were no encounter diagnoses.      has a past medical history of Asthma, Depression, Osteoarthritis, PONV (postoperative nausea and vomiting), and Scoliosis. has a past surgical history that includes Gastric bypass surgery (); Rotator cuff repair (Left, ); Knee cartilage surgery (Left, ); Breast enhancement surgery (Bilateral, );  section ([address]); Cholecystectomy (); [place] tooth extraction (); joint replacement (Bilateral, 2014); fracture surgery; joint replacement; and Total hip arthroplasty (Bilateral, ). Restrictions  Position Activity Restriction  Other position/activity restrictions: WBAT R LE; up with assist; ok to shower  Subjective   General  Chart Reviewed: Yes  Additional Pertinent Hx: Pt had knee and hip replacements. She has since had difficulty with abscesses and infections with the joints. 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 Up until 2021 she was on Keflex Patient admitted too [address] [place] [address] and  had infected plate removed from her femur 2021 along with removal of screws,cables and deep bone biopsy. Per Op note. The area around late looked infected. There was sinus tract that connected with knee joint space. The femur stem of the Artifical hip was exposed and there was reactive synovitis there as well. 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 Intraoperatively 3 tissue Cx and 1 deep bone Cx were taken E Faecalis. Patient was started on IV Vancomycin and plan is currently to continue for 12 weeks with tentative end date of   Family / Caregiver Present: No  Referring Practitioner: Dr. Kaela Childers: [facility] and agrees to let PT assist her to the bathroom. After completion of activities in bathroom pt refuses further PT until she gets a breathing treatment.   Pain Screening  Patient Currently in Pain: Yes(RN present upon approach administerring medication)  Pain Assessment  Pain complete. Pt left supine in bed with all needs met , alarm on , and RN aware        G-Code     OutComes Score                                                     AM-PAC Score             Goals  Short term goals  Time Frame for Short term goals: STG=LTG- estimated LOS 7 days  Short term goal 1: The pt will perform supine<>sit with SBA- met 3/11  Short term goal 2: The pt will transfer with SBA and RW- met 3/11  Short term goal 3: The pt will ambulate with walker x 75' with CGA. met 3/11  Short term goal 4: The pt will be able to t  Long term goals  Time Frame for Long term goals : The pt will perform bed mobility with mod I ongoing  Long term goal 1: The pt will transfer with mod I and LRAD ongoing  Long term goal 2: The pt will ambulate 150 ft with LRAD and mod I ongoing  Patient Goals   Patient goals : To get home soon    Plan    Plan  Times per week: 5-7x/wk for 90 minutes per day  Times per day: Daily  Current Treatment Recommendations: Strengthening, Transfer Training, Endurance Training, Neuromuscular Re-education, Patient/Caregiver Education & Training, Balance Training, Gait Training, Functional Mobility Training, Safety Education & Training  Safety Devices  Type of devices:  All fall risk precautions in place, Call light within reach, Nurse notified, Left in chair, Chair alarm in place  Restraints  Initially in place: No     Therapy Time   Individual Concurrent Group Co-treatment   Time In 1100         Time Out 1130         Minutes 30             Total Minutes: 30 min   Second Session Therapy Time:   Individual Concurrent Group Co-treatment   Time In 1400         Time Out 1455         Minutes            Timed Code Treatment Minutes:  14+82    Total Treatment Minutes:  85    Variance: 5  Reason: Refusal    Noé Cobos, SPT

## 2021-03-17 NOTE — CONSULTS
Infectious Diseases Inpatient Consult Note    Reason for Consult:   R leg hardware associated infection  Requesting Physician:   Dr Stalin Jimenez  Primary Care Physician:  Corrine Vargas  History Obtained From:   Pt, EPIC    Admit Date: 3/3/2021  Hospital Day: 15    CHIEF COMPLAINT:     R leg infection    HISTORY OF PRESENT ILLNESS:      64 yo woman with hx scoliosis, OA, chronic pain, asthma, obesity (BMI 44), psych  Pt had fall / trauma 2013/2014  Pt had b/l TKA 12/29/14, b/l JACEK, R JACEK revision 6/2016    Pt had fall and R leg fx assoc with JACEK, had ORIF 10/2016. 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 Culture + MSSA - [facility], 3/18/20    Pt had recurrent infection, seen by Ortho 2/3/21  Admit / OR 2/25 - removal plate screw yet extension to R JACEK and assoc with stem / poss extension to R TKA, arthoplasties NOT removed, 1 colony E faecalis, S amp / vanco.  Seen by ID, recommended iv ceftriaxone 2 gm iv q 12 and unasyn 3 gm q 6 hr x 12 weeks     Admit ARU 3/4. Doing well.   Asked to see regarding antibiotic regimen for after discharge from ARU  Pt report pain controlled, therapy progressing     Past Medical History:    Past Medical History:   Diagnosis Date    Asthma     Depression     Osteoarthritis     PONV (postoperative nausea and vomiting)     Scoliosis        Past Surgical History:    Past Surgical History:   Procedure Laterality Date    BREAST ENHANCEMENT SURGERY Bilateral 2007   [address]    GASTRIC BYPASS SURGERY  1998    [place]   Leeanna Downy JOINT REPLACEMENT Bilateral 12/29/2014    knee replacement    JOINT REPLACEMENT      KNEE CARTILAGE SURGERY Left 2006    ROTATOR CUFF REPAIR Left 2008    TOTAL HIP ARTHROPLASTY Bilateral 2014    WISDOM TOOTH EXTRACTION  1995       Current Medications:     furosemide  80 mg Oral Daily    ipratropium-albuterol  1 ampule Inhalation 4x daily  potassium chloride  20 mEq Oral BID     diclofenac sodium  2 g Topical BID    lidocaine  1 patch Transdermal Daily    cefTRIAXone (ROCEPHIN) IV  2,000 mg Intravenous Q12H    enoxaparin  30 mg Subcutaneous Daily    amitriptyline  75 mg Oral Nightly    amLODIPine  2.5 mg Oral Daily    atorvastatin  40 mg Oral Nightly    FLUoxetine  40 mg Oral Daily    pregabalin  100 mg Oral TID    ampicillin-sulbactam  3,000 mg Intravenous Q6H    bisacodyl  5 mg Oral Daily       Allergies:  Cortisone, Droperidol, and Compazine [prochlorperazine]    Social History:    TOBACCO:    None - past, quit 2014  ETOH:    None - past  DRUGS:   None   MARITAL STATUS:      OCCUPATION:   Disabled, RN in past     Family History:   No immunodeficiency    REVIEW OF SYSTEMS:    No fever / chills / sweats. No weight loss. No visual change, eye pain, eye discharge. No oral lesion, sore throat, dysphagia. Denies cough / sputum. Denies chest pain, palpitations. Denies n / v / abd pain. No diarrhea. Denies dysuria or change in urinary function. Denies joint swelling or pain. No myalgia, arthralgia. Denies skin changes, itching  Denies focal weakness, sensory change or other neurologic symptom    Denies new / worse depression, psychiatric symptoms    PHYSICAL EXAM:      Vitals:    /73   Pulse 89   Temp 98.1 °F (36.7 °C) (Oral)   Resp 18   Ht 4' 10\" (1.473 m)   Wt 214 lb 15.2 oz (97.5 kg)   LMP 12/22/2014   SpO2 94%   BMI 44.92 kg/m²     GENERAL: No apparent distress.     HEENT: Membranes moist, no oral lesion, PERRL  NECK:  Supple, no lymphadenopathy  LUNGS: Clear b/l, no rales, no dullness  CARDIAC: RRR, no murmur appreciated  ABD:  + BS, soft / NT  EXT:  No rash, no edema, no lesions  NEURO: No focal neurologic findings  PSYCH: Orientation, sensorium, mood normal  LINES:  Peripheral iv    DATA:    Lab Results   Component Value Date    WBC 6.8 03/15/2021    HGB 8.4 (L) 03/15/2021    HCT 25.8 (L) 03/15/2021 MCV 80.6 03/15/2021     (H) 03/15/2021     Lab Results   Component Value Date    CREATININE 0.8 03/15/2021    BUN 11 03/15/2021     03/15/2021    K 3.9 03/15/2021     03/15/2021    CO2 31 03/15/2021       Hepatic Function Panel:   Lab Results   Component Value Date    ALKPHOS 113 10/14/2018    ALT 15 10/14/2018    AST 17 10/14/2018    PROT 7.5 10/14/2018    BILITOT <0.2 10/14/2018    BILIDIR <0.2 06/29/2018    IBILI see below 06/29/2018    LABALBU 3.7 10/14/2018       Micro:  2/25/21 [facility] - 1 colony E faecalis    3/18/20 [facility]  Organism Antibiotic Method Susceptibility   Staphylococcus aureus Ciprofloxacin SAVI <=0.5: Sensitive    Comment: SUSCEPTIBLE    Clindamycin SAVI <=0.25: Sensitive    Comment: SUSCEPTIBLE    Erythromycin SAVI <=0.25: Sensitive    Comment: SUSCEPTIBLE    Gentamicin SAVI <=0.5: Sensitive    Comment: SUSCEPTIBLE    Levofloxacin SAVI <=0.12:  Sensitive    Comment: SUSCEPTIBLE    Moxifloxacin SAVI <=0.25: Sensitive    Comment: SUSCEPTIBLE    Oxacillin SAVI <=0.25: Sensitive    Comment: SUSCEPTIBLE    Rifampin SAVI <=0.5: Sensitive    Comment: SUSCEPTIBLE    Tetracycline SAVI <=1: Sensitive    Comment: SUSCEPTIBLE    Vancomycin SAVI 1: Sensitive        Benzylpenicillin SAVI >=0.5: Resistant    Comment: RESISTANT    Trimethoprim + Sulfamethoxazole SAVI <=10: Sensitive     Imaging:   3/11 LE doppler - no DVT    3/11 CXR  'Stable appearance of the chest including pulmonary vascular congestion and right basilar airspace disease'      IMPRESSION:      Patient Active Problem List   Diagnosis    Knee osteoarthritis    Chronic pain    Bladder prolapse    Moderate persistent asthma    Alcohol abuse    Localized edema    Anxiety    Primary insomnia    Arthritis    Alcohol use disorder, severe, dependence (HCC)    Depression    Suicide attempt ([facility] [address]    Acute asthma exacerbation    Right knee pain    Asthma    Contusion of right knee    WIGGINS (dyspnea on exertion)    Dyspnea on exertion    Debility    Hypoxemia    PMB (postmenopausal bleeding)    Menopause       Hx scoliosis, OA, chronic pain, asthma, obesity (BMI 44), psych    Hx fall / trauma 2013/2014  Hx b/l TKA 12/29/14, b/l JACEK, R JACEK revision 6/2016    Hx fall and R leg fx assoc with JACEK, had ORIF 10/2016. 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   Culture + MSSA - [facility], 3/18/20    Pt had recurrent infection, seen by Ortho 2/3/21  Admit / OR 2/25 - removal plate screw yet extension to R JACEK and assoc with stem / poss extension to R TKA, arthoplasties NOT removed, 1 colony E faecalis, S amp / vanco.  Seen by ID, recommended iv ceftriaxone 2 gm iv q 12 and unasyn 3 gm q 6 hr x 12 weeks     IMP/  R leg infection assoc with hardware (JACEK, TKA)    RECOMMENDATIONS:    Cont ceftriaxone and unasyn for now   - plan per Dr Caty Shah, [facility] for 12 weeks - through 5/20    Will confer with [facility] regarding antibiotic / Ortho plan   - NP Syed Kenyon [phone], pager [phone], cell [phone]    At discharge, may substitute ampicillin 2 gm q 4 / 12 hr daily (CAD pump) for unasyn    Discussed with pt  Fabiola Arceo MD

## 2021-03-17 NOTE — CARE COORDINATION
Spoke to patient. She is requesting assistance with cleaning, laundry, groceries. Patient reports her  is not at home that much. Reports he goes to [facility] to take care of his mother. Discussed home care for RN, PT/OT and HHA. Discussed private duty vs. [facility]. Patient states she can not afford private duty. States she is current with [facility] for [facility] [facility]. She is agreeable to home health aides from [employer]. Call placed to [facility]. Patient is current, patient was getting 4 hours a week of home health aide, but this was put on hold March 2020. They will restart this service for her for next week. Patient notified and agreeable. No other needs at this time.   Electronically signed by GARTH Miranda, DIANNA on 3/17/2021 at 4:21 PM

## 2021-03-17 NOTE — PROGRESS NOTES
Patient used call light to inform Primary RN of need for prn pain medication. Primary RN at bedside to medicate patient for pain per request, please see MAR. Pt resting quietly in bed, no acute distress noted. Pt denies further needs. Call light remains in reach, will monitor.

## 2021-03-17 NOTE — PROGRESS NOTES
Patient resting quietly in bed watching TV, no acute distress noted. Scheduled medication administered at this time. Pt requested [employer], Primary RN provided per request. Pt denies further needs. Call light remains in reach, will continue to monitor.

## 2021-03-17 NOTE — PROGRESS NOTES
Occupational Therapy  Facility/Department: [facility] ACUTE REHAB UNIT  Daily Treatment Note  NAME: Axel Mejia  : 1960  MRN: [id number]    Date of Service: 3/17/2021    Discharge Recommendations:  24 hour supervision or assist, Home with Home health OT  OT Equipment Recommendations  Equipment Needed: Yes  ADL Assistive Devices: Transfer Tub Bench  Other: pt reports she owns reacher and sock aid at baseline    Assessment   Performance deficits / Impairments: Decreased functional mobility ; Decreased endurance;Decreased coordination;Decreased ADL status; Decreased balance;Decreased strength;Decreased high-level IADLs  Assessment: Pt able to don shoes for first time this with spvn assist and use of [place] 9 d/t decreased edema as pt agreed to wear tenso shapes yesterday afternoon. Pt completed toileting with SBA but continues to require demo and VCs to sequence and setup toilet aid properly d/t decreased recall. Pt functioning below baseline, cont per OT POC. Treatment Diagnosis: decreased ADLs and transfers  Prognosis: Fair  OT Education: Plan of Care;ADL Adaptive Strategies;Transfer Training;Home Exercise Program  Patient Education: education on use of toilet aid-continue to reinforce d/t impaired recall  REQUIRES OT FOLLOW UP: Yes  Activity Tolerance  Activity Tolerance: Patient Tolerated treatment well  Safety Devices  Safety Devices in place: Yes  Type of devices: Chair alarm in place; Left in chair; All fall risk precautions in place;Call light within reach         Patient Diagnosis(es): There were no encounter diagnoses. has a past medical history of Asthma, Depression, Osteoarthritis, PONV (postoperative nausea and vomiting), and Scoliosis. has a past surgical history that includes Gastric bypass surgery (); Rotator cuff repair (Left, ); Knee cartilage surgery (Left, ); Breast enhancement surgery (Bilateral, );  section ([place] 71); Cholecystectomy ();  [place] tooth extraction (1995); joint replacement (Bilateral, 12/29/2014); fracture surgery; joint replacement; and Total hip arthroplasty (Bilateral, 2014). Restrictions  Position Activity Restriction  Other position/activity restrictions: WBAT R LE; up with assist; ok to shower  Subjective   General  Chart Reviewed: Yes  Patient assessed for rehabilitation services?: Yes  Additional Pertinent Hx: Pt admitted to OSH with h/o hip hardware infection. Infected hardware removed on 2/25/21. Started on IV ABX. Admitted to ARU on 3/3/21. PMHx inclues:  Asthma, Depression, Osteoarthritis, PONV (postoperative nausea and vomiting), and Scoliosis. Family / Caregiver Present: No  Referring Practitioner: Heis  Diagnosis: debility  Subjective  Subjective: Pt supine in bed upon arrival, pleasant and agreeable to OT session. RN present during session to change IV anitbiotics. General Comment  Comments: Sammi Boyd Vital Signs  Patient Currently in Pain: Yes(pain in RLE, did not rate)     Orientation  Orientation  Overall Orientation Status: Within Functional Limits  Objective    ADL  Feeding: Independent  LE Dressing: Supervision(seated EOB pt donned tenso shapes using sock aid, donned sneakers using [place] 9 req + time for heel mgmt d/t edema, use of foot stool for placement of BLEs)  Toileting: Stand by assistance; Increased time to complete(pt continent of bowel and bladder with use of toilet aid for perihygiene seated +++time and initially requiring demo for proper setup/use of toilet aid using wet wipes; LB clothing mgmt in stance)        Balance  Sitting Balance: Modified independent (seated EOB and on toilet)  Standing Balance: Stand by assistance  Standing Balance  Time: ~5 minutes total  Activity: functional mobility to/from bathroom; in stance for toileting; functional transfers  Functional Mobility  Functional - Mobility Device: 4-Wheeled Walker  Activity: To/from bathroom  Assist Level: Stand by assistance  Functional Mobility Comments: OT assisting with IV pole  Bed mobility  Supine to Sit: Supervision(HOB flat, use of bed rail)  Transfers  Sit to stand: Stand by assistance  Stand to sit: Stand by assistance  Transfer Comments: v/c for safe hand placement, locking brakes on 4WW and appropriate positioning of device prior to transitional movements throughout session                       Cognition  Overall Cognitive Status: Exceptions  Following Commands: Follows one step commands consistently  Attention Span: Attends with cues to redirect  Memory: Decreased recall of recent events;Decreased short term memory  Problem Solving: Assistance required to implement solutions  Insights: Fully aware of deficits  Initiation: Does not require cues  Sequencing: Does not require cues  Cognition Comment: Pt requiring verbal demo and step by step VCs for proper use of toilet aid despite multiple uses and instruction in previous sessions        Second Session: Pt supine in bed upon arrival with RN present providing pain medication reporting 8.5 pain in R hip. Supine<sit with HOB elevated with spvn assist. STS from EOB to 4WW with SBA. Pt completed functional mobility to/from bathroom using 4WW with SBA. Pt continent of bladder using toilet aid i'ly for pericare seated and mgmt of LB clothing in stance with SBA. Pt completed hand hygiene and oral hygiene in stance at sink with SBA. Pt stand<sit to recliner with SBA. Pt completed the following BUE therex using 3# dowel aracelis in order to increase activity tolerance and BUE strength, pt req VCs for upright seated posture and BLEs propped on footrest: 2 x 15 biceps curls, 2 x 15 bilateral trunk rotation with VCs for pacing and proper body mechanics, 2 x 15 chest press. Pt provided with lunch tray at EOS. Pt left in bed at end of session with bed alarm engaged, call light within reach and all needs met.        Plan   Plan  Times per week: 5 days per week 90 mins each  Times per day: Daily  Current Treatment Recommendations: Strengthening, Balance Training, Functional Mobility Training, Endurance Training, Self-Care / ADL, Patient/Caregiver Education & Training, Safety Education & Training, Equipment Evaluation, Education, & procurement       Goals  Short term goals  Time Frame for Short term goals: by 1 week  Short term goal 1: Pt will complete LB dressing with AE PRN at mod A-goal met 3/6  Short term goal 2: Pt will complete toileting with CGA-goal met 3/5  Short term goal 3: Pt will complete bathing at min A- goal met 3/10  Short term goal 4: Pt will complete shower transfer at spvn-ongoing  Short term goal 5: Pt will complete B UE exercises at mod I-ongoing  Long term goals  Time Frame for Long term goals : By 2 weeks-all ongoing  Long term goal 1: Pt will complete LB dressing with AE PRN at mod I  Long term goal 2: Pt will complete toileting with mod I  Long term goal 3: Pt will complete bathing at mod I  Long term goal 4: Pt will complete shower transfer at mod I  Long term goal 5: Pt will complete simple meal prep task at mod I with LRAD  Patient Goals   Patient goals : to be able to go home       Therapy Time   Individual Second Session Group Co-treatment   Time In 0920  1255       Time Out 1020  1325       Minutes 60  30       Timed Code Treatment Minutes: 60 Minutes+ 30 Minutes  Total Treatment Time: Johnna Saucedo 47, OT

## 2021-03-17 NOTE — PROGRESS NOTES
Primary RN rounding. Pt requested prn pain medication and Xanax, provided by Primary RN per request, please see MAR. Pt resting quietly in bed watching TV, no acute distress noted. Pt denies further needs at this time. Call light remains in reach, will continue to monitor.

## 2021-03-18 LAB
C-REACTIVE PROTEIN: 15.3 MG/L (ref 0–5.1)
SEDIMENTATION RATE, ERYTHROCYTE: 56 MM/HR (ref 0–30)

## 2021-03-18 PROCEDURE — 94680 O2 UPTK RST&XERS DIR SIMPLE: CPT

## 2021-03-18 PROCEDURE — 86140 C-REACTIVE PROTEIN: CPT

## 2021-03-18 PROCEDURE — 97110 THERAPEUTIC EXERCISES: CPT

## 2021-03-18 PROCEDURE — 99232 SBSQ HOSP IP/OBS MODERATE 35: CPT | Performed by: INTERNAL MEDICINE

## 2021-03-18 PROCEDURE — 2580000003 HC RX 258: Performed by: PHYSICAL MEDICINE & REHABILITATION

## 2021-03-18 PROCEDURE — 97530 THERAPEUTIC ACTIVITIES: CPT

## 2021-03-18 PROCEDURE — 99232 SBSQ HOSP IP/OBS MODERATE 35: CPT | Performed by: PHYSICAL MEDICINE & REHABILITATION

## 2021-03-18 PROCEDURE — 97535 SELF CARE MNGMENT TRAINING: CPT

## 2021-03-18 PROCEDURE — 97116 GAIT TRAINING THERAPY: CPT

## 2021-03-18 PROCEDURE — 2700000000 HC OXYGEN THERAPY PER DAY

## 2021-03-18 PROCEDURE — 85652 RBC SED RATE AUTOMATED: CPT

## 2021-03-18 PROCEDURE — 6370000000 HC RX 637 (ALT 250 FOR IP): Performed by: PHYSICAL MEDICINE & REHABILITATION

## 2021-03-18 PROCEDURE — 6360000002 HC RX W HCPCS: Performed by: PHYSICAL MEDICINE & REHABILITATION

## 2021-03-18 PROCEDURE — 1280000000 HC REHAB R&B

## 2021-03-18 PROCEDURE — 6370000000 HC RX 637 (ALT 250 FOR IP): Performed by: STUDENT IN AN ORGANIZED HEALTH CARE EDUCATION/TRAINING PROGRAM

## 2021-03-18 PROCEDURE — 94761 N-INVAS EAR/PLS OXIMETRY MLT: CPT

## 2021-03-18 PROCEDURE — 94664 DEMO&/EVAL PT USE INHALER: CPT

## 2021-03-18 PROCEDURE — 94640 AIRWAY INHALATION TREATMENT: CPT

## 2021-03-18 RX ADMIN — ENOXAPARIN SODIUM 30 MG: 30 INJECTION SUBCUTANEOUS at 08:07

## 2021-03-18 RX ADMIN — AMITRIPTYLINE HYDROCHLORIDE 75 MG: 25 TABLET, FILM COATED ORAL at 20:59

## 2021-03-18 RX ADMIN — PREGABALIN 100 MG: 75 CAPSULE ORAL at 13:24

## 2021-03-18 RX ADMIN — OXYCODONE 10 MG: 5 TABLET ORAL at 18:46

## 2021-03-18 RX ADMIN — IPRATROPIUM BROMIDE AND ALBUTEROL SULFATE 1 AMPULE: .5; 3 SOLUTION RESPIRATORY (INHALATION) at 18:21

## 2021-03-18 RX ADMIN — POTASSIUM CHLORIDE 20 MEQ: 20 TABLET, EXTENDED RELEASE ORAL at 16:12

## 2021-03-18 RX ADMIN — AMPICILLIN AND SULBACTAM 3000 MG: 1; 2 INJECTION, POWDER, FOR SOLUTION INTRAMUSCULAR; INTRAVENOUS at 03:18

## 2021-03-18 RX ADMIN — FUROSEMIDE 80 MG: 40 TABLET ORAL at 08:07

## 2021-03-18 RX ADMIN — IPRATROPIUM BROMIDE AND ALBUTEROL SULFATE 1 AMPULE: .5; 3 SOLUTION RESPIRATORY (INHALATION) at 11:57

## 2021-03-18 RX ADMIN — OXYCODONE 10 MG: 5 TABLET ORAL at 13:26

## 2021-03-18 RX ADMIN — PREGABALIN 100 MG: 75 CAPSULE ORAL at 08:06

## 2021-03-18 RX ADMIN — FLUOXETINE 40 MG: 20 CAPSULE ORAL at 08:07

## 2021-03-18 RX ADMIN — AMPICILLIN AND SULBACTAM 3000 MG: 1; 2 INJECTION, POWDER, FOR SOLUTION INTRAMUSCULAR; INTRAVENOUS at 15:19

## 2021-03-18 RX ADMIN — OXYCODONE 10 MG: 5 TABLET ORAL at 00:58

## 2021-03-18 RX ADMIN — AMPICILLIN AND SULBACTAM 3000 MG: 1; 2 INJECTION, POWDER, FOR SOLUTION INTRAMUSCULAR; INTRAVENOUS at 22:38

## 2021-03-18 RX ADMIN — ALBUTEROL SULFATE 2.5 MG: 2.5 SOLUTION RESPIRATORY (INHALATION) at 12:03

## 2021-03-18 RX ADMIN — IPRATROPIUM BROMIDE AND ALBUTEROL SULFATE 1 AMPULE: .5; 3 SOLUTION RESPIRATORY (INHALATION) at 07:13

## 2021-03-18 RX ADMIN — DICLOFENAC SODIUM 2 G: 10 GEL TOPICAL at 08:08

## 2021-03-18 RX ADMIN — PREGABALIN 100 MG: 75 CAPSULE ORAL at 20:59

## 2021-03-18 RX ADMIN — OXYCODONE 10 MG: 5 TABLET ORAL at 22:46

## 2021-03-18 RX ADMIN — IPRATROPIUM BROMIDE AND ALBUTEROL SULFATE 1 AMPULE: .5; 3 SOLUTION RESPIRATORY (INHALATION) at 21:49

## 2021-03-18 RX ADMIN — ALPRAZOLAM 0.25 MG: 0.25 TABLET ORAL at 22:46

## 2021-03-18 RX ADMIN — CEFTRIAXONE SODIUM 2000 MG: 2 INJECTION, POWDER, FOR SOLUTION INTRAMUSCULAR; INTRAVENOUS at 09:15

## 2021-03-18 RX ADMIN — DICLOFENAC SODIUM 2 G: 10 GEL TOPICAL at 21:02

## 2021-03-18 RX ADMIN — DIPHENHYDRAMINE HYDROCHLORIDE 25 MG: 25 TABLET ORAL at 16:17

## 2021-03-18 RX ADMIN — AMPICILLIN AND SULBACTAM 3000 MG: 1; 2 INJECTION, POWDER, FOR SOLUTION INTRAMUSCULAR; INTRAVENOUS at 08:00

## 2021-03-18 RX ADMIN — ALPRAZOLAM 0.25 MG: 0.25 TABLET ORAL at 13:26

## 2021-03-18 RX ADMIN — CEFTRIAXONE SODIUM 2000 MG: 2 INJECTION, POWDER, FOR SOLUTION INTRAMUSCULAR; INTRAVENOUS at 20:49

## 2021-03-18 RX ADMIN — ALPRAZOLAM 0.25 MG: 0.25 TABLET ORAL at 00:58

## 2021-03-18 RX ADMIN — POTASSIUM CHLORIDE 20 MEQ: 20 TABLET, EXTENDED RELEASE ORAL at 08:06

## 2021-03-18 RX ADMIN — OXYCODONE 10 MG: 5 TABLET ORAL at 06:39

## 2021-03-18 RX ADMIN — IBUPROFEN 400 MG: 400 TABLET, FILM COATED ORAL at 08:06

## 2021-03-18 RX ADMIN — ATORVASTATIN CALCIUM 40 MG: 40 TABLET, FILM COATED ORAL at 20:59

## 2021-03-18 RX ADMIN — AMLODIPINE BESYLATE 2.5 MG: 5 TABLET ORAL at 08:07

## 2021-03-18 ASSESSMENT — PAIN DESCRIPTION - ONSET
ONSET: ON-GOING
ONSET: ON-GOING
ONSET_2: ON-GOING

## 2021-03-18 ASSESSMENT — PAIN DESCRIPTION - PAIN TYPE
TYPE: SURGICAL PAIN;CHRONIC PAIN
TYPE: SURGICAL PAIN
TYPE: SURGICAL PAIN

## 2021-03-18 ASSESSMENT — PAIN DESCRIPTION - LOCATION
LOCATION: HIP;SHOULDER
LOCATION_2: SHOULDER
LOCATION: HIP

## 2021-03-18 ASSESSMENT — PAIN SCALES - GENERAL
PAINLEVEL_OUTOF10: 5
PAINLEVEL_OUTOF10: 9

## 2021-03-18 ASSESSMENT — PAIN DESCRIPTION - DESCRIPTORS
DESCRIPTORS: ACHING
DESCRIPTORS: ACHING;DULL;OTHER (COMMENT)

## 2021-03-18 ASSESSMENT — PAIN DESCRIPTION - FREQUENCY
FREQUENCY: CONTINUOUS
FREQUENCY: CONTINUOUS

## 2021-03-18 ASSESSMENT — PAIN DESCRIPTION - DIRECTION: RADIATING_TOWARDS: KNEE

## 2021-03-18 ASSESSMENT — PAIN DESCRIPTION - ORIENTATION: ORIENTATION: RIGHT

## 2021-03-18 ASSESSMENT — PAIN DESCRIPTION - DURATION: DURATION_2: CONTINUOUS

## 2021-03-18 ASSESSMENT — PAIN SCALES - WONG BAKER: WONGBAKER_NUMERICALRESPONSE: 6

## 2021-03-18 ASSESSMENT — PAIN DESCRIPTION - PROGRESSION
CLINICAL_PROGRESSION: NOT CHANGED
CLINICAL_PROGRESSION: NOT CHANGED

## 2021-03-18 NOTE — PROGRESS NOTES
Patient sitting up in chair talking to sitter at bedside, no acute distress noted. Re-introduced self to patient as Primary RN providing care, pt verbalized understanding. Assessment as charted, please see flowsheet. Scheduled medications administered at this time. Pt requested Motrin for discomfort, provided per PRN order. Pt denies further needs. Chair alarm activated, call light and bedside table within reach. Continued sitter for safety, will monitor.

## 2021-03-18 NOTE — PROGRESS NOTES
Bedside shift report completed with LUIZ Loyola. Patient sitting up in chair at bedside, no acute distress noted. PCA present combing patient's hair. Respiratory arrived to room to administer breathing treatment. Chair alarm activated, call light and bedside table within reach.

## 2021-03-18 NOTE — PROGRESS NOTES
Pt requested to go to the hospital gift shop. Pt escorted to the gift shop by PT via pulmonary walker and gait belt.

## 2021-03-18 NOTE — CARE COORDINATION
SW following Pt. Pt will be DC home with spouse on Saturday 3/20/21. Aware Dr. Guera Paris following and will finalize IV ABX with [facility]. SW called and spoke with Charles/liaison with [facility] and made tentative referral - Cirilo Gutierrez has Epic access so he will review chart and run Pt's home benefits for IV ABX. Referral also made to [facility] with [facility] for skilled Home Care. [facility] is following for a home nebulizer and Home 02 at night. SW will ask Dr. Noah Swan to place an order for RT eval for Home 02. SW will continue to follow. RASHEED Tenorio  Case Management  [phone]-Addendum  RT Home 02 eval is done and recommendation for 2 lpm. SW will advise Kacey Jiménez with [facility]. RASHEED Gonzalez  Case Management      [phone]-Addendum  Charles/[facility] called back stating they can accept referral for Home IV ABX and ran Pt's insurance benefits = supplies/$12 a day, rocephin $47 a week, and ampicillin $100 a week. Cirilo Gutierrez will call Pt's room to discuss above tomorrow [facility] has also accepted referral for skilled RN,PT, OT visits.      RASHEED Abrams  Case Management

## 2021-03-18 NOTE — PROGRESS NOTES
Patient resting quietly in bed with eyes closed, easily aroused, no acute distress noted. Patient denies needs. Bed alarm remains activated. Call light remains in reach, will continue to monitor.

## 2021-03-18 NOTE — PROGRESS NOTES
Pt O2 88 % and quickly increased to 92% post . Pt refused to use supplemental O2. . Pt with multiple bouts of wheezing and SOB throughout session . Pt educated on PLB and effects of long term O2 deprivation . Pt refused all supplemental O2 this session . Pt ambulated 1 x 174 ft with RW and SBA. Max cues oir posture and maintaining a safe distance from RW. Pt stated that she has been using walker for 7 years and not willing to follow PT safety suggestions. Pt performed scifit level 1.5 x 7 mins to increase activity tolerance . Pt reports SOB but able to carry conversation while using scifit. Pt refused the use of O2 or to allow therapist to check O2. Pt requested breathing treatment. PT notified respiratory. Pt left seated in chair with OT present. Subjective   General  Additional Pertinent Hx: Pt had knee and hip replacements. She has since had difficulty with abscesses and infections with the joints. 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 Up until 2/5/2021 she was on Keflex Patient admitted too [facility] and  had infected plate removed from her femur 2/25/2021 along with removal of screws,cables and deep bone biopsy. Per Op note. The area around late looked infected. There was sinus tract that connected with knee joint space. The femur stem of the Artifical hip was exposed and there was reactive synovitis there as well. 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 Intraoperatively 3 tissue Cx and 1 deep bone Cx were taken E Faecalis.  Patient was started on IV Vancomycin and plan is currently to continue for 12 weeks with tentative end date of 5/20  Referring Practitioner: Dr. Nithya Oviedo: Pt was sitting up requiring encouragement to participate  Pain Screening  Patient Currently in Pain: Yes  Pain Assessment  Pain Assessment: Faces  Persaud-Baker Pain Rating: Hurts even more  Pain Type: Surgical pain;Chronic pain  Pain Location: Hip  Vital Signs  Patient Currently in Pain: Yes  Orientation  Orientation  Overall Orientation Status: Within Normal Limits  Objective   Transfers  Sit to Stand: Stand by assistance  Stand to sit: Stand by assistance  Ambulation  Ambulation?: Yes  Ambulation 1  Surface: level tile  Device: Rollator  Other Apparatus: (2L)  Assistance: Stand by assistance  Quality of Gait: Forward flexed posture, decreased step height/length, slow marisa, wide AKANKSHA. Distance: approx 1000ft with approx 8 sitting rest breaks. Exercise:   Static standing with dynamic reaching outside AKANKSHA and below waistline picking items up off high and low surfaces. x10 mins with SBA to CGA. Goals  Short term goals  Time Frame for Short term goals: STG=LTG- estimated LOS 7 days  Short term goal 1: The pt will perform supine<>sit with SBA- met 3/11  Short term goal 2: The pt will transfer with SBA and RW- met 3/11  Short term goal 3: The pt will ambulate with walker x 75' with CGA. met 3/11  Short term goal 4: The pt will be able to t  Long term goals  Time Frame for Long term goals : The pt will perform bed mobility with mod I ongoing  Long term goal 1: The pt will transfer with mod I and LRAD ongoing  Long term goal 2: The pt will ambulate 150 ft with LRAD and mod I ongoing  Patient Goals   Patient goals : To get home soon    Plan    Plan  Times per week: 5-7x/wk for 90 minutes per day  Times per day: Daily  Current Treatment Recommendations: Strengthening, Transfer Training, Endurance Training, Neuromuscular Re-education, Patient/Caregiver Education & Training, Balance Training, Gait Training, Functional Mobility Training, Safety Education & Training  Safety Devices  Type of devices:  All fall risk precautions in place, Call light within reach, Nurse notified, Left in chair, Chair alarm in place  Restraints  Initially in place: No     Therapy Time   Individual Concurrent Group Co-treatment Time In 1400         Time Out 1500         Minutes 60         Timed Code Treatment Minutes: Raf Mackenzie, PTA  First  Session Therapy Time:   Individual Concurrent Group Co-treatment   Time In 1100         Time Out 1130         Minutes 30           Timed Code Treatment Minutes:  88+78    Total Treatment Minutes: 90   First session documentation only Porsche Noguera , PT, DPT

## 2021-03-18 NOTE — PROGRESS NOTES
Patient sitting up in bed watching TV, no acute distress noted. Patient requested pain medication. Primary RN medicated with prn pain medication per request. Pt denies further needs. Bed alarm remains activated. Call light remains in reach.

## 2021-03-18 NOTE — PROGRESS NOTES
Primary RN rounding. Patient resting quietly in bed, no acute distress noted at this time. Pt requested prn pain and anxiety medication. Scheduled and prn medication administered at this time. Sitter remains at bedside for safety. Pt denies further needs at this time. Call light remains in reach, will continue to monitor.

## 2021-03-18 NOTE — FLOWSHEET NOTE
03/18/21 1247   Encounter Summary   Services provided to: Patient   Referral/Consult From: Rounding   Continue Visiting   (es 3/18)   Complexity of Encounter Moderate   Length of Encounter 15 minutes   Routine   Type Initial   Assessment Approachable   Intervention Active listening;Prayer   Outcome Engaged in conversation;Receptive;Venting emotion

## 2021-03-18 NOTE — PLAN OF CARE
Problem: Pain:  Goal: Pain level will decrease  Description: Pain level will decrease  Outcome: Ongoing  Note: Patient complains of pain at level 8/10. Patient describes pain as aching. Patient requests pain medication. Patient medicated with oxycodone. Will continue to monitor. Goal: Control of acute pain  Description: Control of acute pain  Outcome: Ongoing     Problem: Falls - Risk of:  Goal: Will remain free from falls  Description: Will remain free from falls  Outcome: Ongoing  Note: Patient is a fall risk. Patient is a x 1 with a gaitbelt and walker. See Fall Risk assessment for details. Bed is in low, lock position; call light/belongings within reach. No attempts to get out of bed have been made, calls appropriately when assistance is needed. Bed alarm and hourly rounds in place for safety. Goal: Absence of physical injury  Description: Absence of physical injury  Outcome: Ongoing     Problem: Nutrition  Goal: Optimal nutrition therapy  Outcome: Ongoing     Problem: Infection - Surgical Site:  Goal: Will show no infection signs and symptoms  Description: Will show no infection signs and symptoms  Outcome: Ongoing  Note: Surgical site looks clean, dry and intact, no redness, with staples in place. Problem: Skin Integrity:  Goal: Will show no infection signs and symptoms  Description: Will show no infection signs and symptoms  Outcome: Ongoing  Note: Surgical site looks clean, dry and intact, no redness, with staples in place.

## 2021-03-18 NOTE — PROGRESS NOTES
ID Follow-up NOTE    CC:   R leg hardware associated infection  Antibiotics: Ceftriaxone, Unasyn    Admit Date: 3/3/2021  Hospital Day: 16    Subjective:     Patient reports minimal R hip pain      Objective:     Patient Vitals for the past 8 hrs:   BP Temp Temp src Pulse Resp SpO2   03/18/21 0800 137/70 98.5 °F (36.9 °C) Oral 94 16 93 %   03/18/21 0712 -- -- -- -- 14 94 %     I/O last 3 completed shifts: In: 120 [P.O.:120]  Out: -   No intake/output data recorded. EXAM:  GENERAL: No apparent distress. HEENT: Membranes moist, no oral lesion  NECK:  Supple, no lymphadenopathy  LUNGS: Clear b/l, no rales, no dullness  CARDIAC: RRR, no murmur appreciated  ABD:  + BS, soft / NT  EXT:  No rash, no edema, no lesions - R hip wound closed, few staples, no redness / drainage  NEURO: No focal neurologic findings  PSYCH: Orientation, sensorium, mood normal  LINES:  PICC in place        Data Review:  Lab Results   Component Value Date    WBC 5.7 03/17/2021    HGB 7.7 (L) 03/17/2021    HCT 23.6 (L) 03/17/2021    MCV 79.9 (L) 03/17/2021     (H) 03/17/2021     Lab Results   Component Value Date    CREATININE 0.8 03/17/2021    BUN 10 03/17/2021     03/17/2021    K 3.6 03/17/2021    CL 99 03/17/2021    CO2 33 (H) 03/17/2021       Hepatic Function Panel:   Lab Results   Component Value Date    ALKPHOS 113 10/14/2018    ALT 15 10/14/2018    AST 17 10/14/2018    PROT 7.5 10/14/2018    BILITOT <0.2 10/14/2018    BILIDIR <0.2 06/29/2018    IBILI see below 06/29/2018    LABALBU 3.7 10/14/2018       Micro:  2/25/21 [facility] - 1 colony E faecalis     3/18/20 [facility]  Organism Antibiotic Method Susceptibility   Staphylococcus aureus Ciprofloxacin SAVI <=0.5: Sensitive    Comment: SUSCEPTIBLE    Clindamycin SAVI <=0.25: Sensitive    Comment: SUSCEPTIBLE    Erythromycin ASVI <=0.25: Sensitive    Comment: SUSCEPTIBLE    Gentamicin SAVI <=0.5: Sensitive    Comment: SUSCEPTIBLE    Levofloxacin SAVI <=0.12:  Sensitive    Comment: SUSCEPTIBLE    Moxifloxacin SAVI <=0.25: Sensitive    Comment: SUSCEPTIBLE    Oxacillin SAVI <=0.25: Sensitive    Comment: SUSCEPTIBLE    Rifampin SAVI <=0.5: Sensitive    Comment: SUSCEPTIBLE    Tetracycline SAVI <=1: Sensitive    Comment: SUSCEPTIBLE    Vancomycin SAVI 1: Sensitive         Benzylpenicillin SAVI >=0.5: Resistant    Comment: RESISTANT    Trimethoprim + Sulfamethoxazole SAVI <=10: Sensitive      Imaging:   3/11 LE doppler - no DVT     3/11 CXR  'Stable appearance of the chest including pulmonary vascular congestion and right basilar airspace disease'      Scheduled Meds:   furosemide  80 mg Oral Daily    ipratropium-albuterol  1 ampule Inhalation 4x daily    potassium chloride  20 mEq Oral BID WC    diclofenac sodium  2 g Topical BID    lidocaine  1 patch Transdermal Daily    cefTRIAXone (ROCEPHIN) IV  2,000 mg Intravenous Q12H    enoxaparin  30 mg Subcutaneous Daily    amitriptyline  75 mg Oral Nightly    amLODIPine  2.5 mg Oral Daily    atorvastatin  40 mg Oral Nightly    FLUoxetine  40 mg Oral Daily    pregabalin  100 mg Oral TID    ampicillin-sulbactam  3,000 mg Intravenous Q6H    bisacodyl  5 mg Oral Daily       Continuous Infusions:      PRN Meds:  ibuprofen, diphenhydrAMINE, albuterol, ALPRAZolam, tiZANidine, benzonatate, acetaminophen, magnesium hydroxide, polyethylene glycol, oxyCODONE **OR** oxyCODONE      Assessment:     Hx scoliosis, OA, chronic pain, asthma, obesity (BMI 44), psych     Hx fall / trauma 2013/2014  Hx b/l TKA 12/29/14, b/l JACEK, R JACEK revision 6/2016     Hx fall and R leg fx assoc with JACEK, had ORIF 10/2016. 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   Culture + MSSA - [facility] Care Everywhere, 3/18/20     Pt had recurrent infection, seen by Ortho 2/3/21  Admit / OR 2/25 - removal plate screw yet extension to R JACEK and assoc with stem / poss extension to R TKA, arthoplasties NOT removed, 1 colony E faecalis, S amp / vanco.  Seen by ID, recommended iv ceftriaxone 2 gm iv q 12 and unasyn 3 gm q 6 hr x 12 weeks ([facility], Dr Renetta Branham, NP [place])     IMP/  R leg infection assoc with hardware (JACEK, TKA)   - hx MSSA infection    - I&D with some hardware removal 2/25/21, cult with E faecalis (1 colony)    Plan:     Cont ceftriaxone and unasyn for now   - plan per Dr Renetta Branham, [address] for 12 weeks - through 5/20     Will confer with [facility] regarding antibiotic / Ortho plan   - HAN Mendes [phone], pager [phone], cell [phone]     At discharge, may substitute ampicillin 2 gm q 4 / 12 hr daily (CAD pump) for unasyn     Discussed with pt  Tal Cabello MD

## 2021-03-18 NOTE — PROGRESS NOTES
Occupational Therapy  Facility/Department: [facility] ACUTE REHAB UNIT  Daily Treatment Note  NAME: Gavino Saenz  : 1960  MRN: [id number]    Date of Service: 3/18/2021    Discharge Recommendations:  24 hour supervision or assist, Home with Home health OT  OT Equipment Recommendations  Equipment Needed: Yes  ADL Assistive Devices: Transfer Tub Bench  Other: pt reports she owns reacher and sock aid at baseline    Assessment   Performance deficits / Impairments: Decreased functional mobility ; Decreased endurance;Decreased coordination;Decreased ADL status; Decreased balance;Decreased strength;Decreased high-level IADLs  Assessment: Pt completing functional mobility and functional transfers consistently with SBA progressing to spvn assist using 4WW, however pt non-compliant with locking 4WW brakes. Pt continues to be limited by WIGGINS but refusing to wear O2 during activity and educated on PLB. Pt functioning below baseline, cont per OT POC. Treatment Diagnosis: decreased ADLs and transfers  Prognosis: Fair  OT Education: Plan of Care;ADL Adaptive Strategies;Transfer Training  Patient Education: pt verb understanding  REQUIRES OT FOLLOW UP: Yes  Activity Tolerance  Activity Tolerance: Patient Tolerated treatment well  Safety Devices  Safety Devices in place: Yes  Type of devices: Chair alarm in place; Left in chair; All fall risk precautions in place;Call light within reach         Patient Diagnosis(es): There were no encounter diagnoses. has a past medical history of Asthma, Depression, Osteoarthritis, PONV (postoperative nausea and vomiting), and Scoliosis. has a past surgical history that includes Gastric bypass surgery (); Rotator cuff repair (Left, ); Knee cartilage surgery (Left, ); Breast enhancement surgery (Bilateral, );  section ([address]); Cholecystectomy ();  [place] tooth extraction (); joint replacement (Bilateral, 2014); fracture surgery; joint replacement; and Total hip arthroplasty (Bilateral, 2014). Restrictions  Position Activity Restriction  Other position/activity restrictions: WBAT R LE; up with assist; ok to shower  Subjective   General  Chart Reviewed: Yes  Patient assessed for rehabilitation services?: Yes  Additional Pertinent Hx: Pt admitted to OSH with h/o hip hardware infection. Infected hardware removed on 2/25/21. Started on IV ABX. Admitted to ARU on 3/3/21. PMHx inclues:  Asthma, Depression, Osteoarthritis, PONV (postoperative nausea and vomiting), and Scoliosis. Family / Caregiver Present: No  Referring Practitioner: Gabi  Diagnosis: debility  Subjective  Subjective: Pt sitting in bedside recliner finishing breakfast, pleasant and agreeable to OT session. General Comment  Comments: Sammi Boyd Vital Signs  Patient Currently in Pain: Yes(8/10 R hip)     Orientation  Orientation  Overall Orientation Status: Within Functional Limits  Objective    ADL  Feeding: Independent  Grooming: Supervision(oral hygiene in stance at sink)  LE Dressing: Supervision; Increased time to complete(seated on recliner pt doffed socks using reacher i'ly, pt donned tenso shapes and socks using sock aid, setup of foot stool for placement of BLEs)  Toileting: Stand by assistance(pt continent of bladder with use of toilet aid for perihygiene seated ++time d/t presence of stool, use of toilet aid using wet wipes; LB clothing mgmt in stance)        Balance  Sitting Balance: Modified independent (on toilet)  Standing Balance: Stand by assistance  Standing Balance  Time: ~5 minutes total  Activity: functional mobility to/from bathroom; in stance for toileting and grooming; functional transfers  Functional Mobility  Functional - Mobility Device: 4-Wheeled Walker  Activity: To/from bathroom  Assist Level: Stand by assistance  Functional Mobility Comments: OT assisting with IV pole  Toilet Transfers  Toilet - Technique: Ambulating  Equipment Used: Standard toilet  Toilet Transfer: Stand by assistance  Toilet Transfers Comments: use of GB to stand     Transfers  Sit to stand: Stand by assistance  Stand to sit: Stand by assistance  Transfer Comments: v/c for safe hand placement, locking brakes on 4WW and appropriate positioning of device prior to transitional movements throughout session                       Cognition  Overall Cognitive Status: Exceptions  Following Commands: Follows one step commands consistently  Attention Span: Attends with cues to redirect  Memory: Decreased recall of recent events;Decreased short term memory  Problem Solving: Assistance required to implement solutions  Insights: Fully aware of deficits  Initiation: Does not require cues  Sequencing: Does not require cues  Cognition Comment: Pt with improved carryover of use of toilet aid i'ly this session       Second Session: Pt sitting in chair in therapy gym after completing PT session, pleasant and agreeable to OT session but demo WIGGINS after [employer] seated stepper. Pt educated on PLB, pt demo ~30 seconds while OT obtained vitals. SPO2 90% on RA with HR 94. Pt required short seated rest break and agreeable to practice dry tub transfer in order to simulate home environment. STS from standard chair to 4WW with SBA, VCs to ensure brakes locked before initiating. Pt ambulated to/from therapy gym bathroom using 4WW with SBA. Pt completed dry tub transfer using TTB with sit<>swivel technique with SBA progressing to spvn assist. Pt requesting to attempt side step entry into and out of tub as she currently does not own at TTB. Pt instructed to place BUEs on GB for support, pt required + time and effort to manage BLEs into tub with CGA, L more difficult than right with difficulty clearing BLEs, however no overt LOB. Pt required seated rest break on TTB d/t fatigue and WIGGINS ~ 2 minutes with cues for PLB.  Pt with insight that need for TTB is required for safe bathing d/t decreased activity tolerance, WIGGINS and easily fatigued after merely transferring into tub requiring a tub bench to recover. Pt requesting assistance with resources to purchase prior to d/c. STS from TTB with SBA to 4WW, stand<sit to standard chair with SBA. Pt required rest break and spot checked SPO2 levels d/t audible wheezing. SPO2: 86% , pt recovered to 90% after 1 minute with PLB on RA as pt resistive to O2, \"I want to see how long it will take me to get back to normal.\" Pt ambulated back to room (~174 feet) using 4WW with SBA making it ~80 feet prior to requiring a seated rest break d/t fatigue. Pt requesting to get back to bed and inquiring about nurse calling RT for a treatment. Stand<sit EOB with SBA. Sit<supine with SBA with HOB raised and use of bed rails. Pt provided with 4 L O2 via NC as pt heavily breathing and wheezing. HOB positioned flat and pt scooted self superiorly in bed using bilateral hand rails with spvn assist. Pt educated on multiple tub bench options using phone and [email or web address], pt made a decision and OT assisted with purchasing in order to increase safety and independence with bathing upon d/c. Pt left in bed at end of session with bed alarm engaged, call light within reach and all needs met and sitter present. Third Session: Pt supine in bed upon arrival on 2 L O2 via NC. Pt difficult to arouse and stating she does not feel well \"I feel like I just need to sleep. \" Pt refusing therapy and requesting second session of PT to be pushed back so she can rest. PT aware and able to accommodate request. Pt agreeable to extra therapy session tomorrow to make up for lost time. Pt left in bed at end of session with bed alarm engaged, call light within reach and all needs met.     Minute variance: 30 Minutes         Plan   Plan  Times per week: 5 days per week 90 mins each  Times per day: Daily  Current Treatment Recommendations: Strengthening, Balance Training, Functional Mobility Training, Endurance Training, Self-Care / ADL, Patient/Caregiver Education & Training, Safety Education & Training, Equipment Evaluation, Education, & procurement      Goals  Short term goals  Time Frame for Short term goals: by 1 week  Short term goal 1: Pt will complete LB dressing with AE PRN at mod A-goal met 3/6  Short term goal 2: Pt will complete toileting with CGA-goal met 3/5  Short term goal 3: Pt will complete bathing at min A- goal met 3/10  Short term goal 4: Pt will complete shower transfer at spvn-ongoing  Short term goal 5: Pt will complete B UE exercises at mod I-goal met 3/17  Long term goals  Time Frame for Long term goals : By 2 weeks-all ongoing  Long term goal 1: Pt will complete LB dressing with AE PRN at mod I  Long term goal 2: Pt will complete toileting with mod I  Long term goal 3: Pt will complete bathing at mod I  Long term goal 4: Pt will complete shower transfer at mod I  Long term goal 5: Pt will complete simple meal prep task at mod I with LRAD  Patient Goals   Patient goals : to be able to go home       Therapy Time   Individual Second Session Group Co-treatment   Time In 0900  1130       Time Out 0930  1200       Minutes 30  30       Timed Code Treatment Minutes: 30 Minutes+ 30 Minutes    Minute variance: 30 Minutes (pt refusal)        Aaron Tucker OT

## 2021-03-18 NOTE — PROGRESS NOTES
Patient c/o itching to bilateral hands, pt requested PO Benadryl. Primary RN provided pt with prn PO Benadryl, please see MAR. Pt denies further needs. Call light remains in reach.

## 2021-03-18 NOTE — PROGRESS NOTES
[facility]    Respiratory Therapy     Home Oxygen Evaluation        Name: Mayte Doherty Record Number: [id number]  Age: 63 y.o. Gender:  female   : 1960  Today's date: 3/18/2021  Room: [address] [place] [address]      Assessment        /70   Pulse 94   Temp 98.5 °F (36.9 °C) (Oral)   Resp 21   Ht 4' 10\" (1.473 m)   Wt 214 lb 15.2 oz (97.5 kg)   LMP 2014   SpO2 96%   BMI 44.92 kg/m²     Patient Active Problem List   Diagnosis    Knee osteoarthritis    Chronic pain    Bladder prolapse    Moderate persistent asthma    Alcohol abuse    Localized edema    Anxiety    Primary insomnia    Arthritis    Alcohol use disorder, severe, dependence (HCC)    Depression    Suicide attempt ([facility] [address]    Acute asthma exacerbation    Right knee pain    Asthma    Contusion of right knee    WIGGINS (dyspnea on exertion)    Dyspnea on exertion    Debility    Hypoxemia    PMB (postmenopausal bleeding)    Menopause       Social History:  Social History     Tobacco Use    Smoking status: Former Smoker     Packs/day: 0.25     Years: 0.20     Pack years: 0.05     Types: Cigarettes     Quit date: 2014     Years since quittin.8    Smokeless tobacco: Never Used    Tobacco comment: working to decrease   Substance Use Topics    Alcohol use: No     Comment: recovering alcoholic since , has had couple of relaspes, last 7/15/2015.  Drug use: No       Patient Room Air saturation at rest 90  %  Patient Room Air saturation upon ambulation 86   %    Oxygen saturations of 88% or less on RA qualifies patient for Home Oxygen    Patient resting on 2  lmp  with an oxygen saturation of  92 %       Qualifying patient for home oxygen with ambulation and continuous flow  @ 2 lpm.      In your clinical assessment does the Patient Require Portable Oxygen Tanks?     Yes               Patient/caregiver was educated on Home Oxygen process:  Yes      Level of patient/caregiver understanding able to:   [] Verbalize understanding   [] Demonstrate understanding       [] Teach back        [] Needs reinforcement        []  No available caregiver               []  Other:     Response to education:  Good     Time Spent with Home O2 Set Up:  15  minutes     Edel Holley RCP on 3/18/2021 at 12:08 PM                 .

## 2021-03-18 NOTE — PROGRESS NOTES
Patient in bed at this time, asking for PRN breathing treatment, on PRN 2 L or oxygen via nasal canula. Patient was in pain 8/10 with hip and shoulder, gave PRN oxycodone. Calls appropriately to go to the bathroom, bed alarm is on.

## 2021-03-19 LAB
ANION GAP SERPL CALCULATED.3IONS-SCNC: 13 MMOL/L (ref 3–16)
BASOPHILS ABSOLUTE: 0 K/UL (ref 0–0.2)
BASOPHILS RELATIVE PERCENT: 0.8 %
BUN BLDV-MCNC: 8 MG/DL (ref 7–20)
CALCIUM SERPL-MCNC: 8.8 MG/DL (ref 8.3–10.6)
CHLORIDE BLD-SCNC: 100 MMOL/L (ref 99–110)
CO2: 30 MMOL/L (ref 21–32)
CREAT SERPL-MCNC: 0.8 MG/DL (ref 0.6–1.2)
EOSINOPHILS ABSOLUTE: 0.6 K/UL (ref 0–0.6)
EOSINOPHILS RELATIVE PERCENT: 9.6 %
GFR AFRICAN AMERICAN: >60
GFR NON-AFRICAN AMERICAN: >60
GLUCOSE BLD-MCNC: 106 MG/DL (ref 70–99)
HCT VFR BLD CALC: 25.5 % (ref 36–48)
HEMOGLOBIN: 8.3 G/DL (ref 12–16)
LYMPHOCYTES ABSOLUTE: 1.7 K/UL (ref 1–5.1)
LYMPHOCYTES RELATIVE PERCENT: 26.8 %
MCH RBC QN AUTO: 26 PG (ref 26–34)
MCHC RBC AUTO-ENTMCNC: 32.5 G/DL (ref 31–36)
MCV RBC AUTO: 80.2 FL (ref 80–100)
MONOCYTES ABSOLUTE: 0.7 K/UL (ref 0–1.3)
MONOCYTES RELATIVE PERCENT: 10.6 %
NEUTROPHILS ABSOLUTE: 3.3 K/UL (ref 1.7–7.7)
NEUTROPHILS RELATIVE PERCENT: 52.2 %
PDW BLD-RTO: 17.3 % (ref 12.4–15.4)
PLATELET # BLD: 470 K/UL (ref 135–450)
PMV BLD AUTO: 7.7 FL (ref 5–10.5)
POTASSIUM REFLEX MAGNESIUM: 3.9 MMOL/L (ref 3.5–5.1)
RBC # BLD: 3.18 M/UL (ref 4–5.2)
SODIUM BLD-SCNC: 143 MMOL/L (ref 136–145)
WBC # BLD: 6.2 K/UL (ref 4–11)

## 2021-03-19 PROCEDURE — 6370000000 HC RX 637 (ALT 250 FOR IP): Performed by: PHYSICAL MEDICINE & REHABILITATION

## 2021-03-19 PROCEDURE — 97530 THERAPEUTIC ACTIVITIES: CPT

## 2021-03-19 PROCEDURE — 99232 SBSQ HOSP IP/OBS MODERATE 35: CPT | Performed by: PHYSICAL MEDICINE & REHABILITATION

## 2021-03-19 PROCEDURE — 85025 COMPLETE CBC W/AUTO DIFF WBC: CPT

## 2021-03-19 PROCEDURE — 97535 SELF CARE MNGMENT TRAINING: CPT

## 2021-03-19 PROCEDURE — 2700000000 HC OXYGEN THERAPY PER DAY

## 2021-03-19 PROCEDURE — 2580000003 HC RX 258: Performed by: PHYSICAL MEDICINE & REHABILITATION

## 2021-03-19 PROCEDURE — 80048 BASIC METABOLIC PNL TOTAL CA: CPT

## 2021-03-19 PROCEDURE — 97116 GAIT TRAINING THERAPY: CPT

## 2021-03-19 PROCEDURE — 6360000002 HC RX W HCPCS: Performed by: PHYSICAL MEDICINE & REHABILITATION

## 2021-03-19 PROCEDURE — 2580000003 HC RX 258: Performed by: INTERNAL MEDICINE

## 2021-03-19 PROCEDURE — 97110 THERAPEUTIC EXERCISES: CPT

## 2021-03-19 PROCEDURE — 6360000002 HC RX W HCPCS: Performed by: INTERNAL MEDICINE

## 2021-03-19 PROCEDURE — 94640 AIRWAY INHALATION TREATMENT: CPT

## 2021-03-19 PROCEDURE — 1280000000 HC REHAB R&B

## 2021-03-19 PROCEDURE — 99232 SBSQ HOSP IP/OBS MODERATE 35: CPT | Performed by: INTERNAL MEDICINE

## 2021-03-19 RX ADMIN — OXYCODONE 10 MG: 5 TABLET ORAL at 05:36

## 2021-03-19 RX ADMIN — IPRATROPIUM BROMIDE AND ALBUTEROL SULFATE 1 AMPULE: .5; 3 SOLUTION RESPIRATORY (INHALATION) at 07:24

## 2021-03-19 RX ADMIN — FLUOXETINE 40 MG: 20 CAPSULE ORAL at 08:38

## 2021-03-19 RX ADMIN — ATORVASTATIN CALCIUM 40 MG: 40 TABLET, FILM COATED ORAL at 21:31

## 2021-03-19 RX ADMIN — POTASSIUM CHLORIDE 10 MEQ: 20 TABLET, EXTENDED RELEASE ORAL at 08:38

## 2021-03-19 RX ADMIN — OXYCODONE 10 MG: 5 TABLET ORAL at 17:19

## 2021-03-19 RX ADMIN — IPRATROPIUM BROMIDE AND ALBUTEROL SULFATE 1 AMPULE: .5; 3 SOLUTION RESPIRATORY (INHALATION) at 15:42

## 2021-03-19 RX ADMIN — AMPICILLIN AND SULBACTAM 3000 MG: 1; 2 INJECTION, POWDER, FOR SOLUTION INTRAMUSCULAR; INTRAVENOUS at 17:33

## 2021-03-19 RX ADMIN — ALBUTEROL SULFATE 2.5 MG: 2.5 SOLUTION RESPIRATORY (INHALATION) at 05:12

## 2021-03-19 RX ADMIN — ALPRAZOLAM 0.25 MG: 0.25 TABLET ORAL at 10:11

## 2021-03-19 RX ADMIN — AMPICILLIN SODIUM 2000 MG: 2 INJECTION, POWDER, FOR SOLUTION INTRAMUSCULAR; INTRAVENOUS at 23:36

## 2021-03-19 RX ADMIN — AMLODIPINE BESYLATE 2.5 MG: 5 TABLET ORAL at 08:47

## 2021-03-19 RX ADMIN — IPRATROPIUM BROMIDE AND ALBUTEROL SULFATE 1 AMPULE: .5; 3 SOLUTION RESPIRATORY (INHALATION) at 11:03

## 2021-03-19 RX ADMIN — CEFTRIAXONE SODIUM 2000 MG: 2 INJECTION, POWDER, FOR SOLUTION INTRAMUSCULAR; INTRAVENOUS at 20:34

## 2021-03-19 RX ADMIN — PREGABALIN 100 MG: 75 CAPSULE ORAL at 13:32

## 2021-03-19 RX ADMIN — AMPICILLIN AND SULBACTAM 3000 MG: 1; 2 INJECTION, POWDER, FOR SOLUTION INTRAMUSCULAR; INTRAVENOUS at 05:38

## 2021-03-19 RX ADMIN — ENOXAPARIN SODIUM 30 MG: 30 INJECTION SUBCUTANEOUS at 08:39

## 2021-03-19 RX ADMIN — AMITRIPTYLINE HYDROCHLORIDE 75 MG: 25 TABLET, FILM COATED ORAL at 21:31

## 2021-03-19 RX ADMIN — OXYCODONE 10 MG: 5 TABLET ORAL at 10:11

## 2021-03-19 RX ADMIN — ALBUTEROL SULFATE 2.5 MG: 2.5 SOLUTION RESPIRATORY (INHALATION) at 23:56

## 2021-03-19 RX ADMIN — PREGABALIN 100 MG: 75 CAPSULE ORAL at 21:31

## 2021-03-19 RX ADMIN — DICLOFENAC SODIUM 2 G: 10 GEL TOPICAL at 21:33

## 2021-03-19 RX ADMIN — OXYCODONE 10 MG: 5 TABLET ORAL at 21:32

## 2021-03-19 RX ADMIN — AMPICILLIN AND SULBACTAM 3000 MG: 1; 2 INJECTION, POWDER, FOR SOLUTION INTRAMUSCULAR; INTRAVENOUS at 10:17

## 2021-03-19 RX ADMIN — FUROSEMIDE 80 MG: 40 TABLET ORAL at 08:38

## 2021-03-19 RX ADMIN — TIZANIDINE 4 MG: 4 TABLET ORAL at 13:33

## 2021-03-19 RX ADMIN — POTASSIUM CHLORIDE 20 MEQ: 20 TABLET, EXTENDED RELEASE ORAL at 17:18

## 2021-03-19 RX ADMIN — PREGABALIN 100 MG: 75 CAPSULE ORAL at 08:38

## 2021-03-19 RX ADMIN — IPRATROPIUM BROMIDE AND ALBUTEROL SULFATE 1 AMPULE: .5; 3 SOLUTION RESPIRATORY (INHALATION) at 19:50

## 2021-03-19 RX ADMIN — CEFTRIAXONE SODIUM 2000 MG: 2 INJECTION, POWDER, FOR SOLUTION INTRAMUSCULAR; INTRAVENOUS at 08:37

## 2021-03-19 ASSESSMENT — PAIN DESCRIPTION - FREQUENCY
FREQUENCY: CONTINUOUS
FREQUENCY: CONTINUOUS

## 2021-03-19 ASSESSMENT — PAIN DESCRIPTION - ONSET
ONSET: ON-GOING
ONSET: ON-GOING

## 2021-03-19 ASSESSMENT — PAIN SCALES - GENERAL
PAINLEVEL_OUTOF10: 9
PAINLEVEL_OUTOF10: 10
PAINLEVEL_OUTOF10: 6
PAINLEVEL_OUTOF10: 9
PAINLEVEL_OUTOF10: 8

## 2021-03-19 ASSESSMENT — PAIN DESCRIPTION - ORIENTATION: ORIENTATION: RIGHT

## 2021-03-19 ASSESSMENT — PAIN DESCRIPTION - PAIN TYPE: TYPE: SURGICAL PAIN

## 2021-03-19 ASSESSMENT — PAIN - FUNCTIONAL ASSESSMENT: PAIN_FUNCTIONAL_ASSESSMENT: PREVENTS OR INTERFERES SOME ACTIVE ACTIVITIES AND ADLS

## 2021-03-19 ASSESSMENT — PAIN DESCRIPTION - LOCATION: LOCATION: HIP;KNEE;SHOULDER

## 2021-03-19 NOTE — PROGRESS NOTES
hospitalization. Increased stiffness and pain. Xray shows   Moderate acromioclavicular joint arthrosis. If pain doesn't improve may consider further imaging.      Moderate Persistent Asthma  Continue home Albuterol prn   O2 sats stable on room air  Needs outpatient PFTs and pulmonology for further w/u of SOB with exertion (? ILD, pulm HTN. ..) (already new referral PTA at an [facility] consult     Vaginal bleeding- OB consult   Transvaginal US shows  small endometrial polyp, left ovary not visualized.      Rehab Progress: Improving  Anticipated Dispo: home  Services/DME: [facility]  ELOS: CARLYN Sanabria.P.H  PM&R  3/19/2021  10:59 AM

## 2021-03-19 NOTE — CARE COORDINATION
[facility]    Spoke with patient regarding [facility] services. Patient aware and agreeable to services. Faxed orders to [facility] for [facility] after patient is home 3/20/2021.  Electronically signed by Zac Moy LPN on 3/36/8660 at 0:61 PM      Zac Moy LPN  Care Transition Nurse  [facility]  [phone]

## 2021-03-19 NOTE — PROGRESS NOTES
Physical Therapy  Facility/Department: [facility] ACUTE REHAB UNIT  Daily Treatment Note  NAME: Damion Menjivar  : 1960  MRN: [id number]    Date of Service: 3/19/2021    Discharge Recommendations:  Home with Home health PT, 24 hour supervision or assist   PT Equipment Recommendations  Other: pt has all needed DME    Assessment   Body structures, Functions, Activity limitations: Decreased functional mobility ; Decreased balance;Decreased strength;Decreased endurance;Decreased safe awareness; Increased pain  Assessment: The pt has demonstrated improved endurance and activity tolerance as well as strength with ambulation and stairs today. She required few rest breaks and was steady with mobility. She does continue to require cueing for proper sequencing of the stairs. She has met her goals except for mod I with use of rollator while ambulating as the pt doesnot properly use brakes of rollator with ambulation and has had instances where her R LE has given out though she is typically steady. She was independent with bed mobility and transfers. The pt would benefit from further skilled PT upon discharge to improve her independence and safety with functional mobility. Treatment Diagnosis: impaired mobility secondary to infection  Prognosis: Good  PT Education: Goals;PT Role;Transfer Training;Functional Mobility Training;Gait Training;General Safety;Precautions  REQUIRES PT FOLLOW UP: Yes  Activity Tolerance  Activity Tolerance: Patient limited by pain; Patient limited by fatigue;Patient limited by endurance  Activity Tolerance: pt also limited by SOB at times     Patient Diagnosis(es): There were no encounter diagnoses. has a past medical history of Asthma, Depression, Osteoarthritis, PONV (postoperative nausea and vomiting), and Scoliosis. has a past surgical history that includes Gastric bypass surgery (); Rotator cuff repair (Left, ); Knee cartilage surgery (Left, );  Breast enhancement surgery (Bilateral, );  section ([address]); Cholecystectomy (); [place] tooth extraction (); joint replacement (Bilateral, 2014); fracture surgery; joint replacement; and Total hip arthroplasty (Bilateral, ). Restrictions  Position Activity Restriction  Other position/activity restrictions: WBAT R LE; up with assist; ok to shower  Subjective   General  Chart Reviewed: Yes  Additional Pertinent Hx: Pt had knee and hip replacements. She has since had difficulty with abscesses and infections with the joints. 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 Up until 2021 she was on Keflex Patient admitted too [facility] and  had infected plate removed from her femur 2021 along with removal of screws,cables and deep bone biopsy. Per Op note. The area around late looked infected. There was sinus tract that connected with knee joint space. The femur stem of the Artifical hip was exposed and there was reactive synovitis there as well. 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 Intraoperatively 3 tissue Cx and 1 deep bone Cx were taken E Faecalis. Patient was started on IV Vancomycin and plan is currently to continue for 12 weeks with tentative end date of   Response To Previous Treatment: Patient with no complaints from previous session. Family / Caregiver Present: No  Referring Practitioner: Dr. Fariba Rodriguez: \"I am worried about this call I got regarding how much I have to pay for my medication. \"  General Comment  Comments: Pt supine in bed upon PT arrival          Orientation     Cognition      Objective   Bed mobility  Supine to Sit: Independent  Scooting: Independent(to EOB)  Transfers  Sit to Stand: Modified independent(from bed, from 4WW)  Stand to sit: Modified independent  Ambulation  Ambulation?: Yes  Ambulation 1  Surface: level tile  Device: Rollator  Assistance: Stand by assistance  Quality of Gait: Forward flexed posture, decreased step height/length, slow marisa. Distance: 200 ft  Comments: No LOB noted. Stairs/Curb  Stairs?: Yes  Stairs  # Steps : 12  Stairs Height: 6\"  Rails: Bilateral  Device: No Device  Assistance: Stand by assistance  Comment: cueing for proper sequencing of L LE up and R LE down first      2nd session:  The pt presents supine in bed and willing to work with therapist. \"I am excited but a little nervous about leaving. \" Pt performs rolls to R and L and then performs supine to sit independently. She stands and gets object off of floor with supervision. She stands to walker independently and ambulates down the marshall with supervision to the ramp which she goes up and then sits and rest ~200' total.  She ambulates to car transfer gym ~100' and performs car transfer with mod I. She then ambulates to gift shop and back to therapy unit with about 3 additional rest breaks for a total of over 1000' with rollator. Pt then sits and takes a long rest break and then ambulates 120' back to room where she performs sit to supine with independence. She performs 15 SLR and 15 hip abd exercises and is left supine in bed with all needs met, alarm on and call light within reach. Goals  Short term goals  Time Frame for Short term goals: STG=LTG- estimated LOS 7 days  Short term goal 1: The pt will perform supine<>sit with SBA- met 3/11  Short term goal 2: The pt will transfer with SBA and RW- met 3/11  Short term goal 3: The pt will ambulate with walker x 75' with CGA. met 3/11  Short term goal 4: The pt will be able to t  Long term goals  Time Frame for Long term goals : The pt will perform bed mobility with mod I -MET 3/19  Long term goal 1: The pt will transfer with mod I and LRAD ongoing-MET 3/19  Long term goal 2: The pt will ambulate 150 ft with LRAD and mod I -NOT MET SBA  Patient Goals   Patient goals :  To get home soon    Plan Plan  Times per week: 5-7x/wk for 90 minutes per day  Times per day: Daily  Current Treatment Recommendations: Strengthening, Transfer Training, Endurance Training, Neuromuscular Re-education, Patient/Caregiver Education & Training, Balance Training, Gait Training, Functional Mobility Training, Safety Education & Training  Safety Devices  Type of devices:  All fall risk precautions in place, Call light within reach, Nurse notified, Left in chair, Chair alarm in place  Restraints  Initially in place: No     Therapy Time   Individual Concurrent Group Co-treatment   Time In 1138         Time Out 1208         Minutes 30         Timed Code Treatment Minutes: 30 Minutes    Timed Code Treatment Minutes:  30 Minutes  Second Session Therapy Time:   Individual Concurrent Group Co-treatment   Time In 1415         Time Out 1515         Minutes 60           Timed Code Treatment Minutes:  30+60=90 minutes    Total Treatment Minutes:  90 minutes  Richard Watson, PT

## 2021-03-19 NOTE — CARE COORDINATION
CEFERINO spoke with Laura Castaneda from [place] late yesterday afternoon regarding Pt's Home 02 and nebulizer and the RT Home 02 eval is done but Laura Castaneda reminded SW that RT eval needs to be done the day of DC or 1 day prior to DC. Pt will be discharged tomorrow 3/20/21. CEFERINO has left a message with Dr. Harish Humphrey to order another RT Home 02 eval for today. SW following. Kar Cardona [place]  Case Management  [phone]        3/19/21-Addendum  We are still awaiting RT Home 02 eval - Dr. Harish Humphrey placed order this morning. CEFERINO spoke with Laura Castaneda with [facility] this afternoon- she will make all Home 02 and nebulizer arrangements this afternoon as Pt will be DC home tomorrow. CEFERINO spoke with Dr. Glenn Graves regarding Pt's Home IV's - he completed Pt's LATISHA/AVS. CEFERINO spoke with Bennie Alford with [facility] spoke with Pt via phone this morning regarding cost/set-up tomorrow at home. Bennie Alford has IV orders. [facility] - she has made all [facility] [place]. arrangements. Pt is aware and in agreement with DC plan and states her spouse will provide transport home tomorrow afternoon.  [facility], [facility], and [facility] phone #'s on Pt's LATISHA/AVS.     Kar Cardona [place]  Case Management  [phone]

## 2021-03-19 NOTE — PROGRESS NOTES
Patient is in bed, vitals stable, pain is 9/10 with right shoulder and hip, gave PRN oxycodone. Call light with in reach and bed alarm on.

## 2021-03-19 NOTE — PROGRESS NOTES
Occupational Therapy  Facility/Department: [facility] ACUTE [facility]  Daily Treatment Note/Discharge Note  NAME: Gavino Saenz  : 1960  MRN: [id number]    Date of Service: 3/19/2021    Discharge Recommendations:  24 hour supervision or assist, Home with Home health OT  OT Equipment Recommendations  Equipment Needed: Yes  ADL Assistive Devices: Transfer Tub Bench  Other: TTB ordered 3/18    Assessment   Performance deficits / Impairments: Decreased functional mobility ; Decreased endurance;Decreased coordination;Decreased ADL status; Decreased balance;Decreased strength;Decreased high-level IADLs  Assessment: Pt met 5/5 STGs and 0/5 LTGs as pt continues to require spvn assistance for LB bathing and dressing d/t impulsivity, deconditioning, RLE weakness and pain. Pt plan to d/c home tomorrow with 24 hr supervision available from family and HHOT to continue skilled therapy session. Pt purchased TTB and has AE at baseline for LB dressing. Treatment Diagnosis: decreased ADLs and transfers  Prognosis: Fair  OT Education: Plan of Care;ADL Adaptive Strategies;Transfer Training  Patient Education: d/c planning, pt verb understanding  REQUIRES OT FOLLOW UP: Yes  Activity Tolerance  Activity Tolerance: Patient Tolerated treatment well  Activity Tolerance: Pt reporting fatigue and impaired RLE strength after bathing task  Safety Devices  Safety Devices in place: Yes  Type of devices: All fall risk precautions in place;Call light within reach;Nurse notified; Bed alarm in place; Left in bed         Patient Diagnosis(es): There were no encounter diagnoses. has a past medical history of Asthma, Depression, Osteoarthritis, PONV (postoperative nausea and vomiting), and Scoliosis. has a past surgical history that includes Gastric bypass surgery (); Rotator cuff repair (Left, ); Knee cartilage surgery (Left, ); Breast enhancement surgery (Bilateral, );  section ([address]); Cholecystectomy ();  [place] tooth extraction (1995); joint replacement (Bilateral, 12/29/2014); fracture surgery; joint replacement; and Total hip arthroplasty (Bilateral, 2014). Restrictions  Position Activity Restriction  Other position/activity restrictions: WBAT R LE; up with assist; ok to shower  Subjective   General  Chart Reviewed: Yes  Patient assessed for rehabilitation services?: Yes  Additional Pertinent Hx: Pt admitted to OSH with h/o hip hardware infection. Infected hardware removed on 2/25/21. Started on IV ABX. Admitted to ARU on 3/3/21. PMHx inclues:  Asthma, Depression, Osteoarthritis, PONV (postoperative nausea and vomiting), and Scoliosis. Family / Caregiver Present: No  Referring Practitioner: Gabi  Diagnosis: debility  Subjective  Subjective: Pt sitting in bedside recliner pleasant and agreeable to OT session excited to take shower but requesting to use bathroom prior. General Comment  Comments: Maribell Valenzuela   Vital Signs  Patient Currently in Pain: Yes(12.5/10 RLE pain, RN aware and providing pain meds at EOS)     Orientation  Orientation  Overall Orientation Status: Within Functional Limits  Objective    ADL  Feeding: Independent  Grooming: Modified independent (oral hygiene in stance at sink)  UE Bathing: Modified independent (seated on TTB)  LE Bathing: Supervision(seated on TTB, use of long handled sponge to wash lower limbs, feet and periarea in stance with unilateral UE support at GB)  UE Dressing: Modified independent (pt donned bra and shirt seated on TTB)  LE Dressing: Supervision(pt donned shorts and pants seated on TTB, in stance to manage over hips, pt used reacher to doff socks and sock aid to don socks, declined tenso shapes d/t fatigue)  Toileting: Supervision(pt continent of bowel and bladder, pericarehyigne seated with use of toilet aid and + time for thoroughness, LB clothing mgmt in stance)        Balance  Sitting Balance: Modified independent (on toilet and TTB)  Standing Balance: Supervision(statically)  Standing Balance  Time: ~8 minutes total  Activity: functional mobility to/from bathroom; in stance for toileting and grooming; functional transfers  Functional Mobility  Functional - Mobility Device: 4-Wheeled Walker  Activity: To/from bathroom  Assist Level: Stand by assistance  Functional Mobility Comments: OT assisting with IV pole  Toilet Transfers  Toilet - Technique: Ambulating  Equipment Used: Standard toilet  Toilet Transfer: Supervision  Toilet Transfers Comments: use of GB to stand  Shower Transfers  Shower - Transfer From: [facility] - Transfer Type: To and From  Shower - Transfer To: Transfer tub bench  Shower - Technique: Ambulating  Shower Transfers: Supervision  Shower Transfers Comments: use of GB  Bed mobility  Sit to Supine: Modified independent(HOB flat, + time to manage RLE into bed)  Transfers  Sit to stand: Supervision  Stand to sit: Supervision  Transfer Comments: v/c for safe hand placement, locking brakes on 4WW and appropriate positioning of device prior to transitional movements throughout session                       Cognition  Overall Cognitive Status: Exceptions  Following Commands: Follows one step commands consistently  Attention Span: Attends with cues to redirect  Memory: Decreased recall of recent events;Decreased short term memory  Problem Solving: Assistance required to implement solutions  Insights: Fully aware of deficits  Initiation: Does not require cues  Sequencing: Does not require cues       Second Session: Pt supine in bed upon arrival, pleasant and agreeable to OT session requesting to use bathroom. Supine<sit with HOB elevated with mod I. Pt STS from EOB to 4WW with spvn assist. Pt ambulated to/from bathroom using 4WW with SBA. Toilet transfer onto standard toilet with spvn assist, pt toileted with spvn assist, continent of bladder, pericare hygiene i'ly with use of toilet aid, LB clothing mgmt in stance.  Hand hygiene in stance at sink with spvn assist. Stand<sit to recliner with spvn. Pt i'ly completed 2 x 12 of the following BUE therex to increase activity tolerance and BUE strength using blue theraband: fwd punches, overhead triceps press, biceps curls, sh diagonals up, sh flexion while seated. Pt excited and motivated to continue HEP at home. Pt stating slight anxiety about d/c and concern that her  will be overwhelmed, emotional support provided. All questions about d/c clarified. Pt left in bedside recliner at end of session with chair alarm engaged, call light within reach and all needs met.        Plan   Plan  Times per week: 5 days per week 90 mins each  Times per day: Daily  Current Treatment Recommendations: Strengthening, Balance Training, Functional Mobility Training, Endurance Training, Self-Care / ADL, Patient/Caregiver Education & Training, Safety Education & Training, Equipment Evaluation, Education, & procurement  G-Code     OutComes Score                                                  AM-PAC Score             Goals  Short term goals  Time Frame for Short term goals: by 1 week  Short term goal 1: Pt will complete LB dressing with AE PRN at mod A-goal met 3/6  Short term goal 2: Pt will complete toileting with CGA-goal met 3/5  Short term goal 3: Pt will complete bathing at min A- goal met 3/10  Short term goal 4: Pt will complete shower transfer at spvn-goal met 3/19  Short term goal 5: Pt will complete B UE exercises at mod I-goal met 3/17  Long term goals  Time Frame for Long term goals : By 2 weeks-goal not met 3/19  Long term goal 1: Pt will complete LB dressing with AE PRN at mod I-goal not met 3/19  Long term goal 2: Pt will complete toileting with mod I-goal not met 3/19  Long term goal 3: Pt will complete bathing at mod I-goal met for UB bathing spvn for LB bathing 3/19  Long term goal 4: Pt will complete shower transfer at mod I-goal not met 3/19  Long term goal 5: Pt will complete simple meal prep task at mod I

## 2021-03-19 NOTE — PLAN OF CARE
Problem: Pain:  Goal: Pain level will decrease  Description: Pain level will decrease  Outcome: Ongoing  Note: Patient complains of pain at level 9/10. Patient describes pain as aching. Patient requests pain medication. Patient medicated with oxycodone. Goal: Control of acute pain  Description: Control of acute pain  Outcome: Ongoing     Problem: Falls - Risk of:  Goal: Will remain free from falls  Description: Will remain free from falls  Outcome: Ongoing  Note: Patient is a fall risk. Patient is a x 1 with a gait belt and walker. See Fall Risk assessment for details. Bed is in low, lock position; call light/belongings within reach. No attempts to get out of bed have been made, calls appropriately when assistance is needed. Bed alarm and hourly rounds in place for safety. Goal: Absence of physical injury  Description: Absence of physical injury  Outcome: Ongoing     Problem: Infection - Surgical Site:  Goal: Will show no infection signs and symptoms  Description: Will show no infection signs and symptoms  Outcome: Ongoing  Note: Pt at risk for skin breakdown. Patient has a surgical incision on side of R hip down to above knee. Skin assessment as documented. Pts skin cleansed with inc cleanser as needed. Pt repositioned in bed with pillow support as needed. Call light within reach. Problem: Skin Integrity:  Goal: Will show no infection signs and symptoms  Description: Will show no infection signs and symptoms  Outcome: Ongoing  Note: Pt at risk for skin breakdown. Patient has a surgical incision on side of R hip down to above knee. Skin assessment as documented. Pts skin cleansed with inc cleanser as needed. Pt repositioned in bed with pillow support as needed. Call light within reach.       Goal: Absence of new skin breakdown  Description: Absence of new skin breakdown  Outcome: Ongoing

## 2021-03-19 NOTE — PROGRESS NOTES
ID Follow-up NOTE    CC:   R leg hardware associated infection  Antibiotics: Ceftriaxone, Unasyn    Admit Date: 3/3/2021  Hospital Day: 17    Subjective:     Patient reports minimal R hip pain      Objective:     Patient Vitals for the past 8 hrs:   BP Temp Temp src Pulse Resp SpO2   03/19/21 0831 128/72 98.2 °F (36.8 °C) Oral 98 18 90 %   03/19/21 0726 -- -- -- -- 18 94 %   03/19/21 0512 -- -- -- -- 18 95 %     I/O last 3 completed shifts: In: 1560 [P.O.:1560]  Out: -   I/O this shift:  In: 240 [P.O.:240]  Out: -     EXAM:  GENERAL: No apparent distress.     HEENT: Membranes moist, no oral lesion  NECK:  Supple, no lymphadenopathy  LUNGS: Clear b/l, no rales, no dullness  CARDIAC: RRR, no murmur appreciated  ABD:  + BS, soft / NT  EXT:  No rash, no edema, no lesions - R hip wound closed, few staples, no redness / drainage  NEURO: No focal neurologic findings  PSYCH: Orientation, sensorium, mood normal  LINES:  PICC in place        Data Review:  Lab Results   Component Value Date    WBC 6.2 03/19/2021    HGB 8.3 (L) 03/19/2021    HCT 25.5 (L) 03/19/2021    MCV 80.2 03/19/2021     (H) 03/19/2021     Lab Results   Component Value Date    CREATININE 0.8 03/19/2021    BUN 8 03/19/2021     03/19/2021    K 3.9 03/19/2021     03/19/2021    CO2 30 03/19/2021       Hepatic Function Panel:   Lab Results   Component Value Date    ALKPHOS 113 10/14/2018    ALT 15 10/14/2018    AST 17 10/14/2018    PROT 7.5 10/14/2018    BILITOT <0.2 10/14/2018    BILIDIR <0.2 06/29/2018    IBILI see below 06/29/2018    LABALBU 3.7 10/14/2018       Micro:  2/25/21 [facility] - 1 colony E faecalis     3/18/20 [facility]  Organism Antibiotic Method Susceptibility   Staphylococcus aureus Ciprofloxacin SAVI <=0.5: Sensitive    Comment: SUSCEPTIBLE    Clindamycin SAVI <=0.25: Sensitive    Comment: SUSCEPTIBLE    Erythromycin SAVI <=0.25: Sensitive    Comment: SUSCEPTIBLE    Gentamicin SAVI <=0.5: Sensitive    Comment: SUSCEPTIBLE Levofloxacin SAVI <=0.12: Sensitive    Comment: SUSCEPTIBLE    Moxifloxacin SAVI <=0.25: Sensitive    Comment: SUSCEPTIBLE    Oxacillin SAVI <=0.25: Sensitive    Comment: SUSCEPTIBLE    Rifampin SAVI <=0.5: Sensitive    Comment: SUSCEPTIBLE    Tetracycline SAVI <=1: Sensitive    Comment: SUSCEPTIBLE    Vancomycin SAVI 1: Sensitive         Benzylpenicillin SAVI >=0.5: Resistant    Comment: RESISTANT    Trimethoprim + Sulfamethoxazole SAVI <=10: Sensitive      Imaging:   3/11 LE doppler - no DVT     3/11 CXR  'Stable appearance of the chest including pulmonary vascular congestion and right basilar airspace disease'      Scheduled Meds:   furosemide  80 mg Oral Daily    ipratropium-albuterol  1 ampule Inhalation 4x daily    potassium chloride  20 mEq Oral BID WC    diclofenac sodium  2 g Topical BID    lidocaine  1 patch Transdermal Daily    cefTRIAXone (ROCEPHIN) IV  2,000 mg Intravenous Q12H    enoxaparin  30 mg Subcutaneous Daily    amitriptyline  75 mg Oral Nightly    amLODIPine  2.5 mg Oral Daily    atorvastatin  40 mg Oral Nightly    FLUoxetine  40 mg Oral Daily    pregabalin  100 mg Oral TID    ampicillin-sulbactam  3,000 mg Intravenous Q6H    bisacodyl  5 mg Oral Daily       Continuous Infusions:      PRN Meds:  ibuprofen, diphenhydrAMINE, albuterol, ALPRAZolam, tiZANidine, benzonatate, acetaminophen, magnesium hydroxide, polyethylene glycol, oxyCODONE **OR** oxyCODONE      Assessment:     Hx scoliosis, OA, chronic pain, asthma, obesity (BMI 44), psych     Hx fall / trauma 2013/2014  Hx b/l TKA 12/29/14, b/l JACEK, R JACEK revision 6/2016     Hx fall and R leg fx assoc with JACEK, had ORIF 10/2016. 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   Culture + MSSA - [facility] Everywhere, 3/18/20     Pt had recurrent infection, seen by Ortho 2/3/21  Admit / OR 2/25 - removal plate screw yet extension to R JACEK and assoc with stem / poss extension to R TKA, arthoplasties NOT removed, 1 colony E faecalis, S amp / vanco.  Seen by ID, recommended iv ceftriaxone 2 gm iv q 12 and unasyn 3 gm q 6 hr x 12 weeks ([facility], Dr Nona Peraza, NP [place])     IMP/  R leg infection assoc with hardware (JACEK, TKA)   - hx MSSA infection    - I&D with some hardware removal 2/25/21, cult with E faecalis (1 colony)    Plan:     Cont ceftriaxone and unasyn for now   - plan per Dr Nona Peraza, [address] for 12 weeks - through 5/20     Reviewed antibiotic / Hayley Amos with [facility] ID NP   Jael Grade [phone], pager [phone], cell [phone]     At discharge, may substitute ampicillin 2 gm q 4 / 12 hr daily (CAD pump) for unasyn  See LATISHA     Discussed with pt,  [facility], Discharge Planner, [address] [facility] MD Ortega    INFUSION ORDERS:  Ceftriaxone 2 g iv q 12 hr through 5/20  Ampicillin 2 gm iv q 4 hr through 5/20  - First dose given in hospital  - PICC   - Disposition / date discharge  - Check CBC w diff, CMP, ESR, CRP every Mon or Tue - FAX result to [phone] ([facility])  - Call with antibiotic / infusion issues, [phone] ([facility] ID Dept)  - No f/u in outpatient [facility] ([facility]) ID office

## 2021-03-20 VITALS
HEART RATE: 91 BPM | DIASTOLIC BLOOD PRESSURE: 87 MMHG | WEIGHT: 214.95 LBS | SYSTOLIC BLOOD PRESSURE: 144 MMHG | RESPIRATION RATE: 18 BRPM | TEMPERATURE: 98.5 F | BODY MASS INDEX: 45.12 KG/M2 | HEIGHT: 58 IN | OXYGEN SATURATION: 90 %

## 2021-03-20 PROCEDURE — 99239 HOSP IP/OBS DSCHRG MGMT >30: CPT | Performed by: PHYSICAL MEDICINE & REHABILITATION

## 2021-03-20 PROCEDURE — 6360000002 HC RX W HCPCS: Performed by: PHYSICAL MEDICINE & REHABILITATION

## 2021-03-20 PROCEDURE — 6360000002 HC RX W HCPCS: Performed by: INTERNAL MEDICINE

## 2021-03-20 PROCEDURE — 94761 N-INVAS EAR/PLS OXIMETRY MLT: CPT

## 2021-03-20 PROCEDURE — 94640 AIRWAY INHALATION TREATMENT: CPT

## 2021-03-20 PROCEDURE — 2580000003 HC RX 258: Performed by: PHYSICAL MEDICINE & REHABILITATION

## 2021-03-20 PROCEDURE — 6370000000 HC RX 637 (ALT 250 FOR IP): Performed by: PHYSICAL MEDICINE & REHABILITATION

## 2021-03-20 PROCEDURE — 2580000003 HC RX 258: Performed by: INTERNAL MEDICINE

## 2021-03-20 PROCEDURE — 36592 COLLECT BLOOD FROM PICC: CPT

## 2021-03-20 PROCEDURE — 2700000000 HC OXYGEN THERAPY PER DAY

## 2021-03-20 RX ORDER — POLYETHYLENE GLYCOL 3350 17 G/17G
17 POWDER, FOR SOLUTION ORAL DAILY PRN
Qty: 527 G | Refills: 1 | Status: SHIPPED | OUTPATIENT
Start: 2021-03-20 | End: 2021-04-19

## 2021-03-20 RX ORDER — IBUPROFEN 400 MG/1
400 TABLET ORAL EVERY 6 HOURS PRN
Qty: 120 TABLET | Refills: 3 | Status: SHIPPED | OUTPATIENT
Start: 2021-03-20

## 2021-03-20 RX ORDER — TIZANIDINE 4 MG/1
4 TABLET ORAL EVERY 6 HOURS PRN
Qty: 60 TABLET | Refills: 0 | Status: SHIPPED | OUTPATIENT
Start: 2021-03-20 | End: 2021-12-11

## 2021-03-20 RX ORDER — POTASSIUM CHLORIDE 20 MEQ/1
20 TABLET, EXTENDED RELEASE ORAL 2 TIMES DAILY WITH MEALS
Qty: 60 TABLET | Refills: 3 | Status: SHIPPED | OUTPATIENT
Start: 2021-03-20 | End: 2021-12-11 | Stop reason: ALTCHOICE

## 2021-03-20 RX ORDER — PREGABALIN 100 MG/1
100 CAPSULE ORAL 3 TIMES DAILY
Qty: 60 CAPSULE | Refills: 3 | Status: SHIPPED | OUTPATIENT
Start: 2021-03-20 | End: 2021-04-19

## 2021-03-20 RX ORDER — FUROSEMIDE 80 MG
80 TABLET ORAL DAILY
Qty: 60 TABLET | Refills: 3 | Status: SHIPPED | OUTPATIENT
Start: 2021-03-21

## 2021-03-20 RX ORDER — LIDOCAINE 4 G/G
1 PATCH TOPICAL DAILY
Qty: 2 BOX | Refills: 1 | Status: SHIPPED | OUTPATIENT
Start: 2021-03-20

## 2021-03-20 RX ORDER — OXYCODONE HYDROCHLORIDE 5 MG/1
5 TABLET ORAL EVERY 4 HOURS PRN
Qty: 20 TABLET | Refills: 0 | Status: SHIPPED | OUTPATIENT
Start: 2021-03-20 | End: 2021-03-23

## 2021-03-20 RX ADMIN — FUROSEMIDE 80 MG: 40 TABLET ORAL at 08:54

## 2021-03-20 RX ADMIN — PREGABALIN 100 MG: 75 CAPSULE ORAL at 08:54

## 2021-03-20 RX ADMIN — CEFTRIAXONE SODIUM 2000 MG: 2 INJECTION, POWDER, FOR SOLUTION INTRAMUSCULAR; INTRAVENOUS at 09:44

## 2021-03-20 RX ADMIN — ALPRAZOLAM 0.25 MG: 0.25 TABLET ORAL at 08:54

## 2021-03-20 RX ADMIN — POTASSIUM CHLORIDE 20 MEQ: 20 TABLET, EXTENDED RELEASE ORAL at 08:54

## 2021-03-20 RX ADMIN — AMLODIPINE BESYLATE 2.5 MG: 5 TABLET ORAL at 08:54

## 2021-03-20 RX ADMIN — AMPICILLIN SODIUM 2000 MG: 2 INJECTION, POWDER, FOR SOLUTION INTRAMUSCULAR; INTRAVENOUS at 05:33

## 2021-03-20 RX ADMIN — ENOXAPARIN SODIUM 30 MG: 30 INJECTION SUBCUTANEOUS at 08:53

## 2021-03-20 RX ADMIN — OXYCODONE 10 MG: 5 TABLET ORAL at 05:44

## 2021-03-20 RX ADMIN — IPRATROPIUM BROMIDE AND ALBUTEROL SULFATE 1 AMPULE: .5; 3 SOLUTION RESPIRATORY (INHALATION) at 07:52

## 2021-03-20 RX ADMIN — IPRATROPIUM BROMIDE AND ALBUTEROL SULFATE 1 AMPULE: .5; 3 SOLUTION RESPIRATORY (INHALATION) at 12:07

## 2021-03-20 RX ADMIN — AMPICILLIN SODIUM 2000 MG: 2 INJECTION, POWDER, FOR SOLUTION INTRAMUSCULAR; INTRAVENOUS at 08:54

## 2021-03-20 RX ADMIN — ALBUTEROL SULFATE 2.5 MG: 2.5 SOLUTION RESPIRATORY (INHALATION) at 05:06

## 2021-03-20 RX ADMIN — OXYCODONE 10 MG: 5 TABLET ORAL at 09:44

## 2021-03-20 RX ADMIN — FLUOXETINE 40 MG: 20 CAPSULE ORAL at 08:54

## 2021-03-20 ASSESSMENT — PAIN SCALES - GENERAL
PAINLEVEL_OUTOF10: 9
PAINLEVEL_OUTOF10: 9

## 2021-03-20 ASSESSMENT — PAIN DESCRIPTION - PROGRESSION: CLINICAL_PROGRESSION: NOT CHANGED

## 2021-03-20 ASSESSMENT — PAIN DESCRIPTION - PAIN TYPE
TYPE: ACUTE PAIN;SURGICAL PAIN
TYPE: ACUTE PAIN;SURGICAL PAIN

## 2021-03-20 ASSESSMENT — PAIN DESCRIPTION - FREQUENCY: FREQUENCY: CONTINUOUS

## 2021-03-20 ASSESSMENT — PAIN DESCRIPTION - DESCRIPTORS: DESCRIPTORS: ACHING;DISCOMFORT

## 2021-03-20 ASSESSMENT — PAIN DESCRIPTION - ORIENTATION
ORIENTATION: RIGHT
ORIENTATION: RIGHT

## 2021-03-20 ASSESSMENT — PAIN DESCRIPTION - ONSET
ONSET: ON-GOING
ONSET: ON-GOING

## 2021-03-20 ASSESSMENT — PAIN DESCRIPTION - LOCATION: LOCATION: HIP;KNEE;SHOULDER

## 2021-03-20 ASSESSMENT — PAIN - FUNCTIONAL ASSESSMENT: PAIN_FUNCTIONAL_ASSESSMENT: PREVENTS OR INTERFERES SOME ACTIVE ACTIVITIES AND ADLS

## 2021-03-20 NOTE — PLAN OF CARE
Problem: Pain:  Description: Pain management should include both nonpharmacologic and pharmacologic interventions. Goal: Control of acute pain  Description: Control of acute pain  Outcome: Ongoing  Note: Patient has complained of right arm and shoulder discomfort. She states that it is due to a fall. Voltaren ointment applied. She also complained of right leg pain. She rates her pain 9 on scale 1-10. Medicated with Oxycodone as ordered. Repositioned with pillow support. Problem: Falls - Risk of:  Goal: Will remain free from falls  Description: Will remain free from falls  Outcome: Ongoing  Note: Patient is alert and oriented. She appears forgetful at times. She continues to have poor safety awareness and move impulsively and quickly. Reinforced the need to move safely with assistance standing by. Fall precautions in place. Bed is in low and locked position. Bed alarm set. Bedside table and call light within reach. [employer] camera in use. Problem: Infection - Surgical Site:  Description: Surgical site clean dry and intact, small area of redness and swelling observed. Incision covered covered with dry dressing and Tegaderm. Will continue to monitor. Goal: Will show no infection signs and symptoms  Description: Will show no infection signs and symptoms  Outcome: Ongoing  Note: Surgical incision well approximated. No drainage or redness noted. Sutures and staples removed. Generalized non pitting edema noted.

## 2021-03-20 NOTE — PROGRESS NOTES
Patient provided with discharge instruction, follow up appointments and phone numbers for several appointments that could not be scheduled as it is the weekend and they are not in the office. Patient states understanding that she needs to follow up and make appointments Monday. Information/phone numbers given for [facility], [facility], and [facility] for her home health services, IV infusion services, and home O2 therapy service. 2 Prescriptions given for Oxycodone and Lyrica, and instructions provided to have the prescriptions filled at pharmacy. Also informed to  medications that were escribed. Patient assisted to w/c and assisted to emergency department entrance. Left facility with  via private vehicle.

## 2021-03-20 NOTE — PLAN OF CARE
Problem: Pain:  Goal: Pain level will decrease  Description: Pain level will decrease  Outcome: Completed     Problem: Pain:  Goal: Control of acute pain  Description: Control of acute pain  3/20/2021 1333 by Chaka Anderson  Outcome: Completed     Problem: Pain:  Goal: Control of chronic pain  Description: Control of chronic pain  Outcome: Completed     Problem: Falls - Risk of:  Goal: Will remain free from falls  Description: Will remain free from falls  3/20/2021 1333 by Chaka Anderson  Outcome: Completed     Problem: Falls - Risk of:  Goal: Absence of physical injury  Description: Absence of physical injury  Outcome: Completed     Problem: Nutrition  Goal: Optimal nutrition therapy  Outcome: Completed     Problem: Infection - Surgical Site:  Goal: Will show no infection signs and symptoms  Description: Will show no infection signs and symptoms  3/20/2021 1333 by Chaka Anderson  Outcome: Completed     Problem: Skin Integrity:  Goal: Will show no infection signs and symptoms  Description: Will show no infection signs and symptoms  3/20/2021 1333 by Chaka Anderson  Outcome: Completed     Problem: Skin Integrity:  Goal: Absence of new skin breakdown  Description: Absence of new skin breakdown  Outcome: Completed

## 2021-03-20 NOTE — PROGRESS NOTES
Patient is alert and oriented. Vital signs stable. Right hip / thigh incision well approximated. No redness or drainage noted. Staples and sutures removed without difficulty. Wound cleansed and left open to air. Patient complains of right shoulder, hip and leg pain. Medicated with Oxycodone as ordered. Call light in reach. Instructed to call for any needs.

## 2021-03-20 NOTE — DISCHARGE SUMMARY
Physical Medicine & Rehabilitation  Discharge Summary     Patient Identification:  Vish Prince  : 1960  Admit date: 3/3/2021  Discharge date:  21  Attending provider: Otto Fleischer, DO        Primary care provider: Corrine Vargas     Discharge Diagnoses:   Patient Active Problem List   Diagnosis    Knee osteoarthritis    Chronic pain    Bladder prolapse    Moderate persistent asthma    Alcohol abuse    Localized edema    Anxiety    Primary insomnia    Arthritis    Alcohol use disorder, severe, dependence ([facility]    Depression    Suicide attempt ([facility]    Acute asthma exacerbation    Right knee pain    Asthma    Contusion of right knee    WIGGINS (dyspnea on exertion)    Dyspnea on exertion    Debility    Hypoxemia    PMB (postmenopausal bleeding)    Menopause       History of Present Illness/Acute Hospital Course:  Pt is 64 y/o female transferred from [address] [place] [address] for ARU placement.      Terri Arizmendi is a 63 y. o.female with pmhx OA, DM, Alcohol Abuse (Prior AA, ), Mood Disorder, asthma admitted on 2021  for planned orthopedic procedure for infection of femur hardware. Pt has b/l knee replaced in  and right hip replaced in 2016/ Revision in 2016. Intraop Femur fracture found the, on right side. Then she fell in Oct 2016 and broke her femur again. Has A plate placed in femur. All this was in [facility], [facility].  In  she has some abscesses pop up in  on her right thigh and she was on ABX for a year then.  The abscesses stopped appearing for a few years. Aminah Primer last year In march she has some more abscesses  along her thigh and on her right knee. They were red with whitish dot in center. Some of them were lanced but I don't see any cultures. She saw ortho and an attempt was made to drain the right hip but nothing could be aspirated. A hip swab from OSH showed MSSA but I am not sure where it was taken from.    She saw her PCP in 2021 who lanced some of the abscesses and started dicloxacillin  followed by a repeat deeper lancing just 6 days ago that he switched her to Keflex. Referred to Ortho at [facility]. 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 Up until 2/5/2021 she was on Keflex Patient admitted too [facility] and  had infected plate removed from her femur 2/25/2021 along with removal of screws,cables and deep bone biopsy. Per Op note. The area around late looked infected. There was sinus tract that connected with knee joint space. The femur stem of the Artifical hip was exposed and there was reactive synovitis there as well. 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 Intraoperatively 3 tissue Cx and 1 deep bone Cx were taken E Faecalis. Patient was started on IV Vancomycin and plan is currently to continue for 12 weeks with tentative end date of 5/20. Post -op Pain control with scheduled tylenol, Ibuprofen, lyrica. Also flexeril and oxycodone prn.  She also has Moderate Persistent Asthma in which she is requiring HHN txs. Inpatient Rehabilitation Course:   Teodoro Llamas is a 63 y.o. female admitted to inpatient rehabilitation on 3/3/2021 with debility. The patient participated in an aggressive multidisciplinary inpatient rehabilitation program involving 3 hours of therapy per day, at least 5 days per week. Impairments: Decreased functional mobility    Medical Management:  Infected Right Femur Hardware  - 2/25 s/p partial JENIFER  - Intra-op cultures with e. Faecalis  - ID consulted, appreciate recs: d/t only partial JENIFER, prefer double beta-lactam coverage for enterococcus  - Continue ceftriaxone 2g q 12 hr + unasyn 3g every 6 hours for 12 weeks, unless plan to remove hardware.  through 5/20.  - At discharge, may substitute ampicillin 2 gm q 4 / 12 hr daily (CAD pump) for unasyn.   - PT/OT in ARU     Essential HTN   - Well controlled   - amlodipine 2.5mg daily, lasix 80 mg daily     Constipation  Continue bowel regimen      Chronic Pain  Mood Disorder - PTA:   elavil  Lidocaine patch  Lyrica (pregabilin)  Voltaren Gel   Zanaflex   Roxicodone     Right shoulder pain. SP fall at previous hospitalization. Increased stiffness and pain. Xray shows   Moderate acromioclavicular joint arthrosis. If pain doesn't improve may consider further imaging.      Moderate Persistent Asthma  Continue home Albuterol prn   O2 sats stable on room air  Needs outpatient PFTs and pulmonology for further w/u of SOB with exertion (? ILD, pulm HTN. ..) (already new referral PTA at [facility] consult      Vaginal bleeding- OB consult   Transvaginal US shows  small endometrial polyp, left ovary not visualized. Discharge Exam:  Constitutional: Alert, WDWN, Pleasant, no distress  Head: Normocephalic, atruamatic, MMM  Eyes: Conjunctiva noninjected, no icterus, no drainage  Pulm: CTA bilat. Respirations non-labored. CV: No murmurs noted. RRR. Abd: Soft, nontender. NABS+  Ext: No edema, no varicosities  Neuro: Alert, fully oriented, appropriate   MSK: No joint abnormalities noted       Discharge Functional Status:    Physical therapy:  Bed Mobility: Scooting: Independent(to EOB)  Transfers: Sit to Stand: Modified independent(from bed, from 4WW)  Stand to sit: Modified independent  Bed to Chair: Contact guard assistance  Comment: During toileting pt completed manuevering in tight spaces with RW with SBA, and was supervision during seated ba;amce during pericare and dressing in seated. Pt required CGA during standing balance while fluctuating between UL UE support and no UE support while pulling up underwear/pants and washing stool out of pants/washing hands at sink., WB Status: n/a  Ambulation 1  Surface: level tile  Device: Rollator  Other Apparatus: (2L)  Assistance: Stand by assistance  Quality of Gait: Forward flexed posture, decreased step height/length, slow marisa.   Gait Deviations: Slow Makeda, Decreased step length, Decreased step height  Distance: 200 ft  Comments: No LOB noted. , Stairs  # Steps : 12  Stairs Height: 6\"  Rails: Bilateral  Device: No Device  Assistance: Stand by assistance  Comment: cueing for proper sequencing of L LE up and R LE down first  Mobility:  , PT Equipment Recommendations  Equipment Needed: (continue to assess)  Other: pt has all needed DME, Assessment: The pt has demonstrated improved endurance and activity tolerance as well as strength with ambulation and stairs today. She required few rest breaks and was steady with mobility. She does continue to require cueing for proper sequencing of the stairs. She has met her goals except for mod I with use of rollator while ambulating as the pt doesnot properly use brakes of rollator with ambulation and has had instances where her R LE has given out though she is typically steady. She was independent with bed mobility and transfers. The pt would benefit from further skilled PT upon discharge to improve her independence and safety with functional mobility. Occupational therapy:  ,  , Assessment: Pt met 5/5 STGs and 0/5 LTGs as pt continues to require spvn assistance for LB bathing and dressing d/t impulsivity, deconditioning, RLE weakness and pain. Pt plan to d/c home tomorrow with 24 hr supervision available from family and HHOT to continue skilled therapy session. Pt purchased TTB and has AE at baseline for LB dressing.     Speech therapy:         Significant Diagnostics:   Lab Results   Component Value Date    CREATININE 0.8 03/19/2021    BUN 8 03/19/2021     03/19/2021    K 3.9 03/19/2021     03/19/2021    CO2 30 03/19/2021       Lab Results   Component Value Date    WBC 6.2 03/19/2021    HGB 8.3 (L) 03/19/2021    HCT 25.5 (L) 03/19/2021    MCV 80.2 03/19/2021     (H) 03/19/2021       Disposition:  home    Services:PT/OT  DME: walker    Discharge Condition: Stable    Follow-up:  See capsule by mouth daily     pantoprazole 40 MG tablet  Commonly known as: PROTONIX  Take 1 tablet by mouth every morning (before breakfast)     Symbicort 80-4.5 MCG/ACT Aero  Generic drug: budesonide-formoterol        STOP taking these medications    aspirin EC 81 MG EC tablet     cyclobenzaprine 10 MG tablet  Commonly known as: FLEXERIL     gabapentin 300 MG capsule  Commonly known as: NEURONTIN     hydrOXYzine 25 MG capsule  Commonly known as: VISTARIL     meloxicam 15 MG tablet  Commonly known as: MOBIC           Where to Get Your Medications      These medications were sent to [facility] #[id number] - [facility], [address] - P [phone] - F [phone]  [address] [address]    Phone: [phone]   · diclofenac sodium 1 % Gel  · furosemide 80 MG tablet  · ibuprofen 400 MG tablet  · lidocaine 4 % external patch  · polyethylene glycol 17 g packet  · potassium chloride 20 MEQ extended release tablet  · tiZANidine 4 MG tablet     You can get these medications from any pharmacy    Bring a paper prescription for each of these medications  · oxyCODONE 5 MG immediate release tablet  · pregabalin 100 MG capsule           I spent over 35 minutes on this discharge encounter between counseling, coordination of care, and medication reconciliation. To comply with [facility] davide R.II.4.1:   Discharge order placed in advance to facilitate patients discharge needs.       Shiva Ashley,

## 2021-03-22 ENCOUNTER — TELEPHONE (OUTPATIENT)
Dept: INFECTIOUS DISEASES | Age: 61
End: 2021-03-22

## 2021-03-22 NOTE — TELEPHONE ENCOUNTER
Pt will be followed by [address] [place] [address] -  Infusion coordinator - Rashad abbasi [facility] - Dr Jazz Alberto

## 2021-03-22 NOTE — TELEPHONE ENCOUNTER
Cat with [facility] called to ask if Dr. Shyam Sapp was following this patients IV antibiotics. I see in patients chart that lab results go to  ID. Wanting to confirm that we are not following this patient.

## 2021-03-23 ENCOUNTER — TELEPHONE (OUTPATIENT)
Dept: INFECTIOUS DISEASES | Age: 61
End: 2021-03-23

## 2021-03-23 NOTE — TELEPHONE ENCOUNTER
Aline Sanches RN called to notify that patient no longer wants to do home infusions. She has not been doing them at home since being discharged and wants her PICC line removed. She is open to PO antibiotics but not IV. Per last notes patient is being followed by [facility].  [facility] gave [facility] nurse their contact info

## 2021-03-23 NOTE — TELEPHONE ENCOUNTER
I am not following this pt -  She needs to contact [facility] (Dr Jeannine Addison) / Ortho   [facility] - [phone]

## 2021-04-21 NOTE — CARE COORDINATION
CTN received note from [facility] office that patient has discontinued services with [facility], who we were originally  Informed that was following this patient. Patient was non compliant within the first few days of service, but it is this CTN's understanding that she has been compliant since that time until the morning of 4/21 when she called the [facility] office and stated that she somehow pulled out her PICC line throughout the night. Patient informed the  at [facility] that she would return to [facility] today, 4/21/21 to have picc line replaced, but as of 3:30pm, she has not shown. It is this CTN's understanding that the patient is looking for a new ID physician possibly with [facility] or Dr Cruz Kwong, but currently [facility] does not have a signing MD for IV Antibiotics. The  with [facility] has stressed the importance of continuing the antibiotics ASAP to the patient, since her PICC line fell out today. Patient did reply in understanding and say that she would arrive to the ED.  Electronically signed by Aleksandr Villavicencio LPN on 5/23/3566 at 7:63 PM

## 2021-06-02 ENCOUNTER — CLINICAL DOCUMENTATION (OUTPATIENT)
Dept: OTHER | Age: 61
End: 2021-06-02

## 2021-10-18 ENCOUNTER — HOSPITAL ENCOUNTER (OUTPATIENT)
Dept: GENERAL RADIOLOGY | Age: 61
Discharge: HOME | End: 2021-10-18
Payer: MEDICARE
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
## 2021-10-18 PROCEDURE — 73560 X-RAY EXAM OF KNEE 1 OR 2: CPT

## 2021-10-18 PROCEDURE — 73502 X-RAY EXAM HIP UNI 2-3 VIEWS: CPT

## 2021-10-20 ENCOUNTER — HOSPITAL ENCOUNTER (EMERGENCY)
Age: 61
Discharge: HOME | End: 2021-10-20
Attending: EMERGENCY MEDICINE
Payer: MEDICARE

## 2021-10-20 VITALS
HEIGHT: 57 IN | DIASTOLIC BLOOD PRESSURE: 85 MMHG | RESPIRATION RATE: 18 BRPM | OXYGEN SATURATION: 92 % | TEMPERATURE: 98.1 F | HEART RATE: 88 BPM | BODY MASS INDEX: 39.91 KG/M2 | WEIGHT: 185 LBS | SYSTOLIC BLOOD PRESSURE: 141 MMHG

## 2021-10-20 DIAGNOSIS — M25.552 LEFT HIP PAIN: Primary | ICD-10-CM

## 2021-10-20 LAB
ANION GAP SERPL CALCULATED.3IONS-SCNC: 11 MMOL/L (ref 3–16)
BASOPHILS ABSOLUTE: 0 K/UL (ref 0–0.2)
BASOPHILS RELATIVE PERCENT: 0.4 %
BUN BLDV-MCNC: 11 MG/DL (ref 7–20)
C-REACTIVE PROTEIN: 11.7 MG/L (ref 0–5.1)
CALCIUM SERPL-MCNC: 8.8 MG/DL (ref 8.3–10.6)
CHLORIDE BLD-SCNC: 100 MMOL/L (ref 99–110)
CO2: 26 MMOL/L (ref 21–32)
CREAT SERPL-MCNC: 0.6 MG/DL (ref 0.6–1.2)
EOSINOPHILS ABSOLUTE: 0.2 K/UL (ref 0–0.6)
EOSINOPHILS RELATIVE PERCENT: 2.7 %
GFR AFRICAN AMERICAN: >60
GFR NON-AFRICAN AMERICAN: >60
GLUCOSE BLD-MCNC: 198 MG/DL (ref 70–99)
HCT VFR BLD CALC: 30.2 % (ref 36–48)
HEMOGLOBIN: 10 G/DL (ref 12–16)
LYMPHOCYTES ABSOLUTE: 1.7 K/UL (ref 1–5.1)
LYMPHOCYTES RELATIVE PERCENT: 21.9 %
MCH RBC QN AUTO: 24.8 PG (ref 26–34)
MCHC RBC AUTO-ENTMCNC: 33.1 G/DL (ref 31–36)
MCV RBC AUTO: 74.9 FL (ref 80–100)
MONOCYTES ABSOLUTE: 0.6 K/UL (ref 0–1.3)
MONOCYTES RELATIVE PERCENT: 7 %
NEUTROPHILS ABSOLUTE: 5.4 K/UL (ref 1.7–7.7)
NEUTROPHILS RELATIVE PERCENT: 68 %
PDW BLD-RTO: 19.2 % (ref 12.4–15.4)
PLATELET # BLD: 321 K/UL (ref 135–450)
PMV BLD AUTO: 7.8 FL (ref 5–10.5)
POTASSIUM REFLEX MAGNESIUM: 4 MMOL/L (ref 3.5–5.1)
RBC # BLD: 4.03 M/UL (ref 4–5.2)
SEDIMENTATION RATE, ERYTHROCYTE: 35 MM/HR (ref 0–30)
SODIUM BLD-SCNC: 137 MMOL/L (ref 136–145)
WBC # BLD: 8 K/UL (ref 4–11)

## 2021-10-20 PROCEDURE — 36415 COLL VENOUS BLD VENIPUNCTURE: CPT

## 2021-10-20 PROCEDURE — 85652 RBC SED RATE AUTOMATED: CPT

## 2021-10-20 PROCEDURE — 96372 THER/PROPH/DIAG INJ SC/IM: CPT

## 2021-10-20 PROCEDURE — 6360000002 HC RX W HCPCS: Performed by: PHYSICIAN ASSISTANT

## 2021-10-20 PROCEDURE — 86140 C-REACTIVE PROTEIN: CPT

## 2021-10-20 PROCEDURE — 85025 COMPLETE CBC W/AUTO DIFF WBC: CPT

## 2021-10-20 PROCEDURE — 6370000000 HC RX 637 (ALT 250 FOR IP): Performed by: EMERGENCY MEDICINE

## 2021-10-20 PROCEDURE — 80048 BASIC METABOLIC PNL TOTAL CA: CPT

## 2021-10-20 PROCEDURE — 99284 EMERGENCY DEPT VISIT MOD MDM: CPT

## 2021-10-20 RX ORDER — KETOROLAC TROMETHAMINE 30 MG/ML
15 INJECTION, SOLUTION INTRAMUSCULAR; INTRAVENOUS ONCE
Status: COMPLETED | OUTPATIENT
Start: 2021-10-20 | End: 2021-10-20

## 2021-10-20 RX ORDER — OXYCODONE HYDROCHLORIDE 5 MG/1
5 TABLET ORAL EVERY 6 HOURS PRN
Qty: 12 TABLET | Refills: 0 | Status: SHIPPED | OUTPATIENT
Start: 2021-10-20 | End: 2021-10-23

## 2021-10-20 RX ORDER — OXYCODONE HYDROCHLORIDE 5 MG/1
5 TABLET ORAL ONCE
Status: COMPLETED | OUTPATIENT
Start: 2021-10-20 | End: 2021-10-20

## 2021-10-20 RX ADMIN — KETOROLAC TROMETHAMINE 15 MG: 30 INJECTION, SOLUTION INTRAMUSCULAR at 14:00

## 2021-10-20 RX ADMIN — OXYCODONE 5 MG: 5 TABLET ORAL at 15:32

## 2021-10-20 ASSESSMENT — PAIN DESCRIPTION - ORIENTATION: ORIENTATION: LEFT

## 2021-10-20 ASSESSMENT — PAIN SCALES - GENERAL
PAINLEVEL_OUTOF10: 8
PAINLEVEL_OUTOF10: 9
PAINLEVEL_OUTOF10: 9
PAINLEVEL_OUTOF10: 8

## 2021-10-20 ASSESSMENT — PAIN DESCRIPTION - PAIN TYPE
TYPE: ACUTE PAIN
TYPE: ACUTE PAIN

## 2021-10-20 ASSESSMENT — PAIN DESCRIPTION - LOCATION: LOCATION: HIP

## 2021-10-20 ASSESSMENT — ENCOUNTER SYMPTOMS
ABDOMINAL PAIN: 0
NAUSEA: 0
BACK PAIN: 1
VOMITING: 0

## 2021-10-20 NOTE — ED PROVIDER NOTES
[address] ENCOUNTER          PHYSICIAN ASSISTANT NOTE       Date of evaluation: 10/20/2021    Chief Complaint     Hip Pain (c/o left hip pain saw PCP and got x-rays and states that there was no fx. was not able to get into her ortho doc states was told to come to ER if pain is worse. )      History of Present Illness     Terri Card is a 57 y.o. female with PMH of asthma, alcohol abuse who presents with left hip pain. Patient states that she has stood up from the toilet on Sunday and had severe pain in her left anterior thigh and groin as well as her left buttock region. She states that she was unable to put weight on the leg initially but has been able to do so recently but with difficulty. She went to her PCP on Monday and had x-rays that were negative for fracture. She was prescribed tramadol and has been taking ibuprofen as well as gabapentin with some relief. She states that she called her orthopedic surgeon through [facility] but he is out of town until mid November and she feels that she cannot wait that long due to her persistent pain. She denies any fevers or chills, nausea or vomiting, other systemic symptoms, rash. She has been able to place some weight on the leg but does feel a pop with certain movements of the hip. Patient does have a complicated orthopedic surgical history including bilateral hip and knee arthroplasties, history of revision and reconstruction of right arthroplasty after a periprostatic fracture, history of infected hardware right hip removal of hardware and insertion of antibiotic beads, completion of course of IV antibiotics and now remains on p.o. ampicillin chronically. Reports she has missed several evening doses recently. Review of Systems     Review of Systems   Constitutional: Negative for chills and fever. Gastrointestinal: Negative for abdominal pain, nausea and vomiting.    Genitourinary: Negative for difficulty urinating. Musculoskeletal: Positive for back pain (chronic) and gait problem. +left hip pain   Skin: Negative for wound. Neurological: Negative for weakness and numbness. Psychiatric/Behavioral: The patient is not nervous/anxious. Past Medical, Surgical, Family, and Social History     She has a past medical history of Asthma, Depression, Osteoarthritis, PONV (postoperative nausea and vomiting), and Scoliosis. She has a past surgical history that includes Gastric bypass surgery (); Rotator cuff repair (Left, ); Knee cartilage surgery (Left, ); Breast enhancement surgery (Bilateral, );  section ([address]); Cholecystectomy (); [place] tooth extraction (); joint replacement (Bilateral, 2014); fracture surgery; joint replacement; and Total hip arthroplasty (Bilateral, ). Her family history includes Cancer in her mother; Diabetes in her daughter; Drug Abuse in her brother; Heart Disease in her sister. She was adopted. She reports that she quit smoking about 7 years ago. Her smoking use included cigarettes. She has a 0.05 pack-year smoking history. She has never used smokeless tobacco. She reports that she does not drink alcohol and does not use drugs.     Medications     Previous Medications    ALBUTEROL SULFATE  (90 BASE) MCG/ACT INHALER    Inhale 2 puffs into the lungs 4 times daily as needed for Wheezing    AMITRIPTYLINE (ELAVIL) 75 MG TABLET    Take 1 tablet by mouth nightly    AMLODIPINE (NORVASC) 2.5 MG TABLET    Take 1 tablet by mouth daily    ATORVASTATIN (LIPITOR) 40 MG TABLET    Take 1 tablet by mouth daily    BUDESONIDE-FORMOTEROL (SYMBICORT) 80-4.5 MCG/ACT AERO    Inhale 2 puffs into the lungs 2 times daily    DICLOFENAC SODIUM (VOLTAREN) 1 % GEL    Apply 2 g topically 2 times daily    FLUOXETINE (PROZAC) 40 MG CAPSULE    Take 1 capsule by mouth daily    FUROSEMIDE (LASIX) 80 MG TABLET    Take 1 tablet by mouth daily    IBUPROFEN (ADVIL;MOTRIN) 400 MG TABLET    Take 1 tablet by mouth every 6 hours as needed for Pain    LIDOCAINE 4 % EXTERNAL PATCH    Place 1 patch onto the skin daily    PANTOPRAZOLE (PROTONIX) 40 MG TABLET    Take 1 tablet by mouth every morning (before breakfast)    POTASSIUM CHLORIDE (KLOR-CON M) 20 MEQ EXTENDED RELEASE TABLET    Take 1 tablet by mouth 2 times daily (with meals)    PREGABALIN (LYRICA) 100 MG CAPSULE    Take 1 capsule by mouth 3 times daily for 30 days. TIZANIDINE (ZANAFLEX) 4 MG TABLET    Take 1 tablet by mouth every 6 hours as needed (spasticity)       Allergies     She is allergic to cortisone, droperidol, and compazine [prochlorperazine]. Physical Exam     INITIAL VITALS: BP: (!) 175/91,Temp: 98.1 °F (36.7 °C), Pulse: 98, Resp: 18, SpO2: 94 %   Physical Exam  Vitals and nursing note reviewed. Constitutional:       General: She is not in acute distress. HENT:      Head: Normocephalic and atraumatic. Eyes:      Extraocular Movements: Extraocular movements intact. Conjunctiva/sclera: Conjunctivae normal.   Cardiovascular:      Rate and Rhythm: Normal rate and regular rhythm. Pulmonary:      Effort: Pulmonary effort is normal. No respiratory distress. Breath sounds: Normal breath sounds. No wheezing, rhonchi or rales. Abdominal:      General: Bowel sounds are normal. There is no distension. Palpations: Abdomen is soft. Tenderness: There is no abdominal tenderness. There is no guarding or rebound. Musculoskeletal:         General: No deformity. Cervical back: Neck supple. Comments: Tenderness to palpation over the left anterior groin as well as the lateral hip. No significant pain with log roll. No overlying erythema, rash or warmth. Patient unable to lift extremity off bed or actively range the hip. 4/5 strength dorsiflexion and plantar flexion bilaterally. SILT. Distal pulses palpable. Skin:     General: Skin is warm and dry.    Neurological:      Mental Status: She is alert and oriented to person, place, and time. Psychiatric:         Mood and Affect: Mood normal.         Behavior: Behavior normal.         Diagnostic Results     RADIOLOGY:  No orders to display       LABS:   Results for orders placed or performed during the hospital encounter of 10/20/21   CBC Auto Differential   Result Value Ref Range    WBC 8.0 4.0 - 11.0 K/uL    RBC 4.03 4.00 - 5.20 M/uL    Hemoglobin 10.0 (L) 12.0 - 16.0 g/dL    Hematocrit 30.2 (L) 36.0 - 48.0 %    MCV 74.9 (L) 80.0 - 100.0 fL    MCH 24.8 (L) 26.0 - 34.0 pg    MCHC 33.1 31.0 - 36.0 g/dL    RDW 19.2 (H) 12.4 - 15.4 %    Platelets 530 912 - 997 K/uL    MPV 7.8 5.0 - 10.5 fL    Neutrophils % 68.0 %    Lymphocytes % 21.9 %    Monocytes % 7.0 %    Eosinophils % 2.7 %    Basophils % 0.4 %    Neutrophils Absolute 5.4 1.7 - 7.7 K/uL    Lymphocytes Absolute 1.7 1.0 - 5.1 K/uL    Monocytes Absolute 0.6 0.0 - 1.3 K/uL    Eosinophils Absolute 0.2 0.0 - 0.6 K/uL    Basophils Absolute 0.0 0.0 - 0.2 K/uL   Basic Metabolic Panel w/ Reflex to MG   Result Value Ref Range    Sodium 137 136 - 145 mmol/L    Potassium reflex Magnesium 4.0 3.5 - 5.1 mmol/L    Chloride 100 99 - 110 mmol/L    CO2 26 21 - 32 mmol/L    Anion Gap 11 3 - 16    Glucose 198 (H) 70 - 99 mg/dL    BUN 11 7 - 20 mg/dL    CREATININE 0.6 0.6 - 1.2 mg/dL    GFR Non-African American >60 >60    GFR African American >60 >60    Calcium 8.8 8.3 - 10.6 mg/dL   Sedimentation Rate   Result Value Ref Range    Sed Rate 35 (H) 0 - 30 mm/Hr   CRP   Result Value Ref Range    CRP 11.7 (H) 0.0 - 5.1 mg/L       RECENT VITALS:  BP: (!) 141/85, Temp: 98.1 °F (36.7 °C), Pulse: 88,Resp: 18, SpO2: 92 %     Procedures       ED Course     Nursing Notes, Past Medical Hx, Past Surgical Hx, Social Hx, Allergies, and Family Hx were reviewed.     The patient was given the followingmedications:  Orders Placed This Encounter   Medications    ketorolac (TORADOL) injection 15 mg    oxyCODONE (Joseaileen Mood) immediate release tablet 5 mg    oxyCODONE (ROXICODONE) 5 MG immediate release tablet     Sig: Take 1 tablet by mouth every 6 hours as needed for Pain for up to 3 days. Dispense:  12 tablet     Refill:  0       CONSULTS:  IP CONSULT TO ORTHOPEDIC SURGERY  IP CONSULT TO ORTHOPEDIC SURGERY  IP CONSULT TO [facility] Al[facility] Ji / ASSESSMENT / Steven Aruna is a 57 y.o. female with PMH of complicated orthopedic surgical history including bilateral hip and knee arthroplasties and R hip infection now remains on p.o. ampicillin chronically who presents with left hip pain. Patient reports she stood up from toilet on Sunday and had a very sharp pain in her left hip. This has been consistent since that time. She had outpatient x-rays by her primary doctor Monday which revealed no acute findings. She was unable to get into her orthopedic surgeon for over a month and so presented to the emergency department here today. She complains of feeling generally unwell but denies any fevers or chills, nausea or vomiting, rash. She has had difficulty with range of motion of the hip and ambulating. On exam, she is hypertensive with otherwise normal vital signs. She is in no acute distress. Tenderness to palpation over the left anterior groin as well as the lateral hip. No significant pain with log roll. No overlying erythema, rash or warmth. Patient unable to lift extremity off bed or actively range the hip. 4/5 strength dorsiflexion and plantar flexion bilaterally. SILT. Distal pulses palpable. Given increased pain in left hip in setting of prior hip replacement and history of infected hardware in the right hip, concern for potential infection. Labs were obtained which revealed no leukocytosis but slightly elevated inflammatory markers from prior.     I spoke with Dr. Josefa OCAMPO through [facility]. However, per our discussion, they have not managed patient's hip infection previously and would defer to . I spoke with Dr. Malka OCAMPO through [facility], who recommended arthrocentesis of the left hip to r/o infection. However, as  is currently at capacity, they recommended consultation of our on call Orthopedics. I discussed the case with Dr. Sophie Reyes and as the patient is currently afebrile, pain is reasonably controlled with Toradol and oxycodone here and she is ambulatory, she would be appropriate for outpatient follow-up for arthrocentesis under fluoroscopy. Dr. Sophie Reyes is willing to see the patient in the office later this week. I discussed this patient who is comfortable with the plan. Will prescribe a short course of oxycodone for her pain and she is given strict return precautions. This patient was also evaluated by the attending physician. All care plans were discussed and agreed upon. Clinical Impression     1. Left hip pain        Disposition     PATIENT REFERRED TO:  Tiny Jerome MD  [facility]  [address] [place] [address]  [phone]  [employer] (can go online to schedule)  Schedule an appointment as soon as possible for a visit       [facility] Emergency Department  [address] [place] [address]  [address]  [phone]    As needed, If symptoms worsen      DISCHARGE MEDICATIONS:  New Prescriptions    OXYCODONE (ROXICODONE) 5 MG IMMEDIATE RELEASE TABLET    Take 1 tablet by mouth every 6 hours as needed for Pain for up to 3 days.        DISPOSITION Decision To Discharge 10/20/2021 05:27:04 PM       Haim Caraballo PA-C  10/20/21 [id number]

## 2021-10-20 NOTE — ED NOTES
Discharge instructions reviewed with patient. Patient discharged home with family.        Amber Bundy, RN  10/20/21 3437

## 2021-10-20 NOTE — ED PROVIDER NOTES
ED Attending Attestation Note     Date of evaluation: 10/20/2021    This patient was seen by the JOVAN. I have seen and examined the patient, agree with the workup, evaluation, management and diagnosis. The care plan has been discussed. I was present for any procedures performed in the JOVAN's note and have made edits to the note where appropriate. My assessment reveals 57 y.o. female with history of bilateral JACEK and bilateral TKA complicated by right hip prosthetic infection currently on lifelong ampicillin presenting for left hip pain. On my examination she has tenderness to palpation along the inguinal crease anteriorly and some laterally as well over the greater trochanter. No significant tenderness with logroll of the leg. No pain in the knee. No abdominal tenderness.          Trent Ceja MD  10/20/21 3114

## 2021-10-27 ENCOUNTER — HOSPITAL ENCOUNTER (OUTPATIENT)
Dept: NUCLEAR MEDICINE | Age: 61
Discharge: HOME | End: 2021-10-27
Payer: MEDICARE

## 2021-10-27 DIAGNOSIS — Z96.642 PRESENCE OF LEFT ARTIFICIAL HIP JOINT: ICD-10-CM

## 2021-10-27 DIAGNOSIS — M25.552 LEFT HIP PAIN: ICD-10-CM

## 2021-10-27 PROCEDURE — 78315 BONE IMAGING 3 PHASE: CPT

## 2021-10-27 PROCEDURE — 3430000000 HC RX DIAGNOSTIC RADIOPHARMACEUTICAL: Performed by: ORTHOPAEDIC SURGERY

## 2021-10-27 PROCEDURE — A9503 TC99M MEDRONATE: HCPCS | Performed by: ORTHOPAEDIC SURGERY

## 2021-10-27 RX ORDER — TC 99M MEDRONATE 20 MG/10ML
27.1 INJECTION, POWDER, LYOPHILIZED, FOR SOLUTION INTRAVENOUS
Status: COMPLETED | OUTPATIENT
Start: 2021-10-27 | End: 2021-10-27

## 2021-10-27 RX ADMIN — TC 99M MEDRONATE 27.1 MILLICURIE: 20 INJECTION, POWDER, LYOPHILIZED, FOR SOLUTION INTRAVENOUS at 11:22

## 2021-12-11 ENCOUNTER — HOSPITAL ENCOUNTER (EMERGENCY)
Age: 61
Discharge: HOME | End: 2021-12-11
Attending: STUDENT IN AN ORGANIZED HEALTH CARE EDUCATION/TRAINING PROGRAM
Payer: MEDICARE

## 2021-12-11 ENCOUNTER — APPOINTMENT (OUTPATIENT)
Dept: CT IMAGING | Age: 61
End: 2021-12-11
Payer: MEDICARE

## 2021-12-11 VITALS
DIASTOLIC BLOOD PRESSURE: 84 MMHG | TEMPERATURE: 98.6 F | HEIGHT: 58 IN | SYSTOLIC BLOOD PRESSURE: 170 MMHG | OXYGEN SATURATION: 96 % | RESPIRATION RATE: 18 BRPM | BODY MASS INDEX: 39.88 KG/M2 | HEART RATE: 95 BPM | WEIGHT: 190 LBS

## 2021-12-11 DIAGNOSIS — M79.18 BUTTOCK PAIN: Primary | ICD-10-CM

## 2021-12-11 LAB
ANION GAP SERPL CALCULATED.3IONS-SCNC: 16 MMOL/L (ref 3–16)
BASOPHILS ABSOLUTE: 0 K/UL (ref 0–0.2)
BASOPHILS RELATIVE PERCENT: 0.2 %
BUN BLDV-MCNC: 15 MG/DL (ref 7–20)
CALCIUM SERPL-MCNC: 8.8 MG/DL (ref 8.3–10.6)
CHLORIDE BLD-SCNC: 95 MMOL/L (ref 99–110)
CO2: 23 MMOL/L (ref 21–32)
CREAT SERPL-MCNC: <0.5 MG/DL (ref 0.6–1.2)
EOSINOPHILS ABSOLUTE: 0.1 K/UL (ref 0–0.6)
EOSINOPHILS RELATIVE PERCENT: 1 %
GFR AFRICAN AMERICAN: >60
GFR NON-AFRICAN AMERICAN: >60
GLUCOSE BLD-MCNC: 111 MG/DL (ref 70–99)
HCT VFR BLD CALC: 37.8 % (ref 36–48)
HEMOGLOBIN: 12.9 G/DL (ref 12–16)
LYMPHOCYTES ABSOLUTE: 1.2 K/UL (ref 1–5.1)
LYMPHOCYTES RELATIVE PERCENT: 12.1 %
MCH RBC QN AUTO: 27.8 PG (ref 26–34)
MCHC RBC AUTO-ENTMCNC: 34.1 G/DL (ref 31–36)
MCV RBC AUTO: 81.7 FL (ref 80–100)
MONOCYTES ABSOLUTE: 0.6 K/UL (ref 0–1.3)
MONOCYTES RELATIVE PERCENT: 6.1 %
NEUTROPHILS ABSOLUTE: 8.3 K/UL (ref 1.7–7.7)
NEUTROPHILS RELATIVE PERCENT: 80.6 %
PDW BLD-RTO: 21.2 % (ref 12.4–15.4)
PLATELET # BLD: 336 K/UL (ref 135–450)
PMV BLD AUTO: 7.9 FL (ref 5–10.5)
POTASSIUM REFLEX MAGNESIUM: 4.4 MMOL/L (ref 3.5–5.1)
RBC # BLD: 4.62 M/UL (ref 4–5.2)
SODIUM BLD-SCNC: 134 MMOL/L (ref 136–145)
WBC # BLD: 10.3 K/UL (ref 4–11)

## 2021-12-11 PROCEDURE — 96375 TX/PRO/DX INJ NEW DRUG ADDON: CPT

## 2021-12-11 PROCEDURE — 96365 THER/PROPH/DIAG IV INF INIT: CPT

## 2021-12-11 PROCEDURE — 74177 CT ABD & PELVIS W/CONTRAST: CPT

## 2021-12-11 PROCEDURE — 80048 BASIC METABOLIC PNL TOTAL CA: CPT

## 2021-12-11 PROCEDURE — 99284 EMERGENCY DEPT VISIT MOD MDM: CPT

## 2021-12-11 PROCEDURE — 2580000003 HC RX 258: Performed by: PHYSICIAN ASSISTANT

## 2021-12-11 PROCEDURE — 85025 COMPLETE CBC W/AUTO DIFF WBC: CPT

## 2021-12-11 PROCEDURE — 6360000004 HC RX CONTRAST MEDICATION: Performed by: PHYSICIAN ASSISTANT

## 2021-12-11 PROCEDURE — 6360000002 HC RX W HCPCS: Performed by: PHYSICIAN ASSISTANT

## 2021-12-11 RX ORDER — GABAPENTIN 600 MG/1
600 TABLET ORAL 3 TIMES DAILY
COMMUNITY

## 2021-12-11 RX ORDER — HYDROCODONE BITARTRATE AND ACETAMINOPHEN 5; 325 MG/1; MG/1
1 TABLET ORAL EVERY 6 HOURS PRN
Qty: 8 TABLET | Refills: 0 | Status: SHIPPED | OUTPATIENT
Start: 2021-12-11 | End: 2021-12-14

## 2021-12-11 RX ORDER — ESCITALOPRAM OXALATE 10 MG/1
10 TABLET ORAL DAILY
COMMUNITY

## 2021-12-11 RX ORDER — KETOROLAC TROMETHAMINE 30 MG/ML
15 INJECTION, SOLUTION INTRAMUSCULAR; INTRAVENOUS ONCE
Status: COMPLETED | OUTPATIENT
Start: 2021-12-11 | End: 2021-12-11

## 2021-12-11 RX ORDER — MORPHINE SULFATE 4 MG/ML
4 INJECTION, SOLUTION INTRAMUSCULAR; INTRAVENOUS ONCE
Status: COMPLETED | OUTPATIENT
Start: 2021-12-11 | End: 2021-12-11

## 2021-12-11 RX ORDER — CYCLOBENZAPRINE HCL 10 MG
10 TABLET ORAL 3 TIMES DAILY PRN
COMMUNITY

## 2021-12-11 RX ADMIN — METHOCARBAMOL 500 MG: 100 INJECTION, SOLUTION INTRAMUSCULAR; INTRAVENOUS at 10:35

## 2021-12-11 RX ADMIN — KETOROLAC TROMETHAMINE 15 MG: 30 INJECTION, SOLUTION INTRAMUSCULAR; INTRAVENOUS at 10:34

## 2021-12-11 RX ADMIN — IOPAMIDOL 80 ML: 755 INJECTION, SOLUTION INTRAVENOUS at 10:57

## 2021-12-11 RX ADMIN — MORPHINE SULFATE 4 MG: 4 INJECTION, SOLUTION INTRAMUSCULAR; INTRAVENOUS at 11:36

## 2021-12-11 ASSESSMENT — ENCOUNTER SYMPTOMS
VOMITING: 0
BACK PAIN: 0
CONSTIPATION: 0
COLOR CHANGE: 0
DIARRHEA: 0
SHORTNESS OF BREATH: 0
BLOOD IN STOOL: 0
ABDOMINAL PAIN: 0
CHEST TIGHTNESS: 0
NAUSEA: 0

## 2021-12-11 ASSESSMENT — PAIN DESCRIPTION - DESCRIPTORS: DESCRIPTORS: SHOOTING;OTHER (COMMENT)

## 2021-12-11 ASSESSMENT — PAIN SCALES - GENERAL
PAINLEVEL_OUTOF10: 8
PAINLEVEL_OUTOF10: 7
PAINLEVEL_OUTOF10: 6

## 2021-12-11 ASSESSMENT — PAIN DESCRIPTION - FREQUENCY: FREQUENCY: INTERMITTENT

## 2021-12-11 ASSESSMENT — PAIN DESCRIPTION - ONSET: ONSET: ON-GOING

## 2021-12-11 ASSESSMENT — PAIN DESCRIPTION - PAIN TYPE: TYPE: ACUTE PAIN

## 2021-12-11 ASSESSMENT — PAIN DESCRIPTION - LOCATION: LOCATION: BUTTOCKS

## 2021-12-11 ASSESSMENT — PAIN DESCRIPTION - ORIENTATION: ORIENTATION: LEFT

## 2021-12-11 NOTE — ED PROVIDER NOTES
ED Attending Attestation Note     Date of evaluation: 12/11/2021    This patient was seen by the advance practice provider. I have seen and examined the patient, agree with the workup, evaluation, management and diagnosis. The care plan has been discussed. My assessment reveals a pleasant female complaining of left buttock pain which she describes it is as if there is a golf ball sewn into her buttock. Compartments are soft throughout her buttock and throughout her bilateral lower extremities. She denies any new trauma. She did recently have a pessary placed by your gynecologist which did initially improve her symptoms. Denies any bilateral lower extremity weakness.   We will obtain CT imaging and lab work     Maude Turner MD  12/11/21 [id number]

## 2021-12-11 NOTE — ED PROVIDER NOTES
.  [facility]  EMERGENCY DEPARTMENT ENCOUNTER          PHYSICIAN ASSISTANT NOTE       Date of evaluation: 12/11/2021    Chief Complaint     Buttocks Pain      History of Present Illness     Terri Card is a 57 y.o. 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 Symptoms started about 1 week ago. She feels that someone has cut her open, implanted a golf ball in her left butt cheek, and sewn her back up. Pain is intermittent, when it is at its worst it is described as severe in nature and worse than childbirth with no known aggravating features. Pain is just left and lateral to her rectum and her butt cheek. No pain elicited with bowel movements, last bowel movement was yesterday and normal with no blood or mucus. No recent fevers or chills. Pain does not radiate to her abdomen or leg. No overlying skin changes. She was seen by gynecology 3 days ago and was refitted for vaginal pessary with known prolapse and rectocele. After pessary was placed she did feel that her pain slightly improved although it returned over the last few days. Review of Systems     Review of Systems   Constitutional: Negative for chills, diaphoresis, fatigue and fever. Respiratory: Negative for chest tightness and shortness of breath. Cardiovascular: Negative for chest pain. Gastrointestinal: Negative for abdominal pain, blood in stool, constipation, diarrhea, nausea and vomiting. Genitourinary: Negative for dysuria, frequency, hematuria, vaginal bleeding, vaginal discharge and vaginal pain. Musculoskeletal: Negative for back pain and myalgias. Skin: Negative for color change, pallor and rash.    Neurological: Negative for dizziness, weakness, light-headedness and headaches. Past Medical, Surgical, Family, and Social History     She has a past medical history of Asthma, Depression, Osteoarthritis, PONV (postoperative nausea and vomiting), and Scoliosis. She has a past surgical history that includes Gastric bypass surgery (); Rotator cuff repair (Left, ); Knee cartilage surgery (Left, ); Breast enhancement surgery (Bilateral, );  section ([facility] [address]); Cholecystectomy (); [place] tooth extraction (); joint replacement (Bilateral, 2014); fracture surgery; joint replacement; and Total hip arthroplasty (Bilateral, ). Her family history includes Cancer in her mother; Diabetes in her daughter; Drug Abuse in her brother; Heart Disease in her sister. She was adopted. She reports that she quit smoking about 7 years ago. Her smoking use included cigarettes. She has a 0.05 pack-year smoking history. She has never used smokeless tobacco. She reports that she does not drink alcohol and does not use drugs. Medications     Discharge Medication List as of 2021  1:50 PM      CONTINUE these medications which have NOT CHANGED    Details   escitalopram (LEXAPRO) 10 MG tablet Take 10 mg by mouth dailyHistorical Med      cyclobenzaprine (FLEXERIL) 10 MG tablet Take 10 mg by mouth 3 times daily as needed for Muscle spasmsHistorical Med      gabapentin (NEURONTIN) 600 MG tablet Take 600 mg by mouth 3 times daily. Historical Med      ibuprofen (ADVIL;MOTRIN) 400 MG tablet Take 1 tablet by mouth every 6 hours as needed for Pain, Disp-120 tablet, R-3Normal      diclofenac sodium (VOLTAREN) 1 % GEL Apply 2 g topically 2 times daily, Topical, 2 TIMES DAILY Starting Sat 3/20/2021, Disp-129 g, R-1, Normal      furosemide (LASIX) 80 MG tablet Take 1 tablet by mouth daily, Disp-60 tablet, R-3Normal      amLODIPine (NORVASC) 2.5 MG tablet Take 1 tablet by mouth daily, Disp-30 tablet,R-0Normal      atorvastatin (LIPITOR) 40 MG tablet Take 1 tablet by mouth daily, Disp-30 tablet,R-0Normal      albuterol sulfate  (90 Base) MCG/ACT inhaler Inhale 2 puffs into the lungs 4 times daily as needed for Wheezing, Disp-1 Inhaler, R-0Normal      pregabalin (LYRICA) 100 MG capsule Take 1 capsule by mouth 3 times daily for 30 days. , Disp-60 capsule, R-3Print      lidocaine 4 % external patch Place 1 patch onto the skin daily, Transdermal, DAILY Starting Sat 3/20/2021, Disp-2 box, R-1, Normal      pantoprazole (PROTONIX) 40 MG tablet Take 1 tablet by mouth every morning (before breakfast), Disp-30 tablet,R-0Normal      budesonide-formoterol (SYMBICORT) 80-4.5 MCG/ACT AERO Inhale 2 puffs into the lungs 2 times dailyHistorical Med             Allergies     She is allergic to cortisone, droperidol, and compazine [prochlorperazine]. Physical Exam     INITIAL VITALS: BP: (!) 180/104, Temp: 98.6 °F (37 °C), Pulse: 93, Resp: 18, SpO2: 96 %  Physical Exam  Vitals and nursing note reviewed. Constitutional:       General: She is not in acute distress. Appearance: Normal appearance. She is normal weight. She is not toxic-appearing or diaphoretic. HENT:      Head: Normocephalic and atraumatic. Eyes:      Extraocular Movements: Extraocular movements intact. Pupils: Pupils are equal, round, and reactive to light. Cardiovascular:      Rate and Rhythm: Normal rate and regular rhythm. Pulses: Normal pulses. Heart sounds: Normal heart sounds. Pulmonary:      Effort: Pulmonary effort is normal. No respiratory distress. Breath sounds: Normal breath sounds. Abdominal:      General: There is no distension. Palpations: There is no mass. Tenderness: There is no abdominal tenderness. There is no guarding or rebound. Hernia: No hernia is present.       Comments: Mild reproducible tenderness just left and lateral to the rectum in the left butt cheek with no overlying skin changes, no underlying fluctuance or induration, no abnormalities of the external rectum   Skin:     General: Skin is warm and dry. Neurological:      General: No focal deficit present. Mental Status: She is alert. Psychiatric:         Mood and Affect: Mood normal.         Behavior: Behavior normal.         Diagnostic Results     RADIOLOGY:  CT ABDOMEN PELVIS W IV CONTRAST Additional Contrast? None   Final Result      Status post cholecystectomy. Postsurgical changes of the stomach consistent with gastric bypass. There is saccular distention of the blind pouch in the left upper quadrant, of the Basil-en-Y anastomosis. Correlate clinically. Pessary in place. Limited images of the low pelvis, due to metallic streak artifact from the patient's hip prostheses. No other acute findings in the abdomen or pelvis.           LABS:   Results for orders placed or performed during the hospital encounter of 12/11/21   CBC Auto Differential   Result Value Ref Range    WBC 10.3 4.0 - 11.0 K/uL    RBC 4.62 4.00 - 5.20 M/uL    Hemoglobin 12.9 12.0 - 16.0 g/dL    Hematocrit 37.8 36.0 - 48.0 %    MCV 81.7 80.0 - 100.0 fL    MCH 27.8 26.0 - 34.0 pg    MCHC 34.1 31.0 - 36.0 g/dL    RDW 21.2 (H) 12.4 - 15.4 %    Platelets 170 951 - 290 K/uL    MPV 7.9 5.0 - 10.5 fL    Neutrophils % 80.6 %    Lymphocytes % 12.1 %    Monocytes % 6.1 %    Eosinophils % 1.0 %    Basophils % 0.2 %    Neutrophils Absolute 8.3 (H) 1.7 - 7.7 K/uL    Lymphocytes Absolute 1.2 1.0 - 5.1 K/uL    Monocytes Absolute 0.6 0.0 - 1.3 K/uL    Eosinophils Absolute 0.1 0.0 - 0.6 K/uL    Basophils Absolute 0.0 0.0 - 0.2 K/uL   Basic Metabolic Panel w/ Reflex to MG   Result Value Ref Range    Sodium 134 (L) 136 - 145 mmol/L    Potassium reflex Magnesium 4.4 3.5 - 5.1 mmol/L    Chloride 95 (L) 99 - 110 mmol/L    CO2 23 21 - 32 mmol/L    Anion Gap 16 3 - 16    Glucose 111 (H) 70 - 99 mg/dL    BUN 15 7 - 20 mg/dL    CREATININE <0.5 (L) 0.6 - 1.2 mg/dL    GFR Non-African American >60 >60    GFR  American >60 >60    Calcium 8.8 8.3 - 10.6 mg/dL       ED BEDSIDE ULTRASOUND:  None    RECENT VITALS:  BP: (!) 170/84, Temp: 98.6 °F (37 °C), Pulse: 95, Resp: 18, SpO2: 96 %     Procedures   None    ED Course     Nursing Notes, Past Medical Hx,Past Surgical Hx, Social Hx, Allergies, and Family Hx were reviewed. The patient was given the following medications:  Orders Placed This Encounter   Medications    ketorolac (TORADOL) injection 15 mg    methocarbamol (ROBAXIN) 500 mg in dextrose 5 % 100 mL IVPB    iopamidol (ISOVUE-370) 76 % injection 80 mL    morphine injection 4 mg    HYDROcodone-acetaminophen (NORCO) 5-325 MG per tablet     Sig: Take 1 tablet by mouth every 6 hours as needed for Pain for up to 3 days. Intended supply: 3 days. Take lowest dose possible to manage pain     Dispense:  8 tablet     Refill:  0       CONSULTS:  None    MEDICAL DECISION MAKING / ASSESSMENT / Leónda Starla is a 57 y.o. female presenting to the emergency department for evaluation of buttock pain. Symptoms have been present for the last few days, intermittent with no known aggravating features. No associated trauma. Patient did appear uncomfortable during our evaluation, although in no acute distress with stable vitals. She had no overlying skin changes, compartments are soft throughout the buttock with no rectal involvement. She did recently have a pessary placed by her gynecologist for vaginal prolapse which did initially improve her symptoms, although they have since returned. No evidence of cellulitis, abscess, and no associated abdominal pain or incontinence. CT scan was completed for concerns of underlying abscess that is not visible on physical exam.  Imaging studies were normal, showing normal bowel, no evidence of obstruction, anteverted uterus slightly left of midline with pessary in place. No free fluid or adenopathy present.   Patient was feeling better after receiving Toradol, Robaxin and a dose of morphine. I had a lengthy discussion with the patient regarding possible underlying source of pain including radicular low back pain. She had no red flag symptoms to indicate cauda equina syndrome, cord compression or epidural abscess/osteomyelitis. Pain may also be muscular in origin, RICE therapy was reviewed. She will be given a short course of oxycodone to take for breakthrough severe pain at home, will monitor symptoms with strict return precautions and follow-up instructions. Patient and family members are in agreement with plan for discharge. This patient was also evaluated by the attending physician. All care plans were discussed and agreed upon. Clinical Impression     1. Buttock pain        Disposition     PATIENT REFERRED TO:  Mana Richardson [address] [address] [id number]    Schedule an appointment as soon as possible for a visit       [address] [facility] [id number] [place], [place] [id number] [phone]  Schedule an appointment as soon as possible for a visit         DISCHARGE MEDICATIONS:  Discharge Medication List as of 12/11/2021  1:50 PM      START taking these medications    Details   HYDROcodone-acetaminophen (NORCO) 5-325 MG per tablet Take 1 tablet by mouth every 6 hours as needed for Pain for up to 3 days. Intended supply: 3 days.  Take lowest dose possible to manage pain, Disp-8 tablet, R-0Print             DISPOSITION Decision To Discharge 12/11/2021 01:31:55 PM        Nat Bates PA-C  12/11/21 0391

## 2022-09-17 ENCOUNTER — HOSPITAL ENCOUNTER (EMERGENCY)
Age: 62
Discharge: HOME | End: 2022-09-17
Attending: EMERGENCY MEDICINE
Payer: MEDICARE

## 2022-09-17 VITALS
BODY MASS INDEX: 39.88 KG/M2 | HEART RATE: 106 BPM | DIASTOLIC BLOOD PRESSURE: 86 MMHG | HEIGHT: 58 IN | SYSTOLIC BLOOD PRESSURE: 118 MMHG | RESPIRATION RATE: 21 BRPM | WEIGHT: 190 LBS | OXYGEN SATURATION: 95 % | TEMPERATURE: 98.2 F

## 2022-09-17 DIAGNOSIS — J45.901 EXACERBATION OF ASTHMA, UNSPECIFIED ASTHMA SEVERITY, UNSPECIFIED WHETHER PERSISTENT: Primary | ICD-10-CM

## 2022-09-17 PROCEDURE — 99285 EMERGENCY DEPT VISIT HI MDM: CPT

## 2022-09-17 RX ORDER — ALBUTEROL SULFATE 90 UG/1
2 AEROSOL, METERED RESPIRATORY (INHALATION) 4 TIMES DAILY PRN
Qty: 1 EACH | Refills: 2 | Status: SHIPPED | OUTPATIENT
Start: 2022-09-17

## 2022-09-17 RX ORDER — PREDNISONE 20 MG/1
40 TABLET ORAL DAILY
Qty: 8 TABLET | Refills: 0 | Status: SHIPPED | OUTPATIENT
Start: 2022-09-18 | End: 2022-09-22

## 2022-09-17 ASSESSMENT — PAIN - FUNCTIONAL ASSESSMENT: PAIN_FUNCTIONAL_ASSESSMENT: NONE - DENIES PAIN

## 2022-09-17 ASSESSMENT — LIFESTYLE VARIABLES: HOW OFTEN DO YOU HAVE A DRINK CONTAINING ALCOHOL: MONTHLY OR LESS

## 2022-09-17 NOTE — DISCHARGE INSTRUCTIONS
You were seen in the emergency department for shortness of breath consistent with your asthma. You felt improved after steroids and a nebulizer treatment in the ambulance. Your exam was overall reassuring. Please follow-up with your pulmonary doctor, Dr. Eliseo Arteaga for reexamination and lung function testing. Call 911 or go to the nearest Emergency Department immediately for worsening symptoms, difficulty breathing, chest pain, lightheadedness, difficulty moving or talking, sensory loss, difficulty controlling your bowel or bladder function, altered level of consciousness, neck stiffness, uncontrollable nausea or vomiting, uncontrollable pain, inability to tolerate oral intake, fever, chills, severe bleeding, signs of infection (spreading redness, area feels very warm, swelling, pain, foul-smelling drainage), suicidal or homicidal ideation, or any other concerns. If we have prescribed you any new medications, please take them as prescribed and read the drug package insert carefully. Do not drink alcohol, drive, or operate machinery if you are taking narcotic pain medications.

## 2022-09-17 NOTE — ED NOTES
Pt up and ambulated in [place] no drop in O2 sat with ambulation, some SOB but pt states it's at her baseline no worse than normal     Delmy Lawrence, LUIZ  09/17/22 0773

## 2022-09-17 NOTE — ED PROVIDER NOTES
[address] [facility]          ATTENDING PHYSICIAN NOTE       Date of evaluation: 9/17/2022    Chief Complaint     Shortness of Breath (Patient states was moving and had asthma attack. States was unable to find nebulizer or inhaler to use at home. Ems gave solumedrol 125mg IV and Duoneb. Patient states symptoms resolved. )      History of Present Illness     Terri Holcomb is a 57 y.o. female with history of asthma, possible COPD presenting to the emergency department today for shortness of breath. Patient states she is currently moving and began to have symptoms of shortness of breath and wheezing at home. It became moderate to severe and she was unable to find her home nebulizer. As such EMS was called. She received a DuoNeb and Solu-Medrol in route. She had near complete relief of her symptoms with this. She has had a persistent and significant nonproductive cough for many weeks. Follows with pulmonology but has never had pulmonary function testing. No recent fevers or known sick contacts. No chest pain. Is vaccinated for COVID x2. Review of Systems     Pertinent positive and negative findings as documented in the HPI. Otherwise all other systems were reviewed and were negative. Physical Exam     INITIAL VITALS: BP: 118/86, Temp: 98.2 °F (36.8 °C), Heart Rate: (!) 106, Resp: 21, SpO2: 95 %     Nursing note and vitals reviewed. General:  Adult female, alert and appropriately interactive. In no distress. HENT: Normocephalic and atraumatic. External ears normal. Nose appears normal externally. Eyes: Conjunctivae normal. No scleral icterus. Neck: Neck supple. No tracheal deviation present. CV: Normal rate. Regular rhythm. S1/S2 auscultated. No murmurs, gallops or rubs. Pulm: Effort normal. Breath sounds clear to auscultation bilaterally. No wheezes. No rales or rhonchi. Musculoskeletal: No edema. No gross deformities.   Neurological: Alert and appropriately interactive. Face symmetric, speech without dysarthria or obvious aphasia. Moving all extremities spontaneously. Skin: Warm, dry. No rash. No diaphoresis or erythema. Psychiatric: Calm and cooperative with appropriate mood and affect. Procedures   Procedures    MEDICAL DECISION MAKING     MDM: Nina Haque is a 57 y.o. female with history as above presenting for shortness of breath in the setting of asthma. On arrival, she is in no distress and vital signs are notable for mild tachycardia. Her cardiopulmonary examination is reassuring. Her symptoms seem nearly resolved after treatment by EMS. No clear infectious symptoms. We did monitor for period of time without any worsening and stable vital signs. Her  has since found her nebulizer and she has access to her routine meds at this time. Did recommend follow-up with her outpatient pulmonologist as it has been longer than she can recall since her last visit. She is able to ambulate in the department without desaturation and feels back to baseline. Short course of oral prednisone prescribed an albuterol inhaler refill provided. Discharged in stable condition with written and verbal instructions for which to return to the ED. Clinical Impression     1. Exacerbation of asthma, unspecified asthma severity, unspecified whether persistent        Disposition     DISPOSITION Decision To Discharge 2022 07:18:44 AM        Silas Santana MD  8:21 AM                     Past Medical, Surgical, Family, and Social History     She has a past medical history of Asthma, Depression, Osteoarthritis, PONV (postoperative nausea and vomiting), and Scoliosis. She has a past surgical history that includes Gastric bypass surgery (); Rotator cuff repair (Left, 2008); Knee cartilage surgery (Left, ); Breast enhancement surgery (Bilateral, );  section ([facility] [address]); Cholecystectomy ();  [place] tooth extraction (); joint replacement (Bilateral, 12/29/2014); fracture surgery; joint replacement; and Total hip arthroplasty (Bilateral, 2014). Her family history includes Cancer in her mother; Diabetes in her daughter; Drug Abuse in her brother; Heart Disease in her sister. She was adopted. She reports that she quit smoking about 8 years ago. Her smoking use included cigarettes. She has a 0.05 pack-year smoking history. She has never used smokeless tobacco. She reports that she does not drink alcohol and does not use drugs. Medications     Previous Medications    AMLODIPINE (NORVASC) 2.5 MG TABLET    Take 1 tablet by mouth daily    ATORVASTATIN (LIPITOR) 40 MG TABLET    Take 1 tablet by mouth daily    BUDESONIDE-FORMOTEROL (SYMBICORT) 80-4.5 MCG/ACT AERO    Inhale 2 puffs into the lungs 2 times daily    CYCLOBENZAPRINE (FLEXERIL) 10 MG TABLET    Take 10 mg by mouth 3 times daily as needed for Muscle spasms    DICLOFENAC SODIUM (VOLTAREN) 1 % GEL    Apply 2 g topically 2 times daily    ESCITALOPRAM (LEXAPRO) 10 MG TABLET    Take 10 mg by mouth daily    FUROSEMIDE (LASIX) 80 MG TABLET    Take 1 tablet by mouth daily    GABAPENTIN (NEURONTIN) 600 MG TABLET    Take 600 mg by mouth 3 times daily. IBUPROFEN (ADVIL;MOTRIN) 400 MG TABLET    Take 1 tablet by mouth every 6 hours as needed for Pain    LIDOCAINE 4 % EXTERNAL PATCH    Place 1 patch onto the skin daily    PANTOPRAZOLE (PROTONIX) 40 MG TABLET    Take 1 tablet by mouth every morning (before breakfast)    PREGABALIN (LYRICA) 100 MG CAPSULE    Take 1 capsule by mouth 3 times daily for 30 days. Allergies     She is allergic to cortisone, droperidol, and compazine [prochlorperazine]. ED Course     Nursing Notes, Past Medical Hx, Past Surgical Hx, Social Hx,Allergies, and Family Hx were reviewed.     Patient was given the following medications:  Orders Placed This Encounter   Medications    predniSONE (DELTASONE) 20 MG tablet     Sig: Take 2 tablets by mouth daily for 4 days     Dispense: 8 tablet     Refill:  0    albuterol sulfate HFA (PROVENTIL;VENTOLIN;PROAIR) 108 (90 Base) MCG/ACT inhaler     Sig: Inhale 2 puffs into the lungs 4 times daily as needed for Wheezing     Dispense:  1 each     Refill:  2       Diagnostic Results         RECENT VITALS:  BP: 118/86,Temp: 98.2 °F (36.8 °C), Heart Rate: (!) 106, Resp: 21, SpO2: 95 %     RADIOLOGY:  No orders to display       LABS:   No results found for this visit on 09/17/22.     CONSULTS:  None    PATIENT REFERRED TO:  [facility] Emergency Department  [address]    If symptoms worsen    Natasha Orozco MD  [address]  [phone]    Schedule an appointment as soon as possible for a visit in 1 week  For reexamination    DISCHARGE MEDICATIONS:  New Prescriptions    PREDNISONE (DELTASONE) 20 MG TABLET    Take 2 tablets by mouth daily for 4 days          Brian Li MD  09/17/22 [id number]

## 2022-11-25 NOTE — ED PROVIDER NOTES
compazine [prochlorperazine]. Physical Exam     INITIAL VITALS: BP: (!) 143/89, Temp: 98.3 °F (36.8 °C), Pulse: 107, Resp: 18, SpO2: 96 %   Physical Exam   Constitutional: She is oriented to person, place, and time. She appears well-developed and well-nourished. No distress. HENT:   Head: Normocephalic and atraumatic. Mouth/Throat: Oropharynx is clear and moist. No oropharyngeal exudate. Eyes: Pupils are equal, round, and reactive to light. Conjunctivae and EOM are normal. No scleral icterus. Neck: Normal range of motion. Neck supple. No JVD present. Cardiovascular: Regular rhythm and normal heart sounds. Tachycardia present. Exam reveals no friction rub. No murmur heard. Pulmonary/Chest: Effort normal. She has wheezes. She has no rales. Abdominal: Soft. Bowel sounds are normal. There is no tenderness. There is no rebound and no guarding. Musculoskeletal: Normal range of motion. She exhibits no edema. Lymphadenopathy:     She has no cervical adenopathy. Neurological: She is alert and oriented to person, place, and time. Nursing note and vitals reviewed. Diagnostic Results     EKG   EKG is interpreted by me shows patient to be in a sinus tachycardic rhythm with a rate of 105, normal axis, normal MA and QT intervals, normal QRS duration, no ST segment abnormalities, no T wave abnormalities. RADIOLOGY:  XR CHEST STANDARD (2 VW)   Final Result     Normal chest x-rays.               LABS:   Results for orders placed or performed during the hospital encounter of 11/02/18   CBC auto differential   Result Value Ref Range    WBC 9.8 4.0 - 11.0 K/uL    RBC 4.22 4.00 - 5.20 M/uL    Hemoglobin 10.7 (L) 12.0 - 16.0 g/dL    Hematocrit 33.2 (L) 36.0 - 48.0 %    MCV 78.6 (L) 80.0 - 100.0 fL    MCH 25.3 (L) 26.0 - 34.0 pg    MCHC 32.2 31.0 - 36.0 g/dL    RDW 20.6 (H) 12.4 - 15.4 %    Platelets 449 194 - 710 K/uL    MPV 7.7 5.0 - 10.5 fL    Neutrophils % 67.2 %    Lymphocytes % 21.9 %    Monocytes
negative

## 2023-04-17 ENCOUNTER — HOSPITAL ENCOUNTER (INPATIENT)
Age: 63
LOS: 2 days | Discharge: HOME | DRG: 202 | End: 2023-04-20
Attending: EMERGENCY MEDICINE | Admitting: HOSPITALIST
Payer: MEDICARE

## 2023-04-17 ENCOUNTER — APPOINTMENT (OUTPATIENT)
Dept: GENERAL RADIOLOGY | Age: 63
DRG: 202 | End: 2023-04-17
Payer: MEDICARE

## 2023-04-17 DIAGNOSIS — R06.00 DYSPNEA, UNSPECIFIED TYPE: Primary | ICD-10-CM

## 2023-04-17 DIAGNOSIS — R07.89 ATYPICAL CHEST PAIN: ICD-10-CM

## 2023-04-17 DIAGNOSIS — M19.90 ARTHRITIS: ICD-10-CM

## 2023-04-17 DIAGNOSIS — J45.901 EXACERBATION OF ASTHMA, UNSPECIFIED ASTHMA SEVERITY, UNSPECIFIED WHETHER PERSISTENT: ICD-10-CM

## 2023-04-17 PROBLEM — D64.9 NORMOCYTIC ANEMIA: Status: ACTIVE | Noted: 2023-04-17

## 2023-04-17 PROBLEM — E66.01 MORBID OBESITY WITH BMI OF 40.0-44.9, ADULT (HCC): Status: ACTIVE | Noted: 2023-04-17

## 2023-04-17 LAB
ALBUMIN SERPL-MCNC: 3.5 G/DL (ref 3.4–5)
ALBUMIN/GLOB SERPL: 0.9 {RATIO} (ref 1.1–2.2)
ALP SERPL-CCNC: 89 U/L (ref 40–129)
ALT SERPL-CCNC: 10 U/L (ref 10–40)
ANION GAP SERPL CALCULATED.3IONS-SCNC: 11 MMOL/L (ref 3–16)
AST SERPL-CCNC: 18 U/L (ref 15–37)
BASOPHILS # BLD: 0.1 K/UL (ref 0–0.2)
BASOPHILS NFR BLD: 1.3 %
BILIRUB SERPL-MCNC: <0.2 MG/DL (ref 0–1)
BUN SERPL-MCNC: 15 MG/DL (ref 7–20)
CALCIUM SERPL-MCNC: 9.3 MG/DL (ref 8.3–10.6)
CHLORIDE SERPL-SCNC: 94 MMOL/L (ref 99–110)
CO2 SERPL-SCNC: 27 MMOL/L (ref 21–32)
CREAT SERPL-MCNC: 0.8 MG/DL (ref 0.6–1.2)
DEPRECATED RDW RBC AUTO: 18 % (ref 12.4–15.4)
EKG ATRIAL RATE: 90 BPM
EKG DIAGNOSIS: NORMAL
EKG P AXIS: 59 DEGREES
EKG P-R INTERVAL: 180 MS
EKG Q-T INTERVAL: 370 MS
EKG QRS DURATION: 82 MS
EKG QTC CALCULATION (BAZETT): 452 MS
EKG R AXIS: 37 DEGREES
EKG T AXIS: 56 DEGREES
EKG VENTRICULAR RATE: 90 BPM
EOSINOPHIL # BLD: 0.3 K/UL (ref 0–0.6)
EOSINOPHIL NFR BLD: 3.7 %
GFR SERPLBLD CREATININE-BSD FMLA CKD-EPI: >60 ML/MIN/{1.73_M2}
GLUCOSE SERPL-MCNC: 99 MG/DL (ref 70–99)
HCT VFR BLD AUTO: 31.9 % (ref 36–48)
HGB BLD-MCNC: 10.6 G/DL (ref 12–16)
LYMPHOCYTES # BLD: 1.9 K/UL (ref 1–5.1)
LYMPHOCYTES NFR BLD: 24.3 %
MAGNESIUM SERPL-MCNC: 1.8 MG/DL (ref 1.8–2.4)
MCH RBC QN AUTO: 27.5 PG (ref 26–34)
MCHC RBC AUTO-ENTMCNC: 33.2 G/DL (ref 31–36)
MCV RBC AUTO: 82.9 FL (ref 80–100)
MONOCYTES # BLD: 0.7 K/UL (ref 0–1.3)
MONOCYTES NFR BLD: 9.3 %
NEUTROPHILS # BLD: 4.7 K/UL (ref 1.7–7.7)
NEUTROPHILS NFR BLD: 61.4 %
NT-PROBNP SERPL-MCNC: 112 PG/ML (ref 0–124)
PLATELET # BLD AUTO: 391 K/UL (ref 135–450)
PMV BLD AUTO: 7.9 FL (ref 5–10.5)
POTASSIUM SERPL-SCNC: 3.9 MMOL/L (ref 3.5–5.1)
PROT SERPL-MCNC: 7.4 G/DL (ref 6.4–8.2)
RBC # BLD AUTO: 3.85 M/UL (ref 4–5.2)
SODIUM SERPL-SCNC: 132 MMOL/L (ref 136–145)
TROPONIN T SERPL-MCNC: <0.01 NG/ML
WBC # BLD AUTO: 7.6 K/UL (ref 4–11)

## 2023-04-17 PROCEDURE — 80053 COMPREHEN METABOLIC PANEL: CPT

## 2023-04-17 PROCEDURE — 6360000002 HC RX W HCPCS: Performed by: STUDENT IN AN ORGANIZED HEALTH CARE EDUCATION/TRAINING PROGRAM

## 2023-04-17 PROCEDURE — 6370000000 HC RX 637 (ALT 250 FOR IP): Performed by: STUDENT IN AN ORGANIZED HEALTH CARE EDUCATION/TRAINING PROGRAM

## 2023-04-17 PROCEDURE — 93005 ELECTROCARDIOGRAM TRACING: CPT | Performed by: STUDENT IN AN ORGANIZED HEALTH CARE EDUCATION/TRAINING PROGRAM

## 2023-04-17 PROCEDURE — 99285 EMERGENCY DEPT VISIT HI MDM: CPT

## 2023-04-17 PROCEDURE — 84484 ASSAY OF TROPONIN QUANT: CPT

## 2023-04-17 PROCEDURE — 83735 ASSAY OF MAGNESIUM: CPT

## 2023-04-17 PROCEDURE — 6360000002 HC RX W HCPCS: Performed by: INTERNAL MEDICINE

## 2023-04-17 PROCEDURE — 96372 THER/PROPH/DIAG INJ SC/IM: CPT

## 2023-04-17 PROCEDURE — 94761 N-INVAS EAR/PLS OXIMETRY MLT: CPT

## 2023-04-17 PROCEDURE — 94640 AIRWAY INHALATION TREATMENT: CPT

## 2023-04-17 PROCEDURE — 83880 ASSAY OF NATRIURETIC PEPTIDE: CPT

## 2023-04-17 PROCEDURE — G0378 HOSPITAL OBSERVATION PER HR: HCPCS

## 2023-04-17 PROCEDURE — 2580000003 HC RX 258: Performed by: INTERNAL MEDICINE

## 2023-04-17 PROCEDURE — 2700000000 HC OXYGEN THERAPY PER DAY

## 2023-04-17 PROCEDURE — 6370000000 HC RX 637 (ALT 250 FOR IP): Performed by: INTERNAL MEDICINE

## 2023-04-17 PROCEDURE — 71045 X-RAY EXAM CHEST 1 VIEW: CPT

## 2023-04-17 PROCEDURE — 96374 THER/PROPH/DIAG INJ IV PUSH: CPT

## 2023-04-17 PROCEDURE — 85025 COMPLETE CBC W/AUTO DIFF WBC: CPT

## 2023-04-17 RX ORDER — GABAPENTIN 300 MG/1
600 CAPSULE ORAL ONCE
Status: COMPLETED | OUTPATIENT
Start: 2023-04-17 | End: 2023-04-17

## 2023-04-17 RX ORDER — FUROSEMIDE 20 MG/1
20 TABLET ORAL DAILY
COMMUNITY

## 2023-04-17 RX ORDER — ESCITALOPRAM OXALATE 20 MG/1
20 TABLET ORAL DAILY
Status: DISCONTINUED | OUTPATIENT
Start: 2023-04-18 | End: 2023-04-20 | Stop reason: HOSPADM

## 2023-04-17 RX ORDER — ACETAMINOPHEN 325 MG/1
650 TABLET ORAL EVERY 6 HOURS PRN
Status: DISCONTINUED | OUTPATIENT
Start: 2023-04-17 | End: 2023-04-20 | Stop reason: HOSPADM

## 2023-04-17 RX ORDER — IPRATROPIUM BROMIDE AND ALBUTEROL SULFATE 2.5; .5 MG/3ML; MG/3ML
1 SOLUTION RESPIRATORY (INHALATION)
Status: DISCONTINUED | OUTPATIENT
Start: 2023-04-17 | End: 2023-04-18

## 2023-04-17 RX ORDER — GABAPENTIN 300 MG/1
600 CAPSULE ORAL 3 TIMES DAILY
Status: DISCONTINUED | OUTPATIENT
Start: 2023-04-17 | End: 2023-04-20 | Stop reason: HOSPADM

## 2023-04-17 RX ORDER — CYCLOBENZAPRINE HCL 10 MG
10 TABLET ORAL 3 TIMES DAILY PRN
Status: DISCONTINUED | OUTPATIENT
Start: 2023-04-17 | End: 2023-04-20 | Stop reason: HOSPADM

## 2023-04-17 RX ORDER — SODIUM CHLORIDE 9 MG/ML
INJECTION, SOLUTION INTRAVENOUS PRN
Status: DISCONTINUED | OUTPATIENT
Start: 2023-04-17 | End: 2023-04-20 | Stop reason: HOSPADM

## 2023-04-17 RX ORDER — ENOXAPARIN SODIUM 100 MG/ML
40 INJECTION SUBCUTANEOUS DAILY
Status: DISCONTINUED | OUTPATIENT
Start: 2023-04-17 | End: 2023-04-20 | Stop reason: HOSPADM

## 2023-04-17 RX ORDER — TRAMADOL HYDROCHLORIDE 50 MG/1
50 TABLET ORAL ONCE
Status: COMPLETED | OUTPATIENT
Start: 2023-04-17 | End: 2023-04-17

## 2023-04-17 RX ORDER — TRAMADOL HYDROCHLORIDE 50 MG/1
50 TABLET ORAL EVERY 8 HOURS PRN
Status: DISCONTINUED | OUTPATIENT
Start: 2023-04-17 | End: 2023-04-20 | Stop reason: HOSPADM

## 2023-04-17 RX ORDER — ATORVASTATIN CALCIUM 20 MG/1
20 TABLET, FILM COATED ORAL DAILY
Status: DISCONTINUED | OUTPATIENT
Start: 2023-04-18 | End: 2023-04-20 | Stop reason: HOSPADM

## 2023-04-17 RX ORDER — LANOLIN ALCOHOL/MO/W.PET/CERES
1000 CREAM (GRAM) TOPICAL DAILY
Status: DISCONTINUED | OUTPATIENT
Start: 2023-04-18 | End: 2023-04-20 | Stop reason: HOSPADM

## 2023-04-17 RX ORDER — LISINOPRIL 5 MG/1
5 TABLET ORAL DAILY
Status: DISCONTINUED | OUTPATIENT
Start: 2023-04-18 | End: 2023-04-20 | Stop reason: HOSPADM

## 2023-04-17 RX ORDER — CYCLOBENZAPRINE HCL 10 MG
10 TABLET ORAL ONCE
Status: COMPLETED | OUTPATIENT
Start: 2023-04-17 | End: 2023-04-17

## 2023-04-17 RX ORDER — ALBUTEROL SULFATE 2.5 MG/3ML
2.5 SOLUTION RESPIRATORY (INHALATION)
Status: DISCONTINUED | OUTPATIENT
Start: 2023-04-17 | End: 2023-04-18

## 2023-04-17 RX ORDER — ZIPRASIDONE HYDROCHLORIDE 20 MG/1
40 CAPSULE ORAL 2 TIMES DAILY WITH MEALS
Status: DISCONTINUED | OUTPATIENT
Start: 2023-04-17 | End: 2023-04-20 | Stop reason: HOSPADM

## 2023-04-17 RX ORDER — ONDANSETRON 4 MG/1
4 TABLET, ORALLY DISINTEGRATING ORAL EVERY 8 HOURS PRN
Status: DISCONTINUED | OUTPATIENT
Start: 2023-04-17 | End: 2023-04-20 | Stop reason: HOSPADM

## 2023-04-17 RX ORDER — ARFORMOTEROL TARTRATE 15 UG/2ML
15 SOLUTION RESPIRATORY (INHALATION) 2 TIMES DAILY
Status: DISCONTINUED | OUTPATIENT
Start: 2023-04-17 | End: 2023-04-20 | Stop reason: HOSPADM

## 2023-04-17 RX ORDER — FUROSEMIDE 20 MG/1
20 TABLET ORAL DAILY
Status: DISCONTINUED | OUTPATIENT
Start: 2023-04-18 | End: 2023-04-20 | Stop reason: HOSPADM

## 2023-04-17 RX ORDER — IPRATROPIUM BROMIDE AND ALBUTEROL SULFATE 2.5; .5 MG/3ML; MG/3ML
SOLUTION RESPIRATORY (INHALATION)
Status: DISPENSED
Start: 2023-04-17 | End: 2023-04-18

## 2023-04-17 RX ORDER — ATORVASTATIN CALCIUM 20 MG/1
20 TABLET, FILM COATED ORAL DAILY
COMMUNITY

## 2023-04-17 RX ORDER — AMITRIPTYLINE HYDROCHLORIDE 50 MG/1
25 TABLET, FILM COATED ORAL NIGHTLY
Status: DISCONTINUED | OUTPATIENT
Start: 2023-04-17 | End: 2023-04-20 | Stop reason: HOSPADM

## 2023-04-17 RX ORDER — ZIPRASIDONE HYDROCHLORIDE 40 MG/1
40 CAPSULE ORAL 2 TIMES DAILY WITH MEALS
COMMUNITY

## 2023-04-17 RX ORDER — ACETAMINOPHEN 650 MG/1
650 SUPPOSITORY RECTAL EVERY 6 HOURS PRN
Status: DISCONTINUED | OUTPATIENT
Start: 2023-04-17 | End: 2023-04-20 | Stop reason: HOSPADM

## 2023-04-17 RX ORDER — SODIUM CHLORIDE 0.9 % (FLUSH) 0.9 %
5-40 SYRINGE (ML) INJECTION EVERY 12 HOURS SCHEDULED
Status: DISCONTINUED | OUTPATIENT
Start: 2023-04-17 | End: 2023-04-20 | Stop reason: HOSPADM

## 2023-04-17 RX ORDER — BUDESONIDE 0.5 MG/2ML
0.5 INHALANT ORAL 2 TIMES DAILY
Status: DISCONTINUED | OUTPATIENT
Start: 2023-04-17 | End: 2023-04-20 | Stop reason: HOSPADM

## 2023-04-17 RX ORDER — ONDANSETRON 2 MG/ML
4 INJECTION INTRAMUSCULAR; INTRAVENOUS EVERY 6 HOURS PRN
Status: DISCONTINUED | OUTPATIENT
Start: 2023-04-17 | End: 2023-04-20 | Stop reason: HOSPADM

## 2023-04-17 RX ORDER — MAGNESIUM HYDROXIDE/ALUMINUM HYDROXICE/SIMETHICONE 120; 1200; 1200 MG/30ML; MG/30ML; MG/30ML
30 SUSPENSION ORAL EVERY 6 HOURS PRN
Status: DISCONTINUED | OUTPATIENT
Start: 2023-04-17 | End: 2023-04-20 | Stop reason: HOSPADM

## 2023-04-17 RX ORDER — LANOLIN ALCOHOL/MO/W.PET/CERES
1000 CREAM (GRAM) TOPICAL DAILY
COMMUNITY

## 2023-04-17 RX ORDER — AMITRIPTYLINE HYDROCHLORIDE 25 MG/1
25 TABLET, FILM COATED ORAL NIGHTLY
COMMUNITY

## 2023-04-17 RX ORDER — SODIUM CHLORIDE 0.9 % (FLUSH) 0.9 %
5-40 SYRINGE (ML) INJECTION PRN
Status: DISCONTINUED | OUTPATIENT
Start: 2023-04-17 | End: 2023-04-20 | Stop reason: HOSPADM

## 2023-04-17 RX ORDER — AMOXICILLIN 250 MG/1
500 CAPSULE ORAL ONCE
Status: COMPLETED | OUTPATIENT
Start: 2023-04-17 | End: 2023-04-17

## 2023-04-17 RX ORDER — POLYETHYLENE GLYCOL 3350 17 G/17G
17 POWDER, FOR SOLUTION ORAL DAILY PRN
Status: DISCONTINUED | OUTPATIENT
Start: 2023-04-17 | End: 2023-04-20 | Stop reason: HOSPADM

## 2023-04-17 RX ORDER — CALCIUM CARBONATE-CHOLECALCIFEROL TAB 250 MG-125 UNIT 250-125 MG-UNIT
2 TAB ORAL DAILY
Status: DISCONTINUED | OUTPATIENT
Start: 2023-04-18 | End: 2023-04-20 | Stop reason: HOSPADM

## 2023-04-17 RX ORDER — LISINOPRIL 5 MG/1
5 TABLET ORAL DAILY
COMMUNITY

## 2023-04-17 RX ORDER — FUROSEMIDE 10 MG/ML
40 INJECTION INTRAMUSCULAR; INTRAVENOUS ONCE
Status: COMPLETED | OUTPATIENT
Start: 2023-04-17 | End: 2023-04-17

## 2023-04-17 RX ORDER — TRAMADOL HYDROCHLORIDE 50 MG/1
50 TABLET ORAL EVERY 8 HOURS PRN
COMMUNITY

## 2023-04-17 RX ADMIN — FUROSEMIDE 40 MG: 10 INJECTION, SOLUTION INTRAMUSCULAR; INTRAVENOUS at 18:42

## 2023-04-17 RX ADMIN — IPRATROPIUM BROMIDE AND ALBUTEROL SULFATE 1 AMPULE: 2.5; .5 SOLUTION RESPIRATORY (INHALATION) at 19:50

## 2023-04-17 RX ADMIN — AMITRIPTYLINE HYDROCHLORIDE 25 MG: 50 TABLET, FILM COATED ORAL at 22:36

## 2023-04-17 RX ADMIN — ZIPRASIDONE HYDROCHLORIDE 40 MG: 20 CAPSULE ORAL at 21:20

## 2023-04-17 RX ADMIN — ENOXAPARIN SODIUM 40 MG: 100 INJECTION SUBCUTANEOUS at 21:21

## 2023-04-17 RX ADMIN — TRAMADOL HYDROCHLORIDE 50 MG: 50 TABLET ORAL at 17:49

## 2023-04-17 RX ADMIN — IPRATROPIUM BROMIDE AND ALBUTEROL SULFATE 1 AMPULE: 2.5; .5 SOLUTION RESPIRATORY (INHALATION) at 16:34

## 2023-04-17 RX ADMIN — AMOXICILLIN 500 MG: 250 CAPSULE ORAL at 17:17

## 2023-04-17 RX ADMIN — GABAPENTIN 600 MG: 300 CAPSULE ORAL at 21:20

## 2023-04-17 RX ADMIN — CYCLOBENZAPRINE 10 MG: 10 TABLET, FILM COATED ORAL at 17:16

## 2023-04-17 RX ADMIN — SODIUM CHLORIDE, PRESERVATIVE FREE 10 ML: 5 INJECTION INTRAVENOUS at 21:21

## 2023-04-17 RX ADMIN — GABAPENTIN 600 MG: 300 CAPSULE ORAL at 17:15

## 2023-04-17 ASSESSMENT — PAIN - FUNCTIONAL ASSESSMENT: PAIN_FUNCTIONAL_ASSESSMENT: 0-10

## 2023-04-17 ASSESSMENT — PAIN SCALES - GENERAL
PAINLEVEL_OUTOF10: 7
PAINLEVEL_OUTOF10: 7
PAINLEVEL_OUTOF10: 2
PAINLEVEL_OUTOF10: 7

## 2023-04-17 ASSESSMENT — ENCOUNTER SYMPTOMS
VOMITING: 0
ABDOMINAL PAIN: 0
BLOOD IN STOOL: 0
DIARRHEA: 0
ABDOMINAL DISTENTION: 0
CONSTIPATION: 0
SHORTNESS OF BREATH: 1
NAUSEA: 0
COUGH: 1

## 2023-04-17 ASSESSMENT — PAIN DESCRIPTION - DESCRIPTORS
DESCRIPTORS: ACHING
DESCRIPTORS: PRESSURE;TIGHTNESS
DESCRIPTORS: ACHING

## 2023-04-17 ASSESSMENT — PAIN DESCRIPTION - LOCATION
LOCATION: ARM;KNEE
LOCATION: CHEST
LOCATION: ARM;KNEE

## 2023-04-17 ASSESSMENT — PAIN DESCRIPTION - ORIENTATION
ORIENTATION: RIGHT
ORIENTATION: RIGHT

## 2023-04-17 NOTE — ED PROVIDER NOTES
ED Attending Attestation Note     Date of evaluation: 4/17/2023    This patient was seen by the resident. I have seen and examined the patient, agree with the workup, evaluation, management and diagnosis. The care plan has been discussed. I have reviewed the ECG and concur with the resident's interpretation. My assessment reveals female who presents with difficulty breathing. States it started about a week ago. She has had a productive cough with white thick mucus. Denies any fevers or chills. Has also noticed some swelling to her lower extremities that does not seem to resolve with positioning. On exam she has poor air movement throughout with minimal audible expiratory wheezing. She does have edema up to the mid shins with overlying erythema. No tenderness to palpation of her calves.      Gabriela Clements MD  04/17/23 7835
injection 40 mg       CONSULTS:  IP CONSULT TO HOSPITALIST    Review of Systems   Constitutional:  Negative for appetite change, chills and fever. HENT:  Negative for congestion. Respiratory:  Positive for cough and shortness of breath. Cardiovascular:  Positive for chest pain and leg swelling. Negative for palpitations. Gastrointestinal:  Negative for abdominal distention, abdominal pain, blood in stool, constipation, diarrhea, nausea and vomiting. Genitourinary:  Negative for dysuria, frequency and urgency. Neurological:  Negative for headaches. Past Medical, Surgical, Family, and Social History     She has a past medical history of Asthma, Depression, Osteoarthritis, PONV (postoperative nausea and vomiting), and Scoliosis. She has a past surgical history that includes Gastric bypass surgery (); Rotator cuff repair (Left, ); Knee cartilage surgery (Left, ); Breast enhancement surgery (Bilateral, );  [facility] Cholecystectomy (); [place] tooth extraction (); joint replacement (Bilateral, 2014); fracture surgery; joint replacement; and Total hip arthroplasty (Bilateral, ). Her family history includes Cancer in her mother; Diabetes in her daughter; Drug Abuse in her brother; Heart Disease in her sister. She was adopted. She reports that she quit smoking about 8 years ago. Her smoking use included cigarettes. She has a 0.05 pack-year smoking history. She has never used smokeless tobacco. She reports that she does not drink alcohol and does not use drugs.     Medications     Previous Medications    ALBUTEROL SULFATE HFA (PROVENTIL;VENTOLIN;PROAIR) 108 (90 BASE) MCG/ACT INHALER    Inhale 2 puffs into the lungs 4 times daily as needed for Wheezing    AMITRIPTYLINE (ELAVIL) 25 MG TABLET    Take 1 tablet by mouth nightly    ATORVASTATIN (LIPITOR) 20 MG TABLET    Take 1 tablet by mouth daily    BUDESONIDE-FORMOTEROL (SYMBICORT) 160-4.5 MCG/ACT AERO

## 2023-04-18 ENCOUNTER — APPOINTMENT (OUTPATIENT)
Dept: CT IMAGING | Age: 63
DRG: 202 | End: 2023-04-18
Payer: MEDICARE

## 2023-04-18 LAB
ANION GAP SERPL CALCULATED.3IONS-SCNC: 7 MMOL/L (ref 3–16)
BASOPHILS # BLD: 0.1 K/UL (ref 0–0.2)
BASOPHILS NFR BLD: 1.1 %
BUN SERPL-MCNC: 12 MG/DL (ref 7–20)
CALCIUM SERPL-MCNC: 9.2 MG/DL (ref 8.3–10.6)
CHLORIDE SERPL-SCNC: 101 MMOL/L (ref 99–110)
CO2 SERPL-SCNC: 31 MMOL/L (ref 21–32)
CREAT SERPL-MCNC: 0.6 MG/DL (ref 0.6–1.2)
D DIMER: 3.04 UG/ML FEU (ref 0–0.6)
DEPRECATED RDW RBC AUTO: 18 % (ref 12.4–15.4)
EOSINOPHIL # BLD: 0.2 K/UL (ref 0–0.6)
EOSINOPHIL NFR BLD: 3.5 %
GFR SERPLBLD CREATININE-BSD FMLA CKD-EPI: >60 ML/MIN/{1.73_M2}
GLUCOSE SERPL-MCNC: 102 MG/DL (ref 70–99)
HCT VFR BLD AUTO: 33.6 % (ref 36–48)
HGB BLD-MCNC: 11 G/DL (ref 12–16)
IRON SATN MFR SERPL: 9 % (ref 15–50)
IRON SERPL-MCNC: 27 UG/DL (ref 37–145)
LYMPHOCYTES # BLD: 1 K/UL (ref 1–5.1)
LYMPHOCYTES NFR BLD: 18.2 %
MCH RBC QN AUTO: 26.6 PG (ref 26–34)
MCHC RBC AUTO-ENTMCNC: 32.7 G/DL (ref 31–36)
MCV RBC AUTO: 81.3 FL (ref 80–100)
MONOCYTES # BLD: 0.5 K/UL (ref 0–1.3)
MONOCYTES NFR BLD: 8.1 %
NEUTROPHILS # BLD: 3.9 K/UL (ref 1.7–7.7)
NEUTROPHILS NFR BLD: 69.1 %
PLATELET # BLD AUTO: 377 K/UL (ref 135–450)
PMV BLD AUTO: 8 FL (ref 5–10.5)
POTASSIUM SERPL-SCNC: 4 MMOL/L (ref 3.5–5.1)
RBC # BLD AUTO: 4.14 M/UL (ref 4–5.2)
SODIUM SERPL-SCNC: 139 MMOL/L (ref 136–145)
TIBC SERPL-MCNC: 311 UG/DL (ref 260–445)
WBC # BLD AUTO: 5.7 K/UL (ref 4–11)

## 2023-04-18 PROCEDURE — 6370000000 HC RX 637 (ALT 250 FOR IP): Performed by: STUDENT IN AN ORGANIZED HEALTH CARE EDUCATION/TRAINING PROGRAM

## 2023-04-18 PROCEDURE — 2580000003 HC RX 258: Performed by: INTERNAL MEDICINE

## 2023-04-18 PROCEDURE — 6370000000 HC RX 637 (ALT 250 FOR IP): Performed by: INTERNAL MEDICINE

## 2023-04-18 PROCEDURE — 71275 CT ANGIOGRAPHY CHEST: CPT

## 2023-04-18 PROCEDURE — 1200000000 HC SEMI PRIVATE

## 2023-04-18 PROCEDURE — 97165 OT EVAL LOW COMPLEX 30 MIN: CPT

## 2023-04-18 PROCEDURE — 94761 N-INVAS EAR/PLS OXIMETRY MLT: CPT

## 2023-04-18 PROCEDURE — 97116 GAIT TRAINING THERAPY: CPT

## 2023-04-18 PROCEDURE — 6370000000 HC RX 637 (ALT 250 FOR IP): Performed by: HOSPITALIST

## 2023-04-18 PROCEDURE — 36415 COLL VENOUS BLD VENIPUNCTURE: CPT

## 2023-04-18 PROCEDURE — 80048 BASIC METABOLIC PNL TOTAL CA: CPT

## 2023-04-18 PROCEDURE — 83540 ASSAY OF IRON: CPT

## 2023-04-18 PROCEDURE — 6360000004 HC RX CONTRAST MEDICATION: Performed by: HOSPITALIST

## 2023-04-18 PROCEDURE — 97162 PT EVAL MOD COMPLEX 30 MIN: CPT

## 2023-04-18 PROCEDURE — 83550 IRON BINDING TEST: CPT

## 2023-04-18 PROCEDURE — 6360000002 HC RX W HCPCS: Performed by: INTERNAL MEDICINE

## 2023-04-18 PROCEDURE — 97530 THERAPEUTIC ACTIVITIES: CPT

## 2023-04-18 PROCEDURE — 85025 COMPLETE CBC W/AUTO DIFF WBC: CPT

## 2023-04-18 PROCEDURE — 85379 FIBRIN DEGRADATION QUANT: CPT

## 2023-04-18 PROCEDURE — 94640 AIRWAY INHALATION TREATMENT: CPT

## 2023-04-18 RX ORDER — OXYCODONE HYDROCHLORIDE AND ACETAMINOPHEN 5; 325 MG/1; MG/1
1 TABLET ORAL EVERY 6 HOURS PRN
Status: DISCONTINUED | OUTPATIENT
Start: 2023-04-18 | End: 2023-04-20

## 2023-04-18 RX ORDER — IPRATROPIUM BROMIDE AND ALBUTEROL SULFATE 2.5; .5 MG/3ML; MG/3ML
1 SOLUTION RESPIRATORY (INHALATION) 3 TIMES DAILY
Status: DISCONTINUED | OUTPATIENT
Start: 2023-04-18 | End: 2023-04-20 | Stop reason: HOSPADM

## 2023-04-18 RX ORDER — AMOXICILLIN 250 MG/1
250 CAPSULE ORAL EVERY 8 HOURS SCHEDULED
Status: DISCONTINUED | OUTPATIENT
Start: 2023-04-18 | End: 2023-04-20 | Stop reason: HOSPADM

## 2023-04-18 RX ORDER — ALBUTEROL SULFATE 2.5 MG/3ML
2.5 SOLUTION RESPIRATORY (INHALATION) EVERY 4 HOURS PRN
Status: DISCONTINUED | OUTPATIENT
Start: 2023-04-18 | End: 2023-04-20 | Stop reason: HOSPADM

## 2023-04-18 RX ADMIN — CYANOCOBALAMIN TAB 1000 MCG 1000 MCG: 1000 TAB at 13:40

## 2023-04-18 RX ADMIN — BUDESONIDE INHALATION 500 MCG: 0.5 SUSPENSION RESPIRATORY (INHALATION) at 21:29

## 2023-04-18 RX ADMIN — GABAPENTIN 600 MG: 300 CAPSULE ORAL at 20:17

## 2023-04-18 RX ADMIN — ATORVASTATIN CALCIUM 20 MG: 20 TABLET, FILM COATED ORAL at 09:47

## 2023-04-18 RX ADMIN — BUDESONIDE INHALATION 500 MCG: 0.5 SUSPENSION RESPIRATORY (INHALATION) at 08:32

## 2023-04-18 RX ADMIN — TRAMADOL HYDROCHLORIDE 50 MG: 50 TABLET ORAL at 09:46

## 2023-04-18 RX ADMIN — IOPAMIDOL 75 ML: 755 INJECTION, SOLUTION INTRAVENOUS at 12:55

## 2023-04-18 RX ADMIN — IPRATROPIUM BROMIDE AND ALBUTEROL SULFATE 1 AMPULE: 2.5; .5 SOLUTION RESPIRATORY (INHALATION) at 21:28

## 2023-04-18 RX ADMIN — CYCLOBENZAPRINE 10 MG: 10 TABLET, FILM COATED ORAL at 13:29

## 2023-04-18 RX ADMIN — AMOXICILLIN 250 MG: 250 CAPSULE ORAL at 20:17

## 2023-04-18 RX ADMIN — IPRATROPIUM BROMIDE AND ALBUTEROL SULFATE 1 AMPULE: 2.5; .5 SOLUTION RESPIRATORY (INHALATION) at 08:17

## 2023-04-18 RX ADMIN — ALBUTEROL SULFATE 2.5 MG: 2.5 SOLUTION RESPIRATORY (INHALATION) at 05:40

## 2023-04-18 RX ADMIN — FUROSEMIDE 20 MG: 40 TABLET ORAL at 09:47

## 2023-04-18 RX ADMIN — TRAMADOL HYDROCHLORIDE 50 MG: 50 TABLET ORAL at 19:02

## 2023-04-18 RX ADMIN — LISINOPRIL 5 MG: 10 TABLET ORAL at 09:47

## 2023-04-18 RX ADMIN — OXYCODONE HYDROCHLORIDE AND ACETAMINOPHEN 1 TABLET: 5; 325 TABLET ORAL at 19:02

## 2023-04-18 RX ADMIN — IPRATROPIUM BROMIDE AND ALBUTEROL SULFATE 1 AMPULE: 2.5; .5 SOLUTION RESPIRATORY (INHALATION) at 17:31

## 2023-04-18 RX ADMIN — ESCITALOPRAM OXALATE 20 MG: 20 TABLET ORAL at 13:39

## 2023-04-18 RX ADMIN — Medication 2 TABLET: at 20:17

## 2023-04-18 RX ADMIN — ENOXAPARIN SODIUM 40 MG: 100 INJECTION SUBCUTANEOUS at 20:17

## 2023-04-18 RX ADMIN — OXYCODONE HYDROCHLORIDE AND ACETAMINOPHEN 1 TABLET: 5; 325 TABLET ORAL at 13:39

## 2023-04-18 RX ADMIN — ARFORMOTEROL TARTRATE 15 MCG: 15 SOLUTION RESPIRATORY (INHALATION) at 08:31

## 2023-04-18 RX ADMIN — AMITRIPTYLINE HYDROCHLORIDE 25 MG: 50 TABLET, FILM COATED ORAL at 20:17

## 2023-04-18 RX ADMIN — GABAPENTIN 600 MG: 300 CAPSULE ORAL at 13:29

## 2023-04-18 RX ADMIN — GABAPENTIN 600 MG: 300 CAPSULE ORAL at 09:46

## 2023-04-18 RX ADMIN — ARFORMOTEROL TARTRATE 15 MCG: 15 SOLUTION RESPIRATORY (INHALATION) at 21:28

## 2023-04-18 RX ADMIN — SODIUM CHLORIDE, PRESERVATIVE FREE 10 ML: 5 INJECTION INTRAVENOUS at 09:47

## 2023-04-18 ASSESSMENT — PAIN DESCRIPTION - LOCATION
LOCATION: SHOULDER
LOCATION: SHOULDER;KNEE
LOCATION: SHOULDER
LOCATION: SHOULDER;CHEST

## 2023-04-18 ASSESSMENT — PAIN SCALES - GENERAL
PAINLEVEL_OUTOF10: 10
PAINLEVEL_OUTOF10: 8
PAINLEVEL_OUTOF10: 10
PAINLEVEL_OUTOF10: 8
PAINLEVEL_OUTOF10: 4
PAINLEVEL_OUTOF10: 9
PAINLEVEL_OUTOF10: 0

## 2023-04-18 NOTE — ED NOTES
Placed pt on 2L PRN while asleep. RN noticed pt O2 sats will drop down to 88-89% on room air. While she is awake and talking to you her O2 sats stay in 93-96% on room air.      Janette Cruz RN  04/17/23 4359
04/17/23 1800 04/17/23 1830 04/17/23 1952   BP:  111/64 (!) 163/66    Pulse: 87 97 93 84   Resp: 19 17 17 16   Temp:       TempSrc:       SpO2: 95% 94% 94% 96%   Weight:       Height:         FiO2 (%):   O2 Flow Rate: O2 Device: Nasal cannula O2 Flow Rate (L/min): 2 L/min  Cardiac Rhythm:    Pain Assessment:  [] Verbal [] [place] Scale  Pain Scale: Pain Assessment  Pain Assessment: 0-10  Pain Level: 7  Patient's Stated Pain Goal: 2  Pain Location: Arm, Knee  Pain Orientation: Right  Pain Descriptors: Aching  Last documented pain score (0-10 scale) Pain Level: 7  Last documented pain medication administered:   Mental Status: oriented and alert  Orientation Level:    NIH Score:    C-SSRS: Risk of Suicide: No Risk  Bedside swallow:    [place] Coma Scale (GCS): [place] Coma Scale  Eye Opening: Spontaneous  Best Verbal Response: Oriented  Best Motor Response: Obeys commands  [place] Coma Scale Score: 15  Active LDA's:   Peripheral IV 04/17/23 Left; Anterior Wrist (Active)     PO Status:   Pertinent or High Risk Medications/Drips: no   If Yes, please provide details:   Pending Blood Product Administration:  no      You may also review the ED PT Care Timeline found under the Summary Nursing Index tab. Recommendation    Pending orders No pending orders from ED  Plan for Discharge (if known):    Additional Comments: None  If any further questions, please call Sending RN at [phone]    Electronically signed by: Electronically signed by Charlie Spears RN on 4/17/2023 at 8:13 PM       Doreen Cruz RN  04/17/23 2014

## 2023-04-18 NOTE — H&P
10.6*   HCT 31.9*        Recent Labs     04/17/23  1622   *   K 3.9   CL 94*   CO2 27   BUN 15   CREATININE 0.8   CALCIUM 9.3     Recent Labs     04/17/23  1622   AST 18   ALT 10   BILITOT <0.2   ALKPHOS 89     No results for input(s): INR in the last 72 hours. Recent Labs     04/17/23  1622   TROPONINI <0.01       Urinalysis:      Lab Results   Component Value Date/Time    NITRU Negative 03/16/2018 02:48 PM    [address] 03/16/2018 02:24 PM    BACTERIA 1+ 03/16/2018 02:24 PM    RBCUA 0-2 03/16/2018 02:24 PM    BLOODU Negative 03/16/2018 02:48 PM    SPECGRAV 1.010 03/16/2018 02:48 PM    GLUCOSEU Negative 03/16/2018 02:48 PM       Radiology:     CXR: I have reviewed the CXR with the following interpretation: Mild right perihilar airspace disease. EKG:  I have reviewed the EKG with the following interpretation: Sinus rhythm without any acute ischemic changes    XR CHEST PORTABLE   Final Result      1. Mild right perihilar and basilar airspace disease. Consults:    IP CONSULT TO HOSPITALIST    ASSESSMENT:    Active Hospital Problems    Diagnosis Date Noted    Acute asthma exacerbation [J45.901] 08/28/2018     Priority: High    Morbid obesity with BMI of 40.0-44.9, adult ([facility] [address] [E66.01, Z68.41] 04/17/2023     Priority: Medium    WIGGINS (dyspnea on exertion) [R06.09] 10/23/2020     Priority: Medium    Bilateral lower extremity edema [R60.0] 11/02/2015     Priority: Medium   Normocytic anemia      PLAN:  -Bronchodilators   -Check D-dimer  -Check iron studies  -Resume home regimen for chronic stable conditions      DVT Prophylaxis: Lovenox  Diet: ADULT DIET; Regular; Low Fat/Low Chol/High Fiber/2 gm Na  Code Status: Full Code    PT/OT Eval Status: Pending    Gem Posey MD    Thank you Rossy Burrows for the opportunity to be involved in this patient's care. If you have any questions or concerns please feel free to contact me at [phone].

## 2023-04-18 NOTE — RT PROTOCOL NOTE
RT Nebulizer Bronchodilator Protocol Note    There is a bronchodilator order in the chart from a provider indicating to follow the RT Bronchodilator Protocol and there is an Initiate RT Bronchodilator Protocol order as well (see protocol at bottom of note). CXR Findings:  XR CHEST PORTABLE    Result Date: 4/17/2023  1. Mild right perihilar and basilar airspace disease. The findings from the last RT Protocol Assessment were as follows:  Smoking: Chronic pulmonary disease  Respiratory Pattern: Dyspnea on exertion or RR 21-25 bpm  Breath Sounds: Slightly diminished and/or crackles  Cough: Strong, productive  Indication for Bronchodilator Therapy: Decreased or absent breath sounds, On home bronchodilators  Bronchodilator Assessment Score: 7    Aerosolized bronchodilator medication orders have been revised according to the RT Nebulizer Bronchodilator Protocol below. Respiratory Therapist to perform RT Therapy Protocol Assessment initially then follow the protocol. Repeat RT Therapy Protocol Assessment PRN for score 0-3 or on second treatment, BID, and PRN for scores above 3. No Indications - adjust the frequency to every 6 hours PRN wheezing or bronchospasm, if no treatments needed after 48 hours then discontinue using Per Protocol order mode. If indication present, adjust the RT bronchodilator orders based on the Bronchodilator Assessment Score as indicated below. If a patient is on this medication at home then do not decrease Frequency below that used at home. 0-3 - enter or revise RT bronchodilator order(s) to equivalent RT Bronchodilator order with Frequency of every 4 hours PRN for wheezing or increased work of breathing using Per Protocol order mode.        4-6 - enter or revise RT Bronchodilator order(s) to two equivalent RT bronchodilator orders with one order with BID Frequency and one order with Frequency of every 4 hours PRN wheezing or increased work of breathing using Per Protocol

## 2023-04-19 ENCOUNTER — APPOINTMENT (OUTPATIENT)
Dept: GENERAL RADIOLOGY | Age: 63
DRG: 202 | End: 2023-04-19
Payer: MEDICARE

## 2023-04-19 PROCEDURE — 6360000002 HC RX W HCPCS: Performed by: HOSPITALIST

## 2023-04-19 PROCEDURE — 73030 X-RAY EXAM OF SHOULDER: CPT

## 2023-04-19 PROCEDURE — 6360000002 HC RX W HCPCS: Performed by: INTERNAL MEDICINE

## 2023-04-19 PROCEDURE — 94761 N-INVAS EAR/PLS OXIMETRY MLT: CPT

## 2023-04-19 PROCEDURE — 6370000000 HC RX 637 (ALT 250 FOR IP): Performed by: INTERNAL MEDICINE

## 2023-04-19 PROCEDURE — 2580000003 HC RX 258: Performed by: INTERNAL MEDICINE

## 2023-04-19 PROCEDURE — 2700000000 HC OXYGEN THERAPY PER DAY

## 2023-04-19 PROCEDURE — 1200000000 HC SEMI PRIVATE

## 2023-04-19 PROCEDURE — 6370000000 HC RX 637 (ALT 250 FOR IP): Performed by: HOSPITALIST

## 2023-04-19 PROCEDURE — 94640 AIRWAY INHALATION TREATMENT: CPT

## 2023-04-19 RX ORDER — LEVOFLOXACIN 5 MG/ML
750 INJECTION, SOLUTION INTRAVENOUS EVERY 24 HOURS
Status: DISCONTINUED | OUTPATIENT
Start: 2023-04-19 | End: 2023-04-20 | Stop reason: HOSPADM

## 2023-04-19 RX ADMIN — AMOXICILLIN 250 MG: 250 CAPSULE ORAL at 15:59

## 2023-04-19 RX ADMIN — ARFORMOTEROL TARTRATE 15 MCG: 15 SOLUTION RESPIRATORY (INHALATION) at 08:53

## 2023-04-19 RX ADMIN — OXYCODONE HYDROCHLORIDE AND ACETAMINOPHEN 1 TABLET: 5; 325 TABLET ORAL at 11:45

## 2023-04-19 RX ADMIN — SODIUM CHLORIDE 10 ML/HR: 9 INJECTION, SOLUTION INTRAVENOUS at 12:09

## 2023-04-19 RX ADMIN — LEVOFLOXACIN 750 MG: 5 INJECTION, SOLUTION INTRAVENOUS at 12:11

## 2023-04-19 RX ADMIN — ARFORMOTEROL TARTRATE 15 MCG: 15 SOLUTION RESPIRATORY (INHALATION) at 21:08

## 2023-04-19 RX ADMIN — TRAMADOL HYDROCHLORIDE 50 MG: 50 TABLET ORAL at 17:40

## 2023-04-19 RX ADMIN — IPRATROPIUM BROMIDE AND ALBUTEROL SULFATE 1 AMPULE: 2.5; .5 SOLUTION RESPIRATORY (INHALATION) at 08:53

## 2023-04-19 RX ADMIN — AMOXICILLIN 250 MG: 250 CAPSULE ORAL at 06:25

## 2023-04-19 RX ADMIN — GABAPENTIN 600 MG: 300 CAPSULE ORAL at 15:58

## 2023-04-19 RX ADMIN — POLYETHYLENE GLYCOL 3350 17 G: 17 POWDER, FOR SOLUTION ORAL at 19:26

## 2023-04-19 RX ADMIN — CYCLOBENZAPRINE 10 MG: 10 TABLET, FILM COATED ORAL at 17:40

## 2023-04-19 RX ADMIN — IPRATROPIUM BROMIDE AND ALBUTEROL SULFATE 1 AMPULE: 2.5; .5 SOLUTION RESPIRATORY (INHALATION) at 21:08

## 2023-04-19 RX ADMIN — GABAPENTIN 600 MG: 300 CAPSULE ORAL at 09:27

## 2023-04-19 RX ADMIN — Medication 2 TABLET: at 09:29

## 2023-04-19 RX ADMIN — ENOXAPARIN SODIUM 40 MG: 100 INJECTION SUBCUTANEOUS at 09:26

## 2023-04-19 RX ADMIN — AMITRIPTYLINE HYDROCHLORIDE 25 MG: 50 TABLET, FILM COATED ORAL at 20:14

## 2023-04-19 RX ADMIN — IPRATROPIUM BROMIDE AND ALBUTEROL SULFATE 1 AMPULE: 2.5; .5 SOLUTION RESPIRATORY (INHALATION) at 12:05

## 2023-04-19 RX ADMIN — ZIPRASIDONE HYDROCHLORIDE 40 MG: 20 CAPSULE ORAL at 17:41

## 2023-04-19 RX ADMIN — ALBUTEROL SULFATE 2.5 MG: 2.5 SOLUTION RESPIRATORY (INHALATION) at 05:53

## 2023-04-19 RX ADMIN — CYCLOBENZAPRINE 10 MG: 10 TABLET, FILM COATED ORAL at 09:35

## 2023-04-19 RX ADMIN — OXYCODONE HYDROCHLORIDE AND ACETAMINOPHEN 1 TABLET: 5; 325 TABLET ORAL at 04:26

## 2023-04-19 RX ADMIN — ZIPRASIDONE HYDROCHLORIDE 40 MG: 20 CAPSULE ORAL at 09:35

## 2023-04-19 RX ADMIN — ATORVASTATIN CALCIUM 20 MG: 20 TABLET, FILM COATED ORAL at 09:27

## 2023-04-19 RX ADMIN — TRAMADOL HYDROCHLORIDE 50 MG: 50 TABLET ORAL at 09:35

## 2023-04-19 RX ADMIN — IPRATROPIUM BROMIDE AND ALBUTEROL SULFATE 1 AMPULE: 2.5; .5 SOLUTION RESPIRATORY (INHALATION) at 16:22

## 2023-04-19 RX ADMIN — BUDESONIDE INHALATION 500 MCG: 0.5 SUSPENSION RESPIRATORY (INHALATION) at 21:08

## 2023-04-19 RX ADMIN — OXYCODONE HYDROCHLORIDE AND ACETAMINOPHEN 1 TABLET: 5; 325 TABLET ORAL at 19:25

## 2023-04-19 RX ADMIN — BUDESONIDE INHALATION 500 MCG: 0.5 SUSPENSION RESPIRATORY (INHALATION) at 08:53

## 2023-04-19 RX ADMIN — FUROSEMIDE 20 MG: 40 TABLET ORAL at 09:27

## 2023-04-19 RX ADMIN — CYANOCOBALAMIN TAB 1000 MCG 1000 MCG: 1000 TAB at 09:27

## 2023-04-19 RX ADMIN — ESCITALOPRAM OXALATE 20 MG: 20 TABLET ORAL at 09:27

## 2023-04-19 RX ADMIN — GABAPENTIN 600 MG: 300 CAPSULE ORAL at 20:14

## 2023-04-19 RX ADMIN — SODIUM CHLORIDE, PRESERVATIVE FREE 10 ML: 5 INJECTION INTRAVENOUS at 09:36

## 2023-04-19 RX ADMIN — LISINOPRIL 5 MG: 10 TABLET ORAL at 09:27

## 2023-04-19 RX ADMIN — AMOXICILLIN 250 MG: 250 CAPSULE ORAL at 23:10

## 2023-04-19 ASSESSMENT — PAIN - FUNCTIONAL ASSESSMENT
PAIN_FUNCTIONAL_ASSESSMENT: PREVENTS OR INTERFERES SOME ACTIVE ACTIVITIES AND ADLS
PAIN_FUNCTIONAL_ASSESSMENT: PREVENTS OR INTERFERES SOME ACTIVE ACTIVITIES AND ADLS
PAIN_FUNCTIONAL_ASSESSMENT: ACTIVITIES ARE NOT PREVENTED
PAIN_FUNCTIONAL_ASSESSMENT: ACTIVITIES ARE NOT PREVENTED

## 2023-04-19 ASSESSMENT — PAIN DESCRIPTION - PAIN TYPE
TYPE: CHRONIC PAIN

## 2023-04-19 ASSESSMENT — PAIN DESCRIPTION - DESCRIPTORS
DESCRIPTORS: ACHING;BURNING
DESCRIPTORS: ACHING

## 2023-04-19 ASSESSMENT — PAIN SCALES - GENERAL
PAINLEVEL_OUTOF10: 4
PAINLEVEL_OUTOF10: 7
PAINLEVEL_OUTOF10: 0
PAINLEVEL_OUTOF10: 4
PAINLEVEL_OUTOF10: 0
PAINLEVEL_OUTOF10: 7
PAINLEVEL_OUTOF10: 7
PAINLEVEL_OUTOF10: 0
PAINLEVEL_OUTOF10: 7
PAINLEVEL_OUTOF10: 4
PAINLEVEL_OUTOF10: 7

## 2023-04-19 ASSESSMENT — PAIN DESCRIPTION - LOCATION
LOCATION: SHOULDER
LOCATION: ARM
LOCATION: SHOULDER
LOCATION: SHOULDER

## 2023-04-19 ASSESSMENT — PAIN DESCRIPTION - ORIENTATION
ORIENTATION: RIGHT

## 2023-04-19 ASSESSMENT — PAIN DESCRIPTION - ONSET
ONSET: ON-GOING

## 2023-04-19 ASSESSMENT — PAIN DESCRIPTION - DIRECTION
RADIATING_TOWARDS: BACK

## 2023-04-19 ASSESSMENT — PAIN DESCRIPTION - FREQUENCY
FREQUENCY: CONTINUOUS
FREQUENCY: OTHER (COMMENT)

## 2023-04-19 NOTE — PLAN OF CARE
Problem: Safety - Adult  Goal: Free from fall injury  4/19/2023 1105 by Surya Quach RN  Outcome: Progressing  Note: Patient is a high fall risk, fall precautions in place, patient remains free from falls     Problem: Pain  Goal: Verbalizes/displays adequate comfort level or baseline comfort level  Outcome: Progressing  Note: Patient complaint of pain relieved with PRN pain medications, patient educated on alternative methods of pain relief, patient verbalized understanding

## 2023-04-19 NOTE — PLAN OF CARE
Problem: Safety - Adult  Goal: Free from fall injury  Outcome: Progressing   PT remained safe throughout the night,

## 2023-04-19 NOTE — RT PROTOCOL NOTE
RT Nebulizer Bronchodilator Protocol Note    There is a bronchodilator order in the chart from a provider indicating to follow the RT Bronchodilator Protocol and there is an Initiate RT Bronchodilator Protocol order as well (see protocol at bottom of note). CXR Findings:  XR CHEST PORTABLE    Result Date: 4/17/2023  1. Mild right perihilar and basilar airspace disease. The findings from the last RT Protocol Assessment were as follows:  Smoking: Chronic pulmonary disease  Respiratory Pattern: Regular pattern and RR 12-20 bpm  Breath Sounds: Intermittent or unilateral wheezes  Cough: Strong, spontaneous, non-productive  Indication for Bronchodilator Therapy: Decreased or absent breath sounds, On home bronchodilators  Bronchodilator Assessment Score: 6 Leaving TID with PRN due to patient calling. Aerosolized bronchodilator medication orders have been revised according to the RT Nebulizer Bronchodilator Protocol below. Respiratory Therapist to perform RT Therapy Protocol Assessment initially then follow the protocol. Repeat RT Therapy Protocol Assessment PRN for score 0-3 or on second treatment, BID, and PRN for scores above 3. No Indications - adjust the frequency to every 6 hours PRN wheezing or bronchospasm, if no treatments needed after 48 hours then discontinue using Per Protocol order mode. If indication present, adjust the RT bronchodilator orders based on the Bronchodilator Assessment Score as indicated below. If a patient is on this medication at home then do not decrease Frequency below that used at home. 0-3 - enter or revise RT bronchodilator order(s) to equivalent RT Bronchodilator order with Frequency of every 4 hours PRN for wheezing or increased work of breathing using Per Protocol order mode.        4-6 - enter or revise RT Bronchodilator order(s) to two equivalent RT bronchodilator orders with one order with BID Frequency and one order with Frequency of every 4 hours PRN

## 2023-04-20 ENCOUNTER — TELEPHONE (OUTPATIENT)
Dept: PULMONOLOGY | Age: 63
End: 2023-04-20

## 2023-04-20 VITALS
HEART RATE: 85 BPM | SYSTOLIC BLOOD PRESSURE: 119 MMHG | WEIGHT: 188.5 LBS | TEMPERATURE: 97.6 F | HEIGHT: 58 IN | OXYGEN SATURATION: 98 % | RESPIRATION RATE: 18 BRPM | BODY MASS INDEX: 39.57 KG/M2 | DIASTOLIC BLOOD PRESSURE: 81 MMHG

## 2023-04-20 PROBLEM — R06.00 DYSPNEA: Status: ACTIVE | Noted: 2020-10-26

## 2023-04-20 PROBLEM — R06.02 SOB (SHORTNESS OF BREATH): Status: ACTIVE | Noted: 2020-10-26

## 2023-04-20 PROCEDURE — 2580000003 HC RX 258: Performed by: INTERNAL MEDICINE

## 2023-04-20 PROCEDURE — 6370000000 HC RX 637 (ALT 250 FOR IP): Performed by: INTERNAL MEDICINE

## 2023-04-20 PROCEDURE — 6360000002 HC RX W HCPCS: Performed by: HOSPITALIST

## 2023-04-20 PROCEDURE — 99222 1ST HOSP IP/OBS MODERATE 55: CPT | Performed by: INTERNAL MEDICINE

## 2023-04-20 PROCEDURE — 94640 AIRWAY INHALATION TREATMENT: CPT

## 2023-04-20 PROCEDURE — 6360000002 HC RX W HCPCS: Performed by: INTERNAL MEDICINE

## 2023-04-20 PROCEDURE — 94761 N-INVAS EAR/PLS OXIMETRY MLT: CPT

## 2023-04-20 PROCEDURE — 6370000000 HC RX 637 (ALT 250 FOR IP): Performed by: HOSPITALIST

## 2023-04-20 RX ORDER — OXYCODONE HYDROCHLORIDE AND ACETAMINOPHEN 5; 325 MG/1; MG/1
1 TABLET ORAL EVERY 6 HOURS PRN
Qty: 15 TABLET | Refills: 0 | Status: SHIPPED | OUTPATIENT
Start: 2023-04-20 | End: 2023-04-23

## 2023-04-20 RX ORDER — OXYCODONE HYDROCHLORIDE AND ACETAMINOPHEN 5; 325 MG/1; MG/1
1 TABLET ORAL EVERY 4 HOURS PRN
Status: DISCONTINUED | OUTPATIENT
Start: 2023-04-20 | End: 2023-04-20 | Stop reason: HOSPADM

## 2023-04-20 RX ORDER — LEVOFLOXACIN 750 MG/1
750 TABLET ORAL DAILY
Qty: 5 TABLET | Refills: 0 | Status: SHIPPED | OUTPATIENT
Start: 2023-04-20 | End: 2023-04-25

## 2023-04-20 RX ADMIN — IPRATROPIUM BROMIDE AND ALBUTEROL SULFATE 1 AMPULE: 2.5; .5 SOLUTION RESPIRATORY (INHALATION) at 15:17

## 2023-04-20 RX ADMIN — AMOXICILLIN 250 MG: 250 CAPSULE ORAL at 15:15

## 2023-04-20 RX ADMIN — BUDESONIDE INHALATION 500 MCG: 0.5 SUSPENSION RESPIRATORY (INHALATION) at 08:10

## 2023-04-20 RX ADMIN — LISINOPRIL 5 MG: 10 TABLET ORAL at 08:44

## 2023-04-20 RX ADMIN — CYANOCOBALAMIN TAB 1000 MCG 1000 MCG: 1000 TAB at 08:43

## 2023-04-20 RX ADMIN — IPRATROPIUM BROMIDE AND ALBUTEROL SULFATE 1 AMPULE: 2.5; .5 SOLUTION RESPIRATORY (INHALATION) at 08:09

## 2023-04-20 RX ADMIN — Medication 2 TABLET: at 08:46

## 2023-04-20 RX ADMIN — GABAPENTIN 600 MG: 300 CAPSULE ORAL at 15:14

## 2023-04-20 RX ADMIN — CYCLOBENZAPRINE 10 MG: 10 TABLET, FILM COATED ORAL at 06:03

## 2023-04-20 RX ADMIN — ESCITALOPRAM OXALATE 20 MG: 20 TABLET ORAL at 08:43

## 2023-04-20 RX ADMIN — ZIPRASIDONE HYDROCHLORIDE 40 MG: 20 CAPSULE ORAL at 08:43

## 2023-04-20 RX ADMIN — ARFORMOTEROL TARTRATE 15 MCG: 15 SOLUTION RESPIRATORY (INHALATION) at 08:12

## 2023-04-20 RX ADMIN — OXYCODONE HYDROCHLORIDE AND ACETAMINOPHEN 1 TABLET: 5; 325 TABLET ORAL at 10:08

## 2023-04-20 RX ADMIN — ATORVASTATIN CALCIUM 20 MG: 20 TABLET, FILM COATED ORAL at 08:43

## 2023-04-20 RX ADMIN — OXYCODONE HYDROCHLORIDE AND ACETAMINOPHEN 1 TABLET: 5; 325 TABLET ORAL at 03:11

## 2023-04-20 RX ADMIN — LEVOFLOXACIN 750 MG: 5 INJECTION, SOLUTION INTRAVENOUS at 12:47

## 2023-04-20 RX ADMIN — GABAPENTIN 600 MG: 300 CAPSULE ORAL at 08:43

## 2023-04-20 RX ADMIN — ALBUTEROL SULFATE 2.5 MG: 2.5 SOLUTION RESPIRATORY (INHALATION) at 12:09

## 2023-04-20 RX ADMIN — AMOXICILLIN 250 MG: 250 CAPSULE ORAL at 05:05

## 2023-04-20 RX ADMIN — OXYCODONE HYDROCHLORIDE AND ACETAMINOPHEN 1 TABLET: 5; 325 TABLET ORAL at 15:14

## 2023-04-20 RX ADMIN — TRAMADOL HYDROCHLORIDE 50 MG: 50 TABLET ORAL at 06:04

## 2023-04-20 RX ADMIN — SODIUM CHLORIDE, PRESERVATIVE FREE 10 ML: 5 INJECTION INTRAVENOUS at 08:45

## 2023-04-20 RX ADMIN — FUROSEMIDE 20 MG: 40 TABLET ORAL at 08:43

## 2023-04-20 RX ADMIN — ALBUTEROL SULFATE 2.5 MG: 2.5 SOLUTION RESPIRATORY (INHALATION) at 03:13

## 2023-04-20 ASSESSMENT — PAIN DESCRIPTION - FREQUENCY
FREQUENCY: CONTINUOUS

## 2023-04-20 ASSESSMENT — PAIN SCALES - WONG BAKER
WONGBAKER_NUMERICALRESPONSE: 0
WONGBAKER_NUMERICALRESPONSE: 0

## 2023-04-20 ASSESSMENT — PAIN DESCRIPTION - ORIENTATION
ORIENTATION: RIGHT
ORIENTATION: RIGHT
ORIENTATION: LOWER

## 2023-04-20 ASSESSMENT — PAIN - FUNCTIONAL ASSESSMENT
PAIN_FUNCTIONAL_ASSESSMENT: ACTIVITIES ARE NOT PREVENTED

## 2023-04-20 ASSESSMENT — PAIN DESCRIPTION - DESCRIPTORS
DESCRIPTORS: ACHING;DISCOMFORT
DESCRIPTORS: ACHING
DESCRIPTORS: ACHING
DESCRIPTORS: ACHING;DISCOMFORT
DESCRIPTORS: ACHING;DISCOMFORT

## 2023-04-20 ASSESSMENT — PAIN DESCRIPTION - PAIN TYPE
TYPE: CHRONIC PAIN

## 2023-04-20 ASSESSMENT — PAIN SCALES - GENERAL
PAINLEVEL_OUTOF10: 4
PAINLEVEL_OUTOF10: 6
PAINLEVEL_OUTOF10: 10
PAINLEVEL_OUTOF10: 0
PAINLEVEL_OUTOF10: 7
PAINLEVEL_OUTOF10: 8
PAINLEVEL_OUTOF10: 9
PAINLEVEL_OUTOF10: 0
PAINLEVEL_OUTOF10: 7

## 2023-04-20 ASSESSMENT — PAIN DESCRIPTION - LOCATION
LOCATION: BACK;ARM
LOCATION: BACK;ARM
LOCATION: ARM
LOCATION: BACK
LOCATION: SHOULDER

## 2023-04-20 ASSESSMENT — PAIN DESCRIPTION - ONSET
ONSET: ON-GOING

## 2023-04-20 NOTE — PROGRESS NOTES
4 Eyes Admission Assessment     I agree as the admission nurse that 2 RN's have performed a thorough Head to Toe Skin Assessment on the patient. ALL assessment sites listed below have been assessed on admission. Areas assessed by both nurses: ***  [x]   Head, Face, and Ears   [x]   Shoulders, Back, and Chest  [x]   Arms, Elbows, and Hands   [x]   Coccyx, Sacrum, and Ischum  [x]   Legs, Feet, and Heels        Does the Patient have Skin Breakdown?   No         Evan Prevention initiated:  No   Wound Care Orders initiated:  No      [facility] nurse consulted for Pressure Injury (Stage 3,4, Unstageable, DTI, NWPT, and Complex wounds):  No      Nurse 1 eSignature: Electronically signed by Kayla Sandoval RN on 4/18/23 at 6:57 PM EDT    **SHARE this note so that the co-signing nurse is able to place an eSignature**    Nurse 2 eSignature: {Esignature:[id number]}
AM Assessment completed. BP (!) 140/68   Pulse 83   Temp 98 °F (36.7 °C)   Resp 21   Ht 4' 10\" (1.473 m)   Wt 196 lb 3.4 oz (89 kg)   LMP 12/22/2014   SpO2 94%   BMI 41.01 kg/m²     Alert and oriented x4. Acute ex. COPD. Pain Assessment  Pain Assessment: 0-10  Pain Level: 0   Pain/Discomfort is being managed with PRN analgesics per MD orders (See MAR). Patient is able to express and rate pain using numerical scale. Calls appropriately. Plan of care, education and safety measures reviewed and mutually agreed upon with the patient. Needed items including call light in reach and exit alarm in place.        Electronically signed by Colyb Calderón RN on 4/18/2023 at 8:29 AM
Called to give PRN treatment, patient not in room. Spoke with RN, patient found at . Advised patient to stay in bed and call if she needed anything, advised she as fall risk. With RN and RT at bedside patient stated she would turn off her bed alarm if it was set and she wanted up. Patient appears agitated. Will continue to monitor.
Hospital Medicine History & Physical      PCP: Shawnee Bishop    Date of Admission: 4/17/2023    Date of Service: Pt seen/examined on 4/17/2023 and placed in Observation. Chief Complaint: Shortness of breath      History Of Present Illness:    60 y.o. morbidly obese female with symptomatic history of asthma, dyspnea on exertion, osteoarthritis and lower extremity edema who presented to [facility] ED with increased dyspnea exertion, lower extremity edema and a right shoulder pain. Patient reports symptoms for about a week denies any fevers, chills she reports a dry cough but no rhinorrhea, nasal congestion, sore throat, headache, nausea, vomiting, diarrhea. She also reports increased lower extremity edema, however looking through the chart to this have been chronic issues for her will for which she has had prior work-up. Emergency room she was afebrile with a blood pressure 145/97, pulse 91, respirations 16, sat 90% on room air however she reportedly became hypoxic (no records of it) at rest and was put on 2 L supplemental oxygen via nasal cannula and was satting 95%.       Interval History     SOB improving  Feeling chest tightness +  Cough +  No fever   C/o right shoulder pain +  No leg edema    Past Medical History:          Diagnosis Date    Asthma     Depression     Osteoarthritis     PONV (postoperative nausea and vomiting)     Scoliosis        Past Surgical History:          Procedure Laterality Date    BREAST ENHANCEMENT SURGERY Bilateral 2007    [address]    GASTRIC BYPASS SURGERY  1998    [place]    JOINT REPLACEMENT Bilateral 12/29/2014    knee replacement    JOINT REPLACEMENT      KNEE CARTILAGE SURGERY Left 2006    ROTATOR CUFF REPAIR Left 2008    TOTAL HIP ARTHROPLASTY Bilateral 2014    WISDOM TOOTH EXTRACTION  1995       Medications Prior to Admission:      Prior to Admission medications    Medication Sig Start
Occupational Therapy  Facility/Department: [facility]  Occupational Therapy Initial Assessment  DC     Name: Abdirizak Gasca  : 1960  MRN: [id number]  Date of Service: 2023    Discharge Recommendations:Terri Pa scored a 20/24 on the AM-PAC ADL Inpatient form. Current research shows that an AM-PAC score of 18 or greater is typically associated with a discharge to the patient's home setting. Please see assessment section for further patient specific details. If patient discharges prior to next session this note will serve as a discharge summary. Please see below for the latest assessment towards goals. OT Equipment Recommendations  Equipment Needed: No       Patient Diagnosis(es): The primary encounter diagnosis was Dyspnea, unspecified type. Diagnoses of Exacerbation of asthma, unspecified asthma severity, unspecified whether persistent and Atypical chest pain were also pertinent to this visit. Past Medical History:  has a past medical history of Asthma, Depression, Osteoarthritis, PONV (postoperative nausea and vomiting), and Scoliosis. Past Surgical History:  has a past surgical history that includes Gastric bypass surgery (); Rotator cuff repair (Left, ); Knee cartilage surgery (Left, ); Breast enhancement surgery (Bilateral, );  section ([facility] [address]); Cholecystectomy (); [place] tooth extraction (); joint replacement (Bilateral, 2014); fracture surgery; joint replacement; and Total hip arthroplasty (Bilateral, ). Treatment Diagnosis: impaired mobility, transfers, ADL      Assessment   Assessment: From home with spouse, IND pta, pt completing bed mobility with IND, fx mobility and transfers with SPVN. Pt ambualting steady on her feet with RW. Pt completing toileting with SPVN. Pt on RA, O2 in 90's after activity. Pt with no acute OT needs. Plans to dc home with assist from family.   Treatment Diagnosis: impaired
Patient Is beng discharged home with family. Discharge instructions and medication instructions were discussed and patient verbalized understanding. IV was removed with no complications noted.
Patient is alert and oriented x 4, stand by assist with walker. Patient educated multiple times throughout day to call for assistance and use of bed alarm for safety, needs reinforcement. Patient complaint of pain relieved with PRN pain medications, patient educated on alternative methods of pain relief, patient verbalized understanding. Patient has remained off oxygen throughout shift with no episodes of desaturation. Patient in chair with alarm engaged, call light in reach.
Patient transferred to C30 for ED holds. Patient alert and oriented X4. Bed at lowest position and locked. Bedside table within reach, call light within reach. Admission orders explained to patient. Patient on a bedside Telemetry monitor. Will continue to monitor.   Vitals:    04/17/23 2039   BP: 119/74   Pulse: 83   Resp: 21   Temp: 97.8 °F (36.6 °C)   SpO2: 95%
Pharmacy  Note  - Admission Medication History    List of yfguj-bi-qkgdiiqge medications is complete. I reviewed Rx fill history via \"Complete Dispense Report\" in Epic, and spoke to the patient. The following changes made to tcuks-ba-qmcmyqzhk medication list:    ADDED:   1) tramadol  2) lisinopril  3) amitriptyline   4) ziprasidone   5) vitamin B12  6) vitamin D with calcium    Dose or Frequency CHANGE:  1) The patient takes atorvastatin 20 mg every day and not 40 mg every day like we had on file. 2) The patient takes escitalopram 20 mg every day and not 10 mg every day like we had on file. 3) The patient takes furosemide 20 mg every day and not 80 mg every day like we had on file.     REMOVED:  1) amlodipine   2) ibuprofen   3) lidocaine patch  4) pantoprazole  5) pregabalin     Current Outpatient Medications   Medication Instructions    albuterol sulfate HFA (PROVENTIL;VENTOLIN;PROAIR) 108 (90 Base) MCG/ACT inhaler 2 puffs, Inhalation, 4 TIMES DAILY PRN    amitriptyline (ELAVIL) 25 mg, Oral, NIGHTLY    atorvastatin (LIPITOR) 20 mg, Oral, DAILY    budesonide-formoterol (SYMBICORT) 160-4.5 MCG/ACT AERO 2 puffs, Inhalation, 2 TIMES DAILY    Calcium Carbonate-Vitamin D 500-1.25 MG-MCG CAPS 1 tablet, Oral, DAILY    cyclobenzaprine (FLEXERIL) 10 mg, Oral, 3 TIMES DAILY PRN    diclofenac sodium (VOLTAREN) 2 g, Topical, 2 TIMES DAILY    escitalopram (LEXAPRO) 20 mg, Oral, DAILY    furosemide (LASIX) 20 mg, Oral, DAILY    gabapentin (NEURONTIN) 600 mg, Oral, 3 TIMES DAILY    lisinopril (PRINIVIL;ZESTRIL) 5 mg, Oral, DAILY    traMADol (ULTRAM) 50 mg, Oral, EVERY 8 HOURS PRN    vitamin B-12 (CYANOCOBALAMIN) 1,000 mcg, Oral, DAILY    ziprasidone (GEODON) 40 mg, Oral, 2 TIMES DAILY WITH MEALS      4/17/2023 6:37 PM  Saqib JonesD [employer] [place] [id number]
Physician Progress Note      Raquel Kinney  CSN #:                  [id number]  :                       1960  ADMIT DATE:       2023 3:44 PM  [address] [place] DATE:  RESPONDING  PROVIDER #:        Declan Slaughter MD          QUERY TEXT:    Patient admitted with difficulty breathing, noted to have asthma. If possible,   please document in progress notes and discharge summary further specificity   regarding asthma as follows: The medical record reflects the following:  Risk Factors: 59 yo morbidly obese female w/ hx of asthma w/ SOB, lower   extremity swelling. Clinical Indicators: Per PN : Acute asthma exacerbation 2018. Treatment: Bronchodilators - D dimer elevated, get CT chest PE protocol. *Mild intermittent- symptoms < 2 x/week &/or nocturnal awakenings < 2x/month. Use of inhalers ? 2 x/week. No limits to normal activity. *Mild persistent - symptoms > 2x/week but not daily &/or nocturnal awakenings   3-4 x/month. Use of inhalers > 2x/week but not daily. Minor impact on normal   activity. *Moderate persistent - daily symptoms and/or nocturnal awakenings > 1x/week   but not nightly. Daily use of inhalers. Some impact on normal activity. *Severe persistent - symptoms throughout the day and/or nocturnal awakenings   up to 7x/week. Use of inhalers several times per day. Normal activity is   extremely limited. Options provided:  -- Asthma exacerbation  -- Asthma not in exacerbation  -- Other - I will add my own diagnosis  -- Disagree - Not applicable / Not valid  -- Disagree - Clinically unable to determine / Unknown  -- Refer to Clinical Documentation Reviewer    PROVIDER RESPONSE TEXT:    This patient?s asthma is exacerbated.     Query created by: Rosario Yanes on 2023 11:22 AM      Electronically signed by:  Declan Slaughter MD 2023 8:17 AM
[facility] All questions Answered. Transferred to room 6305 with all belongings in stable condition.
Shift Summary    Admitting Dx:  SOB - Pneumonia     Shift Events:  Continues to complain of feeling WIGGINS  PRN Percocet given for pain   Remained on RA during the night     Vitals:  Vitals:    04/19/23 1622 04/19/23 1944 04/19/23 2308 04/20/23 0310   BP:  115/70 100/67 (!) 142/77   Pulse: 91 88 77 84   Resp: 16 18 18 18   Temp:  98 °F (36.7 °C) 97.5 °F (36.4 °C) 98 °F (36.7 °C)   TempSrc:  Oral Oral Oral   SpO2: 92% 91% 90% 90%   Weight:    188 lb 8 oz (85.5 kg)   Height:             Tele:  SR    IV/Line:  R Wrist PIV    Drains/Fernando:  N/A     Neuro:   A&Ox4     I/O:   I/O last 3 completed shifts:   In: 616 [P.O.:480; I.V.:136]  Out: -   I/O this shift:  In: 360 [P.O.:360]  Out: -
Minutes:  24  Total Treatment Minutes:  [address], [address]
Date Noted    Morbid obesity with BMI of 40.0-44.9, adult ([facility] [address] [E66.01, Z68.41] 04/17/2023    Normocytic anemia [D64.9] 04/17/2023    WIGGINS (dyspnea on exertion) [R06.09] 10/23/2020    Acute asthma exacerbation [J45.901] 08/28/2018    Bilateral lower extremity edema [R60.0] 11/02/2015   Normocytic anemia      PLAN:      - CT neg for PE      -Bronchodilators       - Give IV Levaquin for PNA       -Resume home regimen for chronic stable conditions    - percocet prn for pain     - right shoulder x ray , c/o right shoulder pain +      No leg edema  Trop neg          DVT Prophylaxis: Lovenox  Diet: ADULT DIET; Regular; Low Fat/Low Chol/High Fiber/2 gm Na  Code Status: Full Code    PT/OT Eval Status: Pending    Dispo -Home    DC plan    1-2 days  Pending clinical improvement     Jessica Ortiz MD    Thank you Brianda Marquez for the opportunity to be involved in this patient's care. If you have any questions or concerns please feel free to contact me at [phone].

## 2023-04-20 NOTE — DISCHARGE INSTRUCTIONS
Follow up cardiologist Dr Marlee Torres as outpatient    Get 2 D ECHO as outpatient     Follow up with pulmonologist Dr Mega Ruiz as outpatient.

## 2023-04-20 NOTE — CARE COORDINATION
CTN contacted oneil with [facility] [phone]. They have accepted this patient and will pull referral from [facility].  They will contact patient and make arrangements for [facility] by 4/22  Electronically signed by Joyce Cloud LPN on 0/63/5644 at 7:35 PM
Ms. Salmon's bedding status has upgraded to inpatient from observation. CM delivered the first IMM to the bedside and explained the pt's rights and how to appeal. She expressed understanding. She signed and dated the form. It was placed on the paper chart at the bedside. She was also provided a copy.     Nida Stackchure, RN  Case Management   [phone]
Identified Issues/Barriers to RETURNING to current housing: yes  Potential Assistance needed at discharge: [facility]            Potential DME: Other (Comment) (new rollator as hers is broken)  Patient expects to discharge to: [facility] for transportation at discharge: Family ( can transport)    Financial    Payor: [employer] / Plan: [place] HMO / Product Type: *No Product type* /     Does insurance require precert for SNF: Yes    Potential assistance Purchasing Medications: No  Meds-to-Beds request:        Emiliana [facility], [address] [facility] [phone] - F [phone]  [facility] [address]  Phone: [phone] Fax: [phone]    [id number], [address] Sunitha Jones [phone] - f [phone]  0 [address] [place] [address]  Phone: [phone] Fax: [phone]      Notes:    Factors facilitating achievement of predicted outcomes: Family support, Motivated, Cooperative, and Pleasant    Barriers to discharge: right shoulder xray    Additional Case Management Notes: CM met with pt at bedside. Pt is from home with , who will provide transportation home. Pt reports her rollator at home is broken and she needs a new one at discharge. 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 Pt also reports she will need O2 at home - CM notes the pt is not on O2 in the room and CM informed that it would need to be documented as medically required for insurance to cover. Pt plans to return home at discharge. CM will continue to follow for discharge planning.     The Plan for Transition of Care is related to the following treatment goals of Atypical chest pain [R07.89]  Acute asthma exacerbation [J45.901]  Dyspnea, unspecified type [R06.00]  Exacerbation of asthma, unspecified asthma severity, unspecified whether persistent [I98.997]    IF APPLICABLE:
[phone]  levoFLOXacin  oxyCODONE-acetaminophen      TRQ32 New Patient Appointment 3:20 PM   Dr. Efren De La Rosa, [address] [place]    [phone]       COVID Result:    Lab Results   Component Value Date/Time    COVID19 Not Detected 10/23/2020 04:18 PM       The Plan for Transition of Care is related to the following treatment goals of Atypical chest pain [R07.89]  Acute asthma exacerbation [J45.901]  Dyspnea, unspecified type [R06.00]  Exacerbation of asthma, unspecified asthma severity, unspecified whether persistent [J45.901]    The Patient and/or patient representative Terri and her family were provided with a choice of provider and agrees with the discharge plan Yes    Freedom of choice list was provided with basic dialogue that supports the patient's individualized plan of care/goals and shares the quality data associated with the providers.  Yes    Care Transitions patient: No    Kaylynn Holland RN  [facility]  Case Management Department  Ph: [phone]

## 2023-04-20 NOTE — CONSULTS
Reason for Consultation/Chief Complaint: SOB    History of Present Illness:  Terri Sharma is a 60 y.o. patient whom we were asked to see for sob. Hx asthma. Presents with several day hx of sob. Noted edema. She requested cards see to deja.  No chest pain. Some tightness with her wheezing. Breathing is better. She does push fluids. Does not watch salt. No palp/tachy/syncope. .    Past Medical History:   has a past medical history of Asthma, Depression, Osteoarthritis, PONV (postoperative nausea and vomiting), and Scoliosis. Surgical History:   has a past surgical history that includes Gastric bypass surgery (); Rotator cuff repair (Left, ); Knee cartilage surgery (Left, ); Breast enhancement surgery (Bilateral, );  section ([address]); Cholecystectomy (); Trinity tooth extraction (); joint replacement (Bilateral, 2014); fracture surgery; joint replacement; and Total hip arthroplasty (Bilateral, ). Social History:   reports that she quit smoking about 8 years ago. Her smoking use included cigarettes. She has a 0.05 pack-year smoking history. She has never used smokeless tobacco. She reports that she does not drink alcohol and does not use drugs. Family History:  No evidence for sudden cardiac death or premature CAD    Home Medications:  Were reviewed and are listed in nursing record. and/or listed below  Prior to Admission medications    Medication Sig Start Date End Date Taking? Authorizing Provider   levoFLOXacin (LEVAQUIN) 750 MG tablet Take 1 tablet by mouth daily for 5 days 23 Yes Tam Lebron MD   oxyCODONE-acetaminophen (PERCOCET) 5-325 MG per tablet Take 1 tablet by mouth every 6 hours as needed for Pain for up to 3 days.  Max Daily Amount: 4 tablets 23 Yes Tam Lebron MD   AMOXICILLIN PO Take 500 mg by mouth in the morning, at noon, and at bedtime Prophylactic for Osteomyelitis prevention right leg

## 2023-04-24 NOTE — DISCHARGE SUMMARY
Hospital Medicine Discharge Summary    Patient ID: Genoveva Whaley      Patient's PCP: April Mae    Admit Date: 4/17/2023     Discharge Date: 4/20/2023      Admitting Physician: Phil Ch MD     Discharge Physician: Phil Ch MD     Discharge Diagnoses: Active Hospital Problems    Diagnosis     Morbid obesity with BMI of 40.0-44.9, adult ([address] [E66.01, Z68.41]     Normocytic anemia [D64.9]     Dyspnea [R06.00]     WIGGINS (dyspnea on exertion) [R06.09]     Acute asthma exacerbation [J45.901]     Bilateral lower extremity edema [R60.0]        The patient was seen and examined on day of discharge and this discharge summary is in conjunction with any daily progress note from day of discharge. Hospital Course:     60 y.o. morbidly obese female with symptomatic history of asthma, dyspnea on exertion, osteoarthritis and lower extremity edema who presented to [facility] ED with increased dyspnea exertion, lower extremity edema and a right shoulder pain. Patient reports symptoms for about a week denies any fevers, chills she reports a dry cough but no rhinorrhea, nasal congestion, sore throat, headache, nausea, vomiting, diarrhea. She also reports increased lower extremity edema, however looking through the chart to this have been chronic issues for her will for which she has had prior work-up. Emergency room she was afebrile with a blood pressure 145/97, pulse 91, respirations 16, sat 90% on room air however she reportedly became hypoxic (no records of it) at rest and was put on 2 L supplemental oxygen via nasal cannula and was satting 95%.         Following medical problems addressed :    PNA    Asthma exacerbation         - CT neg for PE        -Bronchodilators         - Give IV Levaquin for PNA         -Resume home regimen for chronic stable conditions     - percocet prn for pain      - right shoulder x ray , c/o right shoulder pain +        No leg edema  Trop neg    Cardiology

## 2023-06-20 ENCOUNTER — OFFICE VISIT (OUTPATIENT)
Dept: PULMONOLOGY | Age: 63
End: 2023-06-20
Payer: MEDICARE

## 2023-06-20 VITALS
DIASTOLIC BLOOD PRESSURE: 95 MMHG | BODY MASS INDEX: 39.88 KG/M2 | HEIGHT: 58 IN | HEART RATE: 97 BPM | SYSTOLIC BLOOD PRESSURE: 145 MMHG | OXYGEN SATURATION: 95 % | WEIGHT: 190 LBS

## 2023-06-20 DIAGNOSIS — J45.40 MODERATE PERSISTENT ASTHMA WITHOUT COMPLICATION: Primary | ICD-10-CM

## 2023-06-20 PROCEDURE — G8427 DOCREV CUR MEDS BY ELIG CLIN: HCPCS | Performed by: INTERNAL MEDICINE

## 2023-06-20 PROCEDURE — 3017F COLORECTAL CA SCREEN DOC REV: CPT | Performed by: INTERNAL MEDICINE

## 2023-06-20 PROCEDURE — 99214 OFFICE O/P EST MOD 30 MIN: CPT | Performed by: INTERNAL MEDICINE

## 2023-06-20 PROCEDURE — 1036F TOBACCO NON-USER: CPT | Performed by: INTERNAL MEDICINE

## 2023-06-20 PROCEDURE — G8417 CALC BMI ABV UP PARAM F/U: HCPCS | Performed by: INTERNAL MEDICINE

## 2023-06-20 RX ORDER — FLUTICASONE PROPIONATE AND SALMETEROL 250; 50 UG/1; UG/1
1 POWDER RESPIRATORY (INHALATION) EVERY 12 HOURS
Qty: 60 EACH | Refills: 5 | Status: SHIPPED | OUTPATIENT
Start: 2023-06-20

## 2023-06-20 RX ORDER — ALBUTEROL SULFATE 2.5 MG/3ML
2.5 SOLUTION RESPIRATORY (INHALATION) EVERY 6 HOURS PRN
Qty: 120 EACH | Refills: 5 | Status: SHIPPED | OUTPATIENT
Start: 2023-06-20

## 2023-06-20 RX ORDER — ALBUTEROL SULFATE 90 UG/1
2 AEROSOL, METERED RESPIRATORY (INHALATION) 4 TIMES DAILY PRN
Qty: 1 EACH | Refills: 5 | Status: SHIPPED | OUTPATIENT
Start: 2023-06-20

## 2023-06-20 ASSESSMENT — ENCOUNTER SYMPTOMS
SHORTNESS OF BREATH: 1
COUGH: 1

## 2023-06-20 NOTE — PROGRESS NOTES
Positive for cough and shortness of breath. Cardiovascular:  Positive for chest pain (pleuritic chest pain ). Negative for palpitations and leg swelling. Musculoskeletal:  Positive for arthralgias. Neurological:  Negative for weakness. Psychiatric/Behavioral:  Positive for sleep disturbance (SOB at night at least once and use nebulizer). The patient is not nervous/anxious. All other systems reviewed and are negative. Objective:   PHYSICAL EXAM:        VITALS:  BP (!) 145/95   Pulse 97   Ht 4' 10\" (1.473 m)   Wt 190 lb (86.2 kg)   LMP 12/22/2014   SpO2 95%   BMI 39.71 kg/m²     Physical Exam  Vitals reviewed. Constitutional:       Appearance: She is well-developed. HENT:      Head: Normocephalic and atraumatic. Eyes:      Pupils: Pupils are equal, round, and reactive to light. Neck:      Vascular: No JVD. Cardiovascular:      Rate and Rhythm: Normal rate and regular rhythm. Heart sounds: No murmur heard. No friction rub. No gallop. Pulmonary:      Effort: Pulmonary effort is normal. No respiratory distress. Breath sounds: No stridor. No rales. Abdominal:      General: Bowel sounds are normal.      Palpations: Abdomen is soft. Musculoskeletal:         General: No deformity. Cervical back: Neck supple. Skin:     General: Skin is warm and dry. Neurological:      Mental Status: She is alert and oriented to person, place, and time. Psychiatric:         Behavior: Behavior normal.       DATA:    CXR on 8/28/18  Mild streaky right mid and lower lung opacity may relate to atelectasis or pneumonitis present previously       No lobar consolidation       Assessment:      Diagnosis Orders   1.  Moderate persistent asthma without complication  Full PFT Study With Bronchodilator    Carbon Monoxide Diffusing Capacity          Plan:   61year old female with hx of asthma, diagnosed at ~ 24, used to be mild but as she start smoking at the age of early 44s asthma symptoms

## 2023-07-25 ENCOUNTER — HOSPITAL ENCOUNTER (EMERGENCY)
Age: 63
Discharge: HOME | End: 2023-07-25
Attending: STUDENT IN AN ORGANIZED HEALTH CARE EDUCATION/TRAINING PROGRAM
Payer: MEDICARE

## 2023-07-25 VITALS
HEART RATE: 97 BPM | WEIGHT: 199.9 LBS | BODY MASS INDEX: 41.96 KG/M2 | OXYGEN SATURATION: 95 % | DIASTOLIC BLOOD PRESSURE: 73 MMHG | HEIGHT: 58 IN | SYSTOLIC BLOOD PRESSURE: 128 MMHG | TEMPERATURE: 98.8 F

## 2023-07-25 DIAGNOSIS — F10.920 ACUTE ALCOHOLIC INTOXICATION WITHOUT COMPLICATION (HCC): Primary | ICD-10-CM

## 2023-07-25 PROCEDURE — 99283 EMERGENCY DEPT VISIT LOW MDM: CPT

## 2023-07-25 PROCEDURE — 6370000000 HC RX 637 (ALT 250 FOR IP): Performed by: STUDENT IN AN ORGANIZED HEALTH CARE EDUCATION/TRAINING PROGRAM

## 2023-07-25 RX ORDER — DEXTROSE, SODIUM CHLORIDE, SODIUM LACTATE, POTASSIUM CHLORIDE, AND CALCIUM CHLORIDE 5; .6; .31; .03; .02 G/100ML; G/100ML; G/100ML; G/100ML; G/100ML
1000 INJECTION, SOLUTION INTRAVENOUS CONTINUOUS
Status: DISCONTINUED | OUTPATIENT
Start: 2023-07-25 | End: 2023-07-25

## 2023-07-25 RX ORDER — FOLIC ACID 1 MG/1
1 TABLET ORAL ONCE
Status: COMPLETED | OUTPATIENT
Start: 2023-07-25 | End: 2023-07-25

## 2023-07-25 RX ORDER — GAUZE BANDAGE 2" X 2"
100 BANDAGE TOPICAL ONCE
Status: COMPLETED | OUTPATIENT
Start: 2023-07-25 | End: 2023-07-25

## 2023-07-25 RX ADMIN — FOLIC ACID 1 MG: 1 TABLET ORAL at 13:08

## 2023-07-25 RX ADMIN — Medication 100 MG: at 13:08

## 2023-07-25 NOTE — CARE COORDINATION
3:57 PM  Spoke with admissions at [facility]; they will need a full and complete referral to do a facility to facility transfer. This includes a face sheet; MD note (with what, how much, and how long patient drinks), along with a full lab work up. Lilia Phan notified. Electronically signed by Sylwia Diego RN, CM on 7/25/2023 at 3:58 PM.  Phone: [phone]  Fax: [phone]        3:16 PM  Spoke with Himanshu Mir; she is going to look into the admissions for the day and call CM back     Electronically signed by Sylwia Diego RN, CM on 7/25/2023 at 3:16 PM.  Phone: [phone]  Fax: [phone]      2:36 PM    SW LVM for admissions at [facility]. [facility],  [phone]  Electronically signed by GARTH Nichols LSW on 7/25/2023 at 2:36 PM    1:29 PM  SW cons noted. SW spoke to MD, they are trying to figure out what is occurring. Pt claims she was setup to go to [facility] today and they are just trying to figure it out. CEFERINO L[facility].      Electronically signed by GARTH Nichols LSW on 7/25/2023 at 1:29 PM  [phone]

## 2023-10-30 ENCOUNTER — TRANSCRIBE ORDERS (OUTPATIENT)
Dept: ADMINISTRATIVE | Age: 63
End: 2023-10-30

## 2023-10-30 DIAGNOSIS — Z12.31 ENCOUNTER FOR SCREENING MAMMOGRAM FOR MALIGNANT NEOPLASM OF BREAST: Primary | ICD-10-CM

## 2023-12-13 RX ORDER — ALBUTEROL SULFATE 2.5 MG/3ML
SOLUTION RESPIRATORY (INHALATION)
Qty: 360 ML | Refills: 0 | Status: SHIPPED | OUTPATIENT
Start: 2023-12-13

## 2024-02-12 RX ORDER — ALBUTEROL SULFATE 2.5 MG/3ML
SOLUTION RESPIRATORY (INHALATION)
Qty: 1080 ML | Refills: 3 | Status: SHIPPED | OUTPATIENT
Start: 2024-02-12

## 2024-02-12 NOTE — TELEPHONE ENCOUNTER
Patient would like 90 day supply of Albuterol Solution. Please send to [employer]. Thank you    Last office visit 6/2023  No future appts scheduled

## 2024-03-06 NOTE — PROGRESS NOTES
[facility] Follow-up Visit    Date of Visit: 3/6/2024     CC:   Follow-up for JUAN management      HPI:  Laureen Munoz is a very pleasant 71 y.o. year old male former smoker (15 pack-years, quit in 1985), with a PMHx of JUAN, pulmonary hypertension, elevated BMI, [id number] mm pituitary macroadenoma being followed by neurosx, idiopathic neuropathy, HTN who presented to the [facility] for a regular follow up. Last seen in the office on 1/23/2024 with Dr. De La Cruz.     Patient presents for titration results.  Patient states that he will have occasional morning headaches.  He also states that he will have daytime drowsiness, which he will nap once a day for less than an hour.  He also have occasional dry mouth. Patient goes to bed between 10-11 PM and wakes up at 6 AM.  He awakens 3-4 times at night.  Patient denies any significant drowsiness while driving, issues with sleep, snoring, gasping, apneas, palpitations.    Sleep History:  PSG 12/28/2023-      PSG titration 1/23/2024-        ECHO 5/10/2023-  Compared to the prior study on 06/09/2021, there has been a decrease in   PAP although study is technically difficult with off axis views.    Clinical correlation recommended.  The left ventricular ejection fraction is visually estimated to be 55-  60%.  No significant valvular abnormalities.   Estimated right ventricular systolic pressure is 35 mmHg.      Patient Active Problem List    Diagnosis Date Noted    Complex sleep apnea syndrome 01/23/2024    Morbid obesity with BMI of 50.0-59.9, adult (HCC) 01/23/2024    Sensory deficit, bilateral 12/27/2023    Paresthesia of both feet 12/27/2023    Prediabetes 09/27/2023    Bladder outlet obstruction 08/17/2023    BPPV (benign paroxysmal positional vertigo) 04/18/2023    Cyst of left kidney 04/04/2023    Localized edema 03/07/2023    Pulmonary hypertension (HCC) 03/07/2023    Pituitary adenoma (HCC) 06/09/2021    Enlarged pituitary gland (HCC) 06/08/2021    IFG  Pt. Reported chest pain of 0/10. EKG order discontinued. (impaired fasting glucose) 2020    Rosacea 10/30/2019    Erectile dysfunction 10/30/2018    Obesity (BMI 35.0-39.9 without comorbidity) 2018    Hypovitaminosis D 2014    Idiopathic neuropathy 2012    Essential hypertension 2012    Hypercholesteremia 2012    Benign prostatic hyperplasia 2012     Past Medical History:   Diagnosis Date    Anesthesia     Arthritis unk    Cancer ([facility])     skin cancer sqammish    Cataract     lens implants    High cholesterol     Hypercholesterolemia     Hypertension     Neuropathy     PONV (postoperative nausea and vomiting)     Rhinoplasti for deviated septon    Reactive depression 2023    Sleep apnea     tested    Stroke ([facility])     stroke or contusion    Tinea inguinalis     Tobacco use 10/30/2018    Urinary bladder disorder     bph      Past Surgical History:   Procedure Laterality Date    SC TRANSURETHRAL ELEC-SURG PROSTATECTOM N/A 2023    Procedure: BIPOLAR TRANSURETHRAL RESECTION OF PROSTATE;  Surgeon: Jacques Mcdowell M.D.;  Location: SURGERY [facility];  Service: Urology    SC CYSTOURETHROSCOPY N/A 2023    Procedure: CYSTOSCOPY;  Surgeon: Jacques Mcdowell M.D.;  Location: SURGERY [facility];  Service: Urology    SC CYSTO/URETERO/PYELOSCOPY W/LITHOTRIPSY N/A 2023    Procedure: CYSTOLITHLOPAXY;  Surgeon: Jacques Mcdowell M.D.;  Location: SURGERY [facility];  Service: Urology    CATARACT PHACO WITH IOL      1992    cyst removal back of the neck    OTHER ABDOMINAL SURGERY      gallbladder    RHINOPLASTY       Family History   Problem Relation Age of Onset    Heart Disease Mother          at 89yo/heart failure    Heart Attack Mother     Breast Cancer Mother     Heart Disease Father          at 57yo/massive heart attack    Heart Attack Father     Hypertension Father          coronary thrombosis    Cancer Sister          of leukemia    Breast Cancer Sister      Stroke Sister         living lost eye site left eye    No Known Problems Maternal Grandmother     No Known Problems Maternal Grandfather     No Known Problems Paternal Grandmother     Dementia Paternal Grandfather     Hyperlipidemia Neg Hx     Diabetes Neg Hx      Social History     Socioeconomic History    Marital status:      Spouse name: Not on file    Number of children: Not on file    Years of education: Not on file    Highest education level: Associate degree: academic program   Occupational History    Not on file   Tobacco Use    Smoking status: Former     Current packs/day: 0.00     Average packs/day: 1 pack/day for 15.0 years (15.0 ttl pk-yrs)     Types: Cigarettes, Cigars     Start date: 1970     Quit date: 1985     Years since quittin.2    Smokeless tobacco: Never    Tobacco comments:     occasional cigar started in / quit completely    Vaping Use    Vaping Use: Never used   Substance and Sexual Activity    Alcohol use: Yes     Comment: 2 beers / 2 shots in last month    Drug use: Not Currently     Types: Inhaled, Marijuana, Oral     Comment: rare occasions,occasionally     Sexual activity: Not Currently   Other Topics Concern    Not on file   Social History Narrative    Not on file     Social Determinants of Health     Financial Resource Strain: Medium Risk (4/15/2023)    Overall Financial Resource Strain (CARDIA)     Difficulty of Paying Living Expenses: Somewhat hard   Food Insecurity: No Food Insecurity (4/15/2023)    Hunger Vital Sign     Worried About Running Out of Food in the Last Year: Never true     Ran Out of Food in the Last Year: Never true   Transportation Needs: No Transportation Needs (4/15/2023)    PRAPARE - Transportation     Lack of Transportation (Medical): No     Lack of Transportation (Non-Medical): No   Physical Activity: Inactive (4/15/2023)    Exercise Vital Sign     Days of Exercise per Week: 0 days     Minutes of Exercise per Session: 0 min   Stress:  No Stress Concern Present (4/15/2023)    Citizen of [place] of Occupational Health - Occupational Stress Questionnaire     Feeling of Stress : Only a little   Social Connections: Socially Isolated (4/15/2023)    Social Connection and Isolation Panel [NHANES]     Frequency of Communication with Friends and Family: More than three times a week     Frequency of Social Gatherings with Friends and Family: Once a week     Attends Yazidism Services: Never     Active Member of Clubs or Organizations: No     Attends Club or Organization Meetings: Never     Marital Status:    Intimate Partner Violence: Not on file   Housing Stability: Low Risk  (4/15/2023)    Housing Stability Vital Sign     Unable to Pay for Housing in the Last Year: No     Number of Places Lived in the Last Year: 1     Unstable Housing in the Last Year: No     Current Outpatient Medications   Medication Sig Dispense Refill    metFORMIN ER (GLUCOPHAGE XR) 500 MG TABLET SR 24 HR Take 2 Tablets by mouth every day. 180 Tablet 1    losartan-hydrochlorothiazide (HYZAAR) 50-12.5 MG per tablet Take 1 Tablet by mouth every day. 90 Tablet 3    meclizine (ANTIVERT) 12.5 MG Tab Take 1 Tablet by mouth 3 times a day as needed for Dizziness. 30 Tablet 0    rosuvastatin (CRESTOR) 20 MG Tab Take 1 Tablet by mouth every evening. 100 Tablet 0    metoprolol SR (TOPROL XL) 25 MG TABLET SR 24 HR Take 1 Tablet by mouth every morning. TAKE ONE TABLET BY MOUTH ONE TIME DAILY 100 Tablet 3    alfuzosin (UROXATRAL) 10 MG SR tablet at bedtime.      fluticasone (FLONASE) 50 MCG/ACT nasal spray Administer 1 Spray into affected nostril(S) every morning.      ciclopirox (PENLAC) 8 % solution       dexamethasone (DECADRON) 1 MG Tab Take 1 Tablet by mouth one time as needed (TAKE AT MIDNIGHT FOR TEST) for up to 1 dose. 2 Tablet 0     No current facility-administered medications for this visit.      ALLERGIES: Gabapentin    ROS:  Constitutional: Denies fever, chills, sweats,  weight  "loss, fatigue  Cardiovascular: Denies chest pain, tightness, palpitations, swelling in legs/feet  Respiratory: Denies shortness of breath, cough, sputum, wheezing, painful breathing   Sleep: per HPI  Gastrointestinal: Denies  difficulty swallowing, nausea, abdominal pain, diarrhea, constipation, heartburn.  Musculoskeletal: Denies painful joints, sore muscles,       PHYSICAL EXAM:  /72 (BP Location: Right arm, Patient Position: Sitting, BP Cuff Size: Adult)   Pulse 62   Resp 18   Ht 1.803 m (5' 11\")   Wt 122 kg (269 lb)   SpO2 92%   BMI 37.52 kg/m²   Appearance: Well-nourished, well-developed, no acute distress  Eyes:  No scleral icterus , EOMI  ENMT: No redness of the oropharynx  Lung auscultation:  No wheezes rhonchi rubs or rales  Cardiac: No murmurs, rubs, or gallops; regular rhythm, normal rate; no edema  Musculoskeletal:  Grossly normal; gait and station normal; digits and nails normal  Skin:  No rashes, petechiae, cyanosis  Neurologic: without focal signs; oriented to person, time, place, and purpose; judgement intact  Psychiatric:  No depression, anxiety, agitation  Mallampati score: Class III    Assessment and Plan:    The medical record was reviewed.    Diagnostic and titration nocturnal polysomnogram's, home sleep apnea tests, continuous nocturnal oximetry results, multiple sleep latency tests, and compliance reports reviewed.    Problem List Items Addressed This Visit          Pulmonary/Sleep Medicine Problems    Complex sleep apnea syndrome     Sleep Apnea:    The pathophysiology of sleep anea and the increased risk of cardiovascular morbidity from untreated sleep apnea is discussed in detail with the patient.  Urged to avoid supine sleep, weight gain and alcoholic beverages since all of these can worsen sleep apnea. Cautioned against drowsy driving. If feeling sleepy while driving, pull over for a break/nap, rather than persist on the road, in the interest of own safety and that of others " on the road.    Titration study was reviewed and discussed with the patient.  We spent a significant time defining sleep apnea, risk of untreated sleep apnea, treatment options, and process of undergoing a ASV titration.    - Order placed for ASV titration  - Recommended the patient change out supplies as recommended for best mask fit and usage of the machine  - Advised patient to reach out via MyChart if any questions or concerns should arise.     The risks of untreated sleep apnea were discussed with the patient at length. Patients with sleep apnea are at increased risk of cardiovascular disease including coronary artery disease, systemic arterial hypertension, pulmonary arterial hypertension, cardiac arrythmias, and stroke. The patient was advised to avoid driving a motor vehicle when drowsy.    Positive airway pressure will favorably impact many of the adverse conditions and effects provoked by sleep apnea.           Relevant Orders    Polysomnography Titration     Have advised the patient to follow up with the appropriate healthcare practitioners for all other medical problems and issues.    Return in about 3 weeks (around 3/27/2024), or if symptoms worsen or fail to improve, for PSG/SS results, with Antonietta.    Please note portions of this record was created using voice recognition software. I have made every reasonable attempt to correct obvious errors, but I expect that there are errors of grammar and possibly content I did not discover before finalizing the note.    Time spent in record review prior to patient arrival, reviewing results, and in face-to-face encounter totaled 23 min.  __________  BRISA Brown  Pulmonary & Sleep Medicine  [facility]

## 2024-04-29 ENCOUNTER — HOSPITAL ENCOUNTER (EMERGENCY)
Age: 64
Discharge: HOME | End: 2024-04-29
Payer: MEDICARE

## 2024-04-29 ENCOUNTER — APPOINTMENT (OUTPATIENT)
Dept: GENERAL RADIOLOGY | Age: 64
End: 2024-04-29
Payer: MEDICARE

## 2024-04-29 VITALS
WEIGHT: 179.1 LBS | DIASTOLIC BLOOD PRESSURE: 83 MMHG | RESPIRATION RATE: 14 BRPM | OXYGEN SATURATION: 99 % | SYSTOLIC BLOOD PRESSURE: 143 MMHG | BODY MASS INDEX: 37.6 KG/M2 | HEIGHT: 58 IN | HEART RATE: 94 BPM | TEMPERATURE: 98.2 F

## 2024-04-29 DIAGNOSIS — S89.91XA RIGHT KNEE INJURY, INITIAL ENCOUNTER: Primary | ICD-10-CM

## 2024-04-29 LAB
BASOPHILS # BLD: 0.1 K/UL (ref 0–0.2)
BASOPHILS NFR BLD: 0.7 %
DEPRECATED RDW RBC AUTO: 17.2 % (ref 12.4–15.4)
EOSINOPHIL # BLD: 0.1 K/UL (ref 0–0.6)
EOSINOPHIL NFR BLD: 1 %
HCT VFR BLD AUTO: 36.2 % (ref 36–48)
HGB BLD-MCNC: 11.7 G/DL (ref 12–16)
LYMPHOCYTES # BLD: 1.8 K/UL (ref 1–5.1)
LYMPHOCYTES NFR BLD: 22 %
MCH RBC QN AUTO: 25.6 PG (ref 26–34)
MCHC RBC AUTO-ENTMCNC: 32.4 G/DL (ref 31–36)
MCV RBC AUTO: 79.2 FL (ref 80–100)
MONOCYTES # BLD: 0.6 K/UL (ref 0–1.3)
MONOCYTES NFR BLD: 7.1 %
NEUTROPHILS # BLD: 5.8 K/UL (ref 1.7–7.7)
NEUTROPHILS NFR BLD: 69.2 %
PLATELET # BLD AUTO: 337 K/UL (ref 135–450)
PMV BLD AUTO: 7.8 FL (ref 5–10.5)
RBC # BLD AUTO: 4.57 M/UL (ref 4–5.2)
WBC # BLD AUTO: 8.3 K/UL (ref 4–11)

## 2024-04-29 PROCEDURE — 85652 RBC SED RATE AUTOMATED: CPT

## 2024-04-29 PROCEDURE — 99284 EMERGENCY DEPT VISIT MOD MDM: CPT

## 2024-04-29 PROCEDURE — 86140 C-REACTIVE PROTEIN: CPT

## 2024-04-29 PROCEDURE — 85025 COMPLETE CBC W/AUTO DIFF WBC: CPT

## 2024-04-29 PROCEDURE — 73560 X-RAY EXAM OF KNEE 1 OR 2: CPT

## 2024-04-29 ASSESSMENT — PAIN DESCRIPTION - LOCATION: LOCATION: KNEE

## 2024-04-29 ASSESSMENT — PAIN - FUNCTIONAL ASSESSMENT: PAIN_FUNCTIONAL_ASSESSMENT: 0-10

## 2024-04-29 ASSESSMENT — PAIN SCALES - GENERAL: PAINLEVEL_OUTOF10: 7

## 2024-04-29 ASSESSMENT — PAIN DESCRIPTION - ORIENTATION: ORIENTATION: RIGHT

## 2024-04-29 ASSESSMENT — PAIN DESCRIPTION - DESCRIPTORS: DESCRIPTORS: DISCOMFORT

## 2024-04-29 NOTE — ED PROVIDER NOTES
KNEE CARTILAGE SURGERY Left 2006    ROTATOR CUFF REPAIR Left 2008    TOTAL HIP ARTHROPLASTY Bilateral 2014    WISDOM TOOTH EXTRACTION  1995       CURRENTMEDICATIONS       Discharge Medication List as of 4/29/2024  3:58 PM        CONTINUE these medications which have NOT CHANGED    Details   albuterol (PROVENTIL) (2.5 MG/3ML) 0.083% nebulizer solution Inhale the contents of 1 vial via nebulizer every 6 hours as needed for wheezing., Disp-1080 mL, R-3Normal      albuterol sulfate HFA (PROVENTIL;VENTOLIN;PROAIR) 108 (90 Base) MCG/ACT inhaler Inhale 2 puffs into the lungs 4 times daily as needed for Wheezing, Disp-1 each, R-5Normal      fluticasone-salmeterol (WIXELA INHUB) 250-50 MCG/ACT AEPB diskus inhaler Inhale 1 puff into the lungs in the morning and 1 puff in the evening., Disp-60 each, R-5Normal      AMOXICILLIN PO Take 500 mg by mouth in the morning, at noon, and at bedtime Prophylactic for Osteomyelitis prevention right leg prescribed by Infectious Disease MD - Dr. Malave.Historical Med      atorvastatin (LIPITOR) 20 MG tablet Take 1 tablet by mouth dailyHistorical Med      furosemide (LASIX) 20 MG tablet Take 1 tablet by mouth dailyHistorical Med      traMADol (ULTRAM) 50 MG tablet Take 1 tablet by mouth every 8 hours as needed for Pain.Historical Med      lisinopril (PRINIVIL;ZESTRIL) 5 MG tablet Take 1 tablet by mouth dailyHistorical Med      amitriptyline (ELAVIL) 25 MG tablet Take 1 tablet by mouth nightlyHistorical Med      vitamin B-12 (CYANOCOBALAMIN) 1000 MCG tablet Take 1 tablet by mouth dailyHistorical Med      Calcium Carbonate-Vitamin D 500-1.25 MG-MCG CAPS Take 1 tablet by mouth dailyHistorical Med      ziprasidone (GEODON) 40 MG capsule Take 1 capsule by mouth 2 times daily (with meals)Historical Med      escitalopram (LEXAPRO) 20 MG tablet Take 1 tablet by mouth dailyHistorical Med      cyclobenzaprine (FLEXERIL) 10 MG tablet Take 1 tablet by mouth 3 times daily as needed for Muscle

## 2024-04-29 NOTE — DISCHARGE INSTRUCTIONS
Follow-up with your infectious disease specialist, call tomorrow as test results should have been resulted by then.  It looks like they want to follow-up after test results are obtained.  If you have any fevers, increased welling/redness/warmth to joint, generalized ill feeling please seek reevaluation.  Recommend rest, ice and elevation and anti-inflammatories for pain. No fractures noted on xray. WBC was normal.

## 2024-04-30 LAB
CRP SERPL-MCNC: 7.7 MG/L (ref 0–5.1)
ERYTHROCYTE [SEDIMENTATION RATE] IN BLOOD BY WESTERGREN METHOD: 42 MM/HR (ref 0–30)

## 2025-01-01 ENCOUNTER — APPOINTMENT (OUTPATIENT)
Dept: CT IMAGING | Age: 65
End: 2025-01-01
Payer: COMMERCIAL

## 2025-01-01 ENCOUNTER — HOSPITAL ENCOUNTER (EMERGENCY)
Age: 65
Discharge: HOME | End: 2025-01-01
Attending: STUDENT IN AN ORGANIZED HEALTH CARE EDUCATION/TRAINING PROGRAM
Payer: COMMERCIAL

## 2025-01-01 VITALS
OXYGEN SATURATION: 95 % | RESPIRATION RATE: 18 BRPM | WEIGHT: 175 LBS | HEART RATE: 85 BPM | HEIGHT: 58 IN | DIASTOLIC BLOOD PRESSURE: 80 MMHG | BODY MASS INDEX: 36.73 KG/M2 | TEMPERATURE: 98.1 F | SYSTOLIC BLOOD PRESSURE: 134 MMHG

## 2025-01-01 DIAGNOSIS — M54.50 ACUTE ON CHRONIC LOW BACK PAIN: Primary | ICD-10-CM

## 2025-01-01 DIAGNOSIS — G89.29 ACUTE ON CHRONIC LOW BACK PAIN: Primary | ICD-10-CM

## 2025-01-01 PROCEDURE — 72131 CT LUMBAR SPINE W/O DYE: CPT

## 2025-01-01 PROCEDURE — 6370000000 HC RX 637 (ALT 250 FOR IP): Performed by: STUDENT IN AN ORGANIZED HEALTH CARE EDUCATION/TRAINING PROGRAM

## 2025-01-01 PROCEDURE — 99284 EMERGENCY DEPT VISIT MOD MDM: CPT

## 2025-01-01 RX ORDER — OXYCODONE AND ACETAMINOPHEN 5; 325 MG/1; MG/1
1 TABLET ORAL
Status: COMPLETED | OUTPATIENT
Start: 2025-01-01 | End: 2025-01-01

## 2025-01-01 RX ORDER — LIDOCAINE 4 G/G
1 PATCH TOPICAL ONCE
Status: DISCONTINUED | OUTPATIENT
Start: 2025-01-01 | End: 2025-01-02 | Stop reason: HOSPADM

## 2025-01-01 RX ORDER — LIDOCAINE 4 G/G
1 PATCH TOPICAL DAILY
Status: DISCONTINUED | OUTPATIENT
Start: 2025-01-02 | End: 2025-01-01

## 2025-01-01 RX ADMIN — OXYCODONE AND ACETAMINOPHEN 1 TABLET: 5; 325 TABLET ORAL at 20:24

## 2025-01-01 RX ADMIN — OXYCODONE AND ACETAMINOPHEN 1 TABLET: 5; 325 TABLET ORAL at 22:23

## 2025-01-01 ASSESSMENT — LIFESTYLE VARIABLES
HOW MANY STANDARD DRINKS CONTAINING ALCOHOL DO YOU HAVE ON A TYPICAL DAY: PATIENT DOES NOT DRINK
HOW OFTEN DO YOU HAVE A DRINK CONTAINING ALCOHOL: NEVER

## 2025-01-01 ASSESSMENT — PAIN SCALES - GENERAL
PAINLEVEL_OUTOF10: 10
PAINLEVEL_OUTOF10: 10

## 2025-01-02 NOTE — ED PROVIDER NOTES
uncomfortable.  Alert  HEENT: Head atraumatic, Eyes normal inspection, PERRL.  Normal ENT inspection, Pharynx normal. No signs of dehydration  NECK: Normal inspection  RESPIRATORY: Normal breath sounds. No chest wall tenderness. No respiratory distress  CVS: Heart rate and rhythm regular. No Murmurs  ABDOMEN/GI: Soft, Non-tender, No distention  BACK: Midline tenderness throughout the lower lumbar spine  EXTREMITIES: Non-Tender.  Decreased range of motion of the lower extremities bilaterally secondary to back pain. Normal appearance. No Pedal edema.  2+ DP and PT pulses bilaterally.  NEURO: Alert and oriented. Sensation normal. Motor normal  PSYCH: Mood normal. Affect normal.  SKIN: Color normal. No rash. Warm, Dry    RADIOLOGY  CT LUMBAR SPINE WO CONTRAST   Final Result   Moderate multilevel degenerative disc disease and mild scoliosis with no   acute abnormality seen.      Mild subluxation of L5 on S1 which is unchanged and is probably degenerative   in etiology with no pars defects.      Moderate central disc bulges and posterior osteophytes at L1-2 causing   moderately severe anterior dural sac compression and moderate neural   foraminal narrowing which is unchanged.      Moderate central disc bulges and posterior osteophytes at L2-3 and L3-4   causing moderate anterior dural sac effacement and mild neural foraminal   narrowing bilaterally.      Small central disc bulge at L4-5.      Moderate osteoarthritic changes of the facets throughout causing diffuse mild   spinal stenosis.           ED COURSE/MDM  Patient seen and evaluated. Old records reviewed. Labs and imaging reviewed and results discussed with patient.       DIFFERENTIAL DX  Vertebral fracture, strain/sprain    Treated with Percocet and CT lumbar spine obtained.  This shows multiple chronic degenerative changes, but no acute process.  Patient has no neurological deficits on evaluation today.  Is mildly improved after Percocet and is given a second dose

## 2025-01-02 NOTE — DISCHARGE INSTRUCTIONS
Return the nearest ED if you develop severe worsening pain, new numbness and tingling, bowel or bladder incontinence, or other concerning symptoms.

## 2025-06-10 ENCOUNTER — TRANSCRIBE ORDERS (OUTPATIENT)
Dept: ADMINISTRATIVE | Age: 65
End: 2025-06-10

## 2025-06-10 DIAGNOSIS — M54.2 CERVICALGIA: ICD-10-CM

## 2025-06-10 DIAGNOSIS — M54.16 LUMBAR RADICULOPATHY: Primary | ICD-10-CM

## 2025-06-10 DIAGNOSIS — G89.4 CHRONIC PAIN SYNDROME: ICD-10-CM

## 2025-06-19 ENCOUNTER — HOSPITAL ENCOUNTER (OUTPATIENT)
Dept: MRI IMAGING | Age: 65
Discharge: HOME | End: 2025-06-19
Payer: COMMERCIAL

## 2025-06-19 DIAGNOSIS — G89.4 CHRONIC PAIN SYNDROME: ICD-10-CM

## 2025-06-19 DIAGNOSIS — M54.2 CERVICALGIA: ICD-10-CM

## 2025-06-19 DIAGNOSIS — M54.16 LUMBAR RADICULOPATHY: ICD-10-CM

## 2025-06-19 PROCEDURE — 72146 MRI CHEST SPINE W/O DYE: CPT

## 2025-07-09 ENCOUNTER — APPOINTMENT (OUTPATIENT)
Dept: CT IMAGING | Age: 65
End: 2025-07-09
Payer: COMMERCIAL

## 2025-07-09 ENCOUNTER — HOSPITAL ENCOUNTER (OUTPATIENT)
Age: 65
Setting detail: OBSERVATION
Discharge: HOME | End: 2025-07-11
Attending: EMERGENCY MEDICINE | Admitting: INTERNAL MEDICINE
Payer: COMMERCIAL

## 2025-07-09 DIAGNOSIS — M54.50 LOW BACK PAIN, UNSPECIFIED BACK PAIN LATERALITY, UNSPECIFIED CHRONICITY, UNSPECIFIED WHETHER SCIATICA PRESENT: Primary | ICD-10-CM

## 2025-07-09 PROBLEM — M62.838 MUSCLE SPASMS OF BOTH LOWER EXTREMITIES: Status: ACTIVE | Noted: 2025-07-09

## 2025-07-09 PROCEDURE — 99285 EMERGENCY DEPT VISIT HI MDM: CPT

## 2025-07-09 PROCEDURE — 6370000000 HC RX 637 (ALT 250 FOR IP)

## 2025-07-09 PROCEDURE — 96372 THER/PROPH/DIAG INJ SC/IM: CPT

## 2025-07-09 PROCEDURE — 72131 CT LUMBAR SPINE W/O DYE: CPT

## 2025-07-09 PROCEDURE — 6360000002 HC RX W HCPCS

## 2025-07-09 RX ORDER — METHOCARBAMOL 500 MG/1
750 TABLET, FILM COATED ORAL 4 TIMES DAILY
Status: DISCONTINUED | OUTPATIENT
Start: 2025-07-09 | End: 2025-07-09

## 2025-07-09 RX ORDER — OXYCODONE HYDROCHLORIDE 5 MG/1
5 TABLET ORAL ONCE
Refills: 0 | Status: COMPLETED | OUTPATIENT
Start: 2025-07-09 | End: 2025-07-09

## 2025-07-09 RX ORDER — LANOLIN ALCOHOL/MO/W.PET/CERES
100 CREAM (GRAM) TOPICAL DAILY
COMMUNITY

## 2025-07-09 RX ORDER — METHOCARBAMOL 500 MG/1
750 TABLET, FILM COATED ORAL ONCE
Status: COMPLETED | OUTPATIENT
Start: 2025-07-09 | End: 2025-07-09

## 2025-07-09 RX ORDER — KETOROLAC TROMETHAMINE 30 MG/ML
15 INJECTION, SOLUTION INTRAMUSCULAR; INTRAVENOUS ONCE
Status: COMPLETED | OUTPATIENT
Start: 2025-07-09 | End: 2025-07-09

## 2025-07-09 RX ORDER — ACETAMINOPHEN 500 MG
1000 TABLET ORAL
Status: COMPLETED | OUTPATIENT
Start: 2025-07-09 | End: 2025-07-09

## 2025-07-09 RX ORDER — ACETAMINOPHEN 500 MG
1000 TABLET ORAL ONCE
Status: COMPLETED | OUTPATIENT
Start: 2025-07-09 | End: 2025-07-09

## 2025-07-09 RX ADMIN — OXYCODONE 5 MG: 5 TABLET ORAL at 18:29

## 2025-07-09 RX ADMIN — ACETAMINOPHEN 1000 MG: 500 TABLET ORAL at 17:12

## 2025-07-09 RX ADMIN — METHOCARBAMOL 750 MG: 500 TABLET ORAL at 17:11

## 2025-07-09 RX ADMIN — KETOROLAC TROMETHAMINE 15 MG: 30 INJECTION, SOLUTION INTRAMUSCULAR at 17:12

## 2025-07-09 RX ADMIN — ACETAMINOPHEN 1000 MG: 500 TABLET ORAL at 23:24

## 2025-07-09 RX ADMIN — METHOCARBAMOL 750 MG: 500 TABLET ORAL at 23:25

## 2025-07-09 RX ADMIN — OXYCODONE 5 MG: 5 TABLET ORAL at 23:25

## 2025-07-09 ASSESSMENT — PAIN DESCRIPTION - DESCRIPTORS
DESCRIPTORS: SPASM
DESCRIPTORS: SPASM

## 2025-07-09 ASSESSMENT — LIFESTYLE VARIABLES
HOW OFTEN DO YOU HAVE A DRINK CONTAINING ALCOHOL: NEVER
HOW MANY STANDARD DRINKS CONTAINING ALCOHOL DO YOU HAVE ON A TYPICAL DAY: PATIENT DOES NOT DRINK

## 2025-07-09 ASSESSMENT — PAIN DESCRIPTION - ORIENTATION
ORIENTATION: LOWER
ORIENTATION: LOWER

## 2025-07-09 ASSESSMENT — PAIN DESCRIPTION - LOCATION
LOCATION: BACK

## 2025-07-09 ASSESSMENT — PAIN SCALES - GENERAL
PAINLEVEL_OUTOF10: 7
PAINLEVEL_OUTOF10: 7
PAINLEVEL_OUTOF10: 6
PAINLEVEL_OUTOF10: 7

## 2025-07-09 ASSESSMENT — PAIN - FUNCTIONAL ASSESSMENT: PAIN_FUNCTIONAL_ASSESSMENT: 0-10

## 2025-07-09 NOTE — ED TRIAGE NOTES
Pt came back from her doctor, had a trial stimulator put on her back, on her way to her car she have trouble walking with both legs can't move, and her back pain getting worse

## 2025-07-09 NOTE — ED PROVIDER NOTES
THE [facility]  EMERGENCY DEPARTMENT ENCOUNTER          EM RESIDENT NOTE     Date of evaluation: 7/9/2025    ADDENDUM:      Care of this patient was assumed from off-going provider.  The patient was seen for Back Pain (Pt came back from her doctor, had a trial stimulator put on her back, on her way to her car she have trouble walking with both legs can't move, and her back pain getting worse)  .  The patient's initial evaluation and plan have been discussed with the prior provider who initially evaluated the patient.  Nursing Notes, Past Medical Hx, Past Surgical Hx, Social Hx, Allergies, and Family Hx were all reviewed.    MEDICAL DECISION MAKING / ASSESSMENT / PLAN     ED Course as of 07/10/25 0021   Wed Jul 09, 2025 1851 I have resumed care of this patient. The following pending tasks were signed out to me:  - NSGY consult   - cellulitis treatment with keflex     Briefly, Terri Salmon is a 64 y.o. female with a history of chronic pain with L1-L2 spinal cord stimulator placed this morning at [facility] spine doctors ([facility]) presenting with abnormal gait and BLE muscle spasms. Decreased LLE strength and coordination compared to the right. Page out to neurosurgery. RLE cellulitis at the distal aspect of prior R knee replacement surgical incision.  No significant joint effusion and full range of motion at the knee.   [JG]   2050 Patient discussed with neurosurgery, who recommended proceeding with a CT lumbar spine. The patient requested an MRI, however her device is unfortunately not MRI compatible, wilma this close to her surgery [JG]   2143 CT LUMBAR SPINE WO CONTRAST  IMPRESSION:     1.  Postsurgical changes related to recent spinal stimulator lead placement entering at T12-L1 with scattered subcutaneous and intrathecal gas.  2.  Similar multilevel central canal and neural foraminal narrowing. To prior CT. Trace air in the left L2-3 neural foramina may contribute to acute narrowing.   [JG]   2906 CT images  reviewed by neurosurgery.  No acute intervention indicated. [JG]   Thu Jul 10, 2025   0017 Patient reports persistent but improved symptoms in her bilateral lower extremities.  She continues to feel unsteady on her feet and at risk of falling at home.  Unfortunately, we are still unable to contact her outpatient surgeon.  Will admit to medicine for further symptom control and potential PT/OT [JG]      ED Course User Index  [JG] Mary Murphy MD       Is this patient to be included in the SEP-1 core measure? No Exclusion criteria - the patient is NOT to be included for SEP-1 Core Measure due to: Infection is not suspected    Medical Decision Making  Amount and/or Complexity of Data Reviewed  Radiology: ordered. Decision-making details documented in ED Course.    Risk  OTC drugs.  Prescription drug management.  Decision regarding hospitalization.        This patient was also evaluated by the attending physician. All care plans were discussed and agreed upon.    Clinical Impression     1. Low back pain, unspecified back pain laterality, unspecified chronicity, unspecified whether sciatica present        Disposition     PATIENT REFERRED TO:  No follow-up provider specified.    DISCHARGE MEDICATIONS:  New Prescriptions    No medications on file       DISPOSITION Admitted 07/09/2025 11:35:22 PM                 Diagnostic Results and Other Data     RADIOLOGY:  CT LUMBAR SPINE WO CONTRAST   Final Result      1.  Postsurgical changes related to recent spinal stimulator lead placement entering at T12-L1 with scattered subcutaneous and intrathecal gas.   2.  Similar multilevel central canal and neural foraminal narrowing. To prior CT. Trace air in the left L2-3 neural foramina may contribute to acute narrowing.      Electronically signed by Aly Schroeder MD          LABS:   No results found for this visit on 07/09/25.    RECENT VITALS:  BP: (!) 140/87, Temp: 98.5 °F (36.9 °C), Pulse: 96, Respirations: 28, SpO2: 94 %

## 2025-07-09 NOTE — ED PROVIDER NOTES
THE [facility]  EMERGENCY DEPARTMENT ENCOUNTER          PHYSICIAN ASSISTANT NOTE       Date of evaluation: 7/9/2025    Chief Complaint     Back Pain (Pt came back from her doctor, had a trial stimulator put on her back, on her way to her car she have trouble walking with both legs can't move, and her back pain getting worse)      History of Present Illness     Terri Salmon is a 64 y.o. female who presents to the emergency department for back pain.  Patient reports that she had a spinal cord stimulator placed earlier today and since then she has been having intense muscle spasms that last for a few seconds at a time.  She said this only started after the implant was placed.  She denies any loss of bladder or bowel control or saddle anesthesia.  She has had trouble walking today because both legs will spasm she tried calling the surgical office but they were at lunch and has not tried calling since that time.  Denies any fevers or recent falls or injuries.  Has noticed an area of redness and increased warmth to touch just below her knee on the right side.  States this started developing a couple days ago.     ASSESSMENT / PLAN  (MEDICAL DECISION MAKING)     INITIAL VITALS: BP: (!) 164/92, Temp: 98.5 °F (36.9 °C), Pulse: (!) 106, Respirations: 16, SpO2: 94 %    Terri Salmon is a 64 y.o. female presenting to the emergency department for complications post spinal cord stimulator implantation earlier this morning. Patient had her spinal cord stimulator placed at [facility] pain doctors in [facility].  She was seen by Dr. Casanova.  We attempted to contact the office, however, they had closed early for the evening.  Did leave a voice message for the office.  Also attempted to call the device rep for the spinal cord stimulator but was not able to get in touch with anyone.    On physical exam while examining patient patient had multiple muscle spasms where her lower extremities became slightly more rigid for a few seconds at a

## 2025-07-10 LAB
ALBUMIN SERPL-MCNC: 3.6 G/DL (ref 3.4–5)
ALBUMIN/GLOB SERPL: 1.3 {RATIO} (ref 1.1–2.2)
ALP SERPL-CCNC: 135 U/L (ref 40–129)
ALT SERPL-CCNC: 12 U/L (ref 10–40)
ANION GAP SERPL CALCULATED.3IONS-SCNC: 9 MMOL/L (ref 3–16)
AST SERPL-CCNC: 21 U/L (ref 15–37)
BILIRUB SERPL-MCNC: <0.2 MG/DL (ref 0–1)
BUN SERPL-MCNC: 11 MG/DL (ref 7–20)
CALCIUM SERPL-MCNC: 8.6 MG/DL (ref 8.3–10.6)
CHLORIDE SERPL-SCNC: 99 MMOL/L (ref 99–110)
CO2 SERPL-SCNC: 26 MMOL/L (ref 21–32)
CREAT SERPL-MCNC: 0.5 MG/DL (ref 0.6–1.2)
GFR SERPLBLD CREATININE-BSD FMLA CKD-EPI: >90 ML/MIN/{1.73_M2}
GLUCOSE SERPL-MCNC: 133 MG/DL (ref 70–99)
POTASSIUM SERPL-SCNC: 3.8 MMOL/L (ref 3.5–5.1)
PROT SERPL-MCNC: 6.4 G/DL (ref 6.4–8.2)
SODIUM SERPL-SCNC: 134 MMOL/L (ref 136–145)

## 2025-07-10 PROCEDURE — G0378 HOSPITAL OBSERVATION PER HR: HCPCS

## 2025-07-10 PROCEDURE — 2500000003 HC RX 250 WO HCPCS: Performed by: INTERNAL MEDICINE

## 2025-07-10 PROCEDURE — 6370000000 HC RX 637 (ALT 250 FOR IP): Performed by: INTERNAL MEDICINE

## 2025-07-10 PROCEDURE — 97166 OT EVAL MOD COMPLEX 45 MIN: CPT

## 2025-07-10 PROCEDURE — 97163 PT EVAL HIGH COMPLEX 45 MIN: CPT

## 2025-07-10 PROCEDURE — 96374 THER/PROPH/DIAG INJ IV PUSH: CPT

## 2025-07-10 PROCEDURE — 97116 GAIT TRAINING THERAPY: CPT

## 2025-07-10 PROCEDURE — 6370000000 HC RX 637 (ALT 250 FOR IP): Performed by: FAMILY MEDICINE

## 2025-07-10 PROCEDURE — 97530 THERAPEUTIC ACTIVITIES: CPT

## 2025-07-10 PROCEDURE — 80053 COMPREHEN METABOLIC PANEL: CPT

## 2025-07-10 PROCEDURE — 6360000002 HC RX W HCPCS: Performed by: INTERNAL MEDICINE

## 2025-07-10 RX ORDER — FUROSEMIDE 20 MG/1
20 TABLET ORAL DAILY
Status: DISCONTINUED | OUTPATIENT
Start: 2025-07-10 | End: 2025-07-11 | Stop reason: HOSPADM

## 2025-07-10 RX ORDER — ESCITALOPRAM OXALATE 20 MG/1
20 TABLET ORAL DAILY
Status: DISCONTINUED | OUTPATIENT
Start: 2025-07-10 | End: 2025-07-11 | Stop reason: HOSPADM

## 2025-07-10 RX ORDER — ENOXAPARIN SODIUM 100 MG/ML
40 INJECTION SUBCUTANEOUS DAILY
Status: DISCONTINUED | OUTPATIENT
Start: 2025-07-10 | End: 2025-07-11 | Stop reason: HOSPADM

## 2025-07-10 RX ORDER — POLYETHYLENE GLYCOL 3350 17 G/17G
17 POWDER, FOR SOLUTION ORAL DAILY PRN
Status: DISCONTINUED | OUTPATIENT
Start: 2025-07-10 | End: 2025-07-11 | Stop reason: HOSPADM

## 2025-07-10 RX ORDER — METHOCARBAMOL 500 MG/1
1500 TABLET, FILM COATED ORAL 3 TIMES DAILY
Status: DISCONTINUED | OUTPATIENT
Start: 2025-07-10 | End: 2025-07-11 | Stop reason: HOSPADM

## 2025-07-10 RX ORDER — GAUZE BANDAGE 2" X 2"
100 BANDAGE TOPICAL DAILY
Status: DISCONTINUED | OUTPATIENT
Start: 2025-07-10 | End: 2025-07-11 | Stop reason: HOSPADM

## 2025-07-10 RX ORDER — POTASSIUM CHLORIDE 1500 MG/1
40 TABLET, EXTENDED RELEASE ORAL PRN
Status: DISCONTINUED | OUTPATIENT
Start: 2025-07-10 | End: 2025-07-11 | Stop reason: HOSPADM

## 2025-07-10 RX ORDER — ASPIRIN 81 MG/1
81 TABLET, CHEWABLE ORAL DAILY
COMMUNITY
Start: 2024-10-20

## 2025-07-10 RX ORDER — ACETAMINOPHEN 650 MG/1
650 SUPPOSITORY RECTAL EVERY 6 HOURS PRN
Status: DISCONTINUED | OUTPATIENT
Start: 2025-07-10 | End: 2025-07-11 | Stop reason: HOSPADM

## 2025-07-10 RX ORDER — ALBUTEROL SULFATE 0.83 MG/ML
2.5 SOLUTION RESPIRATORY (INHALATION) EVERY 4 HOURS PRN
Status: DISCONTINUED | OUTPATIENT
Start: 2025-07-10 | End: 2025-07-11 | Stop reason: HOSPADM

## 2025-07-10 RX ORDER — ATORVASTATIN CALCIUM 20 MG/1
20 TABLET, FILM COATED ORAL DAILY
Status: DISCONTINUED | OUTPATIENT
Start: 2025-07-10 | End: 2025-07-11 | Stop reason: HOSPADM

## 2025-07-10 RX ORDER — SODIUM CHLORIDE 0.9 % (FLUSH) 0.9 %
5-40 SYRINGE (ML) INJECTION PRN
Status: DISCONTINUED | OUTPATIENT
Start: 2025-07-10 | End: 2025-07-11 | Stop reason: HOSPADM

## 2025-07-10 RX ORDER — OXYCODONE HYDROCHLORIDE 5 MG/1
5 TABLET ORAL EVERY 4 HOURS PRN
Status: DISCONTINUED | OUTPATIENT
Start: 2025-07-10 | End: 2025-07-11 | Stop reason: HOSPADM

## 2025-07-10 RX ORDER — GABAPENTIN 300 MG/1
600 CAPSULE ORAL 3 TIMES DAILY
Status: DISCONTINUED | OUTPATIENT
Start: 2025-07-10 | End: 2025-07-11 | Stop reason: HOSPADM

## 2025-07-10 RX ORDER — SODIUM CHLORIDE 0.9 % (FLUSH) 0.9 %
5-40 SYRINGE (ML) INJECTION EVERY 12 HOURS SCHEDULED
Status: DISCONTINUED | OUTPATIENT
Start: 2025-07-10 | End: 2025-07-11 | Stop reason: HOSPADM

## 2025-07-10 RX ORDER — ZIPRASIDONE HYDROCHLORIDE 40 MG/1
40 CAPSULE ORAL 2 TIMES DAILY WITH MEALS
Status: DISCONTINUED | OUTPATIENT
Start: 2025-07-10 | End: 2025-07-10

## 2025-07-10 RX ORDER — POTASSIUM CHLORIDE 7.45 MG/ML
10 INJECTION INTRAVENOUS PRN
Status: DISCONTINUED | OUTPATIENT
Start: 2025-07-10 | End: 2025-07-11 | Stop reason: HOSPADM

## 2025-07-10 RX ORDER — LANOLIN ALCOHOL/MO/W.PET/CERES
1000 CREAM (GRAM) TOPICAL DAILY
Status: DISCONTINUED | OUTPATIENT
Start: 2025-07-10 | End: 2025-07-11 | Stop reason: HOSPADM

## 2025-07-10 RX ORDER — MAGNESIUM SULFATE IN WATER 40 MG/ML
2000 INJECTION, SOLUTION INTRAVENOUS PRN
Status: DISCONTINUED | OUTPATIENT
Start: 2025-07-10 | End: 2025-07-11 | Stop reason: HOSPADM

## 2025-07-10 RX ORDER — TRAMADOL HYDROCHLORIDE 50 MG/1
50 TABLET ORAL EVERY 8 HOURS PRN
Status: DISCONTINUED | OUTPATIENT
Start: 2025-07-10 | End: 2025-07-11 | Stop reason: HOSPADM

## 2025-07-10 RX ORDER — ACETAMINOPHEN 325 MG/1
650 TABLET ORAL EVERY 6 HOURS PRN
Status: DISCONTINUED | OUTPATIENT
Start: 2025-07-10 | End: 2025-07-11 | Stop reason: HOSPADM

## 2025-07-10 RX ORDER — METHOCARBAMOL 500 MG/1
1500 TABLET, FILM COATED ORAL 4 TIMES DAILY
Status: DISCONTINUED | OUTPATIENT
Start: 2025-07-10 | End: 2025-07-10

## 2025-07-10 RX ORDER — ONDANSETRON 2 MG/ML
4 INJECTION INTRAMUSCULAR; INTRAVENOUS EVERY 6 HOURS PRN
Status: DISCONTINUED | OUTPATIENT
Start: 2025-07-10 | End: 2025-07-11 | Stop reason: HOSPADM

## 2025-07-10 RX ORDER — ONDANSETRON 4 MG/1
4 TABLET, ORALLY DISINTEGRATING ORAL EVERY 8 HOURS PRN
Status: DISCONTINUED | OUTPATIENT
Start: 2025-07-10 | End: 2025-07-11 | Stop reason: HOSPADM

## 2025-07-10 RX ORDER — LISINOPRIL 5 MG/1
5 TABLET ORAL DAILY
Status: DISCONTINUED | OUTPATIENT
Start: 2025-07-10 | End: 2025-07-11 | Stop reason: HOSPADM

## 2025-07-10 RX ORDER — SODIUM CHLORIDE 9 MG/ML
INJECTION, SOLUTION INTRAVENOUS PRN
Status: DISCONTINUED | OUTPATIENT
Start: 2025-07-10 | End: 2025-07-11 | Stop reason: HOSPADM

## 2025-07-10 RX ORDER — AMOXICILLIN 250 MG/1
500 CAPSULE ORAL EVERY 8 HOURS SCHEDULED
Status: DISCONTINUED | OUTPATIENT
Start: 2025-07-10 | End: 2025-07-10

## 2025-07-10 RX ORDER — GINSENG 100 MG
CAPSULE ORAL 3 TIMES DAILY
Status: DISCONTINUED | OUTPATIENT
Start: 2025-07-10 | End: 2025-07-11 | Stop reason: HOSPADM

## 2025-07-10 RX ADMIN — OXYCODONE 5 MG: 5 TABLET ORAL at 05:40

## 2025-07-10 RX ADMIN — METHOCARBAMOL 1500 MG: 500 TABLET ORAL at 09:33

## 2025-07-10 RX ADMIN — Medication 100 MG: at 09:34

## 2025-07-10 RX ADMIN — GABAPENTIN 600 MG: 300 CAPSULE ORAL at 13:00

## 2025-07-10 RX ADMIN — GABAPENTIN 600 MG: 300 CAPSULE ORAL at 19:47

## 2025-07-10 RX ADMIN — ESCITALOPRAM OXALATE 20 MG: 20 TABLET, FILM COATED ORAL at 09:34

## 2025-07-10 RX ADMIN — LISINOPRIL 5 MG: 5 TABLET ORAL at 09:33

## 2025-07-10 RX ADMIN — OXYCODONE 5 MG: 5 TABLET ORAL at 15:29

## 2025-07-10 RX ADMIN — SODIUM CHLORIDE, PRESERVATIVE FREE 10 ML: 5 INJECTION INTRAVENOUS at 09:35

## 2025-07-10 RX ADMIN — METHOCARBAMOL 1500 MG: 500 TABLET ORAL at 13:00

## 2025-07-10 RX ADMIN — ONDANSETRON 4 MG: 2 INJECTION, SOLUTION INTRAMUSCULAR; INTRAVENOUS at 13:00

## 2025-07-10 RX ADMIN — CYANOCOBALAMIN TAB 1000 MCG 1000 MCG: 1000 TAB at 09:34

## 2025-07-10 RX ADMIN — OXYCODONE 5 MG: 5 TABLET ORAL at 19:47

## 2025-07-10 RX ADMIN — BACITRACIN: 500 OINTMENT TOPICAL at 21:18

## 2025-07-10 RX ADMIN — GABAPENTIN 600 MG: 300 CAPSULE ORAL at 09:33

## 2025-07-10 RX ADMIN — TRAMADOL HYDROCHLORIDE 50 MG: 50 TABLET, COATED ORAL at 16:35

## 2025-07-10 RX ADMIN — METHOCARBAMOL 1500 MG: 500 TABLET ORAL at 19:47

## 2025-07-10 RX ADMIN — CALCIUM CARBONATE-VITAMIN D TAB 500 MG-200 UNIT 1 TABLET: 500-200 TAB at 09:34

## 2025-07-10 RX ADMIN — SODIUM CHLORIDE, PRESERVATIVE FREE 10 ML: 5 INJECTION INTRAVENOUS at 19:48

## 2025-07-10 RX ADMIN — TRAMADOL HYDROCHLORIDE 50 MG: 50 TABLET, COATED ORAL at 05:40

## 2025-07-10 RX ADMIN — FUROSEMIDE 20 MG: 40 TABLET ORAL at 09:33

## 2025-07-10 RX ADMIN — ATORVASTATIN CALCIUM 20 MG: 20 TABLET, FILM COATED ORAL at 09:33

## 2025-07-10 RX ADMIN — AMITRIPTYLINE HYDROCHLORIDE 25 MG: 25 TABLET, FILM COATED ORAL at 21:18

## 2025-07-10 RX ADMIN — OXYCODONE 5 MG: 5 TABLET ORAL at 09:55

## 2025-07-10 RX ADMIN — ACETAMINOPHEN 650 MG: 325 TABLET ORAL at 05:39

## 2025-07-10 ASSESSMENT — PAIN DESCRIPTION - DESCRIPTORS
DESCRIPTORS: ACHING
DESCRIPTORS: DISCOMFORT
DESCRIPTORS: ACHING;DISCOMFORT
DESCRIPTORS: ACHING;DISCOMFORT
DESCRIPTORS: SPASM

## 2025-07-10 ASSESSMENT — PAIN DESCRIPTION - PAIN TYPE: TYPE: ACUTE PAIN;CHRONIC PAIN

## 2025-07-10 ASSESSMENT — PAIN DESCRIPTION - LOCATION
LOCATION: BACK
LOCATION: BACK
LOCATION: BACK;LEG
LOCATION: LEG
LOCATION: LEG
LOCATION: BACK;LEG
LOCATION: BACK

## 2025-07-10 ASSESSMENT — PAIN SCALES - GENERAL
PAINLEVEL_OUTOF10: 5
PAINLEVEL_OUTOF10: 10
PAINLEVEL_OUTOF10: 6
PAINLEVEL_OUTOF10: 7
PAINLEVEL_OUTOF10: 4
PAINLEVEL_OUTOF10: 6
PAINLEVEL_OUTOF10: 10
PAINLEVEL_OUTOF10: 4

## 2025-07-10 ASSESSMENT — PAIN - FUNCTIONAL ASSESSMENT
PAIN_FUNCTIONAL_ASSESSMENT: 0-10
PAIN_FUNCTIONAL_ASSESSMENT: ACTIVITIES ARE NOT PREVENTED

## 2025-07-10 ASSESSMENT — PAIN DESCRIPTION - ONSET: ONSET: ON-GOING

## 2025-07-10 ASSESSMENT — PAIN DESCRIPTION - FREQUENCY: FREQUENCY: INTERMITTENT

## 2025-07-10 ASSESSMENT — PAIN DESCRIPTION - ORIENTATION
ORIENTATION: RIGHT;LEFT
ORIENTATION: LEFT
ORIENTATION: RIGHT;LEFT

## 2025-07-10 ASSESSMENT — PAIN SCALES - WONG BAKER: WONGBAKER_NUMERICALRESPONSE: NO HURT

## 2025-07-10 NOTE — ED NOTES
Message sent to hospitalist that the patient wants her night meds taken,      Escuadra, Marylee, RN  07/10/25 2801

## 2025-07-10 NOTE — ED NOTES
Perfect serve sent to Dr. Hernandez that the patient is asking her night meds, she stated that Dr. Mcmanus is still working on the admitting orders and will pop out soon.      Escuadra, Marylee, RN  07/10/25 7098

## 2025-07-10 NOTE — CARE COORDINATION
Case Management           Date/ Time of Note: 7/10/2025 1:25 PM  Note completed by: GARTH Hinojosa, RASHEED    If patient is discharged prior to next notation, then this note serves as note for discharge by case management.    Patient Name: Terri Salmon  YOB: 1960    Diagnosis:Muscle spasms of both lower extremities [M62.838]  Patient Admission Status: Observation  Date of Admission:7/9/2025  4:18 PM    Length of Stay: 0 GLOS:   Readmission Risk Score:      Current Plan of Care:   ADDENDUM:  1:52 PM    CEFERINO spoke to NP, spinal stimulator is now working and pt is doing better. Pt wants to dc home with [facility]. They are going to have PT/OT work with her again.    CEFERINO sent referral to [facility].     Electronically signed by GARTH Hinojosa LSW, PEG on 7/10/2025 at 1:52 PM    Pt is from home w/. Pt is indp at baseline w/rollator. Pt has no current services at home. Pt is interested in placement. PTOT rec aru. Aru reviewing.     Awaiting neuro surg cons    Referrals completed: Not Applicable    Resources/ information provided: Not indicated at this time    IMM Status:        Terri and her family were provided with choice of provider; she and her family are in agreement with the discharge plan.    Electronically signed by GARTH Hinojosa, RASHEED, PEG on 7/10/2025 at 1:25 PM  The [facility]  Case Management Department  Ph: [phone]

## 2025-07-10 NOTE — PROGRESS NOTES
Physical Therapy  Facility/Department: THE [facility] EMERGENCY DEPARTMENT  Physical Therapy Initial Assessment/Treatment    Name: Terri Salmon  : 1960  MRN: [id number]  Date of Service: 7/10/2025    Discharge Recommendations:  IP Rehab, Patient able to tolerate 3 hours of therapy per day pending improvements with pt's spinal cord stimulator  PT Equipment Recommendations  Equipment Needed: No  At baseline this patient was indep using RW recently with functional mobility & ADL's. The current hospitalization has resulted in the patient now being limited with all aspects of mobility, negatively impacting the patient's functional independence, ability to safely access their home environment, and ability to complete valued daily tasks and life roles. Terri Salmon is motivated for recovery and demonstrates the ability to tolerate inpatient rehabilitation 3 hours/day, 5 days/week for optimal return to functional independence, quality of life, and decreased caregiver burden.        Patient Diagnosis(es): The encounter diagnosis was Low back pain, unspecified back pain laterality, unspecified chronicity, unspecified whether sciatica present.  Past Medical History:  has a past medical history of Asthma, Depression, Osteoarthritis, PONV (postoperative nausea and vomiting), and Scoliosis.  Past Surgical History:  has a past surgical history that includes Gastric bypass surgery (); Rotator cuff repair (Left, ); Knee cartilage surgery (Left, ); Breast enhancement surgery (Bilateral, );  section ( & ); Cholecystectomy (); [place] tooth extraction (); joint replacement (Bilateral, 2014); fracture surgery; joint replacement; and Total hip arthroplasty (Bilateral, ).    Assessment  Assessment: 63 yo seen in ED after having a spinal cord stimulator placed at an outside pain clinic and then began having intense mm spasms.  Attempted OOB with therapy today but was essentially

## 2025-07-10 NOTE — PROGRESS NOTES
Hospitalist Progress Note      Name:  Terri Salmon /Age/Sex: 1960  (64 y.o. female)   MRN & CSN:  [id number] & [id number] Encounter Date/Time: 7/10/2025 8:51 AM EDT    Location:  [facility]/B14-14 PCP: Yaritza Rosas MD       Hospital Day: 2         Date of Admission: 2025    Chief Complaint: Bilateral lower leg spasms, ambulatory dysfunction    Hospital Course: 64 y.o. female with PMHx significant for chronic back pain, hyperlipidemia, asthma, chronic depression, hypertension who had a spinal stimulator placed 25 by Dr. FLORENTIN Child at an outpatient spine clinic ([facility]) presenting with bilateral lower extremity tightness, weakness, and difficulty with coordination and walking. Unfortantely ED staff was not unable to get a hold of her surgeon and NSGY recommended CT L spine, which showed expected post-op changes. Per NSGY, no acute surgical interventions indicated. Patient did have difficulty walking while in the ED. She is neurologically intact with chronic LEFT lower leg weakness noted. She denies loss of bowel/bladder function, numbness, or paraesthesia. Labs and UA WNL. She denied fall after SCS placement. PT/OT evaluated patient initially in the ED and noted BL ataxia with heavy assistance x 2 and walker. Lower extremities were \"jerking with spasms.\" Her LEFT lower leg is weaker than her RIGHT and patient endorsed that this was chronic. She admits that she ambulates with a walker at home and her  will assist her at times because of the pain. ARU was recommended by PT/OT.  This provider spoke with Dr. Child who recommended and requested the rep for the SCS (NIKOLE Hinojosa APRN to come to ED to evaluate SCS. Pt admitted that her and her  were trying to adjust settings of SCS and were unsure of how to use. Spoke with [facility] who admitted that prior to procedure, the patient was having difficulty ambulating and after the procedure of

## 2025-07-10 NOTE — ED NOTES
Patient Name: Terri Salmon  : 1960 64 y.o.  MRN: [id number]  ED Room #: A01/A01-01     Chief complaint:   Chief Complaint   Patient presents with    Back Pain     Pt came back from her doctor, had a trial stimulator put on her back, on her way to her car she have trouble walking with both legs can't move, and her back pain getting worse     Hospital Problem/Diagnosis:   Hospital Problems           Last Modified POA    * (Principal) Muscle spasms of both lower extremities 2025 Yes         O2 Flow Rate:    (if applicable)  Cardiac Rhythm:   (if applicable)  Active LDA's:     External Urinary Catheter (Active)   Site Assessment Clean,dry & intact 25   Placement Initiated 25          How does patient ambulate? Unknown, did not assess in the Emergency Department    2. How does patient take pills? Whole with Water    3. Is patient alert? Alert    4. Is patient oriented? To Person, To Place, To Time, To Situation, and Follows Commands    5.   Patient arrived from:  home  Facility Name: ___________________________________________    6. If patient is disoriented or from a Skill Nursing Facility has family been notified of admission? No    7. Patient belongings? Belongings: Clothing    Disposition of belongings? Kept with Patient     8. Any specific patient or family belongings/needs/dynamics?   a. Alert, uses a cane and walker at home    9. Miscellaneous comments/pending orders?  a. none      If there are any additional questions please reach out to the Emergency Department.      Escuadra, Marylee, RN  07/10/25 0218

## 2025-07-10 NOTE — PROGRESS NOTES
4 Eyes Skin Assessment     NAME:  Terri Salmon  YOB: 1960  MEDICAL RECORD NUMBER:  [id number]    The patient is being assessed for  Admission    I agree that at least one RN has performed a thorough Head to Toe Skin Assessment on the patient. ALL assessment sites listed below have been assessed.      Areas assessed by both nurses:    Arms, Elbows, Hands, Sacrum. Buttock, Coccyx, Ischium, and Legs. Feet and Heels        Does the Patient have a Wound? Redness to skin folds       Evan Prevention initiated by RN: No  Wound Care Orders initiated by RN: No    Pressure Injury (Stage 3,4, Unstageable, DTI, NWPT, and Complex wounds) if present, place Wound referral order by RN under : No    New Ostomies, if present place, Ostomy referral order under : No     Nurse 1 eSignature: Electronically signed by Berny James RN on 7/10/25 at 3:36 PM EDT    **SHARE this note so that the co-signing nurse can place an eSignature**    Nurse 2 eSignature: Electronically signed by Ankit Fernando RN on 7/10/25 at 6:00 PM EDT

## 2025-07-10 NOTE — PROGRESS NOTES
Occupational Therapy  Facility/Department: THE [facility] EMERGENCY DEPARTMENT  Occupational Therapy Initial Assessment and Tx    Name: Terri Salmon  : 1960  MRN: [id number]  Date of Service: 7/10/2025    Discharge Recommendations:  IP Rehab (Vs 24hrA/HHOT)  OT Equipment Recommendations  Other: defer     At baseline this patient was mod I with functional mobility & ADL's. The current hospitalization has resulted in the patient now being limited with all aspects of mobility, negatively impacting the patient's functional independence, ability to safely access their home environment, and ability to complete valued daily tasks and life roles. Terri Salmon is motivated for recovery and demonstrates the ability to tolerate inpatient rehabilitation 3 hours/day, 5 days/week for optimal return to functional independence, quality of life, and decreased caregiver burden.    Patient Diagnosis(es): The encounter diagnosis was Low back pain, unspecified back pain laterality, unspecified chronicity, unspecified whether sciatica present.  Past Medical History:  has a past medical history of Asthma, Depression, Osteoarthritis, PONV (postoperative nausea and vomiting), and Scoliosis.  Past Surgical History:  has a past surgical history that includes Gastric bypass surgery (); Rotator cuff repair (Left, ); Knee cartilage surgery (Left, ); Breast enhancement surgery (Bilateral, );  section ( & ); Cholecystectomy (); [place] tooth extraction (); joint replacement (Bilateral, 2014); fracture surgery; joint replacement; and Total hip arthroplasty (Bilateral, ).           Assessment  Performance deficits / Impairments: Decreased functional mobility ;Decreased safe awareness;Decreased balance;Decreased ADL status;Decreased endurance;Decreased strength  Assessment: Pt is 64 y.o female who presents from home. Pt presents below baseline and demos decreased activity tolerance,

## 2025-07-10 NOTE — PLAN OF CARE
Brief Note:    Terri Salmon is a 64-year-old female who presents to the ED for evaluation of worsening bilateral lower extremity muscle spasms and difficulty ambulating after spinal cord stimulator (SCS) placement earlier today. She reports that she has had similar spasms over the past month, but since the procedure, the spasms have intensified and now occur with attempts to ambulate. She describes them as intense but brief, lasting only a few seconds at a time. She denies saddle anesthesia, bowel or bladder incontinence, recent trauma, or fever. She attempted to contact her surgical team without success, prompting her ED visit.    Plan:  - Q4 hour neuro checks  - Pain control per primary team  - Recommend prompt consultation and follow up with Dr. Casanova at [facility] in [place], [place]   -CT L spine completed and shows Postsurgical changes related to recent spinal stimulator lead placement entering at T12-L1 with scattered subcutaneous and intrathecal gas. Similar multilevel central canal and neural foraminal narrowing. To prior CT. Trace air in the left L2-3 neural foramina -- No further imaging at this time unless exam worsens.    PHYSICAL EXAM:  Vitals:    07/09/25 1802 07/09/25 2130 07/09/25 2200 07/09/25 2230   BP: 131/70 (!) 148/84 (!) 123/91 129/84   Pulse: (!) 102 98 100 94   Resp: 19 23 27 20   Temp:       TempSrc:       SpO2:  95% 94%    Weight:       Height:             General Alert, no distress, well-nourished    Neurologic  Mental status:   Orientation to person, place, time, situation  Attention intact as able to attend well to the exam    Language fluent in conversation  Comprehension intact; follows simple commands    Cranial nerves:   CN2: Visual fields full w/o extinction on confrontational testing  CN 3,4,6: Pupils equal and reactive to light, extraocular muscles intact  CN5: Facial sensation symmetric   CN7: Face symmetric  CN8: Hearing symmetric to spoken voice  CN9: Palate elevated

## 2025-07-10 NOTE — PROGRESS NOTES
Patient admitted to room 3316 Report received from RN from emergency room VSS on room air*. Patient is a/o x4 Patient oriented to room and call light. Rights and responsibilities provided to patient, and welcome packet provided to patient. 4 eyes skin assessment completed with 2nd RN.     Berny James, RN

## 2025-07-10 NOTE — H&P
Hospital Medicine History & Physical      Date of Admission: 7/9/2025    Date of Service:  Pt seen/examined on 07/09/25     []Admitted to Inpatient with expected LOS greater than two midnights due to medical therapy.  [x]Placed in Observation status.    Chief Admission Complaint: Bilateral lower extremity /9    Presenting Admission History:      64 y.o. female with PMHx significant for chronic back pain, hyperlipidemia, asthma, chronic depression, hypertension who had a spinal stimulator placed earlier today at an outpatient spine clinic ([facility] in [facility]) presenting with bilateral lower extremity tightness, weakness, and difficulty with coordination and walking. Unfortantely ED staff was not unable to get a hold of her surgeon and NSGY recommended CT L spine, which showed expected post-op changes. Per NSGY, no acute surgical interventions indicated. Patient did have difficulty walking and is currently being assigned observation status for close monitoring and further evaluation  ROS:     Review of 10 systems is negative except what is outlined in HPI.     Assessment:  Bilateral lower extremity weakness/inability to walk s/p placement of spinal stimulator 7/9/2025  Chronic back pain.  Hyperlipidemia.  COPD/asthma without acute exacerbation  Chronic depression.  Essential hypertension    Plan:    Patient assigned to observation status.  Add as needed tramadol and scheduled Robaxin for pain control  Neurochecks every 4 hours.  PT/OT evaluation treatment.  CT of the lumbar spine shows postoperative changes  Neurosurgery is consulted  Resume the rest of home medications as appropriate.  SQ Lovenox for DVT prophylaxis    Physical Exam Performed:      /84   Pulse 94   Temp 98.5 °F (36.9 °C) (Oral)   Resp 20   Ht 1.461 m (4' 9.5\")   Wt 78.9 kg (174 lb)   LMP 12/22/2014   SpO2 94%   BMI 37.00 kg/m²     General appearance:  No apparent distress, appears stated age and cooperative.  HEENT:   bulge. Bilateral facet arthropathy. Bilateral foraminal narrowing. No central canal stenosis. T10-T11: Facet arthropathy with posterior ligamentous hypertrophy. Diffuse disc bulge. Mild central canal narrowing. Moderate right foraminal narrowing and severe left foraminal narrowing. T10-T11: Severe disc space narrowing with partial osseous bridging of the disc space. No central canal narrowing. Mild bilateral foraminal narrowing. T12-L1: Severe degenerative disc disease with diffuse disc bulge. Facet arthropathy and ligamentous hypertrophy. Mild central canal stenosis. Moderate right foraminal narrowing and severe left foraminal narrowing.     1. Advanced multilevel degenerative disc disease and multilevel facet arthropathy. Mild central canal stenosis at T10-T11 has described. 2. No acute osseous abnormality or suspicious cord signal abnormality. 3. Multilevel foraminal narrowing as described in the level by level description. Electronically signed by Ascencion Uriostegui MD      PCP: Yaritza Rosas MD    Past Medical History:        Diagnosis Date    Asthma     Depression     Osteoarthritis     PONV (postoperative nausea and vomiting)     Scoliosis        Past Surgical History:        Procedure Laterality Date    BREAST ENHANCEMENT SURGERY Bilateral 2007     SECTION   &     CHOLECYSTECTOMY  1980    FRACTURE SURGERY      femur RT    GASTRIC BYPASS SURGERY  1998    [place]    JOINT REPLACEMENT Bilateral 2014    knee replacement    JOINT REPLACEMENT      KNEE CARTILAGE SURGERY Left 2006    ROTATOR CUFF REPAIR Left 2008    TOTAL HIP ARTHROPLASTY Bilateral     WISDOM TOOTH EXTRACTION         Medications Prior to Admission:   Prior to Admission medications    Medication Sig Start Date End Date Taking? Authorizing Provider   thiamine 100 MG tablet Take 1 tablet by mouth daily   Yes Provider, MD [place]   albuterol (PROVENTIL) (2.5 MG/3ML) 0.083% nebulizer solution Inhale the contents of 1

## 2025-07-10 NOTE — ED PROVIDER NOTES
ED Attending Attestation Note     Date of evaluation: 7/9/2025    This patient was seen by the advance practice provider.  I have seen and examined the patient, agree with the workup, evaluation, management and diagnosis. The care plan has been discussed.  My assessment reveals a 64 y.o. female with placement of a spinal stimulator at an outpatient pain clinic with worsening abdominal pain and LLE weakness/paresthesia and muscle spasm.  Reports she is unable to walk.      General:  awake, alert and oriented, no acute distress  HEENT:  AT/NC, PERRL, EOMI, sclera nonicteric, conjunctiva clear, OP clear with PMMM  Neck: supple, no LAD  Lungs:  CTAB, no respiratory distress, breathing even and easy  CV: RRR  Abdomen: soft, nontender, nondistended  Extremities: warm, well perfused, no joint deformity or edema  Skin: warm, dry, intact    Neuro: GCS 15, awake, alert and oriented x 4, moves all four extremities spontaneously with some weakness in LLE  Psych: normal mood and affect appropriate for mood        Yasmin Jacobs MD  07/09/25 8801

## 2025-07-11 VITALS
HEART RATE: 64 BPM | WEIGHT: 174 LBS | BODY MASS INDEX: 36.53 KG/M2 | HEIGHT: 58 IN | OXYGEN SATURATION: 95 % | DIASTOLIC BLOOD PRESSURE: 68 MMHG | RESPIRATION RATE: 18 BRPM | TEMPERATURE: 97.5 F | SYSTOLIC BLOOD PRESSURE: 107 MMHG

## 2025-07-11 LAB
ANION GAP SERPL CALCULATED.3IONS-SCNC: 10 MMOL/L (ref 3–16)
BASOPHILS # BLD: 0 K/UL (ref 0–0.2)
BASOPHILS NFR BLD: 0.3 %
BUN SERPL-MCNC: 13 MG/DL (ref 7–20)
CALCIUM SERPL-MCNC: 8.9 MG/DL (ref 8.3–10.6)
CHLORIDE SERPL-SCNC: 98 MMOL/L (ref 99–110)
CO2 SERPL-SCNC: 27 MMOL/L (ref 21–32)
CREAT SERPL-MCNC: 0.6 MG/DL (ref 0.6–1.2)
DEPRECATED RDW RBC AUTO: 17.8 % (ref 12.4–15.4)
EOSINOPHIL # BLD: 0.1 K/UL (ref 0–0.6)
EOSINOPHIL NFR BLD: 1.8 %
GFR SERPLBLD CREATININE-BSD FMLA CKD-EPI: >90 ML/MIN/{1.73_M2}
GLUCOSE SERPL-MCNC: 118 MG/DL (ref 70–99)
HCT VFR BLD AUTO: 32.7 % (ref 36–48)
HGB BLD-MCNC: 11 G/DL (ref 12–16)
LYMPHOCYTES # BLD: 1.8 K/UL (ref 1–5.1)
LYMPHOCYTES NFR BLD: 25.5 %
MCH RBC QN AUTO: 27.2 PG (ref 26–34)
MCHC RBC AUTO-ENTMCNC: 33.7 G/DL (ref 31–36)
MCV RBC AUTO: 80.9 FL (ref 80–100)
MONOCYTES # BLD: 0.6 K/UL (ref 0–1.3)
MONOCYTES NFR BLD: 8.4 %
NEUTROPHILS # BLD: 4.6 K/UL (ref 1.7–7.7)
NEUTROPHILS NFR BLD: 64 %
PLATELET # BLD AUTO: 336 K/UL (ref 135–450)
PMV BLD AUTO: 7.6 FL (ref 5–10.5)
POTASSIUM SERPL-SCNC: 4.2 MMOL/L (ref 3.5–5.1)
RBC # BLD AUTO: 4.05 M/UL (ref 4–5.2)
SODIUM SERPL-SCNC: 135 MMOL/L (ref 136–145)
WBC # BLD AUTO: 7.2 K/UL (ref 4–11)

## 2025-07-11 PROCEDURE — 97116 GAIT TRAINING THERAPY: CPT

## 2025-07-11 PROCEDURE — 80048 BASIC METABOLIC PNL TOTAL CA: CPT

## 2025-07-11 PROCEDURE — 97530 THERAPEUTIC ACTIVITIES: CPT

## 2025-07-11 PROCEDURE — G0378 HOSPITAL OBSERVATION PER HR: HCPCS

## 2025-07-11 PROCEDURE — 6370000000 HC RX 637 (ALT 250 FOR IP): Performed by: INTERNAL MEDICINE

## 2025-07-11 PROCEDURE — 97535 SELF CARE MNGMENT TRAINING: CPT

## 2025-07-11 PROCEDURE — 96372 THER/PROPH/DIAG INJ SC/IM: CPT

## 2025-07-11 PROCEDURE — 36415 COLL VENOUS BLD VENIPUNCTURE: CPT

## 2025-07-11 PROCEDURE — 6360000002 HC RX W HCPCS: Performed by: INTERNAL MEDICINE

## 2025-07-11 PROCEDURE — 94640 AIRWAY INHALATION TREATMENT: CPT

## 2025-07-11 PROCEDURE — 85025 COMPLETE CBC W/AUTO DIFF WBC: CPT

## 2025-07-11 PROCEDURE — 2500000003 HC RX 250 WO HCPCS: Performed by: INTERNAL MEDICINE

## 2025-07-11 RX ORDER — OXYCODONE HYDROCHLORIDE 5 MG/1
5 TABLET ORAL EVERY 6 HOURS PRN
Qty: 12 TABLET | Refills: 0 | Status: SHIPPED | OUTPATIENT
Start: 2025-07-11 | End: 2025-07-14

## 2025-07-11 RX ADMIN — SODIUM CHLORIDE, PRESERVATIVE FREE 10 ML: 5 INJECTION INTRAVENOUS at 09:12

## 2025-07-11 RX ADMIN — ARFORMOTEROL TARTRATE: 15 SOLUTION RESPIRATORY (INHALATION) at 08:45

## 2025-07-11 RX ADMIN — LISINOPRIL 5 MG: 5 TABLET ORAL at 09:13

## 2025-07-11 RX ADMIN — Medication 100 MG: at 09:12

## 2025-07-11 RX ADMIN — FUROSEMIDE 20 MG: 40 TABLET ORAL at 09:13

## 2025-07-11 RX ADMIN — CALCIUM CARBONATE-VITAMIN D TAB 500 MG-200 UNIT 1 TABLET: 500-200 TAB at 09:12

## 2025-07-11 RX ADMIN — ATORVASTATIN CALCIUM 20 MG: 20 TABLET, FILM COATED ORAL at 09:13

## 2025-07-11 RX ADMIN — ESCITALOPRAM OXALATE 20 MG: 20 TABLET, FILM COATED ORAL at 09:13

## 2025-07-11 RX ADMIN — CYANOCOBALAMIN TAB 1000 MCG 1000 MCG: 1000 TAB at 09:12

## 2025-07-11 RX ADMIN — TRAMADOL HYDROCHLORIDE 50 MG: 50 TABLET, COATED ORAL at 05:59

## 2025-07-11 RX ADMIN — ALBUTEROL SULFATE 2.5 MG: 2.5 SOLUTION RESPIRATORY (INHALATION) at 05:32

## 2025-07-11 RX ADMIN — OXYCODONE 5 MG: 5 TABLET ORAL at 09:13

## 2025-07-11 RX ADMIN — GABAPENTIN 600 MG: 300 CAPSULE ORAL at 09:13

## 2025-07-11 RX ADMIN — ENOXAPARIN SODIUM 40 MG: 100 INJECTION SUBCUTANEOUS at 09:13

## 2025-07-11 RX ADMIN — OXYCODONE 5 MG: 5 TABLET ORAL at 04:05

## 2025-07-11 RX ADMIN — METHOCARBAMOL 1500 MG: 500 TABLET ORAL at 09:13

## 2025-07-11 ASSESSMENT — PAIN SCALES - GENERAL
PAINLEVEL_OUTOF10: 4
PAINLEVEL_OUTOF10: 4
PAINLEVEL_OUTOF10: 7

## 2025-07-11 ASSESSMENT — PAIN DESCRIPTION - LOCATION: LOCATION: LEG

## 2025-07-11 ASSESSMENT — PAIN DESCRIPTION - PAIN TYPE: TYPE: CHRONIC PAIN

## 2025-07-11 ASSESSMENT — PAIN SCALES - WONG BAKER: WONGBAKER_NUMERICALRESPONSE: NO HURT

## 2025-07-11 NOTE — PROGRESS NOTES
Occupational Therapy  Facility/Department: [address]  Daily Treatment Note  NAME: Terri Salmon  : 1960  MRN: [id number]    Date of Service: 2025    Discharge Recommendations:  24 hour supervision or assist, Home with Home health OT  OT Equipment Recommendations  Equipment Needed: Yes  Mobility Devices: Walker  Walker: Rolling      Patient Diagnosis(es): The encounter diagnosis was Low back pain, unspecified back pain laterality, unspecified chronicity, unspecified whether sciatica present.     Assessment   Assessment: Pt tolerating session well, completes all functional transfers and mobility (household distances) with use of RW and CGA-SBA overall. Pt demo's toileting and standing ADL tasks at sink without further assist. Pt has good support from spouse at discharge. Recommending [facility] to further maximize safety/ind with ADLs/transfers for safe return to OF. Cont OT POC.  Activity Tolerance: Patient tolerated treatment well  Discharge Recommendations: 24 hour supervision or assist;Home with Home health OT  Equipment Needed: Yes  Mobility Devices: Walker     Plan  Occupational Therapy Plan  Times Per Week: 2-5x  Current Treatment Recommendations: Strengthening;Balance training;Functional mobility training;Endurance training;Equipment evaluation, education, & procurement;Self-Care / ADL;Patient/Caregiver education & training;Safety education & training    Restrictions  Restrictions/Precautions  Activity Level: Up as Tolerated    Subjective  Subjective  Subjective: Pt in bed upon arrival with spouse present, pt agreeable to OT treatment and denied pain.  Orientation  Overall Orientation Status: Within Functional Limits  Orientation Level: Oriented X4  Cognition  Overall Cognitive Status: Exceptions  Arousal/Alertness: Appears intact  Following Commands: Follows multistep commands with repetition  Attention Span: Appears intact  Memory: Appears intact  Safety Judgement: Decreased awareness of need for

## 2025-07-11 NOTE — PLAN OF CARE
Problem: Safety - Adult  Goal: Free from fall injury  7/11/2025 0943 by Berny James RN  Outcome: Progressing  7/11/2025 0438 by Jennifer Moore RN  Outcome: Progressing  Flowsheets (Taken 7/11/2025 0438)  Free From Fall Injury:   Instruct family/caregiver on patient safety   Based on caregiver fall risk screen, instruct family/caregiver to ask for assistance with transferring infant if caregiver noted to have fall risk factors     Problem: Discharge Planning  Goal: Discharge to home or other facility with appropriate resources  7/11/2025 0943 by Berny James RN  Outcome: Progressing  7/11/2025 0438 by Jennifer Moore RN  Outcome: Progressing  Flowsheets (Taken 7/11/2025 0438)  Discharge to home or other facility with appropriate resources: Identify discharge learning needs (meds, wound care, etc)     Problem: Pain  Goal: Verbalizes/displays adequate comfort level or baseline comfort level  7/11/2025 0943 by Berny James RN  Outcome: Progressing  7/11/2025 0438 by Jennifer Moore RN  Outcome: Progressing  Flowsheets (Taken 7/11/2025 0438)  Verbalizes/displays adequate comfort level or baseline comfort level:   Encourage patient to monitor pain and request assistance   Assess pain using appropriate pain scale   Administer analgesics based on type and severity of pain and evaluate response   Implement non-pharmacological measures as appropriate and evaluate response

## 2025-07-11 NOTE — DISCHARGE SUMMARY
[facility] care which was ordered. DME: front wheeled walker. Pain medication prescription provided (OARRS) reviewed and recommendation for following with PCP and/or establishment with pain management for continued chronic pain. Updated Dr. Child of patient discharging with SCS w/o further neurological events. Pt advised to follow up with [facility] [place] as previously directed. Pt acknowledged name and phone number of representative for [facility]CS. On the basis of discharge, the patient was found to not be in any acute distress, with vital signs within normal limits, and no abnormalities on physical examination. Further, the patient/patient family member expressed appropriate understanding of, and agreement with, the discharge recommendations, medications and plan.     Problems addressed during this hospitalization:     Bilateral lower extremity weakness (LEFT chronic)  Ambulatory dysfunction (Improved)  Chronic back pain  PT/OT rec: [facility]; front wheeled walker  No further neurological events overnight; pain tolerable              OARRS reviewed; Pain medication as ordered              Follow up with [facility]   Dr. Child [phone]   [employer] rep as previously directed  HTN and HLD (Chronic)              BP stable              Continue home medications  COPD/asthma wo exacerbation (Chronic)              SpO2 > 97% on RA              Continue home medications  Anxiety  Depression              Continue home medications  This patient was seen and examined/discussed in conjunction with Dr. WILIAM Sequeira. He was agreeable with patient's plan at discharge as dictated above.     Consults this admission: Spoke directly with FLORENTIN Hagen From [facility]    Discharge Diagnosis:   Muscle spasms of both lower extremities  Ambulatory Dysfunction  Chronic Low Back Pain    Discharge Instruction:   Follow up appointments: Dr. Child as directed  Primary care physician: Yaritza Rosas MD within 2 weeks  Diet: regular diet

## 2025-07-11 NOTE — PROGRESS NOTES
Terri Salmon was evaluated today and a DME order was entered for a wheeled walker because she requires this to successfully complete daily living tasks of eating, bathing, toileting, personal cares, ambulating, dressing upper body, and dressing lower body.  A wheeled walker is necessary due to the patient's unsteady gait, upper body weakness, and inability to  an ambulation device; and she can ambulate only by pushing a walker instead of a lesser assistive device such as a cane, crutch, or standard walker.  The need for this equipment was discussed with the patient and she understands and is in agreement.     Kristen Valenzuela, SHANICE - CNP

## 2025-07-11 NOTE — DISCHARGE INSTR - COC
Continuity of Care Form    Patient Name: Terri Salmon   :  1960  MRN:  [id number]    Admit date:  2025  Discharge date:  ***    Code Status Order: Full Code   Advance Directives:     Admitting Physician:  Bam Mcmanus MD  PCP: Yaritza Rosas MD    Discharging Nurse: ***  Discharging Hospital Unit/Room#: 3316/3316-01  Discharging Unit Phone Number: ***    Emergency Contact:   Extended Emergency Contact Information  Primary Emergency Contact: Kellerman,Becky  Address: [address] [place] [address]  Home Phone: [phone]  Mobile Phone: [phone]  Relation: Child  Secondary Emergency Contact: Kellerman,Patrica  Home Phone: [phone]  Mobile Phone: [phone]  Relation: Child    Past Surgical History:  Past Surgical History:   Procedure Laterality Date    BREAST ENHANCEMENT SURGERY Bilateral      SECTION   &     CHOLECYSTECTOMY      FRACTURE SURGERY      femur RT    GASTRIC BYPASS SURGERY      [place]    JOINT REPLACEMENT Bilateral 2014    knee replacement    JOINT REPLACEMENT      KNEE CARTILAGE SURGERY Left 2006    ROTATOR CUFF REPAIR Left 2008    TOTAL HIP ARTHROPLASTY Bilateral     WISDOM TOOTH EXTRACTION         Immunization History:   Immunization History   Administered Date(s) Administered    COVID-19, PFIZER PURPLE top, DILUTE for use, (age 12 y+), 30mcg/0.3mL 2021, 2021    Influenza, Quadv, 6 mo and older, IM, PF (Flulaval, Fluarix) 10/14/2018    TDaP, ADACEL (age 10y-64y), BOOSTRIX (age 10y+), IM, 0.5mL 2016       Active Problems:  Patient Active Problem List   Diagnosis Code    Knee osteoarthritis M17.9    Chronic pain G89.29    Bladder prolapse CKI1222    Moderate persistent asthma J45.40    Alcohol abuse F10.10    Bilateral lower extremity edema R60.0    Anxiety F41.9    Primary insomnia F51.01    Arthritis M19.90    Alcohol use disorder, severe, dependence (HCC) F10.20     Home Care for greater 30 days.     Update Admission H&P: No change in H&P    PHYSICIAN SIGNATURE:  Electronically signed by SHANICE Mejia CNP on 7/11/25 at 10:18 AM EDT

## 2025-07-11 NOTE — CARE COORDINATION
Patient states that she has no needs at discharge and will go home with . Rolling walker to be delivered by [email or web address].  will be at home to supervise. Electronically signed by Zahra Biggs RN on 7/11/2025 at 10:52 AM

## 2025-07-11 NOTE — PLAN OF CARE
Problem: Safety - Adult  Goal: Free from fall injury  7/11/2025 0438 by Jennifer Moore RN  Outcome: Progressing  Flowsheets (Taken 7/11/2025 0438)  Free From Fall Injury:   Instruct family/caregiver on patient safety   Based on caregiver fall risk screen, instruct family/caregiver to ask for assistance with transferring infant if caregiver noted to have fall risk factors     Problem: Discharge Planning  Goal: Discharge to home or other facility with appropriate resources  7/11/2025 0438 by Jennifer Moore RN  Outcome: Progressing  Flowsheets (Taken 7/11/2025 0438)  Discharge to home or other facility with appropriate resources: Identify discharge learning needs (meds, wound care, etc)     Problem: Pain  Goal: Verbalizes/displays adequate comfort level or baseline comfort level  7/11/2025 0438 by Jennifer Moore RN  Outcome: Progressing  Flowsheets (Taken 7/11/2025 0438)  Verbalizes/displays adequate comfort level or baseline comfort level:   Encourage patient to monitor pain and request assistance   Assess pain using appropriate pain scale   Administer analgesics based on type and severity of pain and evaluate response   Implement non-pharmacological measures as appropriate and evaluate response

## 2025-07-11 NOTE — PROGRESS NOTES
Physical Therapy  Facility/Department: [address]  Daily Treatment Note  NAME: Terri Salmon  : 1960  MRN: [id number]    Date of Service: 2025    Discharge Recommendations:  24 hour supervision or assist, Home with Home health PT   PT Equipment Recommendations  Equipment Needed: Yes  Mobility Devices: Walker  Walker: Rolling    Patient Diagnosis(es): The encounter diagnosis was Low back pain, unspecified back pain laterality, unspecified chronicity, unspecified whether sciatica present.    Pt hx: Patient reports that she had a spinal cord stimulator placed earlier today and since then she has been having intense muscle spasms. CT lumbar spine:Postsurgical changes related to recent spinal stimulator lead placement entering at T12-L1 with scattered subcutaneous and intrathecal gas.     Assessment  Assessment: Pt tolerated session well making good progress with all mobility and reporting feeling back to baseline. pt performing transfers and ambulation at RW with SBA- CGA requiring occ cues for safety. pt mildly limited by LE weakness and endurance requiring rest breaks with prolonged gait however feels her spinal stimulator is working now. pt reporting no concerns for return home, will require initial 24hr, HHPT, and RW for dc. PT to f/u if does not dc.  Activity Tolerance: Patient tolerated treatment well  Equipment Needed: Yes  Mobility Devices: Walker    Plan  Physical Therapy Plan  General Plan:  (2-5)  Current Treatment Recommendations: Functional mobility training;Balance training;Gait training;Stair training;Safety education & training;Transfer training;Patient/Caregiver education & training;Strengthening    Restrictions  Restrictions/Precautions  Activity Level: Up as Tolerated     Subjective   Subjective  Subjective: pt supine in bed and agreeable to PT, denies pain    Objective  Vitals     Bed Mobility Training  Bed Mobility Training: Yes  Overall Level of Assistance: Supervision  Supine to Sit:

## 2025-07-28 ENCOUNTER — HOSPITAL ENCOUNTER (INPATIENT)
Age: 65
LOS: 2 days | Discharge: NURSING, REHAB OR LONG-TERM CARE | End: 2025-07-31
Attending: EMERGENCY MEDICINE | Admitting: INTERNAL MEDICINE
Payer: COMMERCIAL

## 2025-07-28 DIAGNOSIS — N39.0 URINARY TRACT INFECTION WITHOUT HEMATURIA, SITE UNSPECIFIED: ICD-10-CM

## 2025-07-28 DIAGNOSIS — M54.9 INTRACTABLE BACK PAIN: ICD-10-CM

## 2025-07-28 DIAGNOSIS — M54.10 RADICULAR LOW BACK PAIN: Primary | ICD-10-CM

## 2025-07-28 LAB
AMPHETAMINES UR QL SCN>1000 NG/ML: ABNORMAL
ANION GAP SERPL CALCULATED.3IONS-SCNC: 10 MMOL/L (ref 3–16)
BACTERIA URNS QL MICRO: ABNORMAL /HPF
BARBITURATES UR QL SCN>200 NG/ML: ABNORMAL
BASE EXCESS BLDV CALC-SCNC: -1.8 MMOL/L (ref -2–3)
BASOPHILS # BLD: 0 K/UL (ref 0–0.2)
BASOPHILS NFR BLD: 0.6 %
BENZODIAZ UR QL SCN>200 NG/ML: ABNORMAL
BILIRUB UR QL STRIP.AUTO: NEGATIVE
BUN SERPL-MCNC: 11 MG/DL (ref 7–20)
CALCIUM SERPL-MCNC: 8.7 MG/DL (ref 8.3–10.6)
CANNABINOIDS UR QL SCN>50 NG/ML: ABNORMAL
CHLORIDE SERPL-SCNC: 103 MMOL/L (ref 99–110)
CLARITY UR: CLEAR
CO2 BLDV-SCNC: 25 MMOL/L
CO2 SERPL-SCNC: 23 MMOL/L (ref 21–32)
COCAINE UR QL SCN: ABNORMAL
COHGB MFR BLDV: 0.3 % (ref 0–1.5)
COLOR UR: YELLOW
CREAT SERPL-MCNC: 0.5 MG/DL (ref 0.6–1.2)
DEPRECATED RDW RBC AUTO: 17.2 % (ref 12.4–15.4)
DO-HGB MFR BLDV: 8.4 %
DRUG SCREEN COMMENT UR-IMP: ABNORMAL
EOSINOPHIL # BLD: 0.2 K/UL (ref 0–0.6)
EOSINOPHIL NFR BLD: 2.7 %
EPI CELLS #/AREA URNS HPF: ABNORMAL /HPF (ref 0–5)
FENTANYL SCREEN, URINE: ABNORMAL
GFR SERPLBLD CREATININE-BSD FMLA CKD-EPI: >90 ML/MIN/{1.73_M2}
GLUCOSE SERPL-MCNC: 99 MG/DL (ref 70–99)
GLUCOSE UR STRIP.AUTO-MCNC: NEGATIVE MG/DL
HCO3 BLDV-SCNC: 23.3 MMOL/L (ref 24–28)
HCT VFR BLD AUTO: 33.9 % (ref 36–48)
HGB BLD-MCNC: 11.1 G/DL (ref 12–16)
HGB UR QL STRIP.AUTO: NEGATIVE
KETONES UR STRIP.AUTO-MCNC: NEGATIVE MG/DL
LEUKOCYTE ESTERASE UR QL STRIP.AUTO: ABNORMAL
LYMPHOCYTES # BLD: 1.7 K/UL (ref 1–5.1)
LYMPHOCYTES NFR BLD: 30.7 %
MCH RBC QN AUTO: 26.6 PG (ref 26–34)
MCHC RBC AUTO-ENTMCNC: 32.8 G/DL (ref 31–36)
MCV RBC AUTO: 80.9 FL (ref 80–100)
METHADONE UR QL SCN>300 NG/ML: ABNORMAL
METHGB MFR BLDV: 0.5 % (ref 0–1.5)
MONOCYTES # BLD: 0.5 K/UL (ref 0–1.3)
MONOCYTES NFR BLD: 9.4 %
NEUTROPHILS # BLD: 3.1 K/UL (ref 1.7–7.7)
NEUTROPHILS NFR BLD: 56.6 %
NITRITE UR QL STRIP.AUTO: NEGATIVE
OPIATES UR QL SCN>300 NG/ML: ABNORMAL
OXYCODONE UR QL SCN: POSITIVE
PCO2 BLDV: 39.9 MMHG (ref 41–51)
PCP UR QL SCN>25 NG/ML: ABNORMAL
PH BLDV: 7.38 [PH] (ref 7.35–7.45)
PH UR STRIP.AUTO: 6 [PH] (ref 5–8)
PH UR STRIP: 5 [PH]
PLATELET # BLD AUTO: 404 K/UL (ref 135–450)
PMV BLD AUTO: 7.3 FL (ref 5–10.5)
PO2 BLDV: 67.9 MMHG (ref 25–40)
POTASSIUM SERPL-SCNC: 3.6 MMOL/L (ref 3.5–5.1)
PROT UR STRIP.AUTO-MCNC: ABNORMAL MG/DL
RBC # BLD AUTO: 4.19 M/UL (ref 4–5.2)
RBC #/AREA URNS HPF: ABNORMAL /HPF (ref 0–4)
SAO2 % BLDV: 92 %
SODIUM SERPL-SCNC: 136 MMOL/L (ref 136–145)
SP GR UR STRIP.AUTO: 1.02 (ref 1–1.03)
UA DIPSTICK W REFLEX MICRO PNL UR: YES
URN SPEC COLLECT METH UR: ABNORMAL
UROBILINOGEN UR STRIP-ACNC: 0.2 E.U./DL
WBC # BLD AUTO: 5.6 K/UL (ref 4–11)
WBC #/AREA URNS HPF: ABNORMAL /HPF (ref 0–5)

## 2025-07-28 PROCEDURE — 94640 AIRWAY INHALATION TREATMENT: CPT

## 2025-07-28 PROCEDURE — 81001 URINALYSIS AUTO W/SCOPE: CPT

## 2025-07-28 PROCEDURE — 6370000000 HC RX 637 (ALT 250 FOR IP)

## 2025-07-28 PROCEDURE — 96376 TX/PRO/DX INJ SAME DRUG ADON: CPT

## 2025-07-28 PROCEDURE — 85025 COMPLETE CBC W/AUTO DIFF WBC: CPT

## 2025-07-28 PROCEDURE — 87086 URINE CULTURE/COLONY COUNT: CPT

## 2025-07-28 PROCEDURE — 36415 COLL VENOUS BLD VENIPUNCTURE: CPT

## 2025-07-28 PROCEDURE — 94761 N-INVAS EAR/PLS OXIMETRY MLT: CPT

## 2025-07-28 PROCEDURE — 99285 EMERGENCY DEPT VISIT HI MDM: CPT

## 2025-07-28 PROCEDURE — 96374 THER/PROPH/DIAG INJ IV PUSH: CPT

## 2025-07-28 PROCEDURE — 80307 DRUG TEST PRSMV CHEM ANLYZR: CPT

## 2025-07-28 PROCEDURE — 80048 BASIC METABOLIC PNL TOTAL CA: CPT

## 2025-07-28 PROCEDURE — 2700000000 HC OXYGEN THERAPY PER DAY

## 2025-07-28 PROCEDURE — 6360000002 HC RX W HCPCS: Performed by: EMERGENCY MEDICINE

## 2025-07-28 PROCEDURE — 6360000002 HC RX W HCPCS

## 2025-07-28 PROCEDURE — 82803 BLOOD GASES ANY COMBINATION: CPT

## 2025-07-28 RX ORDER — ESCITALOPRAM OXALATE 20 MG/1
20 TABLET ORAL DAILY
Status: DISCONTINUED | OUTPATIENT
Start: 2025-07-29 | End: 2025-07-31 | Stop reason: HOSPADM

## 2025-07-28 RX ORDER — TRAMADOL HYDROCHLORIDE 50 MG/1
50 TABLET ORAL EVERY 6 HOURS PRN
Status: DISCONTINUED | OUTPATIENT
Start: 2025-07-28 | End: 2025-07-29

## 2025-07-28 RX ORDER — ZIPRASIDONE HYDROCHLORIDE 40 MG/1
40 CAPSULE ORAL 2 TIMES DAILY WITH MEALS
Status: DISCONTINUED | OUTPATIENT
Start: 2025-07-29 | End: 2025-07-31 | Stop reason: HOSPADM

## 2025-07-28 RX ORDER — FUROSEMIDE 20 MG/1
20 TABLET ORAL DAILY
Status: DISCONTINUED | OUTPATIENT
Start: 2025-07-29 | End: 2025-07-31 | Stop reason: HOSPADM

## 2025-07-28 RX ORDER — ALBUTEROL SULFATE 0.83 MG/ML
2.5 SOLUTION RESPIRATORY (INHALATION) ONCE
Status: COMPLETED | OUTPATIENT
Start: 2025-07-28 | End: 2025-07-28

## 2025-07-28 RX ORDER — GAUZE BANDAGE 2" X 2"
100 BANDAGE TOPICAL DAILY
Status: DISCONTINUED | OUTPATIENT
Start: 2025-07-29 | End: 2025-07-31 | Stop reason: HOSPADM

## 2025-07-28 RX ORDER — OXYCODONE HYDROCHLORIDE 5 MG/1
5 TABLET ORAL ONCE
Refills: 0 | Status: COMPLETED | OUTPATIENT
Start: 2025-07-28 | End: 2025-07-28

## 2025-07-28 RX ORDER — METHOCARBAMOL 500 MG/1
750 TABLET, FILM COATED ORAL ONCE
Status: COMPLETED | OUTPATIENT
Start: 2025-07-28 | End: 2025-07-28

## 2025-07-28 RX ORDER — LISINOPRIL 5 MG/1
5 TABLET ORAL DAILY
Status: DISCONTINUED | OUTPATIENT
Start: 2025-07-29 | End: 2025-07-31 | Stop reason: HOSPADM

## 2025-07-28 RX ORDER — ASPIRIN 81 MG/1
81 TABLET, CHEWABLE ORAL DAILY
Status: DISCONTINUED | OUTPATIENT
Start: 2025-07-29 | End: 2025-07-31 | Stop reason: HOSPADM

## 2025-07-28 RX ADMIN — METHOCARBAMOL 750 MG: 500 TABLET ORAL at 15:19

## 2025-07-28 RX ADMIN — HYDROMORPHONE HYDROCHLORIDE 1 MG: 1 INJECTION, SOLUTION INTRAMUSCULAR; INTRAVENOUS; SUBCUTANEOUS at 17:42

## 2025-07-28 RX ADMIN — HYDROMORPHONE HYDROCHLORIDE 1 MG: 1 INJECTION, SOLUTION INTRAMUSCULAR; INTRAVENOUS; SUBCUTANEOUS at 15:20

## 2025-07-28 RX ADMIN — ALBUTEROL SULFATE 2.5 MG: 2.5 SOLUTION RESPIRATORY (INHALATION) at 18:28

## 2025-07-28 RX ADMIN — OXYCODONE 5 MG: 5 TABLET ORAL at 12:57

## 2025-07-28 ASSESSMENT — PAIN DESCRIPTION - LOCATION
LOCATION: LEG;BACK
LOCATION: LEG;BACK

## 2025-07-28 ASSESSMENT — PAIN SCALES - GENERAL
PAINLEVEL_OUTOF10: 10
PAINLEVEL_OUTOF10: 9

## 2025-07-28 ASSESSMENT — PAIN DESCRIPTION - ORIENTATION
ORIENTATION: RIGHT;LEFT
ORIENTATION: RIGHT;LEFT

## 2025-07-28 NOTE — ED PROVIDER NOTES
THE [facility]  EMERGENCY DEPARTMENT ENCOUNTER          EM RESIDENT NOTE     Date of evaluation: 7/28/2025    ADDENDUM:      Care of this patient was assumed from an off-going provider.  The patient was seen for Leg Pain (Bilateral leg pain, shooting down legs. Denies back pain. Was scheduled to have a spinal cord stimulator today but was unable to do so. )  .  The patient's initial evaluation and plan have been discussed with the prior provider who initially evaluated the patient.  Nursing Notes, Past Medical Hx, Past Surgical Hx, Social Hx, Allergies, and Family Hx were all reviewed.    MEDICAL DECISION MAKING / ASSESSMENT / PLAN     Terri Salmon is a 64 y.o. female presenting with acute on chronic back pain and inability to walk. Reportedly was having spinal cord stimulator removed. Coming from home. Evaluated by PT/OT on 7/11 who recommended home health care which was ordered.    Signed out to me pending PT/OT evaluation.    Extensive chart review performed.  Patient initially presented to the ER on 7/9/2025 with leg weakness low back pain she had a temporary spinal cord stimulator in place at that time, which was placed by a Dr. FLORENTIN Child at an outpatient spine clinic ([facility]).  Neurosurgery was consulted who recommended a CT lumbar spine given the spinal cord stimulator was incompatible with MRI.  CT was reviewed by neurosurgery noted to be similar to the prior CT with some postsurgical changes, so they recommended no further imaging at the that time.  She was admitted to the hospital for pain control and PT/OT.  Patient was seen by PT/OT who recommended home health care/home PT.  Patient was ultimately discharged on 7/11/2025 with plans to follow-up with her pain management doctor at [facility] [place] to have her temporary spinal cord stimulator removed and have MRI obtained subsequently.    Patient states since being discharged, the spinal cord stimulator was removed.  She

## 2025-07-28 NOTE — ED PROVIDER NOTES
I was called to the patient's room with concern for difficulty breathing.  Patient states that she was having an asthma exacerbation.  She has clear lungs bilaterally.  She is having noisy breathing from her throat.  When told that her lungs sound clear and that this sounds to be coming from her throat, she states that her throat is closing and that she needs a breathing treatment.  Of note while speaking all abnormal noises stop.  She has clear lungs on auscultation.  The patient states that she is going to die and that she has to receive a breathing treatment.  SPO2 98%    Review the patient's had atypical asthma-like presentations in the past.  Will obtain laboratory workup and provide breathing treatment for comfort.    Arnoldo Cardenas MD MPH   Physician     Arnoldo Cardenas MD  07/28/25 3309

## 2025-07-28 NOTE — ED NOTES
Pt shouting out for staff that she can't breathe, upon entering the patient's room RN observed pt lying flat in the bed while coughing, a dry, non productive cough. RN attempted to set the back of the bed, as RN is attempting to raise the HOB the patient started yelling that she can't be lifted. RN educated patient that if she was unable to breathe she needed to sit up, pt boosted herself up in the bed at this time. Pt continued to yell at this RN while RN was attempting to assess the patient and her pulse ox. Pt was sating at 94% on room air during this entire interaction. Pt was maintaining an airway and secretions during the entire interaction. RN explains to patient that she is assessing vitals and will get the physician, pt continued to yell and was rising up off her stretcher while yelling that this RN should go back to nursing school. MD made aware,      Esme Alvarez, LUIZ  07/28/25 2175

## 2025-07-28 NOTE — ED PROVIDER NOTES
THE [facility]  EMERGENCY DEPARTMENT ENCOUNTER          EM RESIDENT NOTE       Date of evaluation: 7/28/2025    Chief Complaint     Leg Pain (Bilateral leg pain, shooting down legs. Denies back pain. Was scheduled to have a spinal cord stimulator today but was unable to do so. )      History of Present Illness     Terri Salmon is a 64 y.o. female who presents for chronic right and left calf pain that she describes as seizing and sharp that goes shooting from her knee down to her posterior ankle.  This has been a chronic issue for her for a long period of time.  She decided come in today after having her spinal stimulator procedure canceled due to lack of vascular access and she decided that she was tired of being in so much pain and having difficulty ambulating.    Patient ambulates with a bariatric walker and requires the help of her  to get around.  Recently she has been unable to get around without her 's help which is led to her becoming incontinent of urine and bowel when he is not around. She is unable to perform her ADLs.     Has frequent minor falls due to pain and difficulty ambulating without help.  She takes 1000 mg 3 times daily of ibuprofen, Flexeril, and gabapentin for her pain however this does not manage it and she is unable to function normally in her daily life due to no pain control.    Denies any fever, chills, IV drug use, saddle anesthesia, bowel or bladder incontinence not secondary to inability to move.  Is not on any steroids or immunosuppressants.  No trauma to her back.  Review of systems is otherwise negative.    She has previously been seen at [facility] with Spine service there, however was unsatisfied with her care.     Patient recently admitted 7/5-7/12 for similar presentation of back pain.     MEDICAL DECISION MAKING / ASSESSMENT / PLAN     INITIAL VITALS: BP: (!) 146/104, Temp: 98.1 °F (36.7 °C), Pulse: 94, Respirations: 18, SpO2: 96 %    Terri Salmon is a 64 y.o.  (FLEXERIL) 10 MG TABLET    Take 1 tablet by mouth 3 times daily as needed for Muscle spasms    DICLOFENAC SODIUM (VOLTAREN) 1 % GEL    Apply 2 g topically 2 times daily    ESCITALOPRAM (LEXAPRO) 20 MG TABLET    Take 1 tablet by mouth daily    FLUTICASONE-SALMETEROL (WIXELA INHUB) 250-50 MCG/ACT AEPB DISKUS INHALER    Inhale 1 puff into the lungs in the morning and 1 puff in the evening.    FUROSEMIDE (LASIX) 20 MG TABLET    Take 1 tablet by mouth daily    GABAPENTIN (NEURONTIN) 600 MG TABLET    Take 1 tablet by mouth 3 times daily.    LISINOPRIL (PRINIVIL;ZESTRIL) 5 MG TABLET    Take 1 tablet by mouth daily    THIAMINE 100 MG TABLET    Take 1 tablet by mouth daily    TRAMADOL (ULTRAM) 50 MG TABLET    Take 1 tablet by mouth every 8 hours as needed for Pain.    VITAMIN B-12 (CYANOCOBALAMIN) 1000 MCG TABLET    Take 1 tablet by mouth daily    ZIPRASIDONE (GEODON) 40 MG CAPSULE    Take 1 capsule by mouth 2 times daily (with meals)       Allergies     She is allergic to cortisone, droperidol, and compazine [prochlorperazine].    Physical Exam     INITIAL VITALS: BP: (!) 146/104, Temp: 98.1 °F (36.7 °C), Pulse: 94, Respirations: 18, SpO2: 96 %   Physical Exam  Constitutional:       General: She is not in acute distress.     Appearance: Normal appearance. She is not ill-appearing or toxic-appearing.   HENT:      Head: Normocephalic and atraumatic.      Nose: Nose normal. No congestion or rhinorrhea.      Mouth/Throat:      Mouth: Mucous membranes are moist.      Pharynx: Oropharynx is clear. No oropharyngeal exudate.   Eyes:      Extraocular Movements: Extraocular movements intact.      Pupils: Pupils are equal, round, and reactive to light.   Cardiovascular:      Rate and Rhythm: Normal rate and regular rhythm.      Pulses: Normal pulses.      Heart sounds: Normal heart sounds.   Pulmonary:      Effort: Pulmonary effort is normal.      Breath sounds: Normal breath sounds. No stridor. No wheezing.   Abdominal:      General:

## 2025-07-28 NOTE — CARE COORDINATION
ADDENDUM:  2:31 PM    SW cons noted. Pt is unable to dc home and wants placement.   CEFERINO paged PT/OT.     Electronically signed by GARTH Hinojosa LSW, ACM-SW on 7/28/2025 at 2:31 PM    1:05 PM    Pt will be a readmission and will require a readmission assessment if admitted Inpatient.     Pt is from home with her . Pt is indp w/rw at baseline.     Electronically signed by GARTH Hinojosa, PEG IQBAL on 7/28/2025 at 1:05 PM  [phone]

## 2025-07-28 NOTE — ED PROVIDER NOTES
ED Attending Attestation Note     Date of evaluation: 7/28/2025    This patient was seen by the resident.  I have seen and examined the patient, agree with the workup, evaluation, management and diagnosis. The care plan has been discussed.  My assessment reveals patient with back and leg pain, recently admitted for same.  Patient now cannot manage herself at home due to the pain and inability to walk.  She is having to get full-time assistance from her significant other, who is also at the point of not being able to fully care for her.  PT/OT/CM engaged.  Care turned over to the oncoming team for follow-up on results and response to pain control attempts.     Jarod Arevalo MD  08/02/25 [id number]

## 2025-07-29 ENCOUNTER — APPOINTMENT (OUTPATIENT)
Dept: MRI IMAGING | Age: 65
End: 2025-07-29
Payer: COMMERCIAL

## 2025-07-29 PROBLEM — M54.9 INTRACTABLE BACK PAIN: Status: ACTIVE | Noted: 2025-07-29

## 2025-07-29 LAB
ANION GAP SERPL CALCULATED.3IONS-SCNC: 12 MMOL/L (ref 3–16)
BASOPHILS # BLD: 0 K/UL (ref 0–0.2)
BASOPHILS NFR BLD: 0.5 %
BUN SERPL-MCNC: 15 MG/DL (ref 7–20)
CALCIUM SERPL-MCNC: 8.8 MG/DL (ref 8.3–10.6)
CHLORIDE SERPL-SCNC: 101 MMOL/L (ref 99–110)
CO2 SERPL-SCNC: 21 MMOL/L (ref 21–32)
CREAT SERPL-MCNC: 0.5 MG/DL (ref 0.6–1.2)
DEPRECATED RDW RBC AUTO: 17.7 % (ref 12.4–15.4)
EOSINOPHIL # BLD: 0.2 K/UL (ref 0–0.6)
EOSINOPHIL NFR BLD: 3.2 %
GFR SERPLBLD CREATININE-BSD FMLA CKD-EPI: >90 ML/MIN/{1.73_M2}
GLUCOSE SERPL-MCNC: 112 MG/DL (ref 70–99)
HCT VFR BLD AUTO: 34.8 % (ref 36–48)
HGB BLD-MCNC: 11.3 G/DL (ref 12–16)
LYMPHOCYTES # BLD: 1.2 K/UL (ref 1–5.1)
LYMPHOCYTES NFR BLD: 20.7 %
MCH RBC QN AUTO: 27.1 PG (ref 26–34)
MCHC RBC AUTO-ENTMCNC: 32.4 G/DL (ref 31–36)
MCV RBC AUTO: 83.5 FL (ref 80–100)
MONOCYTES # BLD: 0.6 K/UL (ref 0–1.3)
MONOCYTES NFR BLD: 11.3 %
NEUTROPHILS # BLD: 3.6 K/UL (ref 1.7–7.7)
NEUTROPHILS NFR BLD: 64.3 %
PLATELET # BLD AUTO: 354 K/UL (ref 135–450)
PMV BLD AUTO: 7.4 FL (ref 5–10.5)
POTASSIUM SERPL-SCNC: 4.2 MMOL/L (ref 3.5–5.1)
RBC # BLD AUTO: 4.17 M/UL (ref 4–5.2)
SODIUM SERPL-SCNC: 134 MMOL/L (ref 136–145)
WBC # BLD AUTO: 5.6 K/UL (ref 4–11)

## 2025-07-29 PROCEDURE — 2500000003 HC RX 250 WO HCPCS

## 2025-07-29 PROCEDURE — 1200000000 HC SEMI PRIVATE

## 2025-07-29 PROCEDURE — 6370000000 HC RX 637 (ALT 250 FOR IP): Performed by: STUDENT IN AN ORGANIZED HEALTH CARE EDUCATION/TRAINING PROGRAM

## 2025-07-29 PROCEDURE — 6370000000 HC RX 637 (ALT 250 FOR IP)

## 2025-07-29 PROCEDURE — 97530 THERAPEUTIC ACTIVITIES: CPT

## 2025-07-29 PROCEDURE — 97166 OT EVAL MOD COMPLEX 45 MIN: CPT

## 2025-07-29 PROCEDURE — 6360000002 HC RX W HCPCS

## 2025-07-29 PROCEDURE — 85025 COMPLETE CBC W/AUTO DIFF WBC: CPT

## 2025-07-29 PROCEDURE — 96375 TX/PRO/DX INJ NEW DRUG ADDON: CPT

## 2025-07-29 PROCEDURE — 80048 BASIC METABOLIC PNL TOTAL CA: CPT

## 2025-07-29 PROCEDURE — 72148 MRI LUMBAR SPINE W/O DYE: CPT

## 2025-07-29 PROCEDURE — 97162 PT EVAL MOD COMPLEX 30 MIN: CPT

## 2025-07-29 PROCEDURE — 36415 COLL VENOUS BLD VENIPUNCTURE: CPT

## 2025-07-29 PROCEDURE — 83036 HEMOGLOBIN GLYCOSYLATED A1C: CPT

## 2025-07-29 RX ORDER — DIPHENHYDRAMINE HCL 25 MG
25 TABLET ORAL ONCE
Status: COMPLETED | OUTPATIENT
Start: 2025-07-29 | End: 2025-07-29

## 2025-07-29 RX ORDER — ATORVASTATIN CALCIUM 20 MG/1
20 TABLET, FILM COATED ORAL NIGHTLY
Status: DISCONTINUED | OUTPATIENT
Start: 2025-07-29 | End: 2025-07-31 | Stop reason: HOSPADM

## 2025-07-29 RX ORDER — SODIUM CHLORIDE 0.9 % (FLUSH) 0.9 %
5-40 SYRINGE (ML) INJECTION EVERY 12 HOURS SCHEDULED
Status: DISCONTINUED | OUTPATIENT
Start: 2025-07-29 | End: 2025-07-31 | Stop reason: HOSPADM

## 2025-07-29 RX ORDER — GABAPENTIN 300 MG/1
600 CAPSULE ORAL 3 TIMES DAILY
Status: DISCONTINUED | OUTPATIENT
Start: 2025-07-29 | End: 2025-07-30

## 2025-07-29 RX ORDER — ENOXAPARIN SODIUM 100 MG/ML
40 INJECTION SUBCUTANEOUS DAILY
Status: DISCONTINUED | OUTPATIENT
Start: 2025-07-29 | End: 2025-07-31 | Stop reason: HOSPADM

## 2025-07-29 RX ORDER — POTASSIUM CHLORIDE 1500 MG/1
40 TABLET, EXTENDED RELEASE ORAL PRN
Status: DISCONTINUED | OUTPATIENT
Start: 2025-07-29 | End: 2025-07-31 | Stop reason: HOSPADM

## 2025-07-29 RX ORDER — SODIUM CHLORIDE 0.9 % (FLUSH) 0.9 %
5-40 SYRINGE (ML) INJECTION PRN
Status: DISCONTINUED | OUTPATIENT
Start: 2025-07-29 | End: 2025-07-31 | Stop reason: HOSPADM

## 2025-07-29 RX ORDER — HYDROMORPHONE HYDROCHLORIDE 2 MG/1
0.5 TABLET ORAL ONCE
Refills: 0 | Status: COMPLETED | OUTPATIENT
Start: 2025-07-29 | End: 2025-07-29

## 2025-07-29 RX ORDER — ALBUTEROL SULFATE 0.83 MG/ML
2.5 SOLUTION RESPIRATORY (INHALATION) ONCE
Status: COMPLETED | OUTPATIENT
Start: 2025-07-29 | End: 2025-07-30

## 2025-07-29 RX ORDER — OXYCODONE HYDROCHLORIDE 5 MG/1
10 TABLET ORAL EVERY 6 HOURS PRN
Refills: 0 | Status: DISCONTINUED | OUTPATIENT
Start: 2025-07-29 | End: 2025-07-29

## 2025-07-29 RX ORDER — MAGNESIUM SULFATE IN WATER 40 MG/ML
2000 INJECTION, SOLUTION INTRAVENOUS PRN
Status: DISCONTINUED | OUTPATIENT
Start: 2025-07-29 | End: 2025-07-31 | Stop reason: HOSPADM

## 2025-07-29 RX ORDER — ONDANSETRON 4 MG/1
4 TABLET, ORALLY DISINTEGRATING ORAL EVERY 8 HOURS PRN
Status: DISCONTINUED | OUTPATIENT
Start: 2025-07-29 | End: 2025-07-31 | Stop reason: HOSPADM

## 2025-07-29 RX ORDER — ACETAMINOPHEN 325 MG/1
650 TABLET ORAL EVERY 6 HOURS SCHEDULED
Status: DISCONTINUED | OUTPATIENT
Start: 2025-07-29 | End: 2025-07-31 | Stop reason: HOSPADM

## 2025-07-29 RX ORDER — OXYCODONE HYDROCHLORIDE 5 MG/1
5 TABLET ORAL EVERY 6 HOURS PRN
Refills: 0 | Status: DISCONTINUED | OUTPATIENT
Start: 2025-07-29 | End: 2025-07-30

## 2025-07-29 RX ORDER — METHOCARBAMOL 500 MG/1
1000 TABLET, FILM COATED ORAL EVERY 8 HOURS
Status: DISCONTINUED | OUTPATIENT
Start: 2025-07-29 | End: 2025-07-31 | Stop reason: HOSPADM

## 2025-07-29 RX ORDER — ONDANSETRON 2 MG/ML
4 INJECTION INTRAMUSCULAR; INTRAVENOUS EVERY 6 HOURS PRN
Status: DISCONTINUED | OUTPATIENT
Start: 2025-07-29 | End: 2025-07-31 | Stop reason: HOSPADM

## 2025-07-29 RX ORDER — LORAZEPAM 0.5 MG/1
0.5 TABLET ORAL ONCE
Status: COMPLETED | OUTPATIENT
Start: 2025-07-29 | End: 2025-07-29

## 2025-07-29 RX ORDER — POTASSIUM CHLORIDE 7.45 MG/ML
10 INJECTION INTRAVENOUS PRN
Status: DISCONTINUED | OUTPATIENT
Start: 2025-07-29 | End: 2025-07-31 | Stop reason: HOSPADM

## 2025-07-29 RX ORDER — IBUPROFEN 400 MG/1
600 TABLET, FILM COATED ORAL EVERY 6 HOURS SCHEDULED
Status: DISCONTINUED | OUTPATIENT
Start: 2025-07-29 | End: 2025-07-30

## 2025-07-29 RX ORDER — LIDOCAINE 4 G/G
1 PATCH TOPICAL DAILY
Status: DISCONTINUED | OUTPATIENT
Start: 2025-07-29 | End: 2025-07-31 | Stop reason: HOSPADM

## 2025-07-29 RX ORDER — SODIUM CHLORIDE 9 MG/ML
INJECTION, SOLUTION INTRAVENOUS PRN
Status: DISCONTINUED | OUTPATIENT
Start: 2025-07-29 | End: 2025-07-31 | Stop reason: HOSPADM

## 2025-07-29 RX ORDER — POLYETHYLENE GLYCOL 3350 17 G/17G
17 POWDER, FOR SOLUTION ORAL DAILY PRN
Status: DISCONTINUED | OUTPATIENT
Start: 2025-07-29 | End: 2025-07-31 | Stop reason: HOSPADM

## 2025-07-29 RX ADMIN — OXYCODONE 10 MG: 5 TABLET ORAL at 07:46

## 2025-07-29 RX ADMIN — AMITRIPTYLINE HYDROCHLORIDE 25 MG: 25 TABLET ORAL at 21:35

## 2025-07-29 RX ADMIN — WATER 2000 MG: 1 INJECTION INTRAMUSCULAR; INTRAVENOUS; SUBCUTANEOUS at 14:49

## 2025-07-29 RX ADMIN — FUROSEMIDE 20 MG: 40 TABLET ORAL at 07:46

## 2025-07-29 RX ADMIN — GABAPENTIN 600 MG: 300 CAPSULE ORAL at 10:43

## 2025-07-29 RX ADMIN — WATER 1000 MG: 1 INJECTION INTRAMUSCULAR; INTRAVENOUS; SUBCUTANEOUS at 03:10

## 2025-07-29 RX ADMIN — IBUPROFEN 600 MG: 400 TABLET, FILM COATED ORAL at 19:35

## 2025-07-29 RX ADMIN — ACETAMINOPHEN 650 MG: 325 TABLET ORAL at 01:37

## 2025-07-29 RX ADMIN — METHOCARBAMOL 1000 MG: 500 TABLET ORAL at 07:46

## 2025-07-29 RX ADMIN — SODIUM CHLORIDE, PRESERVATIVE FREE 10 ML: 5 INJECTION INTRAVENOUS at 10:44

## 2025-07-29 RX ADMIN — DIPHENHYDRAMINE HYDROCHLORIDE 25 MG: 25 TABLET ORAL at 07:47

## 2025-07-29 RX ADMIN — ESCITALOPRAM OXALATE 20 MG: 20 TABLET, FILM COATED ORAL at 10:43

## 2025-07-29 RX ADMIN — METHOCARBAMOL 1000 MG: 500 TABLET ORAL at 01:37

## 2025-07-29 RX ADMIN — HYDROMORPHONE HYDROCHLORIDE 0.5 MG: 2 TABLET ORAL at 19:34

## 2025-07-29 RX ADMIN — TRAMADOL HYDROCHLORIDE 50 MG: 50 TABLET, COATED ORAL at 00:00

## 2025-07-29 RX ADMIN — ACETAMINOPHEN 650 MG: 325 TABLET ORAL at 07:46

## 2025-07-29 RX ADMIN — OXYCODONE 5 MG: 5 TABLET ORAL at 13:38

## 2025-07-29 RX ADMIN — METHOCARBAMOL 1000 MG: 500 TABLET ORAL at 17:21

## 2025-07-29 RX ADMIN — IBUPROFEN 600 MG: 400 TABLET, FILM COATED ORAL at 01:37

## 2025-07-29 RX ADMIN — GABAPENTIN 600 MG: 300 CAPSULE ORAL at 14:48

## 2025-07-29 RX ADMIN — ONDANSETRON 4 MG: 2 INJECTION, SOLUTION INTRAMUSCULAR; INTRAVENOUS at 12:24

## 2025-07-29 RX ADMIN — LORAZEPAM 0.5 MG: 0.5 TABLET ORAL at 19:34

## 2025-07-29 RX ADMIN — GABAPENTIN 600 MG: 300 CAPSULE ORAL at 21:20

## 2025-07-29 RX ADMIN — ASPIRIN 81 MG: 81 TABLET, CHEWABLE ORAL at 07:45

## 2025-07-29 RX ADMIN — LISINOPRIL 5 MG: 5 TABLET ORAL at 07:47

## 2025-07-29 RX ADMIN — Medication 2 G: at 20:30

## 2025-07-29 RX ADMIN — Medication 100 MG: at 07:46

## 2025-07-29 RX ADMIN — ACETAMINOPHEN 650 MG: 325 TABLET ORAL at 13:38

## 2025-07-29 RX ADMIN — DICLOFENAC SODIUM 2 G: 10 GEL TOPICAL at 14:48

## 2025-07-29 RX ADMIN — IBUPROFEN 600 MG: 400 TABLET, FILM COATED ORAL at 13:38

## 2025-07-29 RX ADMIN — SODIUM CHLORIDE, PRESERVATIVE FREE 10 ML: 5 INJECTION INTRAVENOUS at 21:00

## 2025-07-29 RX ADMIN — GABAPENTIN 600 MG: 300 CAPSULE ORAL at 03:10

## 2025-07-29 RX ADMIN — ATORVASTATIN CALCIUM 20 MG: 20 TABLET, FILM COATED ORAL at 21:20

## 2025-07-29 RX ADMIN — ENOXAPARIN SODIUM 40 MG: 100 INJECTION SUBCUTANEOUS at 10:44

## 2025-07-29 RX ADMIN — OXYCODONE 10 MG: 5 TABLET ORAL at 01:37

## 2025-07-29 RX ADMIN — IBUPROFEN 600 MG: 400 TABLET, FILM COATED ORAL at 07:46

## 2025-07-29 RX ADMIN — ACETAMINOPHEN 650 MG: 325 TABLET ORAL at 19:35

## 2025-07-29 RX ADMIN — Medication 2 G: at 22:50

## 2025-07-29 RX ADMIN — OXYCODONE 5 MG: 5 TABLET ORAL at 21:20

## 2025-07-29 ASSESSMENT — ENCOUNTER SYMPTOMS
ABDOMINAL PAIN: 0
WHEEZING: 0
ABDOMINAL DISTENTION: 0
SHORTNESS OF BREATH: 0
CHEST TIGHTNESS: 0
BACK PAIN: 1
VOMITING: 0
DIARRHEA: 0

## 2025-07-29 ASSESSMENT — PAIN DESCRIPTION - DESCRIPTORS
DESCRIPTORS: ACHING
DESCRIPTORS: ACHING

## 2025-07-29 ASSESSMENT — PAIN SCALES - GENERAL
PAINLEVEL_OUTOF10: 7
PAINLEVEL_OUTOF10: 7
PAINLEVEL_OUTOF10: 8
PAINLEVEL_OUTOF10: 7
PAINLEVEL_OUTOF10: 8
PAINLEVEL_OUTOF10: 10
PAINLEVEL_OUTOF10: 5
PAINLEVEL_OUTOF10: 10

## 2025-07-29 ASSESSMENT — PAIN DESCRIPTION - LOCATION
LOCATION: LEG
LOCATION: HIP;LEG
LOCATION: LEG
LOCATION: BACK;LEG
LOCATION: HIP;LEG
LOCATION: LEG

## 2025-07-29 ASSESSMENT — PAIN DESCRIPTION - ORIENTATION
ORIENTATION: LEFT
ORIENTATION: RIGHT;LEFT

## 2025-07-29 ASSESSMENT — PAIN DESCRIPTION - FREQUENCY: FREQUENCY: CONTINUOUS

## 2025-07-29 ASSESSMENT — PAIN DESCRIPTION - ONSET: ONSET: ON-GOING

## 2025-07-29 ASSESSMENT — PAIN DESCRIPTION - PAIN TYPE: TYPE: ACUTE PAIN;CHRONIC PAIN

## 2025-07-29 ASSESSMENT — PAIN - FUNCTIONAL ASSESSMENT: PAIN_FUNCTIONAL_ASSESSMENT: PREVENTS OR INTERFERES WITH MANY ACTIVE NOT PASSIVE ACTIVITIES

## 2025-07-29 NOTE — CARE COORDINATION
Case Management Assessment  Initial Evaluation    Date/Time of Evaluation: 7/29/2025 3:22 PM  Assessment Completed by: GARTH Hinojosa, CARINW    If patient is discharged prior to next notation, then this note serves as note for discharge by case management.    Patient Name: Terri Salmon                   YOB: 1960  Diagnosis: Radicular low back pain [M54.10]  Intractable back pain [M54.9]  Urinary tract infection without hematuria, site unspecified [N39.0]                   Date / Time: 7/28/2025 11:30 AM    Patient Admission Status: Inpatient   Readmission Risk (Low < 19, Mod (19-27), High > 27): Readmission Risk Score: 11.9    Current PCP: Yaritza Rosas MD  PCP verified by CM? No    Chart Reviewed: Yes      History Provided by: Medical Record  Patient Orientation: Alert and Oriented    Patient Cognition: Alert    Hospitalization in the last 30 days (Readmission):  Yes    If yes, Readmission Assessment in  Navigator will be completed.  Readmission Assessment  Number of Days since last admission?: 1-7 days  Previous Disposition: Home with Family  Who is being Interviewed: Patient  What was the patient's/caregiver's perception as to why they think they needed to return back to the hospital?: Other (Comment) (more pain)  Did you visit your Primary Care Physician after you left the hospital, before you returned this time?: No  Why weren't you able to visit your PCP?: Did not have an appointment  Did you see a specialist, such as Cardiac, Pulmonary, Orthopedic Physician, etc. after you left the hospital?: No  Who advised the patient to return to the hospital?: Self-referral  Does the patient report anything that got in the way of taking their medications?: No  In our efforts to provide the best possible care to you and others like you, can you think of anything that we could have done to help you after you left the hospital the first time, so that you might not have needed to return so soon?: Other

## 2025-07-29 NOTE — PROGRESS NOTES
Occupational Therapy  Facility/Department: [address]  Occupational Therapy Initial Assessment    Name: Terri Salmon  : 1960  MRN: [id number]  Date of Service: 2025    Discharge Recommendations:  Subacute/Skilled Nursing Facility  OT Equipment Recommendations  Equipment Needed: No       Patient Diagnosis(es): The primary encounter diagnosis was Radicular low back pain. A diagnosis of Urinary tract infection without hematuria, site unspecified was also pertinent to this visit.  Past Medical History:  has a past medical history of Asthma, Depression, Osteoarthritis, PONV (postoperative nausea and vomiting), and Scoliosis.  Past Surgical History:  has a past surgical history that includes Gastric bypass surgery (); Rotator cuff repair (Left, ); Knee cartilage surgery (Left, ); Breast enhancement surgery (Bilateral, );  section ( & ); Cholecystectomy (); [place] tooth extraction (); joint replacement (Bilateral, 2014); fracture surgery; joint replacement; and Total hip arthroplasty (Bilateral, ).    Treatment Diagnosis: imp mob, tf, ADL      Assessment  Performance deficits / Impairments: Decreased functional mobility ;Decreased ADL status;Decreased endurance  Assessment: From home.  Pt completing mobility and transfers with Ax2 this date with RW. Unable to ambulate. Completing ADLs with assist. Pt would benefit from cont therapies while in acute care. Rec cont inpt care at [facility]. Cont POC.  Treatment Diagnosis: imp mob, tf, ADL  Decision Making: Medium Complexity  REQUIRES OT FOLLOW-UP: Yes  Activity Tolerance  Activity Tolerance: Patient limited by fatigue;Patient limited by pain;Patient Tolerated treatment well     Plan  Occupational Therapy Plan  Times Per Week: 2-5  Current Treatment Recommendations: Strengthening, ROM, Balance training, Functional mobility training, Endurance training, Safety education & training, Patient/Caregiver education & training,  urinal)?: A Lot  How much help is needed for putting on and taking off regular upper body clothing?: A Little  How much help is needed for taking care of personal grooming?: A Little  How much help for eating meals?: A Little  AM-PAC Inpatient Daily Activity Raw Score: 15  AM-PAC Inpatient ADL T-Scale Score : 34.69  ADL Inpatient CMS 0-100% Score: 56.46  ADL Inpatient CMS G-Code Modifier : CK    Tinneti Score       Goals  Short Term Goals  Time Frame for Short Term Goals: dc  Short Term Goal 1: supine to sit with CGA  Short Term Goal 2: Fx transfers with CGA  Short Term Goal 3: toileting with CGA  Short Term Goal 4: UB/LB dressing with CGA      Therapy Time   Individual Concurrent Group Co-treatment   Time In 0820         Time Out 0846         Minutes 26             Timed Code Treatment Minutes:   11    Total Treatment Minutes:26      Caro Bui, BARB, OTR/L, CNS

## 2025-07-29 NOTE — H&P
Internal Medicine H&P    Date: 2025   Patient: Terri Salmon   Patient : 1960     CC: Leg Pain (Bilateral leg pain, shooting down legs. Denies back pain. Was scheduled to have a spinal cord stimulator today but was unable to do so. )       Subjective     HPI: Terri Salmon is a 64 y.o. female who presented with PMHx significant for chronic back pain with spinal cord stimulator, COPD, chronic depression, hypertension who presents with bilateral leg pain.    Recently admitted 25 for bilateral lower extremity tightness, weakness, and difficulty with coordination and walking.    Today patient presents with chronic bilateral calf pain and difficulty ambulating. She reports that she was to have her spinal stimulator procedure performed today but was unable due to lack of vascular access.    After returning home the patient states that she was unable to walk which prompted her to present to the ED. She states that the pain is in alternating calves but currently controlled in the ED after receiving MMPR.     Patient denies bowel or bladder incontinence or saddle anesthesia.    PMHx:      Diagnosis Date    Asthma     Depression     Osteoarthritis     PONV (postoperative nausea and vomiting)     Scoliosis        PSurgHx:      Procedure Laterality Date    BREAST ENHANCEMENT SURGERY Bilateral 2007     SECTION   &     CHOLECYSTECTOMY  1980    FRACTURE SURGERY      femur RT    GASTRIC BYPASS SURGERY  1998    Bsail N y    JOINT REPLACEMENT Bilateral 2014    knee replacement    JOINT REPLACEMENT      KNEE CARTILAGE SURGERY Left 2006    ROTATOR CUFF REPAIR Left     TOTAL HIP ARTHROPLASTY Bilateral     WISDOM TOOTH EXTRACTION          Medication:  Not in a hospital admission.    Allergies:  Cortisone, Droperidol, and Compazine [prochlorperazine]    SocHx:  Social History     Socioeconomic History    Marital status:      Spouse name: None    Number of children: None    Years of  Rhythm: Normal rate and regular rhythm.      Pulses: Normal pulses.      Heart sounds: Normal heart sounds.   Pulmonary:      Effort: Pulmonary effort is normal.      Breath sounds: Normal breath sounds.   Abdominal:      Tenderness: There is no abdominal tenderness.   Musculoskeletal:      Right lower leg: No edema.      Left lower leg: No edema.   Skin:     Capillary Refill: Capillary refill takes less than 2 seconds.   Neurological:      General: No focal deficit present.      Mental Status: She is alert and oriented to person, place, and time.   Psychiatric:         Behavior: Behavior normal.            Labs:  CBC:   Recent Labs     07/28/25  1840   WBC 5.6   HGB 11.1*   HCT 33.9*          BMP:   Recent Labs     07/28/25 1840      K 3.6      CO2 23   BUN 11   CREATININE 0.5*   GLUCOSE 99     Magnesium: No results for input(s): \"MG\" in the last 72 hours.  LFT's: No results for input(s): \"AST\", \"ALT\", \"BILITOT\", \"ALKPHOS\" in the last 72 hours.    Invalid input(s): \"ALB\"    Troponin: No results for input(s): \"TROPONINI\" in the last 72 hours.  Lactic acid: No results for input(s): \"LACTATE\" in the last 72 hours.    BNP: No results for input(s): \"BNP\" in the last 72 hours.  Pro-BNP: No results for input(s): \"PROBNP\" in the last 72 hours.    Procalcitonin: No results for input(s): \"PROCAL\" in the last 72 hours.  CRP: No results for input(s): \"CRP\" in the last 72 hours.  ESR: No results for input(s): \"ESR\" in the last 72 hours.    ABGs: No results for input(s): \"PHART\", \"EIW2VLE\", \"PO2ART\" in the last 72 hours.  VBGs:   Recent Labs     07/28/25 1840   PHVEN 7.375   NZL7ZUB 39.9*   PO2VEN 67.9*       INR: No results for input(s): \"INR\" in the last 72 hours.    COVID-19: No results for input(s): \"COVID19\" in the last 72 hours.    U/A:   Recent Labs     07/28/25  2214   COLORU Yellow   PHUR 5.0  6.0   WBCUA 10-20*   RBCUA 0-2   BACTERIA 4+*   CLARITYU Clear   LEUKOCYTESUR MODERATE*   UROBILINOGEN 0.2

## 2025-07-29 NOTE — PROGRESS NOTES
4 Eyes Skin Assessment     NAME:  Terri Salmon  YOB: 1960  MEDICAL RECORD NUMBER:  [id number]    The patient is being assessed for  Admission    I agree that at least one RN has performed a thorough Head to Toe Skin Assessment on the patient. ALL assessment sites listed below have been assessed.      Areas assessed by both nurses:    Head, Face, Ears, Shoulders, Back, Chest, Arms, Elbows, Hands, Sacrum. Buttock, Coccyx, Ischium, Legs. Feet and Heels, and Under Medical Devices     Redness to abd fold         Does the Patient have a Wound? No noted wound(s)       Evan Prevention initiated by RN: Yes  Wound Care Orders initiated by RN: No    For hospital-acquired stage 1 & 2 and ALL Stage 3,4, Unstageable, DTI, NWPT, and Complex wounds: place order “IP Wound Care/Ostomy Nurse Eval and Treat” by RN under : NA    New Ostomies, if present place, Ostomy referral order under : No     Nurse 1 eSignature: Electronically signed by Claudette Howe RN on 7/29/25 at 10:17 AM EDT    **SHARE this note so that the co-signing nurse can place an eSignature**    Nurse 2 eSignature: {Esignature:[id number]}

## 2025-07-29 NOTE — PROGRESS NOTES
Physical Therapy  Facility/Department: [address]  Physical Therapy Initial Assessment/treatment    Name: Terri Salmon  : 1960  MRN: [id number]  Date of Service: 2025    Discharge Recommendations:  Subacute/Skilled Nursing Facility   PT Equipment Recommendations  Other: defer      Patient Diagnosis(es): The primary encounter diagnosis was Radicular low back pain. A diagnosis of Urinary tract infection without hematuria, site unspecified was also pertinent to this visit.  Past Medical History:  has a past medical history of Asthma, Depression, Osteoarthritis, PONV (postoperative nausea and vomiting), and Scoliosis.  Past Surgical History:  has a past surgical history that includes Gastric bypass surgery (); Rotator cuff repair (Left, ); Knee cartilage surgery (Left, ); Breast enhancement surgery (Bilateral, );  section ( & ); Cholecystectomy (); [place] tooth extraction (); joint replacement (Bilateral, 2014); fracture surgery; joint replacement; and Total hip arthroplasty (Bilateral, ).    Assessment  Assessment: pt from home with spouse, presents with progressive weakness and several falls. Currently requiring mod A for bed mobility and mod A x2 to stand to RW at EOB, unsafe to pivot to chair or attempt ambulation as pt demo's ataxic/jerky mvmt of RLE and BUEs upon attempts to stand and states \"I'm going to fall!\" several times. Pt well below her typical baseline and a high fall risk. Rec IP PT at OR.  Treatment Diagnosis: gait difficulty  Therapy Prognosis: Good  Decision Making: Medium Complexity  Requires PT Follow-Up: Yes  Activity Tolerance  Activity Tolerance: Patient tolerated evaluation without incident;Patient tolerated treatment well    Plan  Physical Therapy Plan  General Plan:  (2-5)  Current Treatment Recommendations: Strengthening, Balance training, Functional mobility training, Transfer training, Endurance training, Gait training, Stair  with injury in the past year?: Yes (\"several  times a week\")  Prior Level of Assist for ADLs: Needs assistance ( SBA for bathing, IND with dressing)  Prior Level of Assist for Homemaking:  ( does all)  Homemaking Responsibilities: No  Prior Level of Assist for Ambulation: Independent community ambulator, with or without device, Independent household ambulator, with or without device (bariatric rollator or recently got scooter)  Prior Level of Assist for Transfers: Independent  Active : Yes  Occupation: Retired  Type of Occupation: Nurse Practitioner, cellular biology/  Leisure & Hobbies: hang out with grandkids, go to the pool  Vision/Hearing  Vision  Vision: Impaired  Vision Exceptions: Wears glasses for reading;Wears glasses for distance  Hearing  Hearing: Impaired  Hearing Exceptions: Hard of hearing/hearing concerns    Cognition   Orientation  Orientation Level: Oriented X4  Cognition  Overall Cognitive Status: Exceptions    Objective  Temp: 98.2 °F (36.8 °C)  Pulse: 90  Heart Rate Source: Monitor  Respirations: 16  SpO2: 95 %  O2 Device: None (Room air)  BP: (!) 154/96  MAP (Calculated): 115  BP Location: Right upper arm  BP Method: Automatic  Patient Position: Semi fowlers                            Balance  Sitting: Intact  Standing: With support (mod A x2 with RW)  Bed mobility  Supine to Sit: Stand by assistance  Sit to Supine: Partial/Moderate assistance (assist at LEs)  Scooting: Stand by assistance  Transfers  Sit to Stand: Partial/Moderate assistance;2 Person Assistance (mod A x2 from EOB to RW. Pt demo's ataxic/jerky mvmt of RLE and UEs upon standing attempts, unsafe to continue.)  Stand to Sit: 2 Person Assistance;Partial/Moderate assistance              AM-PAC - Mobility    AM-PAC Basic Mobility - Inpatient   How much help is needed turning from your back to your side while in a flat bed without using bedrails?: A Little  How much help is needed moving from lying on your

## 2025-07-29 NOTE — PROGRESS NOTES
[facility] - Clinical Pharmacy Note  Ceftriaxone ordered for UTI. Dose has been adjusted from 1000 mg to 2000 mg based on dose adjustment policy.  Body mass index is 35.66 kg/m².  Pharmacist to automatically order ceftriaxone 2000 mg dose, despite indication (outside of gonorrhea) for patients with BMI >= 30 kg/m2  Site/Type of Infection Dose and Route Frequency   Bacteremia 2000 mg intravenously Every 24 hours   Critically Ill 2000 mg intravenously Every 24 hours   Endocarditis (Enterococcus synergy) 2000 mg intravenously Every 12 hours   Endocarditis (non-Enterococcus synergy) 2000 mg intravenously Every 24 hours   Intra-abdominal infection 2000 mg intravenously Every 24 hours   Spontaneous bacterial peritonitis treatment 2000 mg intravenously Every 24 hours   Spontaneous bacterial peritonitis prophylaxis 1000 mg intravenously Every 24 hours   Meningitis and CNS infections 2000 mg intravenously Every 12 hours   Osteomyelitis/septic arthritis  2000 mg intravenously Every 24 hours   Prosthetic joint infections 2000 mg intravenously Every 24 hours   Skin and soft tissue infections 1000 mg intravenously Every 24 hours   Pneumonia  1000 mg intravenously  Every 24 hours   Urinary tract infections 1000 mg intravenously Every 24 hours   Gonorrhea < 150 kg 500 mg intramuscularly ONCE   Gonorrhea >= 150 kg 1000 mg intramuscularly ONCE   Uncomplicated disseminated gonorrhea 1000 mg intramuscularly or intravenously Every 24 hours     Thanks,     Alli Johnson, [facility] 7/29/2025

## 2025-07-29 NOTE — PROGRESS NOTES
a     Internal Medicine Progress Note    Date: 7/29/2025   Patient: Terir Salmon   [facility] Day: 0      CC: Leg Pain (Bilateral leg pain, shooting down legs. Denies back pain. Was scheduled to have a spinal cord stimulator today but was unable to do so. )       Interval Hx     -Patient reports relief with pain medications given.  Reports pinpoint tenderness to her lumbar and sacral full region.  Reports still having pain in her lower extremities, especially her calves.   - Patient also reported a recent history of urinary incontinence at home with a recent fever.   -Vital signs stable, afebrile, room air  - Patient currently denies any bowel or bladder incontinence, or saddle area genitourinary anesthesia.    Plan  - Continue ceftriaxone; urine cultures pending  - Will wean off opioids and try a multimodal pain relief.  Will start Voltaren gel to affected areas.  - MRI with and without contrast of the lumbar and sacral coccyx region pending    HPI:   64-year-old with a past medical history significant for chronic back pain with spinal cord stimulator that was recently removed, COPD, chronic depression, hypertension presented with bilateral leg pain.     Patient was recently admitted for similar presentation with bilateral lower extremity weakness and pain with trouble ambulating.  She was supposedly supposed to get a spinal stimulator procedure performed, but was unable to obtain vascular access.  After returning home patient was unable to walk and, which prompted her to present as well to the [facility].         ROS   Review of Systems   Constitutional:  Negative for chills, fatigue and fever.   Respiratory:  Negative for chest tightness, shortness of breath and wheezing.    Cardiovascular:  Negative for chest pain, palpitations and leg swelling.   Gastrointestinal:  Negative for abdominal distention, abdominal pain, diarrhea and vomiting.   Genitourinary:  Positive for frequency. Negative for difficulty  rehab     Will discuss with attending physician Dangelo Nichole, DO     _____________________  Jenelle Kapoor MD   Internal Medicine Resident, PGY-1

## 2025-07-30 ENCOUNTER — APPOINTMENT (OUTPATIENT)
Dept: MRI IMAGING | Age: 65
End: 2025-07-30
Payer: COMMERCIAL

## 2025-07-30 PROBLEM — M54.10 RADICULAR LOW BACK PAIN: Status: ACTIVE | Noted: 2025-07-30

## 2025-07-30 LAB
ALBUMIN SERPL-MCNC: 3.6 G/DL (ref 3.4–5)
ANION GAP SERPL CALCULATED.3IONS-SCNC: 10 MMOL/L (ref 3–16)
ANION GAP SERPL CALCULATED.3IONS-SCNC: 10 MMOL/L (ref 3–16)
BACTERIA UR CULT: NORMAL
BASOPHILS # BLD: 0.1 K/UL (ref 0–0.2)
BASOPHILS NFR BLD: 0.9 %
BUN SERPL-MCNC: 20 MG/DL (ref 7–20)
BUN SERPL-MCNC: 20 MG/DL (ref 7–20)
CALCIUM SERPL-MCNC: 8.9 MG/DL (ref 8.3–10.6)
CALCIUM SERPL-MCNC: 9 MG/DL (ref 8.3–10.6)
CHLORIDE SERPL-SCNC: 100 MMOL/L (ref 99–110)
CHLORIDE SERPL-SCNC: 100 MMOL/L (ref 99–110)
CO2 SERPL-SCNC: 23 MMOL/L (ref 21–32)
CO2 SERPL-SCNC: 23 MMOL/L (ref 21–32)
CREAT SERPL-MCNC: 0.6 MG/DL (ref 0.6–1.2)
CREAT SERPL-MCNC: 0.7 MG/DL (ref 0.6–1.2)
DEPRECATED RDW RBC AUTO: 17.1 % (ref 12.4–15.4)
EOSINOPHIL # BLD: 0.2 K/UL (ref 0–0.6)
EOSINOPHIL NFR BLD: 2.7 %
GFR SERPLBLD CREATININE-BSD FMLA CKD-EPI: >90 ML/MIN/{1.73_M2}
GFR SERPLBLD CREATININE-BSD FMLA CKD-EPI: >90 ML/MIN/{1.73_M2}
GLUCOSE SERPL-MCNC: 110 MG/DL (ref 70–99)
GLUCOSE SERPL-MCNC: 111 MG/DL (ref 70–99)
HCT VFR BLD AUTO: 34.1 % (ref 36–48)
HGB BLD-MCNC: 11 G/DL (ref 12–16)
LYMPHOCYTES # BLD: 1.3 K/UL (ref 1–5.1)
LYMPHOCYTES NFR BLD: 18.2 %
MAGNESIUM SERPL-MCNC: 1.9 MG/DL (ref 1.8–2.4)
MCH RBC QN AUTO: 26.5 PG (ref 26–34)
MCHC RBC AUTO-ENTMCNC: 32.3 G/DL (ref 31–36)
MCV RBC AUTO: 82 FL (ref 80–100)
MONOCYTES # BLD: 0.7 K/UL (ref 0–1.3)
MONOCYTES NFR BLD: 9.5 %
NEUTROPHILS # BLD: 4.9 K/UL (ref 1.7–7.7)
NEUTROPHILS NFR BLD: 68.7 %
PHOSPHATE SERPL-MCNC: 4.9 MG/DL (ref 2.5–4.9)
PLATELET # BLD AUTO: 376 K/UL (ref 135–450)
PMV BLD AUTO: 7.7 FL (ref 5–10.5)
POTASSIUM SERPL-SCNC: 4.5 MMOL/L (ref 3.5–5.1)
POTASSIUM SERPL-SCNC: 4.5 MMOL/L (ref 3.5–5.1)
RBC # BLD AUTO: 4.16 M/UL (ref 4–5.2)
SODIUM SERPL-SCNC: 133 MMOL/L (ref 136–145)
SODIUM SERPL-SCNC: 133 MMOL/L (ref 136–145)
WBC # BLD AUTO: 7.1 K/UL (ref 4–11)

## 2025-07-30 PROCEDURE — 6370000000 HC RX 637 (ALT 250 FOR IP): Performed by: STUDENT IN AN ORGANIZED HEALTH CARE EDUCATION/TRAINING PROGRAM

## 2025-07-30 PROCEDURE — 6370000000 HC RX 637 (ALT 250 FOR IP)

## 2025-07-30 PROCEDURE — 6370000000 HC RX 637 (ALT 250 FOR IP): Performed by: NURSE PRACTITIONER

## 2025-07-30 PROCEDURE — 72195 MRI PELVIS W/O DYE: CPT

## 2025-07-30 PROCEDURE — 83735 ASSAY OF MAGNESIUM: CPT

## 2025-07-30 PROCEDURE — 93005 ELECTROCARDIOGRAM TRACING: CPT | Performed by: HOSPITALIST

## 2025-07-30 PROCEDURE — 85025 COMPLETE CBC W/AUTO DIFF WBC: CPT

## 2025-07-30 PROCEDURE — 97530 THERAPEUTIC ACTIVITIES: CPT

## 2025-07-30 PROCEDURE — 6370000000 HC RX 637 (ALT 250 FOR IP): Performed by: HOSPITALIST

## 2025-07-30 PROCEDURE — 6360000002 HC RX W HCPCS

## 2025-07-30 PROCEDURE — 80069 RENAL FUNCTION PANEL: CPT

## 2025-07-30 PROCEDURE — 94761 N-INVAS EAR/PLS OXIMETRY MLT: CPT

## 2025-07-30 PROCEDURE — 1200000000 HC SEMI PRIVATE

## 2025-07-30 PROCEDURE — 94640 AIRWAY INHALATION TREATMENT: CPT

## 2025-07-30 PROCEDURE — 2500000003 HC RX 250 WO HCPCS

## 2025-07-30 PROCEDURE — 36415 COLL VENOUS BLD VENIPUNCTURE: CPT

## 2025-07-30 PROCEDURE — APPNB60 APP NON BILLABLE TIME 46-60 MINS: Performed by: NURSE PRACTITIONER

## 2025-07-30 PROCEDURE — 97535 SELF CARE MNGMENT TRAINING: CPT

## 2025-07-30 RX ORDER — GABAPENTIN 400 MG/1
800 CAPSULE ORAL 3 TIMES DAILY
Status: DISCONTINUED | OUTPATIENT
Start: 2025-07-30 | End: 2025-07-31 | Stop reason: HOSPADM

## 2025-07-30 RX ORDER — ALBUTEROL SULFATE 0.83 MG/ML
2.5 SOLUTION RESPIRATORY (INHALATION) EVERY 4 HOURS PRN
Status: DISCONTINUED | OUTPATIENT
Start: 2025-07-30 | End: 2025-07-31 | Stop reason: HOSPADM

## 2025-07-30 RX ORDER — OXYCODONE HYDROCHLORIDE 5 MG/1
10 TABLET ORAL EVERY 6 HOURS PRN
Refills: 0 | Status: DISCONTINUED | OUTPATIENT
Start: 2025-07-30 | End: 2025-07-31 | Stop reason: HOSPADM

## 2025-07-30 RX ORDER — IBUPROFEN 400 MG/1
600 TABLET, FILM COATED ORAL 2 TIMES DAILY
Status: DISCONTINUED | OUTPATIENT
Start: 2025-07-30 | End: 2025-07-31 | Stop reason: HOSPADM

## 2025-07-30 RX ADMIN — METHOCARBAMOL 1000 MG: 500 TABLET ORAL at 00:25

## 2025-07-30 RX ADMIN — METHOCARBAMOL 1000 MG: 500 TABLET ORAL at 08:10

## 2025-07-30 RX ADMIN — TIZANIDINE 4 MG: 4 TABLET ORAL at 11:57

## 2025-07-30 RX ADMIN — OXYCODONE 5 MG: 5 TABLET ORAL at 05:46

## 2025-07-30 RX ADMIN — ALBUTEROL SULFATE 2.5 MG: 2.5 SOLUTION RESPIRATORY (INHALATION) at 13:01

## 2025-07-30 RX ADMIN — ACETAMINOPHEN 650 MG: 325 TABLET ORAL at 00:26

## 2025-07-30 RX ADMIN — ONDANSETRON 4 MG: 2 INJECTION, SOLUTION INTRAMUSCULAR; INTRAVENOUS at 12:54

## 2025-07-30 RX ADMIN — LISINOPRIL 5 MG: 5 TABLET ORAL at 08:11

## 2025-07-30 RX ADMIN — GABAPENTIN 800 MG: 400 CAPSULE ORAL at 13:50

## 2025-07-30 RX ADMIN — ACETAMINOPHEN 650 MG: 325 TABLET ORAL at 05:46

## 2025-07-30 RX ADMIN — IBUPROFEN 600 MG: 400 TABLET, FILM COATED ORAL at 00:26

## 2025-07-30 RX ADMIN — ASPIRIN 81 MG: 81 TABLET, CHEWABLE ORAL at 08:11

## 2025-07-30 RX ADMIN — SODIUM CHLORIDE, PRESERVATIVE FREE 10 ML: 5 INJECTION INTRAVENOUS at 20:17

## 2025-07-30 RX ADMIN — OXYCODONE 10 MG: 5 TABLET ORAL at 13:50

## 2025-07-30 RX ADMIN — GABAPENTIN 600 MG: 300 CAPSULE ORAL at 08:10

## 2025-07-30 RX ADMIN — ZIPRASIDONE HYDROCHLORIDE 40 MG: 40 CAPSULE ORAL at 18:39

## 2025-07-30 RX ADMIN — Medication 2 G: at 13:53

## 2025-07-30 RX ADMIN — AMITRIPTYLINE HYDROCHLORIDE 25 MG: 25 TABLET ORAL at 21:03

## 2025-07-30 RX ADMIN — ENOXAPARIN SODIUM 40 MG: 100 INJECTION SUBCUTANEOUS at 08:09

## 2025-07-30 RX ADMIN — IBUPROFEN 600 MG: 400 TABLET, FILM COATED ORAL at 05:46

## 2025-07-30 RX ADMIN — ACETAMINOPHEN 650 MG: 325 TABLET ORAL at 17:44

## 2025-07-30 RX ADMIN — ESCITALOPRAM OXALATE 20 MG: 20 TABLET, FILM COATED ORAL at 08:11

## 2025-07-30 RX ADMIN — GABAPENTIN 800 MG: 400 CAPSULE ORAL at 20:17

## 2025-07-30 RX ADMIN — IBUPROFEN 600 MG: 400 TABLET ORAL at 20:17

## 2025-07-30 RX ADMIN — FUROSEMIDE 20 MG: 40 TABLET ORAL at 08:11

## 2025-07-30 RX ADMIN — METHOCARBAMOL 1000 MG: 500 TABLET ORAL at 17:44

## 2025-07-30 RX ADMIN — Medication 100 MG: at 08:11

## 2025-07-30 RX ADMIN — TIZANIDINE 4 MG: 4 TABLET ORAL at 20:17

## 2025-07-30 RX ADMIN — OXYCODONE 10 MG: 5 TABLET ORAL at 20:16

## 2025-07-30 RX ADMIN — OXYCODONE 10 MG: 5 TABLET ORAL at 08:13

## 2025-07-30 RX ADMIN — ACETAMINOPHEN 650 MG: 325 TABLET ORAL at 11:57

## 2025-07-30 RX ADMIN — ATORVASTATIN CALCIUM 20 MG: 20 TABLET, FILM COATED ORAL at 20:17

## 2025-07-30 RX ADMIN — Medication 2 G: at 06:26

## 2025-07-30 ASSESSMENT — PAIN DESCRIPTION - DESCRIPTORS
DESCRIPTORS: SHARP;NUMBNESS
DESCRIPTORS: SHARP;NUMBNESS

## 2025-07-30 ASSESSMENT — PAIN SCALES - GENERAL
PAINLEVEL_OUTOF10: 5
PAINLEVEL_OUTOF10: 0
PAINLEVEL_OUTOF10: 5
PAINLEVEL_OUTOF10: 6
PAINLEVEL_OUTOF10: 9
PAINLEVEL_OUTOF10: 9
PAINLEVEL_OUTOF10: 6
PAINLEVEL_OUTOF10: 0
PAINLEVEL_OUTOF10: 0
PAINLEVEL_OUTOF10: 9
PAINLEVEL_OUTOF10: 9
PAINLEVEL_OUTOF10: 6
PAINLEVEL_OUTOF10: 9
PAINLEVEL_OUTOF10: 10
PAINLEVEL_OUTOF10: 7

## 2025-07-30 ASSESSMENT — PAIN DESCRIPTION - FREQUENCY
FREQUENCY: CONTINUOUS

## 2025-07-30 ASSESSMENT — ENCOUNTER SYMPTOMS
CHEST TIGHTNESS: 0
ABDOMINAL DISTENTION: 0
SHORTNESS OF BREATH: 0
BACK PAIN: 1
WHEEZING: 0
ABDOMINAL PAIN: 0
VOMITING: 0
DIARRHEA: 0

## 2025-07-30 ASSESSMENT — PAIN DESCRIPTION - ONSET
ONSET: ON-GOING

## 2025-07-30 ASSESSMENT — PAIN DESCRIPTION - PAIN TYPE
TYPE: CHRONIC PAIN
TYPE: CHRONIC PAIN

## 2025-07-30 ASSESSMENT — PAIN DESCRIPTION - LOCATION
LOCATION: LEG
LOCATION: LEG;BACK
LOCATION: LEG

## 2025-07-30 ASSESSMENT — PAIN - FUNCTIONAL ASSESSMENT
PAIN_FUNCTIONAL_ASSESSMENT: PREVENTS OR INTERFERES WITH ALL ACTIVE AND SOME PASSIVE ACTIVITIES
PAIN_FUNCTIONAL_ASSESSMENT: PREVENTS OR INTERFERES SOME ACTIVE ACTIVITIES AND ADLS
PAIN_FUNCTIONAL_ASSESSMENT: PREVENTS OR INTERFERES SOME ACTIVE ACTIVITIES AND ADLS

## 2025-07-30 ASSESSMENT — PAIN SCALES - WONG BAKER: WONGBAKER_NUMERICALRESPONSE: NO HURT

## 2025-07-30 ASSESSMENT — PAIN DESCRIPTION - ORIENTATION
ORIENTATION: LEFT
ORIENTATION: RIGHT;LEFT
ORIENTATION: LEFT

## 2025-07-30 NOTE — CARE COORDINATION
SW met with patient at bedside to discuss discharge coordination.     Elma from [place] is following [phone] is following and is able to accept patient.     Patient would like precert started for [facility].     Precert Started: [id number] auth #   [id number]     CEFERINO following.     Electronically signed by GARTH Pacheco on 7/30/2025 at 4:29 PM

## 2025-07-30 NOTE — PROGRESS NOTES
Pt A&O x4 w/ stable vitals and assessments throughout the shift. Pt struggling with pain both w/ and w/o ambulation. Pt endorses pain to back, legs & calves. Pain managed per MAR & nonpharm measures. Unable to complete MRI d/t pain. Pt had a small amount of emesis prior to MRI and may have thrown up some of her medication.     When walking to the bathroom w/ pt during shift, she struggles walking back to bed after getting up from toilet; pt may need to use a martha stedy to ensure Pt & RN safety. Will pass on to day shift.     No acute events overnight.   Electronically signed by Ellie Chin RN on 7/30/2025 at 4:36 AM

## 2025-07-30 NOTE — PROGRESS NOTES
Occupational Therapy  Facility/Department: [address]  Daily Treatment Note  NAME: Terri Salmon  : 1960  MRN: [id number]    Date of Service: 2025    Discharge Recommendations:  Subacute/Skilled Nursing Facility  OT Equipment Recommendations  Equipment Needed: No      Patient Diagnosis(es): The primary encounter diagnosis was Radicular low back pain. A diagnosis of Urinary tract infection without hematuria, site unspecified was also pertinent to this visit.     Assessment   Assessment: Pt tolerating session fairly, continuing to report shooting pain through BLE with decreased tolerance for OOB and standing tasks - pt with neurosurgery consult pending. Pt progressing with functional mobility, completes sit>stand and bed<>chair transfer at RW with CGA and increased time; pt taking short cautious steps. Pt with one episode of posterior LOB when standing with quick descent to bed. Pt completes sponge bath with CGA-SBA, required mod-max A for LB dressing. pt would continue to benefit from OT services to increased ind/safety with ADLs/transfers. Cont OT POC.  Activity Tolerance: Patient limited by pain  Discharge Recommendations: Subacute/Skilled Nursing Facility  Equipment Needed: No     Plan  Occupational Therapy Plan  Times Per Week: 2-5  Current Treatment Recommendations: Strengthening;ROM;Balance training;Functional mobility training;Endurance training;Safety education & training;Patient/Caregiver education & training;Self-Care / ADL;Co-Treatment    Restrictions  Restrictions/Precautions  Activity Level: Up with Assist    Subjective  Subjective  Subjective: Pt in bed upon arrival, agreeable to OT treatment. pt reporting 8/10 pain in BLE - RN present and administering pain meds  Orientation  Overall Orientation Status: Within Functional Limits  Orientation Level: Oriented X4  Cognition  Overall Cognitive Status: WFL  Safety Judgement: Decreased awareness of need for safety       Objective  Vitals     Bed

## 2025-07-30 NOTE — PLAN OF CARE
Problem: Pain  Goal: Verbalizes/displays adequate comfort level or baseline comfort level  Outcome: Progressing  Pt uses 0-10 pain scale appropriately. Oral PRN & scheduled pain meds given per orders; see MAR for all med admin.     Problem: Safety - Adult  Goal: Free from fall injury  Outcome: Progressing  Patient with standard safety measures in place. All personal items within reach. Hourly rounding performed. All needs met. Bed in lowest position. Nonskid socks on. Call light in reach.

## 2025-07-30 NOTE — PLAN OF CARE
Problem: ABCDS Injury Assessment  Goal: Absence of physical injury  Outcome: Progressing     Problem: Pain  Goal: Verbalizes/displays adequate comfort level or baseline comfort level  Outcome: Not Progressing     Problem: Pain  Goal: Verbalizes/displays adequate comfort level or baseline comfort level  Outcome: Not Progressing

## 2025-07-30 NOTE — PROGRESS NOTES
Patient ambulating to bathroom with walker. Rates pain at a 10.  Administered zanaflex .  Patient back in bed and has minimal pain when resting.

## 2025-07-30 NOTE — CONSULTS
NEUROSURGERY CONSULT NOTE    MICHA GRIMALDO  [id number]   1960   2025    Requesting physician: Bam Mcmanus MD    Reason for consultation: Lumbar Radiculopathy    History of present illness: Patient is a 64 y.o. female who  has a past medical history of Asthma, Depression, Osteoarthritis, PONV (postoperative nausea and vomiting), and Scoliosis. Patient presented on 2025 to [facility] ED for chronic right and left calf pain that she describes as seizing and sharp that goes shooting from her knee down to her posterior ankle. This has been a chronic issue for her for a long period of time. Patient was tired of being in so much pain and having difficulty ambulating so she came to the hospital.    ROS:   GENERAL:  Denies fever or recent illness. Denies weight changes   EYES:  Denies vision change or diplopia  EARS:  Denies hearing loss  CARDIAC:  Denies chest pain  RESPIRATORY:  Denies shortness of breath  SKIN:  Denies rash or lesions   HEM:  Denies excessive bruising  PSYCH:  Denies anxiety or depression  NEURO: Endorses weakness in legs due to pain when standing and chronic numbness in hands and feet  :  Denies urinary difficulty  GI: Denies nausea, vomiting, diarrhea or constipation  MUSCULOSKELETAL:  No arthralgias    Allergies   Allergen Reactions    Cortisone Anaphylaxis and Other (See Comments)     Injection caused reaction \"many years ago\"; Patient states she is able to take IV solumedrol as of 17    Droperidol Other (See Comments)     EPS symptoms, shaking      Compazine [Prochlorperazine] Other (See Comments)     EPS symptoms       Past Medical History:   Diagnosis Date    Asthma     Depression     Osteoarthritis     PONV (postoperative nausea and vomiting)     Scoliosis         Past Surgical History:   Procedure Laterality Date    BREAST ENHANCEMENT SURGERY Bilateral 2007     SECTION   &     CHOLECYSTECTOMY  1980    FRACTURE SURGERY      femur RT    GASTRIC BYPASS SURGERY

## 2025-07-30 NOTE — PROGRESS NOTES
a     Internal Medicine Progress Note    Date: 7/30/2025   Patient: Terri Salmon   Hospital Day: 1      CC: Leg Pain (Bilateral leg pain, shooting down legs. Denies back pain. Was scheduled to have a spinal cord stimulator today but was unable to do so. )       Interval Hx   - Patient reports having relief of her back pain.  She reports tenderness in her left calf and somewhat in her right calf.  Reports still having pain in lower extremities.  However, she reports being better.  She is still unable to ambulate by herself and requires a walker.   - Unable to get sacral MRI imaging due to pain.  Had to increase as needed dosage of oxycodone.  -Vital signs stable, afebrile, room air  - Patient currently denies any bladder incontinence, or saddle area genitourinary anesthesia.  - Lumbar MRI without contrast showed severe spondylolithiasis of the L1-L5 with some foramen stenosis.    Plan  - Continue ceftriaxone; urine cultures pending  - Started lidocaine patches to affected areas; Increase gabapentin  - Consulted neurosurgery    HPI:   64-year-old with a past medical history significant for chronic back pain with spinal cord stimulator that was recently removed, COPD, chronic depression, hypertension presented with bilateral leg pain.     Patient was recently admitted for similar presentation with bilateral lower extremity weakness and pain with trouble ambulating.  She was supposedly supposed to get a spinal stimulator procedure performed, but was unable to obtain vascular access.  After returning home patient was unable to walk and, which prompted her to present as well to the [facility].         ROS   Review of Systems   Constitutional:  Negative for chills, fatigue and fever.   Respiratory:  Negative for chest tightness, shortness of breath and wheezing.    Cardiovascular:  Negative for chest pain, palpitations and leg swelling.   Gastrointestinal:  Negative for abdominal distention, abdominal pain, diarrhea

## 2025-07-31 ENCOUNTER — HOSPITAL ENCOUNTER (INPATIENT)
Dept: INTERVENTIONAL RADIOLOGY/VASCULAR | Age: 65
Discharge: HOME | End: 2025-08-02
Payer: COMMERCIAL

## 2025-07-31 VITALS
BODY MASS INDEX: 37.1 KG/M2 | DIASTOLIC BLOOD PRESSURE: 65 MMHG | SYSTOLIC BLOOD PRESSURE: 101 MMHG | HEIGHT: 57 IN | RESPIRATION RATE: 18 BRPM | HEART RATE: 92 BPM | OXYGEN SATURATION: 91 % | TEMPERATURE: 97.9 F | WEIGHT: 171.96 LBS

## 2025-07-31 LAB
ALBUMIN SERPL-MCNC: 3.7 G/DL (ref 3.4–5)
ANION GAP SERPL CALCULATED.3IONS-SCNC: 10 MMOL/L (ref 3–16)
ANION GAP SERPL CALCULATED.3IONS-SCNC: 12 MMOL/L (ref 3–16)
BUN SERPL-MCNC: 17 MG/DL (ref 7–20)
BUN SERPL-MCNC: 17 MG/DL (ref 7–20)
CALCIUM SERPL-MCNC: 9.3 MG/DL (ref 8.3–10.6)
CALCIUM SERPL-MCNC: ABNORMAL MG/DL (ref 8.3–10.6)
CHLORIDE SERPL-SCNC: 100 MMOL/L (ref 99–110)
CHLORIDE SERPL-SCNC: 104 MMOL/L (ref 99–110)
CO2 SERPL-SCNC: 22 MMOL/L (ref 21–32)
CO2 SERPL-SCNC: 23 MMOL/L (ref 21–32)
CREAT SERPL-MCNC: 0.5 MG/DL (ref 0.6–1.2)
CREAT SERPL-MCNC: 0.6 MG/DL (ref 0.6–1.2)
ECHO BSA: 1.73 M2
EKG ATRIAL RATE: 90 BPM
EKG DIAGNOSIS: NORMAL
EKG P AXIS: 59 DEGREES
EKG P-R INTERVAL: 182 MS
EKG Q-T INTERVAL: 340 MS
EKG QRS DURATION: 86 MS
EKG QTC CALCULATION (BAZETT): 415 MS
EKG R AXIS: 43 DEGREES
EKG T AXIS: 30 DEGREES
EKG VENTRICULAR RATE: 90 BPM
GFR SERPLBLD CREATININE-BSD FMLA CKD-EPI: >90 ML/MIN/{1.73_M2}
GFR SERPLBLD CREATININE-BSD FMLA CKD-EPI: >90 ML/MIN/{1.73_M2}
GLUCOSE SERPL-MCNC: 168 MG/DL (ref 70–99)
GLUCOSE SERPL-MCNC: 98 MG/DL (ref 70–99)
PHOSPHATE SERPL-MCNC: 3.6 MG/DL (ref 2.5–4.9)
POTASSIUM SERPL-SCNC: 4.4 MMOL/L (ref 3.5–5.1)
POTASSIUM SERPL-SCNC: >10 MMOL/L (ref 3.5–5.1)
SODIUM SERPL-SCNC: 135 MMOL/L (ref 136–145)
SODIUM SERPL-SCNC: 136 MMOL/L (ref 136–145)

## 2025-07-31 PROCEDURE — 64483 NJX AA&/STRD TFRM EPI L/S 1: CPT

## 2025-07-31 PROCEDURE — 36415 COLL VENOUS BLD VENIPUNCTURE: CPT

## 2025-07-31 PROCEDURE — 6360000004 HC RX CONTRAST MEDICATION: Performed by: RADIOLOGY

## 2025-07-31 PROCEDURE — 97530 THERAPEUTIC ACTIVITIES: CPT

## 2025-07-31 PROCEDURE — 94640 AIRWAY INHALATION TREATMENT: CPT

## 2025-07-31 PROCEDURE — 2500000003 HC RX 250 WO HCPCS

## 2025-07-31 PROCEDURE — 99153 MOD SED SAME PHYS/QHP EA: CPT

## 2025-07-31 PROCEDURE — APPNB30 APP NON BILLABLE TIME 0-30 MINS: Performed by: NURSE PRACTITIONER

## 2025-07-31 PROCEDURE — 6370000000 HC RX 637 (ALT 250 FOR IP): Performed by: HOSPITALIST

## 2025-07-31 PROCEDURE — 6370000000 HC RX 637 (ALT 250 FOR IP)

## 2025-07-31 PROCEDURE — 6360000002 HC RX W HCPCS

## 2025-07-31 PROCEDURE — 99152 MOD SED SAME PHYS/QHP 5/>YRS: CPT

## 2025-07-31 PROCEDURE — 97110 THERAPEUTIC EXERCISES: CPT

## 2025-07-31 PROCEDURE — 6360000002 HC RX W HCPCS: Performed by: RADIOLOGY

## 2025-07-31 PROCEDURE — 6370000000 HC RX 637 (ALT 250 FOR IP): Performed by: NURSE PRACTITIONER

## 2025-07-31 PROCEDURE — 93010 ELECTROCARDIOGRAM REPORT: CPT | Performed by: INTERNAL MEDICINE

## 2025-07-31 PROCEDURE — 80069 RENAL FUNCTION PANEL: CPT

## 2025-07-31 PROCEDURE — 6370000000 HC RX 637 (ALT 250 FOR IP): Performed by: STUDENT IN AN ORGANIZED HEALTH CARE EDUCATION/TRAINING PROGRAM

## 2025-07-31 PROCEDURE — 3E0R3BZ INTRODUCTION OF ANESTHETIC AGENT INTO SPINAL CANAL, PERCUTANEOUS APPROACH: ICD-10-PCS | Performed by: HOSPITALIST

## 2025-07-31 RX ORDER — IOPAMIDOL 755 MG/ML
INJECTION, SOLUTION INTRAVASCULAR PRN
Status: COMPLETED | OUTPATIENT
Start: 2025-07-31 | End: 2025-07-31

## 2025-07-31 RX ORDER — SENNOSIDES 8.6 MG/1
2 TABLET ORAL 2 TIMES DAILY
Status: DISCONTINUED | OUTPATIENT
Start: 2025-07-31 | End: 2025-07-31 | Stop reason: HOSPADM

## 2025-07-31 RX ORDER — LIDOCAINE HYDROCHLORIDE 10 MG/ML
INJECTION, SOLUTION EPIDURAL; INFILTRATION; INTRACAUDAL; PERINEURAL PRN
Status: COMPLETED | OUTPATIENT
Start: 2025-07-31 | End: 2025-07-31

## 2025-07-31 RX ORDER — POLYETHYLENE GLYCOL 3350 17 G/17G
17 POWDER, FOR SOLUTION ORAL DAILY
Status: DISCONTINUED | OUTPATIENT
Start: 2025-07-31 | End: 2025-07-31 | Stop reason: HOSPADM

## 2025-07-31 RX ORDER — BETAMETHASONE SODIUM PHOSPHATE AND BETAMETHASONE ACETATE 3; 3 MG/ML; MG/ML
INJECTION, SUSPENSION INTRA-ARTICULAR; INTRALESIONAL; INTRAMUSCULAR; SOFT TISSUE PRN
Status: COMPLETED | OUTPATIENT
Start: 2025-07-31 | End: 2025-07-31

## 2025-07-31 RX ORDER — LIDOCAINE 4 G/G
1 PATCH TOPICAL DAILY
Qty: 30 EACH | Refills: 0
Start: 2025-08-01

## 2025-07-31 RX ORDER — GABAPENTIN 600 MG/1
900 TABLET ORAL 3 TIMES DAILY
Qty: 135 TABLET | Refills: 0
Start: 2025-07-31 | End: 2025-08-30

## 2025-07-31 RX ORDER — OXYCODONE HYDROCHLORIDE 10 MG/1
10 TABLET ORAL 2 TIMES DAILY PRN
Qty: 6 TABLET | Refills: 0 | Status: SHIPPED | OUTPATIENT
Start: 2025-07-31 | End: 2025-08-03

## 2025-07-31 RX ORDER — METHOCARBAMOL 1000 MG/1
1000 TABLET, FILM COATED ORAL EVERY 8 HOURS
Qty: 30 TABLET | Refills: 0
Start: 2025-07-31 | End: 2025-08-15

## 2025-07-31 RX ORDER — NAPROXEN 500 MG/1
500 TABLET ORAL 2 TIMES DAILY WITH MEALS
Qty: 60 TABLET | Refills: 0 | Status: CANCELLED | OUTPATIENT
Start: 2025-07-31

## 2025-07-31 RX ORDER — MIDAZOLAM HYDROCHLORIDE 1 MG/ML
INJECTION, SOLUTION INTRAMUSCULAR; INTRAVENOUS PRN
Status: COMPLETED | OUTPATIENT
Start: 2025-07-31 | End: 2025-07-31

## 2025-07-31 RX ORDER — FENTANYL CITRATE 50 UG/ML
INJECTION, SOLUTION INTRAMUSCULAR; INTRAVENOUS PRN
Status: COMPLETED | OUTPATIENT
Start: 2025-07-31 | End: 2025-07-31

## 2025-07-31 RX ORDER — SENNOSIDES 8.6 MG/1
1 TABLET ORAL NIGHTLY
Status: DISCONTINUED | OUTPATIENT
Start: 2025-07-31 | End: 2025-07-31

## 2025-07-31 RX ORDER — BUPIVACAINE HYDROCHLORIDE 2.5 MG/ML
INJECTION, SOLUTION EPIDURAL; INFILTRATION; INTRACAUDAL; PERINEURAL PRN
Status: COMPLETED | OUTPATIENT
Start: 2025-07-31 | End: 2025-07-31

## 2025-07-31 RX ADMIN — SENNOSIDES 17.2 MG: 8.6 TABLET, FILM COATED ORAL at 15:51

## 2025-07-31 RX ADMIN — METHOCARBAMOL 1000 MG: 500 TABLET ORAL at 00:09

## 2025-07-31 RX ADMIN — LISINOPRIL 5 MG: 5 TABLET ORAL at 09:56

## 2025-07-31 RX ADMIN — IBUPROFEN 600 MG: 400 TABLET ORAL at 10:03

## 2025-07-31 RX ADMIN — ESCITALOPRAM OXALATE 20 MG: 20 TABLET, FILM COATED ORAL at 09:56

## 2025-07-31 RX ADMIN — MIDAZOLAM HYDROCHLORIDE 1 MG: 1 INJECTION, SOLUTION INTRAMUSCULAR; INTRAVENOUS at 13:53

## 2025-07-31 RX ADMIN — OXYCODONE 10 MG: 5 TABLET ORAL at 16:58

## 2025-07-31 RX ADMIN — ALBUTEROL SULFATE 2.5 MG: 2.5 SOLUTION RESPIRATORY (INHALATION) at 16:30

## 2025-07-31 RX ADMIN — ACETAMINOPHEN 650 MG: 325 TABLET ORAL at 00:09

## 2025-07-31 RX ADMIN — FENTANYL CITRATE 50 MCG: 50 INJECTION INTRAMUSCULAR; INTRAVENOUS at 13:53

## 2025-07-31 RX ADMIN — LIDOCAINE HYDROCHLORIDE 5 ML: 10 INJECTION, SOLUTION EPIDURAL; INFILTRATION; INTRACAUDAL; PERINEURAL at 13:53

## 2025-07-31 RX ADMIN — IOPAMIDOL 2 ML: 755 INJECTION, SOLUTION INTRAVENOUS at 13:54

## 2025-07-31 RX ADMIN — OXYCODONE 10 MG: 5 TABLET ORAL at 08:31

## 2025-07-31 RX ADMIN — ACETAMINOPHEN 650 MG: 325 TABLET ORAL at 12:11

## 2025-07-31 RX ADMIN — ZIPRASIDONE HYDROCHLORIDE 40 MG: 40 CAPSULE ORAL at 09:58

## 2025-07-31 RX ADMIN — METHOCARBAMOL 1000 MG: 500 TABLET ORAL at 16:54

## 2025-07-31 RX ADMIN — GABAPENTIN 800 MG: 400 CAPSULE ORAL at 09:56

## 2025-07-31 RX ADMIN — BETAMETHASONE SODIUM PHOSPHATE AND BETAMETHASONE ACETATE 12 MG: 3; 3 INJECTION, SUSPENSION INTRA-ARTICULAR; INTRALESIONAL; INTRAMUSCULAR at 13:55

## 2025-07-31 RX ADMIN — FUROSEMIDE 20 MG: 40 TABLET ORAL at 09:56

## 2025-07-31 RX ADMIN — BUPIVACAINE HYDROCHLORIDE 2 ML: 2.5 INJECTION, SOLUTION EPIDURAL; INFILTRATION; INTRACAUDAL at 13:54

## 2025-07-31 RX ADMIN — TIZANIDINE 4 MG: 4 TABLET ORAL at 05:56

## 2025-07-31 RX ADMIN — TIZANIDINE 4 MG: 4 TABLET ORAL at 12:11

## 2025-07-31 RX ADMIN — GABAPENTIN 800 MG: 400 CAPSULE ORAL at 15:51

## 2025-07-31 RX ADMIN — POLYETHYLENE GLYCOL 3350 17 G: 17 POWDER, FOR SOLUTION ORAL at 15:51

## 2025-07-31 RX ADMIN — Medication 100 MG: at 10:03

## 2025-07-31 RX ADMIN — METHOCARBAMOL 1000 MG: 500 TABLET ORAL at 10:03

## 2025-07-31 RX ADMIN — ACETAMINOPHEN 650 MG: 325 TABLET ORAL at 05:56

## 2025-07-31 RX ADMIN — OXYCODONE 10 MG: 5 TABLET ORAL at 01:57

## 2025-07-31 RX ADMIN — SODIUM CHLORIDE, PRESERVATIVE FREE 10 ML: 5 INJECTION INTRAVENOUS at 12:12

## 2025-07-31 ASSESSMENT — PAIN SCALES - GENERAL
PAINLEVEL_OUTOF10: 9
PAINLEVEL_OUTOF10: 7
PAINLEVEL_OUTOF10: 0
PAINLEVEL_OUTOF10: 0
PAINLEVEL_OUTOF10: 10
PAINLEVEL_OUTOF10: 0
PAINLEVEL_OUTOF10: 0
PAINLEVEL_OUTOF10: 10
PAINLEVEL_OUTOF10: 7

## 2025-07-31 ASSESSMENT — PAIN DESCRIPTION - DESCRIPTORS
DESCRIPTORS: ACHING

## 2025-07-31 ASSESSMENT — ENCOUNTER SYMPTOMS
CONSTIPATION: 1
WHEEZING: 0
ABDOMINAL PAIN: 0
CHEST TIGHTNESS: 0
DIARRHEA: 0
SHORTNESS OF BREATH: 0
BACK PAIN: 1
ABDOMINAL DISTENTION: 0
VOMITING: 0

## 2025-07-31 ASSESSMENT — PAIN SCALES - WONG BAKER
WONGBAKER_NUMERICALRESPONSE: NO HURT

## 2025-07-31 ASSESSMENT — PAIN DESCRIPTION - ORIENTATION
ORIENTATION: RIGHT
ORIENTATION: RIGHT;LEFT
ORIENTATION: RIGHT;LEFT

## 2025-07-31 ASSESSMENT — PAIN - FUNCTIONAL ASSESSMENT
PAIN_FUNCTIONAL_ASSESSMENT: PREVENTS OR INTERFERES SOME ACTIVE ACTIVITIES AND ADLS

## 2025-07-31 ASSESSMENT — PAIN DESCRIPTION - LOCATION
LOCATION: BACK
LOCATION: LEG;BACK
LOCATION: LEG
LOCATION: LEG

## 2025-07-31 ASSESSMENT — PAIN DESCRIPTION - FREQUENCY
FREQUENCY: INTERMITTENT

## 2025-07-31 ASSESSMENT — PAIN DESCRIPTION - ONSET
ONSET: AWAKENED FROM SLEEP

## 2025-07-31 ASSESSMENT — PAIN DESCRIPTION - PAIN TYPE
TYPE: CHRONIC PAIN

## 2025-07-31 NOTE — CARE COORDINATION
3:48 PM  Spoke with Pat at [facility]; able to accept.   Call made to [facility]; able to switch transport to [facility] over [facility].   Call placed to to [facility] to switch precert to [facility]. Spoke with Kaitlynn, able to switch auth. New auth ID;  [id number]. Next review date 8/1    Notified Dai in admissions at [facility] of change in dispo.   Notified pt and RN    Electronically signed by Krunal Sigala RN, CM on 7/31/2025 at 3:52 PM.  Phone: [phone]  Fax: [phone]

## 2025-07-31 NOTE — DISCHARGE SUMMARY
INTERNAL MEDICINE DEPARTMENT AT THE [facility]  DISCHARGE SUMMARY    Patient ID: Terri Salmon                                             Discharge Date: 07/31/2025  Patient's PCP: Jenelle Kapoor MD                                          Discharge Physician: Oscar Kennedy MD  Admit Date: 7/28/2025   Admitting Physician: Bam Mcmanus MD    PROBLEMS DURING HOSPITALIZATION:  Present on Admission:   Intractable back pain   Radicular low back pain      HPI:64-year-old with a past medical history significant for chronic back pain with spinal cord stimulator that was recently removed, COPD, chronic depression, hypertension presented with bilateral leg pain.      Patient was recently admitted for similar presentation with bilateral lower extremity weakness and pain with trouble ambulating.  She was supposedly supposed to get a spinal stimulator procedure performed, but was unable to obtain vascular access.  After returning home patient was unable to walk and, which prompted her to present as well to the [facility].     The following issues were addressed during hospitalization:  Chronic back pain   Chronic bilateral lower extremity calf pain  Inability to ambulate  Patient has a complicated history of multilevel degeneration of the vertebral disc and facet arthropathy.  She denied any fever, urinary, fecal incontinence, genitourinary or saddle anesthesia.  She reported to having cramping pain in her calf during her admission.  She had trouble with ADLs during her hospitalizations daily, but did improve over time.  MRI of the lumbar and sacral region showed spondylolithiasis and some foraminal stenosis.  Patient was given multimodal pain relief during their hospitalization.  Neurosurgery not recommend any procedure at the time.  She did undergo epidural steroid injection in her back for pain relief. She is being discharged to a SNF facility for acute rehab.        UTI  Patient had a UA that was indicative of    traMADol 50 MG tablet  Commonly known as: ULTRAM     vitamin B-12 1000 MCG tablet  Commonly known as: CYANOCOBALAMIN     ziprasidone 40 MG capsule  Commonly known as: GEODON           * This list has 2 medication(s) that are the same as other medications prescribed for you. Read the directions carefully, and ask your doctor or other care provider to review them with you.                   Activity: as tolerated   Diet: regular diet  Wound Care: None     Time Spent on discharge is more than 30 minutes.     Signed:  Jenelle Kapoor MD  Internal Medicine, PGY-1   7/31/2025

## 2025-07-31 NOTE — PLAN OF CARE
Problem: Pain  Goal: Verbalizes/displays adequate comfort level or baseline comfort level  7/31/2025 0028 by Jeri Renteria RN  Outcome: Progressing  Flowsheets (Taken 7/31/2025 0028)  Verbalizes/displays adequate comfort level or baseline comfort level:   Encourage patient to monitor pain and request assistance   Assess pain using appropriate pain scale   Implement non-pharmacological measures as appropriate and evaluate response  Note: C/o 7-9/10 bilateral leg and calf pain.  Medicated with prn oxycodone and scheduled tylenol and robaxin.  Patient satisfied with current pain regimen.  Will monitor.      Problem: Safety - Adult  Goal: Free from fall injury  Outcome: Progressing  Note: Fall precautions in place.  Bed alarm activated, low position, wheels locked.  Up with assist x 1 with gait belt and walker.  Call light and belongings in reach.  Patient encouraged to call with needs and concerns.       Problem: Discharge Planning  Goal: Discharge to home or other facility with appropriate resources  Outcome: Progressing  Note: Case management following.  Possible d/c to [facility] when medically stable.

## 2025-07-31 NOTE — PROGRESS NOTES
NEUROSURGERY PLAN OF CARE NOTE    MICHA GRIMALDO  [id number]   1960   7/31/2025    Patient getting Epidural Steroid Injection and discharging to SNF this evening.    Assessment:  Patient is a 64 y.o. female w/acute on chronic back pain radiating into the back of both legs.     Plan:  Neurologic exams frequency: Q4H  For change in exam MUST contact neurosurgery team along with critical care or primary team  Epidural Steroid Injection today  Follow up with Dr. Garay in 4-6 weeks  Muscle spasms: Robaxin and PRN Zanaflex  Pain control: Managed by medical team  Advance diet / activity per primary team  Will sign off.  Please call with any questions or decline in neurological status     DISPO: Dispo timing to be determined by primary team once patient is medically stable for discharge.     Patient was discussed with Dr. Garay who agrees with above assessment and plan.      Electronically signed by: SHANICE Davidson CNP, APRN-CNP, 7/31/2025 2:41 PM  [phone]

## 2025-07-31 NOTE — PROGRESS NOTES
Occupational Therapy  Occupational Therapy  Daily Treatment Note  Patient Name: Terri Salmon  MRN: [id number]    Chart Reviewed: Yes             Additional Pertinent Hx: Terri Salmon is a 64 y.o. female who presented with PMHx significant for chronic back pain with spinal cord stimulator, COPD, chronic depression, hypertension who presents with bilateral leg pain.  Recently admitted 7/9/25 for bilateral lower extremity tightness, weakness, and difficulty with coordination and walking.     Today patient presents with chronic bilateral calf pain and difficulty ambulating. She reports that she was to have her spinal stimulator procedure performed today but was unable due to lack of vascular access.     After returning home the patient states that she was unable to walk which prompted her to present to the ED.        Diagnosis: back pain  Treatment Diagnosis: imp mob, tf, ADL    Subjective: Pt supine in bed upon entry, pleasant and motivated for therapy session.    Pain: 9/10 \"My sit bones\" pt not due pain meds per pt    Social/Functional History  Lives With: Spouse  Type of Home: House  Home Layout: One level (1 step up from kitchen to dining room)  Home Access: Stairs to enter with rails  Entrance Stairs - Number of Steps: 3+5  Entrance Stairs - Rails: Left  Bathroom Shower/Tub: Tub/Shower unit, Doors (takes baths)  Bathroom Toilet: Standard  Bathroom Equipment: Grab bars in shower  Home Equipment: Cane, Electric scooter (bariatric rollator)  Has the patient had two or more falls in the past year or any fall with injury in the past year?: Yes (\"several  times a week\")  Prior Level of Assist for ADLs: Needs assistance ( SBA for bathing, IND with dressing)  Prior Level of Assist for Homemaking:  ( does all)  Homemaking Responsibilities: No  Prior Level of Assist for Ambulation: Independent community ambulator, with or without device, Independent household ambulator, with or without device (bariatric rollator

## 2025-07-31 NOTE — CARE COORDINATION
ADDENDUM:  2:42 PM    SW spoke to [facility] they are reviewing.     Electronically signed by GARTH Hinojosa, RASHEED, PEG on 7/31/2025 at 2:42 PM    12:23 PM    Pt is now in contact with the DON at [facility] and wants to see if she can get in there.   SW explained the sent the referral twice and have gotten no response. Sw explained they would need [facility] to respond in order to change the dcp. Pt sated to not cancel anything with [facility] as she is okay with going there if [facility] doesn't respond.     Electronically signed by GARTH Hinojosa, RASHEED, PEG on 7/31/2025 at 12:23 PM    8:31 AM  Pt is from home with her . Pt is indp w/rw at baseline. Pt would like to go to [facility].     Pre-cert for [place] is approved.     Plan Auth ID  [id number]  Auth ID  [id number]  Dates  07/30/2025-08/01/2025  Status  Approved    Steroid injection today, neurosurg following    Sw following.   Electronically signed by GARTH Hinojosa, RASHEED, PEG on 7/31/2025 at 8:31 AM  [phone]

## 2025-07-31 NOTE — DISCHARGE INSTR - COC
Continuity of Care Form    Patient Name: Terri Salmon   :  1960  MRN:  [id number]    Admit date:  2025  Discharge date:  25    Code Status Order: Full Code   Advance Directives:     Admitting Physician:  Bam Mcmanus MD  PCP: Yaritza Rosas MD    Discharging Nurse: krunal [facility] Unit/Room#: 3311/3311-01  Discharging Unit Phone Number: [phone]    Emergency Contact:   Extended Emergency Contact Information  Primary Emergency Contact: Kellerman,Becky  Address: [address] [place] [address]  Home Phone: [phone]  Mobile Phone: [phone]  Relation: Child  Secondary Emergency Contact: Ascencion Salmon  Home Phone: [phone]  Mobile Phone: [phone]  Relation: Spouse   needed? No    Past Surgical History:  Past Surgical History:   Procedure Laterality Date    BREAST ENHANCEMENT SURGERY Bilateral      SECTION   &     CHOLECYSTECTOMY      FRACTURE SURGERY      femur RT    GASTRIC BYPASS SURGERY      [place]    JOINT REPLACEMENT Bilateral 2014    knee replacement    JOINT REPLACEMENT      KNEE CARTILAGE SURGERY Left 2006    ROTATOR CUFF REPAIR Left 2008    TOTAL HIP ARTHROPLASTY Bilateral     WISDOM TOOTH EXTRACTION         Immunization History:   Immunization History   Administered Date(s) Administered    COVID-19, PFIZER PURPLE top, DILUTE for use, (age 12 y+), 30mcg/0.3mL 2021, 2021    Influenza, Quadv, 6 mo and older, IM, PF (Flulaval, Fluarix) 10/14/2018    TDaP, ADACEL (age 10y-64y), BOOSTRIX (age 10y+), IM, 0.5mL 2016       Active Problems:  Patient Active Problem List   Diagnosis Code    Knee osteoarthritis M17.9    Chronic pain G89.29    Bladder prolapse [id number]    Moderate persistent asthma J45.40    Alcohol abuse F10.10    Bilateral lower extremity edema R60.0    Anxiety F41.9    Primary insomnia F51.01    Arthritis M19.90    Alcohol use disorder,  severe, dependence ([facility]) F10.20    Depression F32.A    Suicide attempt ([facility]) T14.91XA    Acute asthma exacerbation J45.901    Right knee pain M25.561    Asthma J45.909    Contusion of right knee S80.01XA    WIGGINS (dyspnea on exertion) R06.09    Dyspnea R06.00    Debility R53.81    Hypoxemia R09.02    PMB (postmenopausal bleeding) N95.0    Menopause Z78.0    Morbid obesity with BMI of 40.0-44.9, adult ([facility]) E66.01, Z68.41    Normocytic anemia D64.9    Muscle spasms of both lower extremities M62.838    Intractable back pain M54.9    Radicular low back pain M54.10       Isolation/Infection:   Isolation            No Isolation          Patient Infection Status    None to display              Nurse Assessment:  Last Vital Signs: BP (!) 129/106   Pulse 92   Temp 97.7 °F (36.5 °C) (Oral)   Resp 16   Ht 1.448 m (4' 9\")   Wt 78 kg (171 lb 15.3 oz)   LMP 12/22/2014   SpO2 94%   BMI 37.21 kg/m²     Last documented pain score (0-10 scale): Pain Level: 10  Last Weight:   Wt Readings from Last 1 Encounters:   07/31/25 78 kg (171 lb 15.3 oz)     Mental Status:  oriented and alert    IV Access:  - None    Nursing Mobility/ADLs:  Walking   Assisted  Transfer  Assisted  Bathing  Assisted  Dressing  Assisted  Toileting  Assisted  Feeding  Assisted  Med Admin  Assisted  Med Delivery   whole    Wound Care Documentation and Therapy:  Incision 03/03/21 Thigh Right;Outer;Lateral (Active)   Number of days: 1610       Incision 03/03/21 Hip Right;Outer;Lateral (Active)   Number of days: 1610        Elimination:  Continence:   Bowel: Yes  Bladder: Yes  Urinary Catheter: None   Colostomy/Ileostomy/Ileal Conduit: No       Date of Last BM: 7/31    Intake/Output Summary (Last 24 hours) at 7/31/2025 1122  Last data filed at 7/31/2025 0549  Gross per 24 hour   Intake 690 ml   Output 2100 ml   Net -1410 ml     I/O last 3 completed shifts:  In: 1120 [P.O.:1110; I.V.:10]  Out: 2100 [Urine:2100]    Safety Concerns:     At Risk for

## 2025-07-31 NOTE — DISCHARGE INSTR - DIET

## 2025-07-31 NOTE — PROGRESS NOTES
Physical Therapy  Daily Treatment Note    Discharge Recommendation:  Skilled Nursing Facility  Equipment Needs:  Defer to next level of care    Assessment:  Pt with good effort during session. Seems motivated for recovery. Pt with L knee buckling when marching in place at walker. Not safe for ambulation this afternoon. Pt at risk for falls if she returns home. Would benefit from continued IP PT at D/C to maximize function and independence prior to returning home. Plan is for SNF.     Chart Reviewed: Yes         Additional Pertinent Hx: Terri Salmon is a 64 y.o. female who presented with PMHx significant for chronic back pain with spinal cord stimulator, COPD, chronic depression, hypertension who presents with bilateral leg pain.  Recently admitted 7/9/25 for bilateral lower extremity tightness, weakness, and difficulty with coordination and walking.     Today patient presents with chronic bilateral calf pain and difficulty ambulating. She reports that she was to have her spinal stimulator procedure performed today but was unable due to lack of vascular access.     After returning home the patient states that she was unable to walk which prompted her to present to the ED.      Diagnosis: intractable back pain   Treatment Diagnosis: gait difficulty    Subjective: Pt in bed initially. Agreeable to working with PT.   \"I want to try.\" Had NOY earlier today.   Continues to have numbness B feet (R worse than L)    Pain: Denies    Objective:    Bed mobility  Supine to sit: CGA, with use of railing. Cues for log roll.   Scooting: CGA forwards/backwards in bed.   Sit to Supine: SBA, with use of railing    Transfers  Sit to stand: CGA from bed  Stand to sit: Min assist onto bed    Ambulation  Pt took a few steps in place (~ 2 steps bilaterally), but L knee buckling, requiring Mod assist for recovery. No further transfers/ambulation attempted this session.     Exercises  Sitting EOB:  2 sets x 10 reps B LAQ  2 sets x 10 reps B  hip abd/add (max assist for 1st set L LE, mod assist for 2nd set L LE)  1 set x 20 reps heel raises (increased active plantar flexion L LE compared to R)    Balance  Sat EOB ~15 minutes total with SBA (static) to CGA (dynamic--performing LE ex)  Static stance with walker CGA    Patient Education  Log roll for bed mobility. Needs continued reinforcement.     Safety Devices  Pt left with needs in reach. In bed with bed alarm on. RN updated.     AM-PAC score  AM-PAC Inpatient Mobility Raw Score : 10  AM-PAC Inpatient T-Scale Score : 32.29  Mobility Inpatient CMS 0-100% Score: 76.75  Mobility Inpatient CMS G-Code Modifier : CL    Goals: (as determined and assessed by primary PT)  Time Frame for Short Term Goals: discharge  Short Term Goal 1: pt will perf supine<>sit IND   Short Term Goal 2: pt will perf STS to LRAD SPV   Short Term Goal 3: pt will amb 50ft with LRAD SPV  Short Term Goal 4: pt will negotiate 5 stairs with rail SPV       Plan:  Plan: 2-5x/week  Current Treatment Recommendations: Strengthening, Balance training, Functional mobility training, Transfer training, Endurance training, Gait training, Stair training, Safety education & training, Home exercise program, Patient/Caregiver education & training, Therapeutic activities    Therapy Time    Individual  Concurrent  Group  Co-treatment    Time In  1455            Time Out  1525            Minutes  30              Timed Code Treatment Minutes: 30  Total Treatment Minutes: 30    Will continue per plan of care.   If patient is discharged prior to next treatment, this note will serve as the discharge summary.    Caro Marks, PTA #[id number]

## 2025-07-31 NOTE — PROGRESS NOTES
a     Internal Medicine Progress Note    Date: 7/31/2025   Patient: Terri Salmon   Hospital Day: 2      CC: Leg Pain (Bilateral leg pain, shooting down legs. Denies back pain. Was scheduled to have a spinal cord stimulator today but was unable to do so. )       Interval Hx   - Patient reports feeling better with her pain in her back and calf.  She she reports a lot of relief with the Voltaren gel.  She denies any urinary symptoms.  She does report being constipated since being admitted.  Denies any fevers, chills, nausea, diarrhea, dysuria, anesthesia of the perianal or genitourinary region.   -Vital signs stable, afebrile, on room air  -Slightly hyponatremic  -Urine cultures no growth to date  -No neurosurgical intervention needed.   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  No fractures  -Aspirin and Lovenox held; NPO for IR guided epidural steroid procedure      Plan  - D/C ceftriaxone; urine cultures pending  - Continue multimodal pain relief  - Follow-up after procedure for pain relief  - Started medication for constipation.    HPI:   64-year-old with a past medical history significant for chronic back pain with spinal cord stimulator that was recently removed, COPD, chronic depression, hypertension presented with bilateral leg pain.     Patient was recently admitted for similar presentation with bilateral lower extremity weakness and pain with trouble ambulating.  She was supposedly supposed to get a spinal stimulator procedure performed, but was unable to obtain vascular access.  After returning home patient was unable to walk and, which prompted her to present as well to the [facility].         ROS   Review of Systems   Constitutional:  Negative for chills, fatigue and fever.   Respiratory:  Negative for chest tightness, shortness of breath and wheezing.    Cardiovascular:  Negative for chest pain, palpitations and leg swelling.   Gastrointestinal:

## 2025-07-31 NOTE — DISCHARGE INSTRUCTIONS
Dear Terri Salmon,      Thank you for allowing us the [facility] to take care of you.     Upon discharge to the Skilled Nursing facility, I would like you to take the medications listed in your discharge summaries.  We have made some changes to your home medications and also requested you to take some of the medications that we have started you on during hospitalization.  A full list will be printed out for you.  We have also prescribed you oxycodone , robaxin, and lidocaine patches.  If you have any troubles with respiratory patient, gait, or cognitive impairment, please consult your PCP immediately.  We have also increased your gabapentin dosage to help control with neuropathic pain.  If you experience dizziness, gait abnormalities, headaches, please contact your PCP.     The SNF facility will also be made or made aware of all the changes in medications that we recommend.  They will take care of your care after being transferred.  After being discharged from the SNF facility please follow-up with your PCP and neurosurgery clinic within a week of after being discharged.     Thank you again.      [place],      Dr. Jenelle Kapoor

## 2025-07-31 NOTE — CARE COORDINATION
Case Management Assessment            Discharge Note                    Date / Time of Note: 7/31/2025 10:58 AM                  Discharge Note Completed by: GARTH Hinojosa, LSW    Patient Name: Terri Salmon   YOB: 1960  Diagnosis: Radicular low back pain [M54.10]  Intractable back pain [M54.9]  Urinary tract infection without hematuria, site unspecified [N39.0]   Date / Time: 7/28/2025 11:30 AM    Current PCP: Yaritza Rosas MD  Clinic patient: No    Hospitalization in the last 30 days: Yes  Readmission Assessment  Number of Days since last admission?: 1-7 days  Previous Disposition: Home with Family  Who is being Interviewed: Patient  What was the patient's/caregiver's perception as to why they think they needed to return back to the hospital?: Other (Comment) (more pain)  Did you visit your Primary Care Physician after you left the hospital, before you returned this time?: No  Why weren't you able to visit your PCP?: Did not have an appointment  Did you see a specialist, such as Cardiac, Pulmonary, Orthopedic Physician, etc. after you left the hospital?: No  Who advised the patient to return to the hospital?: Self-referral  Does the patient report anything that got in the way of taking their medications?: No  In our efforts to provide the best possible care to you and others like you, can you think of anything that we could have done to help you after you left the hospital the first time, so that you might not have needed to return so soon?: Other (Comment) (none)    Advance Directives:  Code Status: Full Code  [place] DNR form completed and on chart: No    Financial:  Payor: [facility] / Plan: [facility] SHARED SERVICES / Product Type: *No Product type* /      Pharmacy:    [id number] - [place], [place] - [address] [phone] - [phone]  [address]  [facility] [address]  Phone: [phone] Fax: [phone]      Assistance purchasing

## 2025-07-31 NOTE — PROGRESS NOTES
[facility]. Report given to receiving RN at [facility]. Patient denies questions or concerns regarding discharge or medications. No distress noted.

## 2025-08-02 LAB
EST. AVERAGE GLUCOSE BLD GHB EST-MCNC: 116.9 MG/DL
HBA1C MFR BLD: 5.7 %

## 2025-08-12 ENCOUNTER — OFFICE VISIT (OUTPATIENT)
Dept: INTERNAL MEDICINE CLINIC | Age: 65
End: 2025-08-12
Payer: COMMERCIAL

## 2025-08-12 VITALS
TEMPERATURE: 98 F | HEIGHT: 57 IN | BODY MASS INDEX: 36.24 KG/M2 | DIASTOLIC BLOOD PRESSURE: 71 MMHG | HEART RATE: 90 BPM | SYSTOLIC BLOOD PRESSURE: 111 MMHG | OXYGEN SATURATION: 92 % | RESPIRATION RATE: 20 BRPM | WEIGHT: 168 LBS

## 2025-08-12 DIAGNOSIS — J45.41 MODERATE PERSISTENT ASTHMA WITH ACUTE EXACERBATION: ICD-10-CM

## 2025-08-12 DIAGNOSIS — M48.07 SPINAL STENOSIS OF LUMBOSACRAL REGION WITH RADICULOPATHY: Primary | ICD-10-CM

## 2025-08-12 DIAGNOSIS — Z91.81 AT HIGH RISK FOR FALLS: ICD-10-CM

## 2025-08-12 DIAGNOSIS — I10 HYPERTENSION, UNSPECIFIED TYPE: ICD-10-CM

## 2025-08-12 DIAGNOSIS — M54.17 SPINAL STENOSIS OF LUMBOSACRAL REGION WITH RADICULOPATHY: Primary | ICD-10-CM

## 2025-08-12 DIAGNOSIS — M54.17 RADICULOPATHY, LUMBOSACRAL REGION: ICD-10-CM

## 2025-08-12 PROCEDURE — 99213 OFFICE O/P EST LOW 20 MIN: CPT | Performed by: STUDENT IN AN ORGANIZED HEALTH CARE EDUCATION/TRAINING PROGRAM

## 2025-08-12 RX ORDER — CYCLOBENZAPRINE HCL 10 MG
10 TABLET ORAL 3 TIMES DAILY PRN
Qty: 300 TABLET | Refills: 0 | Status: SHIPPED | OUTPATIENT
Start: 2025-08-12 | End: 2025-11-20

## 2025-08-12 RX ORDER — LISINOPRIL 5 MG/1
5 TABLET ORAL DAILY
Qty: 30 TABLET | Refills: 1 | Status: SHIPPED | OUTPATIENT
Start: 2025-08-12

## 2025-08-12 RX ORDER — METHOCARBAMOL 750 MG/1
1000 TABLET, FILM COATED ORAL 3 TIMES DAILY
Qty: 90 TABLET | Refills: 0 | Status: SHIPPED | OUTPATIENT
Start: 2025-08-12 | End: 2025-08-22

## 2025-08-12 RX ORDER — ALBUTEROL SULFATE 0.83 MG/ML
SOLUTION RESPIRATORY (INHALATION)
Qty: 3858 ML | Refills: 3 | Status: SHIPPED | OUTPATIENT
Start: 2025-08-12

## 2025-08-12 RX ORDER — ZIPRASIDONE HYDROCHLORIDE 40 MG/1
40 CAPSULE ORAL 2 TIMES DAILY WITH MEALS
Qty: 60 CAPSULE | Refills: 1 | Status: SHIPPED | OUTPATIENT
Start: 2025-08-12

## 2025-08-12 RX ORDER — LANOLIN ALCOHOL/MO/W.PET/CERES
1000 CREAM (GRAM) TOPICAL DAILY
Qty: 30 TABLET | Refills: 1 | Status: SHIPPED | OUTPATIENT
Start: 2025-08-12

## 2025-08-12 RX ORDER — OXYCODONE HYDROCHLORIDE 5 MG/1
10 TABLET ORAL EVERY 6 HOURS PRN
Qty: 81 TABLET | Refills: 0 | Status: SHIPPED | OUTPATIENT
Start: 2025-08-12 | End: 2025-09-02

## 2025-08-12 RX ORDER — ESCITALOPRAM OXALATE 20 MG/1
20 TABLET ORAL DAILY
Qty: 30 TABLET | Refills: 1 | Status: SHIPPED | OUTPATIENT
Start: 2025-08-12

## 2025-08-12 RX ORDER — ALBUTEROL SULFATE 90 UG/1
2 INHALANT RESPIRATORY (INHALATION) 4 TIMES DAILY PRN
Qty: 1 EACH | Refills: 5 | Status: SHIPPED | OUTPATIENT
Start: 2025-08-12

## 2025-08-12 RX ORDER — LIDOCAINE 4 G/G
1 PATCH TOPICAL DAILY
Qty: 30 EACH | Refills: 0 | Status: SHIPPED | OUTPATIENT
Start: 2025-08-12

## 2025-08-12 RX ORDER — GABAPENTIN 600 MG/1
900 TABLET ORAL 3 TIMES DAILY
Qty: 450 EACH | Refills: 0 | Status: SHIPPED | OUTPATIENT
Start: 2025-08-12 | End: 2025-11-20

## 2025-08-12 RX ORDER — LANOLIN ALCOHOL/MO/W.PET/CERES
100 CREAM (GRAM) TOPICAL DAILY
Qty: 30 TABLET | Refills: 1 | Status: SHIPPED | OUTPATIENT
Start: 2025-08-12

## 2025-08-12 ASSESSMENT — PATIENT HEALTH QUESTIONNAIRE - PHQ9
9. THOUGHTS THAT YOU WOULD BE BETTER OFF DEAD, OR OF HURTING YOURSELF: NOT AT ALL
4. FEELING TIRED OR HAVING LITTLE ENERGY: NEARLY EVERY DAY
7. TROUBLE CONCENTRATING ON THINGS, SUCH AS READING THE NEWSPAPER OR WATCHING TELEVISION: NOT AT ALL
10. IF YOU CHECKED OFF ANY PROBLEMS, HOW DIFFICULT HAVE THESE PROBLEMS MADE IT FOR YOU TO DO YOUR WORK, TAKE CARE OF THINGS AT HOME, OR GET ALONG WITH OTHER PEOPLE: SOMEWHAT DIFFICULT
8. MOVING OR SPEAKING SO SLOWLY THAT OTHER PEOPLE COULD HAVE NOTICED. OR THE OPPOSITE, BEING SO FIGETY OR RESTLESS THAT YOU HAVE BEEN MOVING AROUND A LOT MORE THAN USUAL: NEARLY EVERY DAY
SUM OF ALL RESPONSES TO PHQ QUESTIONS 1-9: 15
5. POOR APPETITE OR OVEREATING: NOT AT ALL
6. FEELING BAD ABOUT YOURSELF - OR THAT YOU ARE A FAILURE OR HAVE LET YOURSELF OR YOUR FAMILY DOWN: NOT AT ALL
SUM OF ALL RESPONSES TO PHQ QUESTIONS 1-9: 15
3. TROUBLE FALLING OR STAYING ASLEEP: NEARLY EVERY DAY
1. LITTLE INTEREST OR PLEASURE IN DOING THINGS: NEARLY EVERY DAY
SUM OF ALL RESPONSES TO PHQ QUESTIONS 1-9: 15
2. FEELING DOWN, DEPRESSED OR HOPELESS: NEARLY EVERY DAY
SUM OF ALL RESPONSES TO PHQ QUESTIONS 1-9: 15

## 2025-08-14 ENCOUNTER — TELEPHONE (OUTPATIENT)
Dept: INTERNAL MEDICINE CLINIC | Age: 65
End: 2025-08-14

## 2025-08-19 ENCOUNTER — APPOINTMENT (OUTPATIENT)
Dept: MRI IMAGING | Age: 65
End: 2025-08-19
Payer: MEDICARE

## 2025-08-19 ENCOUNTER — HOSPITAL ENCOUNTER (OUTPATIENT)
Age: 65
Setting detail: OBSERVATION
Discharge: HOME | End: 2025-08-20
Attending: STUDENT IN AN ORGANIZED HEALTH CARE EDUCATION/TRAINING PROGRAM
Payer: MEDICARE

## 2025-08-19 DIAGNOSIS — M54.42 CHRONIC BILATERAL LOW BACK PAIN WITH BILATERAL SCIATICA: Primary | ICD-10-CM

## 2025-08-19 DIAGNOSIS — M54.41 CHRONIC BILATERAL LOW BACK PAIN WITH BILATERAL SCIATICA: Primary | ICD-10-CM

## 2025-08-19 DIAGNOSIS — G89.29 CHRONIC BILATERAL LOW BACK PAIN WITH BILATERAL SCIATICA: Primary | ICD-10-CM

## 2025-08-19 PROCEDURE — 99285 EMERGENCY DEPT VISIT HI MDM: CPT

## 2025-08-19 PROCEDURE — G0378 HOSPITAL OBSERVATION PER HR: HCPCS

## 2025-08-19 PROCEDURE — 97530 THERAPEUTIC ACTIVITIES: CPT

## 2025-08-19 PROCEDURE — 96374 THER/PROPH/DIAG INJ IV PUSH: CPT

## 2025-08-19 PROCEDURE — 97166 OT EVAL MOD COMPLEX 45 MIN: CPT

## 2025-08-19 PROCEDURE — 97162 PT EVAL MOD COMPLEX 30 MIN: CPT

## 2025-08-19 PROCEDURE — 6360000004 HC RX CONTRAST MEDICATION

## 2025-08-19 PROCEDURE — 6370000000 HC RX 637 (ALT 250 FOR IP)

## 2025-08-19 PROCEDURE — 72158 MRI LUMBAR SPINE W/O & W/DYE: CPT

## 2025-08-19 PROCEDURE — A9576 INJ PROHANCE MULTIPACK: HCPCS

## 2025-08-19 RX ORDER — OXYCODONE HYDROCHLORIDE 5 MG/1
5 TABLET ORAL EVERY 6 HOURS PRN
Refills: 0 | Status: DISCONTINUED | OUTPATIENT
Start: 2025-08-19 | End: 2025-08-19

## 2025-08-19 RX ORDER — OXYCODONE HYDROCHLORIDE 5 MG/1
5 TABLET ORAL ONCE
Refills: 0 | Status: COMPLETED | OUTPATIENT
Start: 2025-08-19 | End: 2025-08-19

## 2025-08-19 RX ORDER — SODIUM CHLORIDE 0.9 % (FLUSH) 0.9 %
5-40 SYRINGE (ML) INJECTION PRN
Status: DISCONTINUED | OUTPATIENT
Start: 2025-08-19 | End: 2025-08-20

## 2025-08-19 RX ORDER — ACETAMINOPHEN 500 MG
1000 TABLET ORAL ONCE
Status: COMPLETED | OUTPATIENT
Start: 2025-08-19 | End: 2025-08-19

## 2025-08-19 RX ORDER — ASPIRIN 81 MG/1
81 TABLET, CHEWABLE ORAL DAILY
Status: DISCONTINUED | OUTPATIENT
Start: 2025-08-20 | End: 2025-08-19

## 2025-08-19 RX ORDER — ACETAMINOPHEN 500 MG
1000 TABLET ORAL EVERY 6 HOURS PRN
Status: DISCONTINUED | OUTPATIENT
Start: 2025-08-19 | End: 2025-08-20 | Stop reason: HOSPADM

## 2025-08-19 RX ORDER — GABAPENTIN 300 MG/1
900 CAPSULE ORAL ONCE
Status: COMPLETED | OUTPATIENT
Start: 2025-08-19 | End: 2025-08-19

## 2025-08-19 RX ORDER — LIDOCAINE 4 G/G
1 PATCH TOPICAL ONCE
Status: COMPLETED | OUTPATIENT
Start: 2025-08-19 | End: 2025-08-19

## 2025-08-19 RX ORDER — GABAPENTIN 300 MG/1
900 CAPSULE ORAL 3 TIMES DAILY
Status: DISCONTINUED | OUTPATIENT
Start: 2025-08-19 | End: 2025-08-20 | Stop reason: HOSPADM

## 2025-08-19 RX ORDER — SODIUM CHLORIDE 0.9 % (FLUSH) 0.9 %
5-40 SYRINGE (ML) INJECTION EVERY 12 HOURS SCHEDULED
Status: DISCONTINUED | OUTPATIENT
Start: 2025-08-19 | End: 2025-08-20 | Stop reason: HOSPADM

## 2025-08-19 RX ORDER — METHOCARBAMOL 500 MG/1
750 TABLET, FILM COATED ORAL 3 TIMES DAILY
Status: DISCONTINUED | OUTPATIENT
Start: 2025-08-19 | End: 2025-08-19

## 2025-08-19 RX ORDER — ACETAMINOPHEN 650 MG/1
650 SUPPOSITORY RECTAL EVERY 6 HOURS PRN
Status: DISCONTINUED | OUTPATIENT
Start: 2025-08-19 | End: 2025-08-19 | Stop reason: SDUPTHER

## 2025-08-19 RX ORDER — METHOCARBAMOL 500 MG/1
1000 TABLET, FILM COATED ORAL 3 TIMES DAILY
Status: DISCONTINUED | OUTPATIENT
Start: 2025-08-19 | End: 2025-08-20 | Stop reason: HOSPADM

## 2025-08-19 RX ORDER — SODIUM CHLORIDE 9 MG/ML
INJECTION, SOLUTION INTRAVENOUS PRN
Status: DISCONTINUED | OUTPATIENT
Start: 2025-08-19 | End: 2025-08-20 | Stop reason: HOSPADM

## 2025-08-19 RX ORDER — GADOTERIDOL 279.3 MG/ML
17 INJECTION INTRAVENOUS
Status: COMPLETED | OUTPATIENT
Start: 2025-08-19 | End: 2025-08-19

## 2025-08-19 RX ORDER — ACETAMINOPHEN 325 MG/1
650 TABLET ORAL EVERY 6 HOURS PRN
Status: DISCONTINUED | OUTPATIENT
Start: 2025-08-19 | End: 2025-08-19 | Stop reason: SDUPTHER

## 2025-08-19 RX ORDER — METHOCARBAMOL 500 MG/1
750 TABLET, FILM COATED ORAL ONCE
Status: COMPLETED | OUTPATIENT
Start: 2025-08-19 | End: 2025-08-19

## 2025-08-19 RX ORDER — OXYCODONE HYDROCHLORIDE 5 MG/1
10 TABLET ORAL EVERY 6 HOURS PRN
Refills: 0 | Status: DISCONTINUED | OUTPATIENT
Start: 2025-08-19 | End: 2025-08-20 | Stop reason: HOSPADM

## 2025-08-19 RX ADMIN — METHOCARBAMOL 750 MG: 500 TABLET ORAL at 11:13

## 2025-08-19 RX ADMIN — ACETAMINOPHEN 1000 MG: 500 TABLET ORAL at 11:13

## 2025-08-19 RX ADMIN — METHOCARBAMOL 1000 MG: 500 TABLET ORAL at 20:07

## 2025-08-19 RX ADMIN — GABAPENTIN 900 MG: 300 CAPSULE ORAL at 11:15

## 2025-08-19 RX ADMIN — OXYCODONE 5 MG: 5 TABLET ORAL at 18:24

## 2025-08-19 RX ADMIN — OXYCODONE 5 MG: 5 TABLET ORAL at 11:12

## 2025-08-19 RX ADMIN — GADOTERIDOL 17 ML: 279.3 INJECTION, SOLUTION INTRAVENOUS at 14:17

## 2025-08-19 RX ADMIN — OXYCODONE 5 MG: 5 TABLET ORAL at 13:38

## 2025-08-19 RX ADMIN — OXYCODONE HYDROCHLORIDE 10 MG: 5 TABLET ORAL at 20:08

## 2025-08-19 RX ADMIN — GABAPENTIN 900 MG: 300 CAPSULE ORAL at 20:06

## 2025-08-19 ASSESSMENT — PAIN DESCRIPTION - DESCRIPTORS
DESCRIPTORS: ACHING
DESCRIPTORS: ACHING

## 2025-08-19 ASSESSMENT — ENCOUNTER SYMPTOMS
SHORTNESS OF BREATH: 0
CHEST TIGHTNESS: 0

## 2025-08-19 ASSESSMENT — PAIN DESCRIPTION - LOCATION
LOCATION: BACK
LOCATION: BACK

## 2025-08-19 ASSESSMENT — PAIN - FUNCTIONAL ASSESSMENT
PAIN_FUNCTIONAL_ASSESSMENT: 0-10
PAIN_FUNCTIONAL_ASSESSMENT: 0-10

## 2025-08-19 ASSESSMENT — PAIN DESCRIPTION - FREQUENCY: FREQUENCY: CONTINUOUS

## 2025-08-19 ASSESSMENT — PAIN SCALES - GENERAL
PAINLEVEL_OUTOF10: 10
PAINLEVEL_OUTOF10: 10

## 2025-08-19 ASSESSMENT — PAIN DESCRIPTION - ORIENTATION: ORIENTATION: LOWER

## 2025-08-19 ASSESSMENT — PAIN DESCRIPTION - PAIN TYPE: TYPE: CHRONIC PAIN

## 2025-08-20 VITALS
SYSTOLIC BLOOD PRESSURE: 130 MMHG | HEART RATE: 88 BPM | RESPIRATION RATE: 14 BRPM | OXYGEN SATURATION: 92 % | WEIGHT: 172.1 LBS | HEIGHT: 57 IN | DIASTOLIC BLOOD PRESSURE: 91 MMHG | TEMPERATURE: 98.3 F | BODY MASS INDEX: 37.13 KG/M2

## 2025-08-20 PROCEDURE — G0378 HOSPITAL OBSERVATION PER HR: HCPCS

## 2025-08-20 PROCEDURE — 6370000000 HC RX 637 (ALT 250 FOR IP): Performed by: STUDENT IN AN ORGANIZED HEALTH CARE EDUCATION/TRAINING PROGRAM

## 2025-08-20 PROCEDURE — 6370000000 HC RX 637 (ALT 250 FOR IP)

## 2025-08-20 PROCEDURE — 6370000000 HC RX 637 (ALT 250 FOR IP): Performed by: EMERGENCY MEDICINE

## 2025-08-20 PROCEDURE — 97530 THERAPEUTIC ACTIVITIES: CPT

## 2025-08-20 PROCEDURE — 97116 GAIT TRAINING THERAPY: CPT

## 2025-08-20 RX ORDER — METHOCARBAMOL 500 MG/1
1000 TABLET, FILM COATED ORAL ONCE
Status: COMPLETED | OUTPATIENT
Start: 2025-08-20 | End: 2025-08-20

## 2025-08-20 RX ORDER — GABAPENTIN 300 MG/1
900 CAPSULE ORAL ONCE
Status: COMPLETED | OUTPATIENT
Start: 2025-08-20 | End: 2025-08-20

## 2025-08-20 RX ORDER — OXYCODONE HYDROCHLORIDE 10 MG/1
10 TABLET ORAL EVERY 6 HOURS PRN
Qty: 12 TABLET | Refills: 0 | Status: SHIPPED | OUTPATIENT
Start: 2025-08-20 | End: 2025-08-20

## 2025-08-20 RX ORDER — ESCITALOPRAM OXALATE 20 MG/1
20 TABLET ORAL ONCE
Status: COMPLETED | OUTPATIENT
Start: 2025-08-20 | End: 2025-08-20

## 2025-08-20 RX ORDER — LISINOPRIL 10 MG/1
5 TABLET ORAL ONCE
Status: COMPLETED | OUTPATIENT
Start: 2025-08-20 | End: 2025-08-20

## 2025-08-20 RX ORDER — ZIPRASIDONE HYDROCHLORIDE 40 MG/1
40 CAPSULE ORAL ONCE
Status: DISCONTINUED | OUTPATIENT
Start: 2025-08-20 | End: 2025-08-20 | Stop reason: HOSPADM

## 2025-08-20 RX ORDER — OXYCODONE HYDROCHLORIDE 10 MG/1
10 TABLET ORAL EVERY 6 HOURS PRN
Qty: 12 TABLET | Refills: 0 | Status: SHIPPED | OUTPATIENT
Start: 2025-08-22 | End: 2025-08-22 | Stop reason: SDUPTHER

## 2025-08-20 RX ORDER — ASPIRIN 81 MG/1
81 TABLET, CHEWABLE ORAL ONCE
Status: COMPLETED | OUTPATIENT
Start: 2025-08-20 | End: 2025-08-20

## 2025-08-20 RX ORDER — OXYCODONE HYDROCHLORIDE 5 MG/1
10 TABLET ORAL ONCE
Refills: 0 | Status: COMPLETED | OUTPATIENT
Start: 2025-08-20 | End: 2025-08-20

## 2025-08-20 RX ADMIN — OXYCODONE HYDROCHLORIDE 10 MG: 5 TABLET ORAL at 02:19

## 2025-08-20 RX ADMIN — GABAPENTIN 900 MG: 300 CAPSULE ORAL at 13:48

## 2025-08-20 RX ADMIN — OXYCODONE HYDROCHLORIDE 10 MG: 5 TABLET ORAL at 08:31

## 2025-08-20 RX ADMIN — LISINOPRIL 5 MG: 10 TABLET ORAL at 08:31

## 2025-08-20 RX ADMIN — GABAPENTIN 900 MG: 300 CAPSULE ORAL at 02:40

## 2025-08-20 RX ADMIN — ASPIRIN 81 MG: 81 TABLET, CHEWABLE ORAL at 08:31

## 2025-08-20 RX ADMIN — METHOCARBAMOL 1000 MG: 500 TABLET ORAL at 02:41

## 2025-08-20 RX ADMIN — GABAPENTIN 900 MG: 300 CAPSULE ORAL at 08:31

## 2025-08-20 RX ADMIN — ESCITALOPRAM OXALATE 20 MG: 20 TABLET, FILM COATED ORAL at 09:28

## 2025-08-20 RX ADMIN — METHOCARBAMOL 1000 MG: 500 TABLET ORAL at 08:31

## 2025-08-20 RX ADMIN — METHOCARBAMOL 1000 MG: 500 TABLET ORAL at 13:48

## 2025-08-20 RX ADMIN — ACETAMINOPHEN 1000 MG: 500 TABLET ORAL at 03:43

## 2025-08-20 ASSESSMENT — PAIN DESCRIPTION - DESCRIPTORS
DESCRIPTORS: ACHING;PRESSURE
DESCRIPTORS: SHOOTING

## 2025-08-20 ASSESSMENT — PAIN DESCRIPTION - LOCATION
LOCATION: BACK
LOCATION: BACK
LOCATION: HEAD

## 2025-08-20 ASSESSMENT — PAIN - FUNCTIONAL ASSESSMENT
PAIN_FUNCTIONAL_ASSESSMENT: 0-10
PAIN_FUNCTIONAL_ASSESSMENT: 0-10
PAIN_FUNCTIONAL_ASSESSMENT: ACTIVITIES ARE NOT PREVENTED
PAIN_FUNCTIONAL_ASSESSMENT: PREVENTS OR INTERFERES WITH MANY ACTIVE NOT PASSIVE ACTIVITIES

## 2025-08-20 ASSESSMENT — PAIN SCALES - GENERAL
PAINLEVEL_OUTOF10: 6
PAINLEVEL_OUTOF10: 9
PAINLEVEL_OUTOF10: 6
PAINLEVEL_OUTOF10: 3
PAINLEVEL_OUTOF10: 3

## 2025-08-20 ASSESSMENT — PAIN DESCRIPTION - ORIENTATION
ORIENTATION: LOWER
ORIENTATION: LOWER
ORIENTATION: MID

## 2025-08-20 ASSESSMENT — PAIN DESCRIPTION - PAIN TYPE: TYPE: CHRONIC PAIN

## 2025-08-20 ASSESSMENT — PAIN DESCRIPTION - FREQUENCY: FREQUENCY: CONTINUOUS

## 2025-08-22 ENCOUNTER — OFFICE VISIT (OUTPATIENT)
Dept: INTERNAL MEDICINE CLINIC | Age: 65
End: 2025-08-22
Payer: COMMERCIAL

## 2025-08-22 VITALS
BODY MASS INDEX: 36.96 KG/M2 | WEIGHT: 171.3 LBS | SYSTOLIC BLOOD PRESSURE: 121 MMHG | OXYGEN SATURATION: 96 % | HEART RATE: 86 BPM | DIASTOLIC BLOOD PRESSURE: 80 MMHG | HEIGHT: 57 IN

## 2025-08-22 DIAGNOSIS — M54.42 CHRONIC BILATERAL LOW BACK PAIN WITH BILATERAL SCIATICA: ICD-10-CM

## 2025-08-22 DIAGNOSIS — M54.41 CHRONIC BILATERAL LOW BACK PAIN WITH BILATERAL SCIATICA: ICD-10-CM

## 2025-08-22 DIAGNOSIS — G89.29 CHRONIC BILATERAL LOW BACK PAIN WITH BILATERAL SCIATICA: ICD-10-CM

## 2025-08-22 PROCEDURE — 99213 OFFICE O/P EST LOW 20 MIN: CPT

## 2025-08-22 RX ORDER — OXYCODONE HYDROCHLORIDE 10 MG/1
10 TABLET ORAL EVERY 6 HOURS PRN
Qty: 12 TABLET | Refills: 0 | Status: SHIPPED | OUTPATIENT
Start: 2025-08-22 | End: 2025-08-22

## 2025-08-22 RX ORDER — OXYCODONE HYDROCHLORIDE 10 MG/1
10 TABLET ORAL EVERY 6 HOURS PRN
Qty: 12 TABLET | Refills: 0 | Status: SHIPPED | OUTPATIENT
Start: 2025-08-25 | End: 2025-08-28

## 2025-08-22 RX ORDER — METHOCARBAMOL 1000 MG/1
1000 TABLET, FILM COATED ORAL 3 TIMES DAILY
Qty: 90 TABLET | Refills: 0 | Status: SHIPPED | OUTPATIENT
Start: 2025-08-22 | End: 2025-09-21

## 2025-08-22 ASSESSMENT — ENCOUNTER SYMPTOMS
BACK PAIN: 1
SHORTNESS OF BREATH: 0
ABDOMINAL PAIN: 0

## 2025-08-27 ENCOUNTER — TRANSCRIBE ORDERS (OUTPATIENT)
Dept: ADMINISTRATIVE | Age: 65
End: 2025-08-27

## 2025-08-27 DIAGNOSIS — M41.9 KYPHOSCOLIOSIS AND SCOLIOSIS: Primary | ICD-10-CM

## 2025-08-29 ENCOUNTER — TRANSCRIBE ORDERS (OUTPATIENT)
Dept: ADMINISTRATIVE | Age: 65
End: 2025-08-29

## 2025-08-29 DIAGNOSIS — M54.14 THORACIC RADICULOPATHY: Primary | ICD-10-CM

## 2025-09-01 ENCOUNTER — TRANSCRIBE ORDERS (OUTPATIENT)
Dept: ADMINISTRATIVE | Age: 65
End: 2025-09-01

## 2025-09-01 DIAGNOSIS — M81.0 AGE RELATED OSTEOPOROSIS, UNSPECIFIED PATHOLOGICAL FRACTURE PRESENCE: Primary | ICD-10-CM

## 2025-09-03 ENCOUNTER — TELEPHONE (OUTPATIENT)
Dept: INTERNAL MEDICINE CLINIC | Age: 65
End: 2025-09-03